# Patient Record
Sex: MALE | Race: WHITE | Employment: OTHER | URBAN - METROPOLITAN AREA
[De-identification: names, ages, dates, MRNs, and addresses within clinical notes are randomized per-mention and may not be internally consistent; named-entity substitution may affect disease eponyms.]

---

## 2017-06-18 ENCOUNTER — HOSPITAL ENCOUNTER (EMERGENCY)
Age: 82
Discharge: HOME | End: 2017-06-19
Attending: EMERGENCY MEDICINE | Admitting: EMERGENCY MEDICINE
Payer: MEDICARE

## 2017-06-18 DIAGNOSIS — T78.40XA ALLERGIC REACTION, INITIAL ENCOUNTER: Primary | ICD-10-CM

## 2017-06-18 PROCEDURE — 96374 THER/PROPH/DIAG INJ IV PUSH: CPT

## 2017-06-18 PROCEDURE — 74011250636 HC RX REV CODE- 250/636: Performed by: EMERGENCY MEDICINE

## 2017-06-18 PROCEDURE — 96375 TX/PRO/DX INJ NEW DRUG ADDON: CPT

## 2017-06-18 PROCEDURE — 96361 HYDRATE IV INFUSION ADD-ON: CPT

## 2017-06-18 PROCEDURE — 99283 EMERGENCY DEPT VISIT LOW MDM: CPT

## 2017-06-18 RX ORDER — VALSARTAN 320 MG/1
320 TABLET ORAL DAILY
COMMUNITY
End: 2017-11-19 | Stop reason: SDUPTHER

## 2017-06-18 RX ORDER — DIPHENHYDRAMINE HYDROCHLORIDE 50 MG/ML
25 INJECTION, SOLUTION INTRAMUSCULAR; INTRAVENOUS
Status: COMPLETED | OUTPATIENT
Start: 2017-06-18 | End: 2017-06-18

## 2017-06-18 RX ADMIN — DIPHENHYDRAMINE HYDROCHLORIDE 25 MG: 50 INJECTION, SOLUTION INTRAMUSCULAR; INTRAVENOUS at 22:32

## 2017-06-18 RX ADMIN — SODIUM CHLORIDE 1000 ML: 900 INJECTION, SOLUTION INTRAVENOUS at 22:32

## 2017-06-18 RX ADMIN — METHYLPREDNISOLONE SODIUM SUCCINATE 125 MG: 125 INJECTION, POWDER, FOR SOLUTION INTRAMUSCULAR; INTRAVENOUS at 22:32

## 2017-06-19 VITALS
SYSTOLIC BLOOD PRESSURE: 124 MMHG | RESPIRATION RATE: 16 BRPM | DIASTOLIC BLOOD PRESSURE: 60 MMHG | OXYGEN SATURATION: 94 % | TEMPERATURE: 98 F | BODY MASS INDEX: 26.66 KG/M2 | HEIGHT: 68 IN | WEIGHT: 175.93 LBS | HEART RATE: 69 BPM

## 2017-06-19 RX ORDER — METHYLPREDNISOLONE 4 MG/1
TABLET ORAL
Qty: 1 DOSE PACK | Refills: 0 | Status: SHIPPED | OUTPATIENT
Start: 2017-06-19 | End: 2017-12-07 | Stop reason: ALTCHOICE

## 2017-06-19 NOTE — ED PROVIDER NOTES
HPI Comments: Evangelina Hampton is a [de-identified] y.o. male with pertinent PMHx of HTN, CAD who presents ambulatory with spouse to ED c/o constant lip swelling, along with associated rash under his arms and around his waist that onset at 5:45 PM today. Spouse states the pt took Benadryl 75 mg at 5:45 PM. He denies eating any new foods or previous seafood allergy, and he denies any use of new soaps, lotions, medications, or creams. Pt states he has a history of similar rash 3-4 times with previous allergic reactions, but he does not typically have lip swelling. Pt notes previous concern for possible allergy to pork, but he reports being tested by an allergy specialist who told him that he had no discernable reaction to pork. Pt denies any shortness of breath, tongue swelling, nausea, vomiting. PCP:  Raj Neal MD  Cardiology: Dr Elisa Marina Hx:  tobacco use (former), EtOH use (-)    There are no other complaints, changes or physical findings at this time. The history is provided by the patient. No  was used. Past Medical History:   Diagnosis Date    Arthritis     CAD (coronary artery disease) 2000    MI     Hypertension     Other ill-defined conditions     elevated lipids    Psychiatric disorder     depression /anxiety       Past Surgical History:   Procedure Laterality Date    ABDOMEN SURGERY PROC UNLISTED      hernia repair    CARDIAC SURG PROCEDURE UNLIST      stents x 6    COLORECTAL SCRN; HI RISK IND  9/10/2014         HX TONSILLECTOMY      [place] COLSC FLX W/RMVL OF TUMOR POLYP LESION SNARE TQ  9/10/2014              Family History:   Problem Relation Age of Onset    Psychiatric Disorder Mother      depression    Heart Disease Father      aneurysym       Social History     Social History    Marital status:      Spouse name: N/A    Number of children: N/A    Years of education: N/A     Occupational History    Not on file.      Social History Main Topics  Smoking status: Former Smoker     Packs/day: 1.50     Years: 5.00    Smokeless tobacco: Not on file      Comment: smoke for 5 year     Alcohol use No    Drug use: No    Sexual activity: Not on file     Other Topics Concern    Not on file     Social History Narrative         ALLERGIES: Benazepril and Pork/porcine containing products    Review of Systems   Constitutional: Negative for activity change, chills and fever. HENT: Positive for facial swelling (lips). Negative for congestion and sore throat. Eyes: Negative for pain and redness. Respiratory: Negative for cough, chest tightness and shortness of breath. Cardiovascular: Negative for chest pain and palpitations. Gastrointestinal: Negative for abdominal pain, diarrhea, nausea and vomiting. Genitourinary: Negative for dysuria, frequency and urgency. Musculoskeletal: Negative for back pain and neck pain. Skin: Positive for rash. Neurological: Negative for syncope, light-headedness and headaches. Psychiatric/Behavioral: Negative for confusion. All other systems reviewed and are negative. Patient Vitals for the past 12 hrs:   Temp Pulse Resp BP SpO2   06/19/17 0000 - - - 124/60 94 %   06/18/17 2345 - - - 117/62 95 %   06/18/17 2329 - - - 125/59 95 %   06/18/17 2124 98 °F (36.7 °C) 69 16 (!) 125/94 100 %       Physical Exam   Constitutional: He is oriented to person, place, and time. He appears well-developed and well-nourished. No distress. HENT:   Head: Normocephalic. Nose: Nose normal.   Mouth/Throat: Oropharynx is clear and moist. No oropharyngeal exudate. No tongue swelling, no lip swelling appreciated    Eyes: Conjunctivae are normal. Pupils are equal, round, and reactive to light. No scleral icterus. Neck: Normal range of motion. Neck supple. No JVD present. No tracheal deviation present. No thyromegaly present. Cardiovascular: Normal rate, regular rhythm and intact distal pulses.   Exam reveals no gallop and no friction rub. No murmur heard. Pulmonary/Chest: Effort normal and breath sounds normal. No stridor. No respiratory distress. He has no wheezes. He has no rales. Abdominal: Soft. Bowel sounds are normal. He exhibits no distension. There is no tenderness. There is no rebound and no guarding. Musculoskeletal: Normal range of motion. He exhibits no edema. Lymphadenopathy:     He has no cervical adenopathy. Neurological: He is alert and oriented to person, place, and time. No cranial nerve deficit. He exhibits normal muscle tone. Coordination normal.   Skin: Skin is warm and dry. He is not diaphoretic. Erythematous, urticarial rash bilateral axilla and medial aspect of upper arms, and anterior surface of his waist line and suprapubic area extending down to his thighs. No open wounds or drainage     Psychiatric: He has a normal mood and affect. His behavior is normal.   Nursing note and vitals reviewed. MDM  Number of Diagnoses or Management Options  Allergic reaction, initial encounter:   Diagnosis management comments: DDx: Urticaria , allergic reaction       Amount and/or Complexity of Data Reviewed  Review and summarize past medical records: yes    Risk of Complications, Morbidity, and/or Mortality  General comments: Pt well appearing; urticarial rash in bilateral axillae and anterior thighs and lower abd; no tongue or lip swelling; no sob or resp sx; vss; no progression of sx during observation period; dc with medrol dose kavin; pt agreed to return to ed if any worsening of sx;  Gabriela Cruz MD      Patient Progress  Patient progress: stable    ED Course       Procedures    Progress Note  12:05 AM    Gabriela Cruz MD has re-evaluated pt, and pt states he iss feeling better, and he has had no progression of rash.     MEDICATIONS GIVEN:  Medications   methylPREDNISolone (PF) (SOLU-MEDROL) injection 125 mg (125 mg IntraVENous Given 6/18/17 2232)   diphenhydrAMINE (BENADRYL) injection 25 mg (25 mg IntraVENous Given 6/18/17 2232)   sodium chloride 0.9 % bolus infusion 1,000 mL (0 mL IntraVENous IV Completed 6/18/17 2332)       IMPRESSION:  1. Allergic reaction, initial encounter        PLAN:  1. Discharge Medication List as of 6/19/2017 12:10 AM      START taking these medications    Details   methylPREDNISolone (MEDROL, BRYNN,) 4 mg tablet Take as directed, Print, Disp-1 Dose Pack, R-0         CONTINUE these medications which have NOT CHANGED    Details   valsartan (DIOVAN) 320 mg tablet Take 320 mg by mouth daily. , Historical Med      nebivolol (BYSTOLIC) 5 mg tablet Take 5 mg by mouth daily. , Historical Med      Amlodipine-Olmesartan (TERENCE) 5-40 mg tab Take 1 tablet by mouth daily. , Historical Med      PSYLLIUM SEED, WITH SUGAR, (METAMUCIL PO) Take 2 capsules by mouth daily. , Historical Med      aspirin 81 mg tablet Take 81 mg by mouth daily. , Historical Med      fish oil-dha-epa 1,200-144-216 mg cap Take 3 Tabs by mouth daily. , Historical Med      ALPRAZolam (XANAX) 0.5 mg tablet Take 0.5 mg by mouth nightly as needed.  , Historical Med      cholecalciferol, vitamin d3, (VITAMIN D) 1,000 unit tablet Take 3,000 Units by mouth daily. , Historical Med      rosuvastatin (CRESTOR) 5 mg tablet Take 20 mg by mouth daily. , Historical Med           2. Follow-up Information     Follow up With Details Comments Contact Info    Eliazar Manzo MD Call in 1 day  [address] [place] [address]  [phone]      [facility] EMERGENCY DEPT Go in 1 day As needed, If symptoms worsen [address] [place] [address]  [phone]        Return to ED if worse       DISCHARGE NOTE  12:20 AM  The patient has been re-evaluated and is ready for discharge. Reviewed available results with patient. Counseled pt on diagnosis and care plan. Pt has expressed understanding, and all questions have been answered.  Pt agrees with plan and agrees to follow up as recommended, or return to the ED if their symptoms worsen. Discharge instructions have been provided and explained to the pt, along with reasons to return to the ED. Attestations: This note is prepared by Bharti Beaver, acting as Scribe for Deepak Fitch MD.    Deepak Fitch MD: The scribe's documentation has been prepared under my direction and personally reviewed by me in its entirety. I confirm that the note above accurately reflects all work, treatment, procedures, and medical decision making performed by me.

## 2017-06-19 NOTE — ED NOTES
Assumed care of patient. Patient is alert and oriented, does not appear to be in distress. Patient ambulatory to ED with c/o allergic reaction with rash and itching to aron under arms, abdomen, lips and pelvic area. Pt took 75 mg benadryl at 1745 this evening. Pt denies any changes in medications or laundry detergent.

## 2017-06-19 NOTE — DISCHARGE INSTRUCTIONS
Allergic Reaction: Care Instructions  Your Care Instructions  An allergic reaction is an excessive response from your immune system to a medicine, chemical, food, insect bite, or other substance. A reaction can range from mild to life-threatening. Some people have a mild rash, hives, and itching or stomach cramps. In severe reactions, swelling of your tongue and throat can close up your airway so that you cannot breathe. Follow-up care is a key part of your treatment and safety. Be sure to make and go to all appointments, and call your doctor if you are having problems. It's also a good idea to know your test results and keep a list of the medicines you take. How can you care for yourself at home? · If you know what caused your allergic reaction, be sure to avoid it. Your allergy may become more severe each time you have a reaction. · Take an over-the-counter antihistamine, such as cetirizine (Zyrtec) or loratadine (Claritin), to treat mild symptoms. Read and follow directions on the label. Some antihistamines can make you feel sleepy. Do not give antihistamines to a child unless you have checked with your doctor first. Mild symptoms include sneezing or an itchy or runny nose; an itchy mouth; a few hives or mild itching; and mild nausea or stomach discomfort. · Do not scratch hives or a rash. Put a cold, moist towel on them or take cool baths to relieve itching. Put ice packs on hives, swelling, or insect stings for 10 to 15 minutes at a time. Put a thin cloth between the ice pack and your skin. Do not take hot baths or showers. They will make the itching worse. · Your doctor may prescribe a shot of epinephrine to carry with you in case you have a severe reaction. Learn how to give yourself the shot and keep it with you at all times. Make sure it is not . · Go to the emergency room every time you have a severe reaction, even if you have used your shot of epinephrine and are feeling better. Symptoms can come back after a shot. · Wear medical alert jewelry that lists your allergies. You can buy this at most drugstores. · If your child has a severe allergy, make sure that his or her teachers, babysitters, coaches, and other caregivers know about the allergy. They should have an epinephrine shot, know how and when to give it, and have a plan to take your child to the hospital.  When should you call for help? Give an epinephrine shot if:  · You think you are having a severe allergic reaction. · You have symptoms in more than one body area, such as mild nausea and an itchy mouth. After giving an epinephrine shot call 911, even if you feel better. Call 911 if:  · You have symptoms of a severe allergic reaction. These may include:  ¨ Sudden raised, red areas (hives) all over your body. ¨ Swelling of the throat, mouth, lips, or tongue. ¨ Trouble breathing. ¨ Passing out (losing consciousness). Or you may feel very lightheaded or suddenly feel weak, confused, or restless. · You have been given an epinephrine shot, even if you feel better. Call your doctor now or seek immediate medical care if:  · You have symptoms of an allergic reaction, such as:  ¨ A rash or hives (raised, red areas on the skin). ¨ Itching. ¨ Swelling. ¨ Belly pain, nausea, or vomiting. Watch closely for changes in your health, and be sure to contact your doctor if:  · You do not get better as expected. Where can you learn more? Go to http://florin-madeline.info/. Enter X415 in the search box to learn more about \"Allergic Reaction: Care Instructions. \"  Current as of: February 12, 2016  Content Version: 11.2  © 8960-3909 [employer]. Care instructions adapted under license by [employer] (which disclaims liability or warranty for this information).  If you have questions about a medical condition or this instruction, always ask your healthcare professional. Cathy Ramirez disclaims any warranty or liability for your use of this information.

## 2017-06-19 NOTE — ED NOTES
Dr. Sven Ibarra gave and reviewed discharge instructions with the patient. The patient verbalized understanding. The patient was given opportunity for questions. Patient discharged in stable condition to the waiting room via wheelchair with ED tech and female visitor.

## 2017-06-19 NOTE — ED NOTES
Assumed care of pt from JARRELL Ortiz with verbal report consisting of Situation, Background, Assessment, and Recommendations (SBAR). Pt is A&O x 4. Pt resting comfortably on the stretcher in a position of comfort. Call bell within reach. Side rails x 2. Cardiac monitor x 2. Stretcher locked in the lowest position. Concerns and questions addressed at this time. Pt in no acute distress at this the time. Will continue to monitor.

## 2017-09-03 DIAGNOSIS — E78.2 MIXED HYPERLIPIDEMIA: Primary | ICD-10-CM

## 2017-09-05 RX ORDER — ROSUVASTATIN CALCIUM 20 MG/1
TABLET, COATED ORAL
Qty: 90 TAB | Refills: 3 | Status: SHIPPED | OUTPATIENT
Start: 2017-09-05 | End: 2018-09-17 | Stop reason: SDUPTHER

## 2017-09-05 NOTE — TELEPHONE ENCOUNTER
Requested Prescriptions     Pending Prescriptions Disp Refills    rosuvastatin (CRESTOR) 20 mg tablet [Pharmacy Med Name: ROSUVASTATIN  20MG  TAB] 90 Tab 3     Sig: Take 1 tablet by mouth  daily

## 2017-10-11 RX ORDER — ALPRAZOLAM 0.5 MG/1
0.5 TABLET ORAL
Qty: 90 TAB | Refills: 0 | Status: SHIPPED | OUTPATIENT
Start: 2017-10-11 | End: 2018-02-20 | Stop reason: SDUPTHER

## 2017-10-11 NOTE — TELEPHONE ENCOUNTER
Requested Prescriptions     Pending Prescriptions Disp Refills    ALPRAZolam (XANAX) 0.5 mg tablet 90 Tab 0     Sig: Take 1 Tab by mouth nightly as needed.  Max Daily Amount: 0.5 mg.

## 2017-10-30 ENCOUNTER — CLINICAL SUPPORT (OUTPATIENT)
Dept: INTERNAL MEDICINE CLINIC | Age: 82
End: 2017-10-30

## 2017-10-30 DIAGNOSIS — Z23 ENCOUNTER FOR IMMUNIZATION: ICD-10-CM

## 2017-10-30 NOTE — PATIENT INSTRUCTIONS
Vaccine Information Statement    Influenza (Flu) Vaccine (Inactivated or Recombinant): What you need to know    Many Vaccine Information Statements are available in Croatian and other languages. See www.immunize.org/vis  Hojas de Información Sobre Vacunas están disponibles en Español y en muchos otros idiomas. Visite www.immunize.org/vis    1. Why get vaccinated? Influenza (flu) is a contagious disease that spreads around the [place] every year, usually between October and May. Flu is caused by influenza viruses, and is spread mainly by coughing, sneezing, and close contact. Anyone can get flu. Flu strikes suddenly and can last several days. Symptoms vary by age, but can include:   fever/chills   sore throat   muscle aches   fatigue   cough   headache    runny or stuffy nose    Flu can also lead to pneumonia and blood infections, and cause diarrhea and seizures in children. If you have a medical condition, such as heart or lung disease, flu can make it worse. Flu is more dangerous for some people. Infants and young children, people 72years of age and older, pregnant women, and people with certain health conditions or a weakened immune system are at greatest risk. Each year thousands of people in the [facility] die from flu, and many more are hospitalized. Flu vaccine can:   keep you from getting flu,   make flu less severe if you do get it, and   keep you from spreading flu to your family and other people. 2. Inactivated and recombinant flu vaccines    A dose of flu vaccine is recommended every flu season. Children 6 months through 6years of age may need two doses during the same flu season. Everyone else needs only one dose each flu season.        Some inactivated flu vaccines contain a very small amount of a mercury-based preservative called thimerosal. Studies have not shown thimerosal in vaccines to be harmful, but flu vaccines that do not contain thimerosal are available. There is no live flu virus in flu shots. They cannot cause the flu. There are many flu viruses, and they are always changing. Each year a new flu vaccine is made to protect against three or four viruses that are likely to cause disease in the upcoming flu season. But even when the vaccine doesnt exactly match these viruses, it may still provide some protection    Flu vaccine cannot prevent:   flu that is caused by a virus not covered by the vaccine, or   illnesses that look like flu but are not. It takes about 2 weeks for protection to develop after vaccination, and protection lasts through the flu season. 3. Some people should not get this vaccine    Tell the person who is giving you the vaccine:     If you have any severe, life-threatening allergies. If you ever had a life-threatening allergic reaction after a dose of flu vaccine, or have a severe allergy to any part of this vaccine, you may be advised not to get vaccinated. Most, but not all, types of flu vaccine contain a small amount of egg protein.  If you ever had Guillain-Barré Syndrome (also called GBS). Some people with a history of GBS should not get this vaccine. This should be discussed with your doctor.  If you are not feeling well. It is usually okay to get flu vaccine when you have a mild illness, but you might be asked to come back when you feel better. 4. Risks of a vaccine reaction    With any medicine, including vaccines, there is a chance of reactions. These are usually mild and go away on their own, but serious reactions are also possible. Most people who get a flu shot do not have any problems with it.      Minor problems following a flu shot include:    soreness, redness, or swelling where the shot was given     hoarseness   sore, red or itchy eyes   cough   fever   aches   headache   itching   fatigue  If these problems occur, they usually begin soon after the shot and last 1 or 2 days. More serious problems following a flu shot can include the following:     There may be a small increased risk of Guillain-Barré Syndrome (GBS) after inactivated flu vaccine. This risk has been estimated at 1 or 2 additional cases per million people vaccinated. This is much lower than the risk of severe complications from flu, which can be prevented by flu vaccine.  Young children who get the flu shot along with pneumococcal vaccine (PCV13) and/or DTaP vaccine at the same time might be slightly more likely to have a seizure caused by fever. Ask your doctor for more information. Tell your doctor if a child who is getting flu vaccine has ever had a seizure. Problems that could happen after any injected vaccine:      People sometimes faint after a medical procedure, including vaccination. Sitting or lying down for about 15 minutes can help prevent fainting, and injuries caused by a fall. Tell your doctor if you feel dizzy, or have vision changes or ringing in the ears.  Some people get severe pain in the shoulder and have difficulty moving the arm where a shot was given. This happens very rarely.  Any medication can cause a severe allergic reaction. Such reactions from a vaccine are very rare, estimated at about 1 in a million doses, and would happen within a few minutes to a few hours after the vaccination. As with any medicine, there is a very remote chance of a vaccine causing a serious injury or death. The safety of vaccines is always being monitored. For more information, visit: www.cdc.gov/vaccinesafety/    5. What if there is a serious reaction? What should I look for?  Look for anything that concerns you, such as signs of a severe allergic reaction, very high fever, or unusual behavior.     Signs of a severe allergic reaction can include hives, swelling of the face and throat, difficulty breathing, a fast heartbeat, dizziness, and weakness - usually within a few minutes to a few hours after the vaccination. What should I do?  If you think it is a severe allergic reaction or other emergency that cant wait, call 9-1-1 and get the person to the nearest hospital. Otherwise, call your doctor.  Reactions should be reported to the Vaccine Adverse Event Reporting System (VAERS). Your doctor should file this report, or you can do it yourself through  the VAERS web site at www.vaers. [facility].gov, or by calling 4-756.282.3099. VAERS does not give medical advice. 6. The National Vaccine Injury Compensation Program    The [facility] Vaccine Injury Compensation Program (VICP) is a federal program that was created to compensate people who may have been injured by certain vaccines. Persons who believe they may have been injured by a vaccine can learn about the program and about filing a claim by calling 0-371.360.8113 or visiting the [employer] website at www.[facility].gov/vaccinecompensation. There is a time limit to file a claim for compensation. 7. How can I learn more?  Ask your healthcare provider. He or she can give you the vaccine package insert or suggest other sources of information.  Call your local or state health department.  Contact the Centers for Disease Control and Prevention (CDC):  - Call 9-242.116.4466 (1-800-CDC-INFO) or  - Visit CDCs website at www.cdc.gov/flu    Vaccine Information Statement   Inactivated Influenza Vaccine   8/7/2015  42 LAURA Shonda Evelyn [id number]    Department of Health and Human Services  Centers for Disease Control and Prevention    Office Use Only

## 2017-10-30 NOTE — MR AVS SNAPSHOT
Visit Information Date & Time Provider Department Dept. Phone Encounter #  
 10/30/2017 10:50 AM Nadine Patel MD [place] [address] [phone] [id number] Your Appointments 12/7/2017  8:10 AM  
FOLLOW UP 10 with MD HAYDEE Agee [facility] ([address]) Appt Note: 482 [facility] [address] [phone] [address] [address] Upcoming Health Maintenance Date Due DTaP/Tdap/Td series (1 - Tdap) 10/25/1957 ZOSTER VACCINE AGE 60> 8/25/1996 GLAUCOMA SCREENING Q2Y 10/25/2001 Pneumococcal 65+ Low/Medium Risk (1 of 2 - PCV13) 10/25/2001 MEDICARE YEARLY EXAM 10/25/2001 INFLUENZA AGE 9 TO ADULT 8/1/2017 Allergies as of 10/30/2017  Review Complete On: 6/18/2017 By: Andrews Lozano RN Severity Noted Reaction Type Reactions Benazepril  09/05/2014   Systemic Rash, Swelling Pork/porcine Containing Products  06/28/2010    Hives Current Immunizations  Never Reviewed No immunizations on file. Not reviewed this visit Vitals Smoking Status Former Smoker Preferred Pharmacy Pharmacy Name Phone [id number] - [address] [phone] Your Updated Medication List  
  
   
This list is accurate as of: 10/30/17 11:30 AM.  Always use your most recent med list.  
  
  
  
  
 ALPRAZolam 0.5 mg tablet Commonly known as:  Marcial January Take 1 Tab by mouth nightly as needed. Max Daily Amount: 0.5 mg.  
  
 aspirin 81 mg tablet Take 81 mg by mouth daily. TERENCE 5-40 mg Tab Generic drug:  amLODIPine-Olmesartan Take 1 tablet by mouth daily. BYSTOLIC 5 mg tablet Generic drug:  nebivolol Take 5 mg by mouth daily. * CRESTOR 5 mg tablet Generic drug:  rosuvastatin Take 20 mg by mouth daily. * rosuvastatin 20 mg tablet Commonly known as:  CRESTOR Take 1 tablet by mouth  daily  
  
 fish oil-dha-epa 1,200-144-216 mg Cap Take 3 Tabs by mouth daily. METAMUCIL PO Take 2 capsules by mouth daily. methylPREDNISolone 4 mg tablet Commonly known as:  MEDROL (BRYNN) Take as directed  
  
 valsartan 320 mg tablet Commonly known as:  DIOVAN Take 320 mg by mouth daily. VITAMIN D3 1,000 unit tablet Generic drug:  cholecalciferol Take 3,000 Units by mouth daily. * Notice: This list has 2 medication(s) that are the same as other medications prescribed for you. Read the directions carefully, and ask your doctor or other care provider to review them with you. Introducing [facility]! [facility] introduces [employer] patient portal. Now you can access parts of your medical record, email your doctor's office, and request medication refills online. 1. In your internet browser, go to [email or web address] 2. Click on the First Time User? Click Here link in the Sign In box. You will see the New Member Sign Up page. 3. Enter your Clacendix Access Code exactly as it appears below. You will not need to use this code after youve completed the sign-up process. If you do not sign up before the expiration date, you must request a new code. · Clacendix Access Code: Ricardo Cueva [email or web address] Expires: 1/28/2018 10:56 AM 
 
4. Enter the last four digits of your Social Security Number (xxxx) and Date of Birth (mm/dd/yyyy) as indicated and click Submit. You will be taken to the next sign-up page. 5. Create a Perdoot ID. This will be your Clacendix login ID and cannot be changed, so think of one that is secure and easy to remember. 6. Create a Clacendix password. You can change your password at any time. 7. Enter your Password Reset Question and Answer. This can be used at a later time if you forget your password. 8. Enter your e-mail address. You will receive e-mail notification when new information is available in [address] 9. Click Sign Up. You can now view and download portions of your medical record. 10. Click the Download Summary menu link to download a portable copy of your medical information. If you have questions, please visit the Frequently Asked Questions section of the Link [employer] website. Remember, Link Medicine is NOT to be used for urgent needs. For medical emergencies, dial 911. Now available from your iPhone and Android! Please provide this summary of care documentation to your next provider. Your primary care clinician is listed as Dilia. If you have any questions after today's visit, please call [phone].

## 2017-10-30 NOTE — PROGRESS NOTES
Arnaud Calvert is a 81 y.o. male who presents for routine immunizations. He denies any symptoms , reactions or allergies that would exclude them from being immunized today. Risks and adverse reactions were discussed and the VIS was given to them. All questions were addressed. He was observed for 15 min post injection. There were no reactions observed. Also advised to call if any problems occur after leaving the office.     Calixto Mcelroy RN

## 2017-11-19 DIAGNOSIS — I10 HYPERTENSION, UNSPECIFIED TYPE: Primary | ICD-10-CM

## 2017-11-20 RX ORDER — VALSARTAN 320 MG/1
TABLET ORAL
Qty: 90 TAB | Refills: 3 | Status: SHIPPED | OUTPATIENT
Start: 2017-11-20 | End: 2018-08-07 | Stop reason: ALTCHOICE

## 2017-11-20 NOTE — TELEPHONE ENCOUNTER
Requested Prescriptions     Pending Prescriptions Disp Refills    valsartan (DIOVAN) 320 mg tablet [Pharmacy Med Name: VALSARTAN  320MG  TAB] 90 Tab 3     Sig: TAKE 1 TABLET BY MOUTH  DAILY

## 2017-11-28 PROBLEM — F32.A DEPRESSION: Status: ACTIVE | Noted: 2017-11-28

## 2017-11-28 PROBLEM — Z88.8 ALLERGY TO ACE INHIBITORS: Status: ACTIVE | Noted: 2017-11-28

## 2017-11-28 PROBLEM — N52.9 ED (ERECTILE DYSFUNCTION): Status: ACTIVE | Noted: 2017-11-28

## 2017-11-28 PROBLEM — Z79.899 ON STATIN THERAPY: Status: ACTIVE | Noted: 2017-11-28

## 2017-11-28 PROBLEM — Z87.891 FORMER SMOKER: Status: ACTIVE | Noted: 2017-11-28

## 2017-11-28 PROBLEM — F41.9 ANXIETY: Status: ACTIVE | Noted: 2017-11-28

## 2017-11-28 PROBLEM — R73.02 GLUCOSE INTOLERANCE (IMPAIRED GLUCOSE TOLERANCE): Status: ACTIVE | Noted: 2017-11-28

## 2017-11-28 PROBLEM — G47.00 INSOMNIA: Status: ACTIVE | Noted: 2017-11-28

## 2017-11-28 PROBLEM — R97.20 ELEVATED PSA: Status: ACTIVE | Noted: 2017-11-28

## 2017-11-28 PROBLEM — M19.90 DJD (DEGENERATIVE JOINT DISEASE): Status: ACTIVE | Noted: 2017-11-28

## 2017-11-28 PROBLEM — I25.10 ASCVD (ARTERIOSCLEROTIC CARDIOVASCULAR DISEASE): Status: ACTIVE | Noted: 2017-11-28

## 2017-12-05 PROBLEM — M15.9 PRIMARY OSTEOARTHRITIS INVOLVING MULTIPLE JOINTS: Status: ACTIVE | Noted: 2017-11-28

## 2017-12-07 ENCOUNTER — OFFICE VISIT (OUTPATIENT)
Dept: INTERNAL MEDICINE CLINIC | Age: 82
End: 2017-12-07

## 2017-12-07 VITALS
HEIGHT: 68 IN | BODY MASS INDEX: 25.98 KG/M2 | WEIGHT: 171.4 LBS | SYSTOLIC BLOOD PRESSURE: 124 MMHG | DIASTOLIC BLOOD PRESSURE: 88 MMHG | TEMPERATURE: 97.9 F | RESPIRATION RATE: 18 BRPM | HEART RATE: 50 BPM | OXYGEN SATURATION: 99 %

## 2017-12-07 DIAGNOSIS — F41.9 ANXIETY: ICD-10-CM

## 2017-12-07 DIAGNOSIS — M15.9 PRIMARY OSTEOARTHRITIS INVOLVING MULTIPLE JOINTS: ICD-10-CM

## 2017-12-07 DIAGNOSIS — R73.02 GLUCOSE INTOLERANCE (IMPAIRED GLUCOSE TOLERANCE): ICD-10-CM

## 2017-12-07 DIAGNOSIS — Z00.00 MEDICARE ANNUAL WELLNESS VISIT, INITIAL: ICD-10-CM

## 2017-12-07 DIAGNOSIS — I10 ESSENTIAL HYPERTENSION: Primary | ICD-10-CM

## 2017-12-07 DIAGNOSIS — R26.81 GAIT INSTABILITY: ICD-10-CM

## 2017-12-07 DIAGNOSIS — L98.9 NON-HEALING SKIN LESION OF NOSE: ICD-10-CM

## 2017-12-07 DIAGNOSIS — G45.9 TRANSIENT CEREBRAL ISCHEMIA, UNSPECIFIED TYPE: ICD-10-CM

## 2017-12-07 DIAGNOSIS — E78.2 MIXED HYPERLIPIDEMIA: ICD-10-CM

## 2017-12-07 DIAGNOSIS — I25.10 ASCVD (ARTERIOSCLEROTIC CARDIOVASCULAR DISEASE): ICD-10-CM

## 2017-12-07 NOTE — PROGRESS NOTES
This is an Initial Medicare Annual Wellness Exam (AWV) (Performed 12 months after IPPE or effective date of Medicare Part B enrollment, Once in a lifetime)    I have reviewed the patient's medical history in detail and updated the computerized patient record. He returns today for initial annual Medicare wellness examination screening questionnaire. Is also here in follow-up of his medical problems include hypertension, glucose intolerance, hyperlipidemia, DJD, ASCVD, and anxiety. He is taking his medications and treatments to be doing currently quite well with them as well as trying to exercise and eat appropriately. He denies any chest pain shortness of breath palpitations PND orthopnea or any other cardiovascular complaints. He denies any headaches or neurological planes. There are no GI/ complaints. He has no arthritic complaints no other complaints on review of systems.     History     Past Medical History:   Diagnosis Date    Allergy to ACE inhibitors 11/28/2017    Anxiety 11/28/2017    Arthritis     ASCVD (arteriosclerotic cardiovascular disease) 11/28/2017    CAD (coronary artery disease) 2000    MI     Depression 11/28/2017    DJD (degenerative joint disease) 11/28/2017    ED (erectile dysfunction) 11/28/2017    Elevated PSA 11/28/2017    Former smoker 11/28/2017    Glucose intolerance (impaired glucose tolerance) 11/28/2017    Hypertension     Insomnia 11/28/2017    On statin therapy 11/28/2017    Other ill-defined conditions(799.89)     elevated lipids    Psychiatric disorder     depression /anxiety      Past Surgical History:   Procedure Laterality Date    ABDOMEN SURGERY PROC UNLISTED      hernia repair    CARDIAC SURG PROCEDURE UNLIST      stents x 6    COLORECTAL SCRN; HI RISK IND  9/10/2014         HX TONSILLECTOMY      WY COLSC FLX W/RMVL OF TUMOR POLYP LESION SNARE TQ  9/10/2014          Current Outpatient Prescriptions   Medication Sig Dispense Refill    valsartan (DIOVAN) 320 mg tablet TAKE 1 TABLET BY MOUTH  DAILY 90 Tab 3    ALPRAZolam (XANAX) 0.5 mg tablet Take 1 Tab by mouth nightly as needed. Max Daily Amount: 0.5 mg. 90 Tab 0    rosuvastatin (CRESTOR) 20 mg tablet Take 1 tablet by mouth  daily 90 Tab 3    nebivolol (BYSTOLIC) 5 mg tablet Take 5 mg by mouth daily.  Amlodipine-Olmesartan (TERENCE) 5-40 mg tab Take 1 tablet by mouth daily.  aspirin 81 mg tablet Take 81 mg by mouth daily.  fish oil-dha-epa 1,200-144-216 mg cap Take 3 Tabs by mouth daily.  cholecalciferol, vitamin d3, (VITAMIN D) 1,000 unit tablet Take 3,000 Units by mouth daily. Allergies   Allergen Reactions    Benazepril Rash and Swelling    Pork/Porcine Containing Products Hives     Family History   Problem Relation Age of Onset    Psychiatric Disorder Mother      depression    Heart Disease Father      aneurysym     Social History   Substance Use Topics    Smoking status: Former Smoker     Packs/day: 1.50     Years: 5.00    Smokeless tobacco: Never Used      Comment: smoke for 5 year     Alcohol use No     Patient Active Problem List   Diagnosis Code    TIA (transient ischemic attack) G45.9    Syncope R55    Essential hypertension I10    Mixed hyperlipidemia E78.2    ED (erectile dysfunction) N52.9    Elevated PSA R97.20    On statin therapy Z79.899    Insomnia G47.00    Glucose intolerance (impaired glucose tolerance) R73.02    Former smoker Z87.891    Primary osteoarthritis involving multiple joints M15.0    Depression F32.9    ASCVD (arteriosclerotic cardiovascular disease) I25.10    Anxiety F41.9    Allergy to ACE inhibitors Z88.8    Non-healing skin lesion of nose L98.9    Gait instability R26.81    Medicare annual wellness visit, initial Z00.00       Depression Risk Factor Screening:   No flowsheet data found. Alcohol Risk Factor Screening: You do not drink alcohol or very rarely.       Functional Ability and Level of Safety:     Hearing Loss  Hearing is good. Activities of Daily Living  The home contains: no safety equipment. Patient does total self care    Fall RiskNo flowsheet data found. Abuse Screen  Patient is not abused    Cognitive Screening   Evaluation of Cognitive Function:  Has your family/caregiver stated any concerns about your memory: no  Normal     ROS:    Constitutional: He denies fevers, weight loss, sweats. Eyes: No blurred or double vision. ENT: No difficulty with swallowing, taste, speech or smell. Neck: no stiffness or swelling  Respiratory: No cough wheezing or shortness of breath. Cardiovascular: Denies chest pain, palpitations, unexplained indigestion or syncope. Gastrointestinal:  No changes in bowel movements, no abdominal pain, no bloating. Genitourinary:  He denies frequency, nocturia or stranguria. Extremities: No joint pain, stiffness or swelling. Neurological:  No numbness, tingling, burring paresthesias or loss of motor strength. No syncope, dizziness or frequent headache. He does say he occasionally feels off balance  Lymphatic: no adenopathy noted  Hematologic: no easy bruising or bleeding gums  Skin:  No recent rashes or mole changes. Psychiatric/Behavioral:  Negative for depression. Vitals:    12/07/17 0836 12/07/17 0849 12/07/17 0916   BP: 164/82 144/78 124/88   Pulse: (!) 50     Resp: 18     Temp: 97.9 °F (36.6 °C)     TempSrc: Oral     SpO2: 99%     Weight: 171 lb 6.4 oz (77.7 kg)     Height: 5' 7.5\" (1.715 m)     PainSc:   1          PHYSICAL EXAM:    General appearance - alert, well appearing, and in no distress  Mental status - alert, oriented to person, place, and time  HEENT:  Ears - bilateral TM's and external ear canals clear  Eyes - pupillary responses were normal.  Extraocular muscle function intact. Lids and conjunctiva not injected. Fundoscopic exam revealed sharp disc margins. eye movements intact  Pharynx- clear with teeth in good repair.   No masses were noted  Neck - supple without thyromegaly or burit. No JVD noted  Lungs - clear to auscultation and percussion  Cardiac- normal rate, regular rhythm without murmurs. PMI not displaced. No gallop, rub or click  Abdomen - flat, soft, non-tender without palpable organomegaly or mass. No pulsatile mass was felt, and not bruit was heard. Bowel sounds were active  : Circumcised, Testes descended w/o masses  Rectal: normal sphincter tone, prostate normal, no masses, stool brown and hemacult negative  Extremities -  no clubbing cyanosis or edema  Lymphatics - no palpable lymphadenopathy, no hepatosplenomegaly  Hematologic: no petechiae or purpura  Peripheral vascular -Femoral, Dorsalis pedis and posterior tibial pulses felt without difficulty  Skin - no rash or unusual mole change noted. Irregular raised skin lesion left side of his nose  Neurological - Cranial nerves II-XII grossly intact. Motor strength 5/5. DTR's 2+ and symmetric. Station and gait normal  Back exam - full range of motion, no tenderness, palpable spasm or pain on motion  Musculoskeletal - no joint tenderness, deformity or swelling      Patient Care Team   Patient Care Team:  Liliana Lozoya MD as PCP - General (Internal Medicine)    Assessment/Plan   Education and counseling provided:  Are appropriate based on today's review and evaluation     ASSESSMENT:   1. Essential hypertension    2. Mixed hyperlipidemia    3. Glucose intolerance (impaired glucose tolerance)    4. ASCVD (arteriosclerotic cardiovascular disease)    5. Primary osteoarthritis involving multiple joints    6. Anxiety    7. Transient cerebral ischemia, unspecified type    8. Non-healing skin lesion of nose    9. Gait instability    10. Medicare annual wellness visit, initial      Impression  1. Hypertension that is well controlled continue current therapy reviewed with him  2.   Hyperlipidemia repeat status pending reviewed prior labs will make further recommendations adjustments as necessary based on today's lab  3. Glucose intolerance prior status pending repeat status and reviewed prior status will make adjustments if necessary  4. ASCVD clinically stable without symptoms  5. DJD currently stable  6. Anxiety current stable  7. Prior TIA continue aspirin daily  8. Skin lesion left side of his nose I will set him up for plastic surgery evaluation removal of this  9. Some gait instability I will set him up which showed normal physical therapy for balance improvement. Medicare annual wellness examination screening questionnaire completed. The results were reviewed with him his questions answered. Lifestyle recommendations and modifications discussed and made. He is up-to-date on immunizations. Labs are pending as noted will make further recommendation based on those. Follow stable continue same and follow-up schedule III months or sooner should be a problem. PLAN:  .  Orders Placed This Encounter    REFERRAL TO PLASTIC SURGERY    REFERRAL TO PHYSICAL THERAPY    AMB POC LIPID PROFILE    AMB POC HEMOGLOBIN A1C    AMB POC COMPREHENSIVE METABOLIC PANEL    AMB POC CK (CPK)         ATTENTION:   This medical record was transcribed using an electronic medical records system. Although proofread, it may and can contain electronic and spelling errors. Other human spelling and other errors may be present. Corrections may be executed at a later time. Please feel free to contact us for any clarifications as needed. Follow-up Disposition:  Return in about 6 months (around 6/7/2018).       Jonny Kessler MD         Health Maintenance Due   Topic Date Due    DTaP/Tdap/Td series (1 - Tdap) 10/25/1957    GLAUCOMA SCREENING Q2Y  10/25/2001    COLONOSCOPY  09/10/2017

## 2017-12-07 NOTE — MR AVS SNAPSHOT
Visit Information Date & Time Provider Department Dept. Phone Encounter #  
 12/7/2017  8:10 AM Jenna Miller MD [place] [address] [phone] [id number] Follow-up Instructions Return in about 6 months (around 6/7/2018). Follow-up and Disposition History Your Appointments 6/8/2018  8:10 AM  
FOLLOW UP 10 with Jenna Miller MD  
[facility] ([address]) Appt Note: [address] [facility] [address] [phone] [address] [facility] [address] Upcoming Health Maintenance Date Due DTaP/Tdap/Td series (1 - Tdap) 10/25/1957 GLAUCOMA SCREENING Q2Y 10/25/2001 COLONOSCOPY 9/10/2017 MEDICARE YEARLY EXAM 12/8/2018 Allergies as of 12/7/2017  Review Complete On: 12/7/2017 By: Jenna Miller MD  
  
 Severity Noted Reaction Type Reactions Benazepril  09/05/2014   Systemic Rash, Swelling Pork/porcine Containing Products  06/28/2010    Hives Current Immunizations  Never Reviewed Name Date Influenza High Dose Vaccine PF 10/30/2017 Influenza Vaccine 11/9/2016, 10/15/2015, 10/17/2014 Pneumococcal Conjugate (PCV-13) 12/22/2015 Pneumococcal Vaccine (Unspecified Type) 10/24/2007 Zoster Vaccine, Live 9/1/2010 Not reviewed this visit You Were Diagnosed With   
  
 Codes Comments Essential hypertension    -  Primary ICD-10-CM: I10 
ICD-9-CM: 401.9 Mixed hyperlipidemia     ICD-10-CM: E78.2 ICD-9-CM: 272.2 Glucose intolerance (impaired glucose tolerance)     ICD-10-CM: R73.02 
ICD-9-CM: 790.22   
 ASCVD (arteriosclerotic cardiovascular disease)     ICD-10-CM: I25.10 ICD-9-CM: 429.2, 440.9 Primary osteoarthritis involving multiple joints     ICD-10-CM: M15.0 ICD-9-CM: 715.09 Anxiety     ICD-10-CM: F41.9 ICD-9-CM: 300.00 Transient cerebral ischemia, unspecified type     ICD-10-CM: G45.9 ICD-9-CM: 435.9 Non-healing skin lesion of nose     ICD-10-CM: L98.9 ICD-9-CM: 709.9 Gait instability     ICD-10-CM: R26.81 
ICD-9-CM: 765. 2 Medicare annual wellness visit, initial     ICD-10-CM: Z00.00 ICD-9-CM: V70.0 Vitals BP Pulse Temp Resp Height(growth percentile) Weight(growth percentile) 124/88 (!) 50 97.9 °F (36.6 °C) (Oral) 18 5' 7.5\" (1.715 m) 171 lb 6.4 oz (77.7 kg) SpO2 BMI Smoking Status 99% 26.45 kg/m2 Former Smoker Vitals History BMI and BSA Data Body Mass Index Body Surface Area  
 26.45 kg/m 2 1.92 m 2 Preferred Pharmacy Pharmacy Name Phone 305 [facility], [address] [place] [address] [address] [facility] 134 Your Updated Medication List  
  
   
This list is accurate as of: 12/7/17  9:24 AM.  Always use your most recent med list.  
  
  
  
  
 ALPRAZolam 0.5 mg tablet Commonly known as:  Kaleta Breanna Take 1 Tab by mouth nightly as needed. Max Daily Amount: 0.5 mg.  
  
 aspirin 81 mg tablet Take 81 mg by mouth daily. TERENCE 5-40 mg Tab Generic drug:  amLODIPine-Olmesartan Take 1 tablet by mouth daily. BYSTOLIC 5 mg tablet Generic drug:  nebivolol Take 5 mg by mouth daily. fish oil-dha-epa 1,200-144-216 mg Cap Take 3 Tabs by mouth daily. rosuvastatin 20 mg tablet Commonly known as:  CRESTOR Take 1 tablet by mouth  daily  
  
 valsartan 320 mg tablet Commonly known as:  DIOVAN  
TAKE 1 TABLET BY MOUTH  DAILY  
  
 VITAMIN D3 1,000 unit tablet Generic drug:  cholecalciferol Take 3,000 Units by mouth daily. We Performed the Following AMB POC CK (CPK) [75429 CPT(R)] AMB POC COMPREHENSIVE METABOLIC PANEL [70852 CPT(R)] AMB POC HEMOGLOBIN A1C [03047 CPT(R)] AMB POC LIPID PROFILE [79883 CPT(R)] REFERRAL TO PHYSICAL THERAPY [[facility]] Comments: Referral to [facility] for physical therapy for gait instability. REFERRAL TO PLASTIC SURGERY [REF89 Custom] Comments:  
 Removal of skin lesion left side of nose Follow-up Instructions Return in about 6 months (around 6/7/2018). Referral Information Referral ID Referred By Referred To  
  
 [id number] Nakul Calderon MD   
   [address] [facility] [place], [address] Phone: [phone] Fax: [phone] Visits Status Start Date End Date 1 New Request 12/7/17 12/7/18 If your referral has a status of pending review or denied, additional information will be sent to support the outcome of this decision. Referral ID Referred By Referred To  
 [id number] Jacqueline Hodgson Not Available Visits Status Start Date End Date 1 New Request 12/7/17 12/7/18 If your referral has a status of pending review or denied, additional information will be sent to support the outcome of this decision. Introducing [facility]! [employer] introduces [employer] patient portal. Now you can access parts of your medical record, email your doctor's office, and request medication refills online. 1. In your internet browser, go to [email or web address] 2. Click on the First Time User? Click Here link in the Sign In box. You will see the New Member Sign Up page. 3. Enter your Gener[employer] Access Code exactly as it appears below. You will not need to use this code after youve completed the sign-up process. If you do not sign up before the expiration date, you must request a new code. · [employer] Access Code: Ricardo Cueva [email or web address] Expires: 1/28/2018  9:56 AM 
 
4. Enter the last four digits of your Social Security Number (xxxx) and Date of Birth (mm/dd/yyyy) as indicated and click Submit. You will be taken to the next sign-up page. 5. Create a Lifeproof ID. This will be your Lifeproof login ID and cannot be changed, so think of one that is secure and easy to remember. 6. Create a Lifeproof password. You can change your password at any time. 7. Enter your Password Reset Question and Answer. This can be used at a later time if you forget your password. 8. Enter your e-mail address. You will receive e-mail notification when new information is available in [address] 9. Click Sign Up. You can now view and download portions of your medical record. 10. Click the Download Summary menu link to download a portable copy of your medical information. If you have questions, please visit the Frequently Asked Questions section of the Lifeproof website. Remember, Lifeproof is NOT to be used for urgent needs. For medical emergencies, dial 911. Now available from your iPhone and Android! Please provide this summary of care documentation to your next provider. Your primary care clinician is listed as Dilia. If you have any questions after today's visit, please call [phone].

## 2017-12-12 LAB
ALBUMIN SERPL-MCNC: 4.5 G/DL (ref 3.9–5.4)
ALKALINE PHOS POC: 74 U/L (ref 38–126)
ALT SERPL-CCNC: 28 U/L (ref 9–52)
AST SERPL-CCNC: 29 U/L (ref 14–36)
BUN BLD-MCNC: 19 MG/DL (ref 9–20)
CALCIUM BLD-MCNC: 10.1 MG/DL (ref 8.4–10.2)
CHLORIDE BLD-SCNC: 105 MMOL/L (ref 98–107)
CHOLEST SERPL-MCNC: 173 MG/DL (ref 0–200)
CK (CPK) POC: 98 U/L (ref 30–135)
CO2 POC: 28 MMOL/L (ref 22–32)
CREAT BLD-MCNC: 1.2 MG/DL (ref 0.8–1.5)
EGFR (POC): 56.4
GLUCOSE POC: 97 MG/DL (ref 75–110)
HBA1C MFR BLD HPLC: 5.4 % (ref 4.5–5.7)
HDLC SERPL-MCNC: 57 MG/DL (ref 35–130)
LDL CHOLESTEROL POC: 97.6 MG/DL (ref 0–130)
POTASSIUM SERPL-SCNC: 5.7 MMOL/L (ref 3.6–5)
PROT SERPL-MCNC: 7.7 G/DL (ref 6.3–8.2)
SODIUM SERPL-SCNC: 146 MMOL/L (ref 137–145)
TCHOL/HDL RATIO (POC): 3 (ref 0–4)
TOTAL BILIRUBIN POC: 1 MG/DL (ref 0.2–1.3)
TRIGL SERPL-MCNC: 92 MG/DL (ref 0–200)
VLDLC SERPL CALC-MCNC: 18.4 MG/DL

## 2017-12-12 NOTE — PROGRESS NOTES
Lipid profile is good and BMP is all good except for potassium is slightly elevated which I believe that is around his baseline. Make sure he is not taking a potassium of salt substitutes.

## 2017-12-28 RX ORDER — AMLODIPINE BESYLATE 5 MG/1
TABLET ORAL
Qty: 90 TAB | Refills: 3 | Status: SHIPPED | OUTPATIENT
Start: 2017-12-28 | End: 2018-12-07 | Stop reason: SDUPTHER

## 2018-02-02 ENCOUNTER — OFFICE VISIT (OUTPATIENT)
Dept: INTERNAL MEDICINE CLINIC | Age: 83
End: 2018-02-02

## 2018-02-02 VITALS
HEART RATE: 48 BPM | TEMPERATURE: 97.5 F | DIASTOLIC BLOOD PRESSURE: 88 MMHG | OXYGEN SATURATION: 99 % | SYSTOLIC BLOOD PRESSURE: 138 MMHG | HEIGHT: 68 IN | WEIGHT: 177 LBS | RESPIRATION RATE: 16 BRPM | BODY MASS INDEX: 26.83 KG/M2

## 2018-02-02 DIAGNOSIS — E78.2 MIXED HYPERLIPIDEMIA: ICD-10-CM

## 2018-02-02 DIAGNOSIS — M79.10 MYALGIA: Primary | ICD-10-CM

## 2018-02-02 LAB — CK (CPK) POC: 133 U/L (ref 30–135)

## 2018-02-02 RX ORDER — EZETIMIBE 10 MG/1
TABLET ORAL DAILY
COMMUNITY

## 2018-02-02 RX ORDER — FOLIC ACID/MULTIVIT,IRON,MINER 0.4MG-18MG
TABLET ORAL 2 TIMES DAILY
COMMUNITY

## 2018-02-02 NOTE — PATIENT INSTRUCTIONS
Muscle Aches: Care Instructions  Your Care Instructions    Muscle aches have many possible causes. Some common ones are overuse, tension, and injuries such as a strained muscle. An infection such as the flu can cause muscle aches. Or the aches may be caused by some medicines, such as statins. Muscle aches may also be a symptom of a disease like lupus or fibromyalgia. Myalgia is the medical term for muscle aches. The doctor will do a physical exam and ask questions to try to find what is causing your pain. You may also have tests such as blood tests or imaging tests like X-rays. These can help find or rule out serious problems. The doctor has checked you carefully, but problems can develop later. If you notice any problems or new symptoms, get medical treatment right away. Follow-up care is a key part of your treatment and safety. Be sure to make and go to all appointments, and call your doctor if you are having problems. It's also a good idea to know your test results and keep a list of the medicines you take. How can you care for yourself at home? · Rest the area that hurts. You may need to stop or reduce the activity that causes your symptoms. Then you can return to it slowly. · Put ice or a cold pack on the area for 10 to 20 minutes at a time to ease pain. Put a thin cloth between the ice and your skin. · Take an over-the-counter pain medicine, such as acetaminophen (Tylenol), ibuprofen (Advil, Motrin), or naproxen (Aleve). Be safe with medicines. Read and follow all instructions on the label. When should you call for help? Call your doctor now or seek immediate medical care if:  ? · Your pain gets worse. ? · You have new symptoms, such as a fever, swelling, or a rash. ? Watch closely for changes in your health, and be sure to contact your doctor if:  ? · You do not get better as expected. Where can you learn more? Go to http://florin-madeline.info/.   Enter G355 in the search box to learn more about \"Muscle Aches: Care Instructions. \"  Current as of: October 14, 2016  Content Version: 11.4  © 2334-6029 Healthwise, [employer]. Care instructions adapted under license by [employer] (which disclaims liability or warranty for this information). If you have questions about a medical condition or this instruction, always ask your healthcare professional. [address] any warranty or liability for your use of this information.

## 2018-02-02 NOTE — MR AVS SNAPSHOT
[address] [facility] [address] [address] [phone] Patient: Lyle Moreira MRN: [id number] :[id number] Visit Information Date & Time Provider Department Dept. Phone Encounter #  
 2018  3:20 PM Mario Molina MD [facility] [id number] Follow-up Instructions Return if symptoms worsen or fail to improve. Your Appointments 2018  8:10 AM  
FOLLOW UP 10 with MD VAZQUEZ Andrews [facility] Appt Note: [address] [phone] [address] [address] Upcoming Health Maintenance Date Due DTaP/Tdap/Td series (1 - Tdap) 10/25/1957 GLAUCOMA SCREENING Q2Y 10/25/2001 COLONOSCOPY 9/10/2017 MEDICARE YEARLY EXAM 2018 Allergies as of 2018  Review Complete On: 2018 By: Mario Molina MD  
  
 Severity Noted Reaction Type Reactions Benazepril  2014   Systemic Rash, Swelling Pork/porcine Containing Products  2010    Hives Current Immunizations  Never Reviewed Name Date Influenza High Dose Vaccine PF 10/30/2017 Influenza Vaccine 2016, 10/15/2015, 10/17/2014 Pneumococcal Conjugate (PCV-13) 2015 Pneumococcal Vaccine (Unspecified Type) 10/24/2007 Zoster Vaccine, Live 2010 Not reviewed this visit You Were Diagnosed With   
  
 Codes Comments Myalgia    -  Primary ICD-10-CM: M79.1 ICD-9-CM: 729.1 Mixed hyperlipidemia     ICD-10-CM: E78.2 ICD-9-CM: 272.2 Vitals BP Pulse Temp Resp Height(growth percentile) Weight(growth percentile) 138/88 (!) 48 97.5 °F (36.4 °C) (Oral) 16 5' 7.5\" (1.715 m) 177 lb (80.3 kg) SpO2 BMI Smoking Status 99% 27.31 kg/m2 Former Smoker Vitals History BMI and BSA Data Body Mass Index Body Surface Area  
 27.31 kg/m 2 1.96 m 2 Preferred Pharmacy Pharmacy Name Phone [id number] Anitra Gage, [address] [phone] Your Updated Medication List  
  
   
This list is accurate as of: 2/2/18  5:09 PM.  Always use your most recent med list.  
  
  
  
  
 ALPRAZolam 0.5 mg tablet Commonly known as:  Alexandra Mirza Take 1 Tab by mouth nightly as needed. Max Daily Amount: 0.5 mg.  
  
 amLODIPine 5 mg tablet Commonly known as:  Mariah Glabasilioer TAKE 1 TABLET BY MOUTH  DAILY  
  
 aspirin 81 mg tablet Take 81 mg by mouth daily. TERENCE 5-40 mg Tab Generic drug:  amLODIPine-Olmesartan Take 1 tablet by mouth daily. BYSTOLIC 5 mg tablet Generic drug:  nebivolol Take 5 mg by mouth daily. krill-omega-3-dha-epa-lipids 716-64-58-51 mg Cap Take  by mouth. rosuvastatin 20 mg tablet Commonly known as:  CRESTOR Take 1 tablet by mouth  daily  
  
 valsartan 320 mg tablet Commonly known as:  DIOVAN  
TAKE 1 TABLET BY MOUTH  DAILY  
  
 VITAMIN D3 1,000 unit tablet Generic drug:  cholecalciferol Take 3,000 Units by mouth daily. ZETIA 10 mg tablet Generic drug:  ezetimibe Take  by mouth daily. We Performed the Following AMB POC CK (CPK) [78495 CPT(R)] Follow-up Instructions Return if symptoms worsen or fail to improve. Patient Instructions Muscle Aches: Care Instructions Your Care Instructions Muscle aches have many possible causes. Some common ones are overuse, tension, and injuries such as a strained muscle. An infection such as the flu can cause muscle aches. Or the aches may be caused by some medicines, such as statins. Muscle aches may also be a symptom of a disease like lupus or fibromyalgia. Myalgia is the medical term for muscle aches.  
The doctor will do a physical exam and ask questions to try to find what is causing your pain. You may also have tests such as blood tests or imaging tests like X-rays. These can help find or rule out serious problems. The doctor has checked you carefully, but problems can develop later. If you notice any problems or new symptoms, get medical treatment right away. Follow-up care is a key part of your treatment and safety. Be sure to make and go to all appointments, and call your doctor if you are having problems. It's also a good idea to know your test results and keep a list of the medicines you take. How can you care for yourself at home? · Rest the area that hurts. You may need to stop or reduce the activity that causes your symptoms. Then you can return to it slowly. · Put ice or a cold pack on the area for 10 to 20 minutes at a time to ease pain. Put a thin cloth between the ice and your skin. · Take an over-the-counter pain medicine, such as acetaminophen (Tylenol), ibuprofen (Advil, Motrin), or naproxen (Aleve). Be safe with medicines. Read and follow all instructions on the label. When should you call for help? Call your doctor now or seek immediate medical care if: 
? · Your pain gets worse. ? · You have new symptoms, such as a fever, swelling, or a rash. ? Watch closely for changes in your health, and be sure to contact your doctor if: 
? · You do not get better as expected. Where can you learn more? Go to http://florin-madeline.info/. Enter G355 in the search box to learn more about \"Muscle Aches: Care Instructions. \" Current as of: October 14, 2016 Content Version: 11.4 © 3743-5285 [employer]. Care instructions adapted under license by [employer] (which disclaims liability or warranty for this information). If you have questions about a medical condition or this instruction, always ask your healthcare professional. [address] any warranty or liability for your use of this information. Introducing [facility]! Macario Ortega introduces [employer] patient portal. Now you can access parts of your medical record, email your doctor's office, and request medication refills online. 1. In your internet browser, go to [email or web address] [email or web address]t 2. Click on the First Time User? Click Here link in the Sign In box. You will see the New Member Sign Up page. 3. Enter your [employer] Access Code exactly as it appears below. You will not need to use this code after youve completed the sign-up process. If you do not sign up before the expiration date, you must request a new code. · [employer] Access Code: [email or web address] Expires: 5/3/2018  3:19 PM 
 
4. Enter the last four digits of your Social Security Number (xxxx) and Date of Birth (mm/dd/yyyy) as indicated and click Submit. You will be taken to the next sign-up page. 5. Create a [employer] ID. This will be your [employer] login ID and cannot be changed, so think of one that is secure and easy to remember. 6. Create a [employer] password. You can change your password at any time. 7. Enter your Password Reset Question and Answer. This can be used at a later time if you forget your password. 8. Enter your e-mail address. You will receive e-mail notification when new information is available in [address] 9. Click Sign Up. You can now view and download portions of your medical record. 10. Click the Download Summary menu link to download a portable copy of your medical information. If you have questions, please visit the Frequently Asked Questions section of the [employer] website. Remember, [employer] is NOT to be used for urgent needs. For medical emergencies, dial 911. Now available from your iPhone and Android! Please provide this summary of care documentation to your next provider. Your primary care clinician is listed as Dilia. If you have any questions after today's visit, please call [phone].

## 2018-02-02 NOTE — PROGRESS NOTES
Subjective:   Leslie Marley is a 81 y.o. male      Chief Complaint   Patient presents with    Muscle Pain     both arms are sore and aching        History of present illness: He presents today complaining of some pain in his arms initially started some discomfort in his right shoulder earlier this week actually occurred Sunday and he had a physical therapist that he goes to [employer] with him look at it and told him was a muscle spasm. There was noted some pain in both arms that seems to be more in the muscles predominantly now probably some in the right forearm. He notes no pain or weakness or myalgias in the legs at all and he notes no neck pain and no other complaints. He is taking the Crestor 20 a day which just recently started on Zetia but the pain in his shoulder actually started before he started the Zetia.     Patient Active Problem List   Diagnosis Code    TIA (transient ischemic attack) G45.9    Syncope R55    Essential hypertension I10    Mixed hyperlipidemia E78.2    ED (erectile dysfunction) N52.9    Elevated PSA R97.20    On statin therapy Z79.899    Insomnia G47.00    Glucose intolerance (impaired glucose tolerance) R73.02    Former smoker Z87.891    Primary osteoarthritis involving multiple joints M15.0    Depression F32.9    ASCVD (arteriosclerotic cardiovascular disease) I25.10    Anxiety F41.9    Allergy to ACE inhibitors Z88.8    Non-healing skin lesion of nose L98.9    Gait instability R26.81    Medicare annual wellness visit, initial Z00.00    Myalgia M79.1      Past Medical History:   Diagnosis Date    Allergy to ACE inhibitors 11/28/2017    Anxiety 11/28/2017    Arthritis     ASCVD (arteriosclerotic cardiovascular disease) 11/28/2017    CAD (coronary artery disease) 2000    MI     Depression 11/28/2017    DJD (degenerative joint disease) 11/28/2017    ED (erectile dysfunction) 11/28/2017    Elevated PSA 11/28/2017    Former smoker 11/28/2017    Glucose intolerance (impaired glucose tolerance) 11/28/2017    Hypertension     Insomnia 11/28/2017    On statin therapy 11/28/2017    Other ill-defined conditions(799.89)     elevated lipids    Psychiatric disorder     depression /anxiety      Allergies   Allergen Reactions    Benazepril Rash and Swelling    Pork/Porcine Containing Products Hives      Family History   Problem Relation Age of Onset    Psychiatric Disorder Mother      depression    Heart Disease Father      aneurysym      Social History     Social History    Marital status:      Spouse name: N/A    Number of children: N/A    Years of education: N/A     Occupational History    Not on file. Social History Main Topics    Smoking status: Former Smoker     Packs/day: 1.50     Years: 5.00    Smokeless tobacco: Never Used      Comment: smoke for 5 year     Alcohol use No    Drug use: No    Sexual activity: Not on file     Other Topics Concern    Not on file     Social History Narrative     Prior to Admission medications    Medication Sig Start Date End Date Taking? Authorizing Provider   krill-omega-3-dha-epa-lipids 686-08-18-86 mg cap Take  by mouth. Yes Historical Provider   ezetimibe (ZETIA) 10 mg tablet Take  by mouth daily. Yes Historical Provider   amLODIPine (NORVASC) 5 mg tablet TAKE 1 TABLET BY MOUTH  DAILY 12/28/17  Yes Timo Warren MD   valsartan (DIOVAN) 320 mg tablet TAKE 1 TABLET BY MOUTH  DAILY 11/20/17  Yes Timo Warren MD   ALPRAZolam Radha Iba) 0.5 mg tablet Take 1 Tab by mouth nightly as needed. Max Daily Amount: 0.5 mg. 10/11/17  Yes Timo Warren MD   rosuvastatin (CRESTOR) 20 mg tablet Take 1 tablet by mouth  daily 9/5/17  Yes Timo Warren MD   nebivolol (BYSTOLIC) 5 mg tablet Take 5 mg by mouth daily. Yes Historical Provider   aspirin 81 mg tablet Take 81 mg by mouth daily.      Yes Historical Provider   cholecalciferol, vitamin d3, (VITAMIN D) 1,000 unit tablet Take 3,000 Units by mouth daily. Yes Historical Provider   Amlodipine-Olmesartan (TERENCE) 5-40 mg tab Take 1 tablet by mouth daily. Historical Provider        Review of Systems              Constitutional:  He denies fever, weight loss, sweats or fatigue. EYES: No blurred or double vision,               ENT: no nasal congestion, no headache or dizziness. No difficulty with               swallowing, taste, speech or smell. Respiratory:  No cough, wheezing or shortness of breath. No sputum production. Cardiac:  Denies chest pain, palpitations, unexplained indigestion, syncope, edema, PND or orthopnea. GI:  No changes in bowel movements, no abdominal pain, no bloating, anorexia, nausea, vomiting or heartburn. :  No frequency or dysuria. Denies incontinence or sexual dysfunction. Extremities:  No joint pain, stiffness or swelling. Muscle pain is noted  Back:.no pain or soreness  Skin:  No recent rashes or mole changes. Neurological:  No numbness, tingling, burning paresthesias or loss of motor strength. No syncope, dizziness, frequent headaches or memory loss. Hematologic:  No easy bruising  Lymphatic: No lymph node enlargement    Objective:     Vitals:    02/02/18 1639 02/02/18 1701   BP: 150/86 138/88   Pulse: (!) 48    Resp: 16    Temp: 97.5 °F (36.4 °C)    TempSrc: Oral    SpO2: 99%    Weight: 177 lb (80.3 kg)    Height: 5' 7.5\" (1.715 m)    PainSc:   3    PainLoc: Arm        Body mass index is 27.31 kg/(m^2). Physical Examination:              General Appearance:  Well-developed, well-nourished, no acute distress. HEENT:      Ears:  The TMs and ear canals were clear. Eyes:  The pupillary responses were normal.  Extraocular muscle function intact. Lids and conjunctiva not injected. Funduscopic exam revealed sharp disc margins. Nares: Clear w/o edema or erythema  Pharynx:  Clear with teeth in good repair. No masses were noted. Neck:  Supple without thyromegaly or adenopathy.   No JVD noted.  No carotid                bruits. Lungs:  Clear to auscultation and percussion. Cardiac:  Regular rate and rhythm without murmur. PMI not displaced. No gallop, rub or click. Abdominal: Soft, non-tender, no hepata-spleenomegally or masses  Extremities:  No clubbing, cyanosis or edema. Skin:  No rash or unusual mole changes noted. Lymph Nodes:  None felt in the cervical, supraclavicular, axillary or inguinal region. Neurological: . DTRs 2+ and symmetric. Station and gait normal.   Hematologic:   No purpura or petechiae        Assessment/Plan:         1. Myalgia    2. Mixed hyperlipidemia        Impressions/Plan:  Patient myalgias him a bit concerned this could be related to the statin and have asked him to stop that. I am checking a CPK today. I did offer a muscle relaxant he does not think he needs one. We will see how he does off of the statin and will see what the CPK looks like follow-up to be determined based upon those results. Orders Placed This Encounter    AMB POC CK (CPK)    krill-omega-3-dha-epa-lipids 577-80-81-87 mg cap    ezetimibe (ZETIA) 10 mg tablet       Follow-up Disposition:  Return if symptoms worsen or fail to improve. Results for orders placed or performed in visit on 02/02/18   AMB POC CK (CPK)   Result Value Ref Range    CK (CPK) (POC)  30 - 135 U/L         Kenny Lewis MD    The patient was given after the visit summary the patient verbalized an understanding of the plans and problems as explained.

## 2018-02-02 NOTE — PROGRESS NOTES
1. Have you been to the ER, urgent care clinic since your last visit? Hospitalized since your last visit? No    2. Have you seen or consulted any other health care providers outside of the [address] [place] [address] since your last visit? Include any pap smears or colon screening.  No     Chief Complaint   Patient presents with    Muscle Pain     both arms are sore and aching

## 2018-02-07 NOTE — PROGRESS NOTES
CPK did return normal.  See how he is with the medication change. Patient informed. Doing much better.

## 2018-02-20 DIAGNOSIS — G47.00 INSOMNIA, UNSPECIFIED TYPE: Primary | ICD-10-CM

## 2018-02-20 RX ORDER — ALPRAZOLAM 0.5 MG/1
TABLET ORAL
Qty: 90 TAB | Refills: 0 | Status: SHIPPED | OUTPATIENT
Start: 2018-02-20 | End: 2018-07-10 | Stop reason: SDUPTHER

## 2018-02-20 NOTE — TELEPHONE ENCOUNTER
RX refill request from the patient/pharmacy. Patient last seen 02- with labs, and next appt. scheduled for 06-.

## 2018-04-20 ENCOUNTER — OFFICE VISIT (OUTPATIENT)
Dept: INTERNAL MEDICINE CLINIC | Age: 83
End: 2018-04-20

## 2018-04-20 VITALS
BODY MASS INDEX: 27.13 KG/M2 | WEIGHT: 179 LBS | OXYGEN SATURATION: 97 % | DIASTOLIC BLOOD PRESSURE: 76 MMHG | TEMPERATURE: 97.9 F | SYSTOLIC BLOOD PRESSURE: 136 MMHG | RESPIRATION RATE: 16 BRPM | HEIGHT: 68 IN | HEART RATE: 50 BPM

## 2018-04-20 DIAGNOSIS — L03.012 PARONYCHIA OF FINGER OF LEFT HAND: Primary | ICD-10-CM

## 2018-04-20 RX ORDER — SULFAMETHOXAZOLE AND TRIMETHOPRIM 800; 160 MG/1; MG/1
1 TABLET ORAL 2 TIMES DAILY
Qty: 20 TAB | Refills: 0 | Status: SHIPPED | OUTPATIENT
Start: 2018-04-20 | End: 2018-06-08 | Stop reason: ALTCHOICE

## 2018-04-20 NOTE — PROGRESS NOTES
Subjective:   Naima Gutierrez is a 81 y.o. male      Chief Complaint   Patient presents with    Finger Swelling     Left pointer finger has redness and swelling above first joint        History of present illness: He presents today for evaluation infection in his left index finger is gotten very painful the past 24 hours. He notes no history of trauma. He has had no fevers or chills. He notes no streaking of his arms.     Patient Active Problem List   Diagnosis Code    TIA (transient ischemic attack) G45.9    Syncope R55    Essential hypertension I10    Mixed hyperlipidemia E78.2    ED (erectile dysfunction) N52.9    Elevated PSA R97.20    On statin therapy Z79.899    Insomnia G47.00    Glucose intolerance (impaired glucose tolerance) R73.02    Former smoker Z87.891    Primary osteoarthritis involving multiple joints M15.0    Depression F32.9    ASCVD (arteriosclerotic cardiovascular disease) I25.10    Anxiety F41.9    Allergy to ACE inhibitors Z88.8    Non-healing skin lesion of nose L98.9    Gait instability R26.81    Medicare annual wellness visit, initial Z00.00    Myalgia M79.1    Paronychia of finger of left hand L03.012      Past Medical History:   Diagnosis Date    Allergy to ACE inhibitors 11/28/2017    Anxiety 11/28/2017    Arthritis     ASCVD (arteriosclerotic cardiovascular disease) 11/28/2017    CAD (coronary artery disease) 2000    MI     Depression 11/28/2017    DJD (degenerative joint disease) 11/28/2017    ED (erectile dysfunction) 11/28/2017    Elevated PSA 11/28/2017    Former smoker 11/28/2017    Glucose intolerance (impaired glucose tolerance) 11/28/2017    Hypertension     Insomnia 11/28/2017    On statin therapy 11/28/2017    Other ill-defined conditions(799.89)     elevated lipids    Psychiatric disorder     depression /anxiety      Allergies   Allergen Reactions    Benazepril Rash and Swelling    Pork/Porcine Containing Products Hives Family History   Problem Relation Age of Onset    Psychiatric Disorder Mother      depression    Heart Disease Father      aneurysym      Social History     Social History    Marital status:      Spouse name: N/A    Number of children: N/A    Years of education: N/A     Occupational History    Not on file. Social History Main Topics    Smoking status: Former Smoker     Packs/day: 1.50     Years: 5.00    Smokeless tobacco: Never Used      Comment: smoke for 5 year     Alcohol use No    Drug use: No    Sexual activity: Not on file     Other Topics Concern    Not on file     Social History Narrative     Prior to Admission medications    Medication Sig Start Date End Date Taking? Authorizing Provider   trimethoprim-sulfamethoxazole (BACTRIM DS, SEPTRA DS) 160-800 mg per tablet Take 1 Tab by mouth two (2) times a day. 4/20/18  Yes Emili Rangel MD   ALPRAZolam Ranell Minium) 0.5 mg tablet TAKE 1 TABLET BY MOUTH AT BEDTIME AS NEEDED  2/20/18  Yes Emili Rangel MD   dwvwj-unlkd-6-dha-epa-lipids 306-22-21-10 mg cap Take  by mouth. Yes Historical Provider   ezetimibe (ZETIA) 10 mg tablet Take  by mouth daily. Yes Historical Provider   amLODIPine (NORVASC) 5 mg tablet TAKE 1 TABLET BY MOUTH  DAILY 12/28/17  Yes Emili Rangel MD   valsartan (DIOVAN) 320 mg tablet TAKE 1 TABLET BY MOUTH  DAILY 11/20/17  Yes Emili Rangel MD   rosuvastatin (CRESTOR) 20 mg tablet Take 1 tablet by mouth  daily 9/5/17  Yes Emili Rangel MD   nebivolol (BYSTOLIC) 5 mg tablet Take 5 mg by mouth daily. Yes Historical Provider   Amlodipine-Olmesartan (TERENCE) 5-40 mg tab Take 1 tablet by mouth daily. Yes Historical Provider   aspirin 81 mg tablet Take 81 mg by mouth daily. Yes Historical Provider   cholecalciferol, vitamin d3, (VITAMIN D) 1,000 unit tablet Take 3,000 Units by mouth daily.    Yes Historical Provider        Review of Systems              Constitutional:  He denies fever, weight loss, sweats or fatigue. EYES: No blurred or double vision,               ENT: no nasal congestion, no headache or dizziness. No difficulty with               swallowing, taste, speech or smell. Respiratory:  No cough, wheezing or shortness of breath. No sputum production. Cardiac:  Denies chest pain, palpitations, unexplained indigestion, syncope, edema, PND or orthopnea. GI:  No changes in bowel movements, no abdominal pain, no bloating, anorexia, nausea, vomiting or heartburn. :  No frequency or dysuria. Denies incontinence or sexual dysfunction. Extremities:  No joint pain, stiffness or swelling  Back:.no pain or soreness  Skin:  No recent rashes or mole changes. Redness and swelling with tenderness of his left index finger distally. Neurological:  No numbness, tingling, burning paresthesias or loss of motor strength. No syncope, dizziness, frequent headaches or memory loss. Hematologic:  No easy bruising  Lymphatic: No lymph node enlargement    Objective:     Vitals:    04/20/18 1559 04/20/18 1628   BP: 142/76 136/76   Pulse: (!) 50    Resp: 16    Temp: 97.9 °F (36.6 °C)    TempSrc: Oral    SpO2: 97%    Weight: 179 lb (81.2 kg)    Height: 5' 7.5\" (1.715 m)    PainSc:   4    PainLoc: Finger        Body mass index is 27.62 kg/(m^2). Physical Examination:              General Appearance:  Well-developed, well-nourished, no acute distress. HEENT:      Ears:  The TMs and ear canals were clear. Eyes:  The pupillary responses were normal.  Extraocular muscle function intact. Lids and conjunctiva not injected. Funduscopic exam revealed sharp disc margins. Nares: Clear w/o edema or erythema  Pharynx:  Clear with teeth in good repair. No masses were noted. Neck:  Supple without thyromegaly or adenopathy. No JVD noted. No carotid                bruits. Lungs:  Clear to auscultation and percussion. Cardiac:  Regular rate and rhythm without murmur. PMI not displaced.   No gallop, rub or click. Abdominal: Soft, non-tender, no hepata-spleenomegally or masses  Extremities:  No clubbing, cyanosis or edema. Skin:  No rash or unusual mole changes noted. Infected paronychia him left index finger with erythema involving the entire distal phalanx without streaking or lymphadenopathy  Lymph Nodes:  None felt in the cervical, supraclavicular, axillary or inguinal region. Neurological: . DTRs 2+ and symmetric. Station and gait normal.   Hematologic:   No purpura or petechiae        Assessment/Plan:         1. Paronychia of finger of left hand        Impressions/Plan:  Impression paronychia left index finger. After Betadine scrub and #11 blade was used to puncture the paronychia with only bloody return and no purulence returned at this point. It is sterilely dressed and is instructed in local care I placed him on Bactrim DS twice daily for 10 days and aspirin use warm compresses. Recheck if further problems. Orders Placed This Encounter    DRAIN SKIN ABSCESS SIMPLE    trimethoprim-sulfamethoxazole (BACTRIM DS, SEPTRA DS) 160-800 mg per tablet       Follow-up Disposition: Not on File    No results found for any visits on 04/20/18. Pillo Stern MD    The patient was given after the visit summary the patient verbalized an understanding of the plans and problems as explained.

## 2018-04-20 NOTE — MR AVS SNAPSHOT
[address] [address] [phone] Patient: Cortney Velasquez MRN: [id number] :1935 Visit Information Date & Time Provider Department Dept. Phone Encounter #  
 2018  3:00 PM [place] Patel Tiarra [address] [phone] [id number] Your Appointments 2018  8:10 AM  
FOLLOW UP 10 with MD VAZQUEZ Patel [facility] ([facility]) Appt Note: [address] [id number] [address] [address] Upcoming Health Maintenance Date Due DTaP/Tdap/Td series (1 - Tdap) 10/25/1957 GLAUCOMA SCREENING Q2Y 10/25/2001 COLONOSCOPY 9/10/2017 MEDICARE YEARLY EXAM 2018 Allergies as of 2018  Review Complete On: 2018 By: Jenn Gonsalez MD  
  
 Severity Noted Reaction Type Reactions Benazepril  2014   Systemic Rash, Swelling Pork/porcine Containing Products  2010    Hives Current Immunizations  Never Reviewed Name Date Influenza High Dose Vaccine PF 10/30/2017 Influenza Vaccine 2016, 10/15/2015, 10/17/2014 Pneumococcal Conjugate (PCV-13) 2015 Pneumococcal Vaccine (Unspecified Type) 10/24/2007 Zoster Vaccine, Live 2010 Not reviewed this visit You Were Diagnosed With   
  
 Codes Comments Paronychia of finger of left hand    -  Primary ICD-10-CM: L03.012 
ICD-9-CM: 681.02 Vitals BP Pulse Temp Resp Height(growth percentile) Weight(growth percentile) 136/76 (!) 50 97.9 °F (36.6 °C) (Oral) 16 5' 7.5\" (1.715 m) 179 lb (81.2 kg) SpO2 BMI Smoking Status 97% 27.62 kg/m2 Former Smoker Vitals History BMI and BSA Data Body Mass Index Body Surface Area  
 27.62 kg/m 2 1.97 m 2 Preferred Pharmacy Pharmacy Name Phone [facility] [address] [place] [address] [address], [facility] [address] [place] [address] [phone] Your Updated Medication List  
  
   
This list is accurate as of 4/20/18  4:32 PM.  Always use your most recent med list.  
  
  
  
  
 ALPRAZolam 0.5 mg tablet Commonly known as:  XANAX  
TAKE 1 TABLET BY MOUTH AT BEDTIME AS NEEDED  
  
 amLODIPine 5 mg tablet Commonly known as:  Orval Karrie TAKE 1 TABLET BY MOUTH  DAILY  
  
 aspirin 81 mg tablet Take 81 mg by mouth daily. TERENCE 5-40 mg Tab Generic drug:  amLODIPine-Olmesartan Take 1 tablet by mouth daily. BYSTOLIC 5 mg tablet Generic drug:  nebivolol Take 5 mg by mouth daily. krill-omega-3-dha-epa-lipids 481-03-60-39 mg Cap Take  by mouth. rosuvastatin 20 mg tablet Commonly known as:  CRESTOR Take 1 tablet by mouth  daily  
  
 trimethoprim-sulfamethoxazole 160-800 mg per tablet Commonly known as:  BACTRIM DS, SEPTRA DS Take 1 Tab by mouth two (2) times a day. valsartan 320 mg tablet Commonly known as:  DIOVAN  
TAKE 1 TABLET BY MOUTH  DAILY  
  
 VITAMIN D3 1,000 unit tablet Generic drug:  cholecalciferol Take 3,000 Units by mouth daily. ZETIA 10 mg tablet Generic drug:  ezetimibe Take  by mouth daily. Prescriptions Sent to Pharmacy Refills  
 trimethoprim-sulfamethoxazole (BACTRIM DS, SEPTRA DS) 160-800 mg per tablet 0 Sig: Take 1 Tab by mouth two (2) times a day. Class: Normal  
 Pharmacy: [facility] [id number] [place] [address] [address], [address] [place] [address] Ph #: [phone] Route: Oral  
  
We Performed the Following DRAIN SKIN ABSCESS SIMPLE [01792 CPT(R)] Patient Instructions Cellulitis: Care Instructions Your Care Instructions Cellulitis is a skin infection. It often occurs after a break in the skin from a scrape, cut, bite, or puncture, or after a rash. The doctor has checked you carefully, but problems can develop later.  If you notice any problems or new symptoms, get medical treatment right away. Follow-up care is a key part of your treatment and safety. Be sure to make and go to all appointments, and call your doctor if you are having problems. It's also a good idea to know your test results and keep a list of the medicines you take. How can you care for yourself at home? · Take your antibiotics as directed. Do not stop taking them just because you feel better. You need to take the full course of antibiotics. · Prop up the infected area on pillows to reduce pain and swelling. Try to keep the area above the level of your heart as often as you can. · If your doctor told you how to care for your wound, follow your doctor's instructions. If you did not get instructions, follow this general advice: ¨ Wash the wound with clean water 2 times a day. Don't use hydrogen peroxide or alcohol, which can slow healing. ¨ You may cover the wound with a thin layer of petroleum jelly, such as Vaseline, and a nonstick bandage. ¨ Apply more petroleum jelly and replace the bandage as needed. · Be safe with medicines. Take pain medicines exactly as directed. ¨ If the doctor gave you a prescription medicine for pain, take it as prescribed. ¨ If you are not taking a prescription pain medicine, ask your doctor if you can take an over-the-counter medicine. To prevent cellulitis in the future · Try to prevent cuts, scrapes, or other injuries to your skin. Cellulitis most often occurs where there is a break in the skin. · If you get a scrape, cut, mild burn, or bite, wash the wound with clean water as soon as you can to help avoid infection. Don't use hydrogen peroxide or alcohol, which can slow healing. · If you have swelling in your legs (edema), support stockings and good skin care may help prevent leg sores and cellulitis.  
· Take care of your feet, especially if you have diabetes or other conditions that increase the risk of infection. Wear shoes and socks. Do not go barefoot. If you have athlete's foot or other skin problems on your feet, talk to your doctor about how to treat them. When should you call for help? Call your doctor now or seek immediate medical care if: 
? · You have signs that your infection is getting worse, such as: 
¨ Increased pain, swelling, warmth, or redness. ¨ Red streaks leading from the area. ¨ Pus draining from the area. ¨ A fever. ? · You get a rash. ? Watch closely for changes in your health, and be sure to contact your doctor if: 
? · You are not getting better after 1 day (24 hours). ? · You do not get better as expected. Where can you learn more? Go to http://florin-madeline.info/. Verito Rios in the search box to learn more about \"Cellulitis: Care Instructions. \" Current as of: October 13, 2016 Content Version: 11.4 © 2802-9293 [employer]. Care instructions adapted under license by [employer] (which disclaims liability or warranty for this information). If you have questions about a medical condition or this instruction, always ask your healthcare professional. [place] [address] any warranty or liability for your use of this information. Introducing [facility]! [employer] introduces [employer] patient portal. Now you can access parts of your medical record, email your doctor's office, and request medication refills online. 1. In your internet browser, go to [email or web address] 2. Click on the First Time User? Click Here link in the Sign In box. You will see the New Member Sign Up page. 3. Enter your [employer] Access Code exactly as it appears below. You will not need to use this code after youve completed the sign-up process. If you do not sign up before the expiration date, you must request a new code.  
 
· [employer] Access Code: [email or web address] 
 Expires: 5/3/2018  4:19 PM 
 
4. Enter the last four digits of your Social Security Number (xxxx) and Date of Birth (mm/dd/yyyy) as indicated and click Submit. You will be taken to the next sign-up page. 5. Create a [employer] ID. This will be your [employer] login ID and cannot be changed, so think of one that is secure and easy to remember. 6. Create a [employer] password. You can change your password at any time. 7. Enter your Password Reset Question and Answer. This can be used at a later time if you forget your password. 8. Enter your e-mail address. You will receive e-mail notification when new information is available in [address] 9. Click Sign Up. You can now view and download portions of your medical record. 10. Click the Download Summary menu link to download a portable copy of your medical information. If you have questions, please visit the Frequently Asked Questions section of the [employer] website. Remember, [employer] is NOT to be used for urgent needs. For medical emergencies, dial 911. Now available from your iPhone and Android! Please provide this summary of care documentation to your next provider. Your primary care clinician is listed as Dilia. If you have any questions after today's visit, please call [phone].

## 2018-04-20 NOTE — PROGRESS NOTES
1. Have you been to the ER, urgent care clinic since your last visit? Hospitalized since your last visit? No    2. Have you seen or consulted any other health care providers outside of the [address] [place] [address] since your last visit? Include any pap smears or colon screening. Yes, Dr. Chelsie Davis, Cardiologist, for check up and jaw  Pain.     Chief Complaint   Patient presents with    Finger Swelling     Left pointer finger has redness and swelling above first joint

## 2018-04-20 NOTE — PATIENT INSTRUCTIONS
Cellulitis: Care Instructions  Your Care Instructions    Cellulitis is a skin infection. It often occurs after a break in the skin from a scrape, cut, bite, or puncture, or after a rash. The doctor has checked you carefully, but problems can develop later. If you notice any problems or new symptoms, get medical treatment right away. Follow-up care is a key part of your treatment and safety. Be sure to make and go to all appointments, and call your doctor if you are having problems. It's also a good idea to know your test results and keep a list of the medicines you take. How can you care for yourself at home? · Take your antibiotics as directed. Do not stop taking them just because you feel better. You need to take the full course of antibiotics. · Prop up the infected area on pillows to reduce pain and swelling. Try to keep the area above the level of your heart as often as you can. · If your doctor told you how to care for your wound, follow your doctor's instructions. If you did not get instructions, follow this general advice:  ¨ Wash the wound with clean water 2 times a day. Don't use hydrogen peroxide or alcohol, which can slow healing. ¨ You may cover the wound with a thin layer of petroleum jelly, such as Vaseline, and a nonstick bandage. ¨ Apply more petroleum jelly and replace the bandage as needed. · Be safe with medicines. Take pain medicines exactly as directed. ¨ If the doctor gave you a prescription medicine for pain, take it as prescribed. ¨ If you are not taking a prescription pain medicine, ask your doctor if you can take an over-the-counter medicine. To prevent cellulitis in the future  · Try to prevent cuts, scrapes, or other injuries to your skin. Cellulitis most often occurs where there is a break in the skin. · If you get a scrape, cut, mild burn, or bite, wash the wound with clean water as soon as you can to help avoid infection.  Don't use hydrogen peroxide or alcohol, which can slow healing. · If you have swelling in your legs (edema), support stockings and good skin care may help prevent leg sores and cellulitis. · Take care of your feet, especially if you have diabetes or other conditions that increase the risk of infection. Wear shoes and socks. Do not go barefoot. If you have athlete's foot or other skin problems on your feet, talk to your doctor about how to treat them. When should you call for help? Call your doctor now or seek immediate medical care if:  ? · You have signs that your infection is getting worse, such as:  ¨ Increased pain, swelling, warmth, or redness. ¨ Red streaks leading from the area. ¨ Pus draining from the area. ¨ A fever. ? · You get a rash. ? Watch closely for changes in your health, and be sure to contact your doctor if:  ? · You are not getting better after 1 day (24 hours). ? · You do not get better as expected. Where can you learn more? Go to http://florin-madeline.info/. Henri Moyer in the search box to learn more about \"Cellulitis: Care Instructions. \"  Current as of: October 13, 2016  Content Version: 11.4  © 2256-6831 [employer]. Care instructions adapted under license by [employer] (which disclaims liability or warranty for this information). If you have questions about a medical condition or this instruction, always ask your healthcare professional. [place] [address] any warranty or liability for your use of this information.

## 2018-06-08 ENCOUNTER — OFFICE VISIT (OUTPATIENT)
Dept: INTERNAL MEDICINE CLINIC | Age: 83
End: 2018-06-08

## 2018-06-08 VITALS
HEART RATE: 52 BPM | SYSTOLIC BLOOD PRESSURE: 138 MMHG | BODY MASS INDEX: 26.37 KG/M2 | DIASTOLIC BLOOD PRESSURE: 78 MMHG | WEIGHT: 174 LBS | TEMPERATURE: 97.5 F | OXYGEN SATURATION: 97 % | RESPIRATION RATE: 17 BRPM | HEIGHT: 68 IN

## 2018-06-08 DIAGNOSIS — E78.2 MIXED HYPERLIPIDEMIA: ICD-10-CM

## 2018-06-08 DIAGNOSIS — I25.10 ASCVD (ARTERIOSCLEROTIC CARDIOVASCULAR DISEASE): ICD-10-CM

## 2018-06-08 DIAGNOSIS — F51.01 PRIMARY INSOMNIA: ICD-10-CM

## 2018-06-08 DIAGNOSIS — I10 ESSENTIAL HYPERTENSION: Primary | ICD-10-CM

## 2018-06-08 DIAGNOSIS — F32.A MILD DEPRESSION: ICD-10-CM

## 2018-06-08 DIAGNOSIS — M15.9 PRIMARY OSTEOARTHRITIS INVOLVING MULTIPLE JOINTS: ICD-10-CM

## 2018-06-08 DIAGNOSIS — R73.02 GLUCOSE INTOLERANCE (IMPAIRED GLUCOSE TOLERANCE): ICD-10-CM

## 2018-06-08 LAB
ALBUMIN SERPL-MCNC: 4 G/DL (ref 3.9–5.4)
ALKALINE PHOS POC: 54 U/L (ref 38–126)
ALT SERPL-CCNC: 41 U/L (ref 9–52)
AST SERPL-CCNC: 32 U/L (ref 14–36)
BUN BLD-MCNC: 23 MG/DL (ref 9–20)
CALCIUM BLD-MCNC: 9.6 MG/DL (ref 8.4–10.2)
CHLORIDE BLD-SCNC: 102 MMOL/L (ref 98–107)
CHOLEST SERPL-MCNC: 149 MG/DL (ref 0–200)
CK (CPK) POC: 108 U/L (ref 30–135)
CO2 POC: 27 MMOL/L (ref 22–32)
CREAT BLD-MCNC: 1.1 MG/DL (ref 0.8–1.5)
EGFR (POC): 62.6
GLUCOSE POC: 95 MG/DL (ref 75–110)
HBA1C MFR BLD HPLC: 5.8 % (ref 4.5–5.7)
HDLC SERPL-MCNC: 63 MG/DL (ref 35–130)
LDL CHOLESTEROL POC: 72.2 MG/DL (ref 0–130)
POTASSIUM SERPL-SCNC: 4.6 MMOL/L (ref 3.6–5)
PROT SERPL-MCNC: 7.1 G/DL (ref 6.3–8.2)
SODIUM SERPL-SCNC: 140 MMOL/L (ref 137–145)
TCHOL/HDL RATIO (POC): 2.4 (ref 0–4)
TOTAL BILIRUBIN POC: 1.2 MG/DL (ref 0.2–1.3)
TRIGL SERPL-MCNC: 69 MG/DL (ref 0–200)
VLDLC SERPL CALC-MCNC: 13.8 MG/DL

## 2018-06-08 RX ORDER — TEMAZEPAM 30 MG/1
30 CAPSULE ORAL
Qty: 30 CAP | Status: SHIPPED | OUTPATIENT
Start: 2018-06-08 | End: 2018-12-07 | Stop reason: ALTCHOICE

## 2018-06-08 RX ORDER — TEMAZEPAM 30 MG/1
15 CAPSULE ORAL
Qty: 30 CAP | Refills: 5 | Status: CANCELLED | OUTPATIENT
Start: 2018-06-08

## 2018-06-08 NOTE — PROGRESS NOTES
Chief Complaint   Patient presents with    Anxiety     6 month follow up    Depression       SUBJECTIVE:    Amada Short is a 81 y.o. male who returns in follow-up of his medical problems include hypertension, glucose intolerance, hyperlipidemia, ASCVD, DJD, prior TIA, history depression, and insomnia. He is taking his medications and trying to follow his diet and exercise regular. He is primary problem seems to be days not able to sleep well at night he says he will take some Xanax and go to sleep and only last about 3 or 4 hours and then he wakes up in doses on and off the rest of the night until he gets up by 6 AM.  He currently denies any chest pain, shortness breath, palpitations or cardiorespiratory complaints. He did recently have cardiology evaluation by Dr. Angy Wang. He denies any GI or  complaints. He denies any headaches, dizziness or neurologic complaints. There are no current arthritic complaints and he has no other complaints on complete review of systems. Current Outpatient Prescriptions   Medication Sig Dispense Refill    temazepam (RESTORIL) 30 mg capsule Take 1 Cap by mouth nightly as needed for Sleep. Max Daily Amount: 30 mg. 30 Cap prn    ALPRAZolam (XANAX) 0.5 mg tablet TAKE 1 TABLET BY MOUTH AT BEDTIME AS NEEDED  90 Tab 0    krill-omega-3-dha-epa-lipids 498-71-32-81 mg cap Take  by mouth.  ezetimibe (ZETIA) 10 mg tablet Take  by mouth daily.  amLODIPine (NORVASC) 5 mg tablet TAKE 1 TABLET BY MOUTH  DAILY 90 Tab 3    valsartan (DIOVAN) 320 mg tablet TAKE 1 TABLET BY MOUTH  DAILY 90 Tab 3    rosuvastatin (CRESTOR) 20 mg tablet Take 1 tablet by mouth  daily 90 Tab 3    nebivolol (BYSTOLIC) 5 mg tablet Take 5 mg by mouth daily.  aspirin 81 mg tablet Take 81 mg by mouth daily.  cholecalciferol, vitamin d3, (VITAMIN D) 1,000 unit tablet Take 3,000 Units by mouth daily.        Past Medical History:   Diagnosis Date    Allergy to ACE inhibitors 11/28/2017    Anxiety 11/28/2017    Arthritis     ASCVD (arteriosclerotic cardiovascular disease) 11/28/2017    CAD (coronary artery disease) 2000    MI     Depression 11/28/2017    DJD (degenerative joint disease) 11/28/2017    ED (erectile dysfunction) 11/28/2017    Elevated PSA 11/28/2017    Former smoker 11/28/2017    Glucose intolerance (impaired glucose tolerance) 11/28/2017    Hypertension     Insomnia 11/28/2017    On statin therapy 11/28/2017    Other ill-defined conditions(799.89)     elevated lipids    Psychiatric disorder     depression /anxiety     Past Surgical History:   Procedure Laterality Date    ABDOMEN SURGERY PROC UNLISTED      hernia repair    CARDIAC SURG PROCEDURE UNLIST      stents x 6    COLORECTAL SCRN; HI RISK IND  9/10/2014         HX TONSILLECTOMY      CA COLSC FLX W/RMVL OF TUMOR POLYP LESION SNARE TQ  9/10/2014          Allergies   Allergen Reactions    Benazepril Rash and Swelling    Pork/Porcine Containing Products Hives       REVIEW OF SYSTEMS:  General: negative for - chills or fever, or weight loss or gain  ENT: negative for - headaches, nasal congestion or tinnitus  Eyes: no blurred or visual changes  Neck: No stiffness or swollen nodes  Respiratory: negative for - cough, hemoptysis, shortness of breath or wheezing  Cardiovascular : negative for - chest pain, edema, palpitations or shortness of breath  Gastrointestinal: negative for - abdominal pain, blood in stools, heartburn or nausea/vomiting  Genito-Urinary: no dysuria, trouble voiding, or hematuria  Musculoskeletal: negative for - gait disturbance, joint pain, joint stiffness or joint swelling  Neurological: no TIA or stroke symptoms  Hematologic: no bruises, no bleeding  Lymphatic: no swollen glands  Integument: no lumps, mole changes, nail changes or rash  Endocrine:no malaise/lethargy poly uria or polydipsia or unexpected weight changes        Social History     Social History    Marital status:      Spouse name: N/A    Number of children: N/A    Years of education: N/A     Social History Main Topics    Smoking status: Former Smoker     Packs/day: 1.50     Years: 5.00    Smokeless tobacco: Never Used      Comment: smoke for 5 year     Alcohol use No    Drug use: No    Sexual activity: Not Asked     Other Topics Concern    None     Social History Narrative     Family History   Problem Relation Age of Onset    Psychiatric Disorder Mother      depression    Heart Disease Father      aneurysym       OBJECTIVE:     Visit Vitals    /78    Pulse (!) 52    Temp 97.5 °F (36.4 °C) (Oral)    Resp 17    Ht 5' 7.5\" (1.715 m)    Wt 174 lb (78.9 kg)    SpO2 97%    BMI 26.85 kg/m2     CONSTITUTIONAL:   well nourished, appears age appropriate  EYES: sclera anicteric, PERRL, EOMI  ENMT:nares clear, moist mucous membranes, pharynx clear  NECK: supple. Thyroid normal, No JVD or bruits  RESPIRATORY: Chest: clear to ascultation and percussion, normal inspiratory effort  CARDIOVASCULAR: Heart: regular rate and rhythm no murmurs, rubs or gallops, PMI not displaced, No thrills  GASTROINTESTINAL: Abdomen: non distended, soft, non tender, bowel sounds normal  HEMATOLOGIC: no purpura, petechiae or bruising  LYMPHATIC: No lymph node enlargemant  MUSCULOSKELETAL: Extremities: no edema or active synovitis, pulse 1+   INTEGUMENT: No unusual rashes or suspicious skin lesions noted. Nails appear normal.  PERIPHERAL VASCULAR: normal pulses femoral, PT and DP  NEUROLOGIC: non-focal exam, A & O X 3  PSYCHIATRIC:, appropriate affect     ASSESSMENT:   1. Essential hypertension    2. Glucose intolerance (impaired glucose tolerance)    3. Mixed hyperlipidemia    4. ASCVD (arteriosclerotic cardiovascular disease)    5. Primary osteoarthritis involving multiple joints    6. Mild depression ([address]    7. Primary insomnia      Impression  1. Hypertension that is controlled continue current therapy reviewed with him  2. Glucose intolerance repeat status pending reviewed prior labs make adjustments as necessary  3. Hyperlipidemia prior labs reviewed repeat status pending will adjust as needed  4. ASCVD clinically stable continue aspirin daily  5. DJD that is stable  6. Depression seems to be relatively stable at the present time and is currently not taking any antidepressants just taken some Xanax at bedtime  7. Insomnia that is his main problem will try Restoril 30 mg nightly and see if that is effective  I will call the lab and make further recommendations adjustments if necessary. Follow stable continue same and follow-up schedule III months or sooner should they be a problem. PLAN:  .  Orders Placed This Encounter    AMB POC COMPREHENSIVE METABOLIC PANEL    AMB POC LIPID PROFILE    AMB POC HEMOGLOBIN A1C    AMB POC CK (CPK)    temazepam (RESTORIL) 30 mg capsule         ATTENTION:   This medical record was transcribed using an electronic medical records system. Although proofread, it may and can contain electronic and spelling errors. Other human spelling and other errors may be present. Corrections may be executed at a later time. Please feel free to contact us for any clarifications as needed. Follow-up Disposition:  Return in about 3 months (around 9/8/2018). No results found for any visits on 06/08/18. Matt Velásquez MD    The patient verbalized understanding of the problems and plans as explained.

## 2018-06-08 NOTE — MR AVS SNAPSHOT
[address] [facility] [address] [id number] [phone] Patient: Sourav Garcia MRN: [id number] :1935 Visit Information Date & Time Provider Department Dept. Phone Encounter #  
 2018  8:10 AM Martha Clifford MD [place] [phone] [id number] Follow-up Instructions Return in about 3 months (around 2018). Follow-up and Disposition History Your Appointments 2018  8:30 AM  
FOLLOW UP 10 with MD HAYDEE Martínez [facility] ([address]) Appt Note: [address] [place] [address] [place] [id number] 0[address] [facility] [id number] Upcoming Health Maintenance Date Due DTaP/Tdap/Td series (1 - Tdap) 10/25/1957 GLAUCOMA SCREENING Q2Y 10/25/2001 Influenza Age 5 to Adult 2018 MEDICARE YEARLY EXAM 2018 Allergies as of 2018  Review Complete On: 2018 By: Martha Clifford MD  
  
 Severity Noted Reaction Type Reactions Benazepril  2014   Systemic Rash, Swelling Pork/porcine Containing Products  2010    Hives Current Immunizations  Never Reviewed Name Date Influenza High Dose Vaccine PF 10/30/2017 Influenza Vaccine 2016, 10/15/2015, 10/17/2014 Pneumococcal Conjugate (PCV-13) 2015 Pneumococcal Vaccine (Unspecified Type) 10/24/2007 Zoster Vaccine, Live 2010 Not reviewed this visit You Were Diagnosed With   
  
 Codes Comments Essential hypertension    -  Primary ICD-10-CM: I10 
ICD-9-CM: 401.9 Glucose intolerance (impaired glucose tolerance)     ICD-10-CM: R73.02 
ICD-9-CM: 790.22 Mixed hyperlipidemia     ICD-10-CM: E78.2 ICD-9-CM: 272.2 ASCVD (arteriosclerotic cardiovascular disease)     ICD-10-CM: I25.10 ICD-9-CM: 429.2, 440.9 Primary osteoarthritis involving multiple joints     ICD-10-CM: M15.0 ICD-9-CM: 715.09 Mild depression (HCC)     ICD-10-CM: F32.0 ICD-9-CM: 269 Primary insomnia     ICD-10-CM: F51.01 
ICD-9-CM: 307.42 Vitals BP Pulse Temp Resp Height(growth percentile) Weight(growth percentile) 138/78 (!) 52 97.5 °F (36.4 °C) (Oral) 17 5' 7.5\" (1.715 m) 174 lb (78.9 kg) SpO2 BMI Smoking Status 97% 26.85 kg/m2 Former Smoker Vitals History BMI and BSA Data Body Mass Index Body Surface Area  
 26.85 kg/m 2 1.94 m 2 Preferred Pharmacy Pharmacy Name Phone [facility] [address] [place] [facility] - [address], [place] [address] [phone] Your Updated Medication List  
  
   
This list is accurate as of 6/8/18  9:06 AM.  Always use your most recent med list.  
  
  
  
  
 ALPRAZolam 0.5 mg tablet Commonly known as:  XANAX  
TAKE 1 TABLET BY MOUTH AT BEDTIME AS NEEDED  
  
 amLODIPine 5 mg tablet Commonly known as:  Leward Lanier TAKE 1 TABLET BY MOUTH  DAILY  
  
 aspirin 81 mg tablet Take 81 mg by mouth daily. BYSTOLIC 5 mg tablet Generic drug:  nebivolol Take 5 mg by mouth daily. krill-omega-3-dha-epa-lipids [id number] mg Cap Take  by mouth. rosuvastatin 20 mg tablet Commonly known as:  CRESTOR Take 1 tablet by mouth  daily  
  
 temazepam 30 mg capsule Commonly known as:  RESTORIL Take 1 Cap by mouth nightly as needed for Sleep. Max Daily Amount: 30 mg.  
  
 valsartan 320 mg tablet Commonly known as:  DIOVAN  
TAKE 1 TABLET BY MOUTH  DAILY  
  
 VITAMIN D3 1,000 unit tablet Generic drug:  cholecalciferol Take 3,000 Units by mouth daily. ZETIA 10 mg tablet Generic drug:  ezetimibe Take  by mouth daily. Prescriptions Printed Refills  
 temazepam (RESTORIL) 30 mg capsule prn Sig: Take 1 Cap by mouth nightly as needed for Sleep. Max Daily Amount: 30 mg.  
 Class: Print  Route: Oral  
  
 We Performed the Following AMB POC CK (CPK) [84303 CPT(R)] AMB POC COMPREHENSIVE METABOLIC PANEL [04432 CPT(R)] AMB POC HEMOGLOBIN A1C [79320 CPT(R)] AMB POC LIPID PROFILE [62349 CPT(R)] Follow-up Instructions Return in about 3 months (around 9/8/2018). Patient Instructions Depression and Chronic Disease: Care Instructions Your Care Instructions A chronic disease is one that you have for a long time. Some chronic diseases can be controlled, but they usually cannot be cured. Depression is common in people with chronic diseases, but it often goes unnoticed. Many people have concerns about seeking treatment for a mental health problem. You may think it's a sign of weakness, or you don't want people to know about it. It's important to overcome these reasons for not seeking treatment. Treating depression or anxiety is good for your health. Follow-up care is a key part of your treatment and safety. Be sure to make and go to all appointments, and call your doctor if you are having problems. It's also a good idea to know your test results and keep a list of the medicines you take. How can you care for yourself at home? Watch for symptoms of depression The symptoms of depression are often subtle at first. You may think they are caused by your disease rather than depression. Or you may think it is normal to be depressed when you have a chronic disease. If you are depressed you may: · Feel sad or hopeless. · Feel guilty or worthless. · Not enjoy the things you used to enjoy. · Feel hopeless, as though life is not worth living. · Have trouble thinking or remembering. · Have low energy, and you may not eat or sleep well. · Pull away from others. · Think often about death or killing yourself. (Keep the numbers for these national suicide hotlines: 3-143-348-TALK [1-290.460.5543] and 7-229-BRSQJPB [1-125.601.8986]. ) Get treatment By treating your depression, you can feel more hopeful and have more energy. If you feel better, you may take better care of yourself, so your health may improve. · Talk to your doctor if you have any changes in mood during treatment for your disease. · Ask your doctor for help. Counseling, antidepressant medicine, or a combination of the two can help most people with depression. Often a combination works best. Counseling can also help you cope with having a chronic disease. When should you call for help? Call 911 anytime you think you may need emergency care. For example, call if: 
? · You feel like hurting yourself or someone else. ? · Someone you know has depression and is about to attempt or is attempting suicide. ?Call your doctor now or seek immediate medical care if: 
? · You hear voices. ? · Someone you know has depression and: 
¨ Starts to give away his or her possessions. ¨ Uses illegal drugs or drinks alcohol heavily. ¨ Talks or writes about death, including writing suicide notes or talking about guns, knives, or pills. ¨ Starts to spend a lot of time alone. ¨ Acts very aggressively or suddenly appears calm. ? Watch closely for changes in your health, and be sure to contact your doctor if: 
? · You do not get better as expected. Where can you learn more? Go to http://florin-madeline.info/. Enter C788 in the search box to learn more about \"Depression and Chronic Disease: Care Instructions. \" Current as of: May 12, 2017 Content Version: 11.4 © 1642-9970 Healthwise, Incorporated. Care instructions adapted under license by [employer] (which disclaims liability or warranty for this information). If you have questions about a medical condition or this instruction, always ask your healthcare professional. [place] [address] any warranty or liability for your use of this information. Patient Instructions History Introducing [facility]! [facility] introduces [employer] patient portal. Now you can access parts of your medical record, email your doctor's office, and request medication refills online. 1. In your internet browser, go to [email or web address] 2. Click on the First Time User? Click Here link in the Sign In box. You will see the New Member Sign Up page. 3. Enter your [employer] Access Code exactly as it appears below. You will not need to use this code after youve completed the sign-up process. If you do not sign up before the expiration date, you must request a new code. · [employer] Access Code: [email or web address] Expires: 9/6/2018  8:07 AM 
 
4. Enter the last four digits of your Social Security Number (xxxx) and Date of Birth (mm/dd/yyyy) as indicated and click Submit. You will be taken to the next sign-up page. 5. Create a [employer] ID. This will be your [employer] login ID and cannot be changed, so think of one that is secure and easy to remember. 6. Create a [employer] password. You can change your password at any time. 7. Enter your Password Reset Question and Answer. This can be used at a later time if you forget your password. 8. Enter your e-mail address. You will receive e-mail notification when new information is available in [address] 9. Click Sign Up. You can now view and download portions of your medical record. 10. Click the Download Summary menu link to download a portable copy of your medical information. If you have questions, please visit the Frequently Asked Questions section of the [employer] website. Remember, [employer] is NOT to be used for urgent needs. For medical emergencies, dial 911. Now available from your iPhone and Android! Please provide this summary of care documentation to your next provider. Your primary care clinician is listed as Dilia. If you have any questions after today's visit, please call [phone].

## 2018-06-08 NOTE — PATIENT INSTRUCTIONS
Depression and Chronic Disease: Care Instructions  Your Care Instructions    A chronic disease is one that you have for a long time. Some chronic diseases can be controlled, but they usually cannot be cured. Depression is common in people with chronic diseases, but it often goes unnoticed. Many people have concerns about seeking treatment for a mental health problem. You may think it's a sign of weakness, or you don't want people to know about it. It's important to overcome these reasons for not seeking treatment. Treating depression or anxiety is good for your health. Follow-up care is a key part of your treatment and safety. Be sure to make and go to all appointments, and call your doctor if you are having problems. It's also a good idea to know your test results and keep a list of the medicines you take. How can you care for yourself at home? Watch for symptoms of depression  The symptoms of depression are often subtle at first. You may think they are caused by your disease rather than depression. Or you may think it is normal to be depressed when you have a chronic disease. If you are depressed you may:  · Feel sad or hopeless. · Feel guilty or worthless. · Not enjoy the things you used to enjoy. · Feel hopeless, as though life is not worth living. · Have trouble thinking or remembering. · Have low energy, and you may not eat or sleep well. · Pull away from others. · Think often about death or killing yourself. (Keep the numbers for these national suicide hotlines: 9-758-496-TALK [1-249.943.4201] and 2-088-SEFRCPI [1-358.976.4290]. )  Get treatment  By treating your depression, you can feel more hopeful and have more energy. If you feel better, you may take better care of yourself, so your health may improve. · Talk to your doctor if you have any changes in mood during treatment for your disease. · Ask your doctor for help.  Counseling, antidepressant medicine, or a combination of the two can help most people with depression. Often a combination works best. Counseling can also help you cope with having a chronic disease. When should you call for help? Call 911 anytime you think you may need emergency care. For example, call if:  ? · You feel like hurting yourself or someone else. ? · Someone you know has depression and is about to attempt or is attempting suicide. ?Call your doctor now or seek immediate medical care if:  ? · You hear voices. ? · Someone you know has depression and:  ¨ Starts to give away his or her possessions. ¨ Uses illegal drugs or drinks alcohol heavily. ¨ Talks or writes about death, including writing suicide notes or talking about guns, knives, or pills. ¨ Starts to spend a lot of time alone. ¨ Acts very aggressively or suddenly appears calm. ? Watch closely for changes in your health, and be sure to contact your doctor if:  ? · You do not get better as expected. Where can you learn more? Go to http://florin-madeline.info/. Enter R642 in the search box to learn more about \"Depression and Chronic Disease: Care Instructions. \"  Current as of: May 12, 2017  Content Version: 11.4  © 8796-7987 Healthwise, Incorporated. Care instructions adapted under license by [employer] (which disclaims liability or warranty for this information). If you have questions about a medical condition or this instruction, always ask your healthcare professional. [address] any warranty or liability for your use of this information.

## 2018-06-08 NOTE — PROGRESS NOTES
Chief Complaint   Patient presents with    Anxiety     6 month follow up    Depression     1. Have you been to the ER, urgent care clinic since your last visit? Hospitalized since your last visit? No    2. Have you seen or consulted any other health care providers outside of the [address] [place] [address] since your last visit? Include any pap smears or colon screening.  No     Fasting

## 2018-06-28 ENCOUNTER — TELEPHONE (OUTPATIENT)
Dept: INTERNAL MEDICINE CLINIC | Age: 83
End: 2018-06-28

## 2018-06-28 DIAGNOSIS — G47.00 INSOMNIA, UNSPECIFIED TYPE: Primary | ICD-10-CM

## 2018-06-28 NOTE — TELEPHONE ENCOUNTER
Patient called requesting a referral for sleep study. Patient c/o not being able to sleep, even with taking Temazepam and Xanax. Per verbal order from Dr. Bibiana Duffy, Sleep study referral request completed.

## 2018-07-10 DIAGNOSIS — G47.00 INSOMNIA, UNSPECIFIED TYPE: ICD-10-CM

## 2018-07-10 RX ORDER — ALPRAZOLAM 0.5 MG/1
TABLET ORAL
Qty: 90 TAB | Refills: 0 | Status: SHIPPED | OUTPATIENT
Start: 2018-07-10 | End: 2018-10-31 | Stop reason: SDUPTHER

## 2018-07-10 NOTE — TELEPHONE ENCOUNTER
RX refill request from the patient/pharmacy. Patient last seen 06- with labs, and next appt. scheduled for 10-.

## 2018-08-07 RX ORDER — LOSARTAN POTASSIUM 100 MG/1
100 TABLET ORAL DAILY
Qty: 90 TAB | Refills: 1 | Status: SHIPPED | OUTPATIENT
Start: 2018-08-07 | End: 2019-01-16 | Stop reason: SDUPTHER

## 2018-08-07 NOTE — TELEPHONE ENCOUNTER
Received request from patient to change Valsartan to another medication due to recall on Valsartan. Requested Prescriptions     Pending Prescriptions Disp Refills    losartan (COZAAR) 100 mg tablet 90 Tab 1     Sig: Take 1 Tab by mouth daily.

## 2018-09-17 DIAGNOSIS — E78.2 MIXED HYPERLIPIDEMIA: ICD-10-CM

## 2018-09-17 RX ORDER — ROSUVASTATIN CALCIUM 20 MG/1
TABLET, COATED ORAL
Qty: 90 TAB | Refills: 3 | Status: SHIPPED | OUTPATIENT
Start: 2018-09-17 | End: 2019-01-16 | Stop reason: SDUPTHER

## 2018-10-18 ENCOUNTER — CLINICAL SUPPORT (OUTPATIENT)
Dept: INTERNAL MEDICINE CLINIC | Age: 83
End: 2018-10-18

## 2018-10-18 VITALS — WEIGHT: 174 LBS | HEIGHT: 68 IN | BODY MASS INDEX: 26.37 KG/M2

## 2018-10-18 DIAGNOSIS — Z23 ENCOUNTER FOR IMMUNIZATION: Primary | ICD-10-CM

## 2018-10-18 NOTE — PROGRESS NOTES
Chief Complaint   Patient presents with    Immunization/Injection     flu       1. Have you been to the ER, urgent care clinic since your last visit? Hospitalized since your last visit? no    2. Have you seen or consulted any other health care providers outside of the [address] [place] [address] since your last visit? Include any pap smears or colon screening. No   pateint given high dose flu vacine in right deltoid per verbal order from Dr. Genesis Sullivan.

## 2018-10-18 NOTE — PATIENT INSTRUCTIONS
Vaccine Information Statement    Influenza (Flu) Vaccine (Inactivated or Recombinant): What you need to know    Many Vaccine Information Statements are available in Yi and other languages. See www.immunize.org/vis  Hojas de Información Sobre Vacunas están disponibles en Español y en muchos otros idiomas. Visite www.immunize.org/vis    1. Why get vaccinated? Influenza (flu) is a contagious disease that spreads around the [place] every year, usually between October and May. Flu is caused by influenza viruses, and is spread mainly by coughing, sneezing, and close contact. Anyone can get flu. Flu strikes suddenly and can last several days. Symptoms vary by age, but can include:   fever/chills   sore throat   muscle aches   fatigue   cough   headache    runny or stuffy nose    Flu can also lead to pneumonia and blood infections, and cause diarrhea and seizures in children. If you have a medical condition, such as heart or lung disease, flu can make it worse. Flu is more dangerous for some people. Infants and young children, people 72years of age and older, pregnant women, and people with certain health conditions or a weakened immune system are at greatest risk. Each year thousands of people in the [facility] die from flu, and many more are hospitalized. Flu vaccine can:   keep you from getting flu,   make flu less severe if you do get it, and   keep you from spreading flu to your family and other people. 2. Inactivated and recombinant flu vaccines    A dose of flu vaccine is recommended every flu season. Children 6 months through 6years of age may need two doses during the same flu season. Everyone else needs only one dose each flu season.        Some inactivated flu vaccines contain a very small amount of a mercury-based preservative called thimerosal. Studies have not shown thimerosal in vaccines to be harmful, but flu vaccines that do not contain thimerosal are available. There is no live flu virus in flu shots. They cannot cause the flu. There are many flu viruses, and they are always changing. Each year a new flu vaccine is made to protect against three or four viruses that are likely to cause disease in the upcoming flu season. But even when the vaccine doesnt exactly match these viruses, it may still provide some protection    Flu vaccine cannot prevent:   flu that is caused by a virus not covered by the vaccine, or   illnesses that look like flu but are not. It takes about 2 weeks for protection to develop after vaccination, and protection lasts through the flu season. 3. Some people should not get this vaccine    Tell the person who is giving you the vaccine:     If you have any severe, life-threatening allergies. If you ever had a life-threatening allergic reaction after a dose of flu vaccine, or have a severe allergy to any part of this vaccine, you may be advised not to get vaccinated. Most, but not all, types of flu vaccine contain a small amount of egg protein.  If you ever had Guillain-Barré Syndrome (also called GBS). Some people with a history of GBS should not get this vaccine. This should be discussed with your doctor.  If you are not feeling well. It is usually okay to get flu vaccine when you have a mild illness, but you might be asked to come back when you feel better. 4. Risks of a vaccine reaction    With any medicine, including vaccines, there is a chance of reactions. These are usually mild and go away on their own, but serious reactions are also possible. Most people who get a flu shot do not have any problems with it.      Minor problems following a flu shot include:    soreness, redness, or swelling where the shot was given     hoarseness   sore, red or itchy eyes   cough   fever   aches   headache   itching   fatigue  If these problems occur, they usually begin soon after the shot and last 1 or 2 days. More serious problems following a flu shot can include the following:     There may be a small increased risk of Guillain-Barré Syndrome (GBS) after inactivated flu vaccine. This risk has been estimated at 1 or 2 additional cases per million people vaccinated. This is much lower than the risk of severe complications from flu, which can be prevented by flu vaccine.  Young children who get the flu shot along with pneumococcal vaccine (PCV13) and/or DTaP vaccine at the same time might be slightly more likely to have a seizure caused by fever. Ask your doctor for more information. Tell your doctor if a child who is getting flu vaccine has ever had a seizure. Problems that could happen after any injected vaccine:      People sometimes faint after a medical procedure, including vaccination. Sitting or lying down for about 15 minutes can help prevent fainting, and injuries caused by a fall. Tell your doctor if you feel dizzy, or have vision changes or ringing in the ears.  Some people get severe pain in the shoulder and have difficulty moving the arm where a shot was given. This happens very rarely.  Any medication can cause a severe allergic reaction. Such reactions from a vaccine are very rare, estimated at about 1 in a million doses, and would happen within a few minutes to a few hours after the vaccination. As with any medicine, there is a very remote chance of a vaccine causing a serious injury or death. The safety of vaccines is always being monitored. For more information, visit: www.cdc.gov/vaccinesafety/    5. What if there is a serious reaction? What should I look for?  Look for anything that concerns you, such as signs of a severe allergic reaction, very high fever, or unusual behavior.     Signs of a severe allergic reaction can include hives, swelling of the face and throat, difficulty breathing, a fast heartbeat, dizziness, and weakness - usually within a few minutes to a few hours after the vaccination. What should I do?  If you think it is a severe allergic reaction or other emergency that cant wait, call 9-1-1 and get the person to the nearest hospital. Otherwise, call your doctor.  Reactions should be reported to the Vaccine Adverse Event Reporting System (VAERS). Your doctor should file this report, or you can do it yourself through  the VAERS web site at www.vaers. [facility].gov, or by calling 0-130.693.4232. VAERS does not give medical advice. 6. The National Vaccine Injury Compensation Program    The [facility] Vaccine Injury Compensation Program (VICP) is a federal program that was created to compensate people who may have been injured by certain vaccines. Persons who believe they may have been injured by a vaccine can learn about the program and about filing a claim by calling 9-477.562.1618 or visiting the [employer] website at www.[facility].gov/vaccinecompensation. There is a time limit to file a claim for compensation. 7. How can I learn more?  Ask your healthcare provider. He or she can give you the vaccine package insert or suggest other sources of information.  Call your local or state health department.  Contact the Centers for Disease Control and Prevention (CDC):  - Call 1-707.923.9098 (1-800-CDC-INFO) or  - Visit CDCs website at www.cdc.gov/flu    Vaccine Information Statement   Inactivated Influenza Vaccine   8/7/2015  [address] LA[address] [id number]    Department of Health and Human Services  Centers for Disease Control and Prevention    Office Use Only

## 2018-10-31 DIAGNOSIS — G47.00 INSOMNIA, UNSPECIFIED TYPE: ICD-10-CM

## 2018-10-31 RX ORDER — ALPRAZOLAM 0.5 MG/1
TABLET ORAL
Qty: 90 TAB | Refills: 0 | Status: SHIPPED | OUTPATIENT
Start: 2018-10-31 | End: 2019-01-15 | Stop reason: SDUPTHER

## 2018-10-31 NOTE — TELEPHONE ENCOUNTER
RX refill request from the patient/pharmacy.  Patient last seen 10- with labs, and next appt. scheduled for 12-  Requested Prescriptions     Pending Prescriptions Disp Refills    ALPRAZolam (XANAX) 0.5 mg tablet [Pharmacy Med Name: ALPRAZolam Oral Tablet 0.5 MG] 90 Tab 0     Sig: TAKE ONE TABLET BY MOUTH AT BEDTIME AS NEEDED FOR SLEEP

## 2018-12-06 NOTE — PROGRESS NOTES
This is a Subsequent Medicare Annual Wellness Visit providing Personalized Prevention Plan Services (PPPS) (Performed 12 months after initial AWV and PPPS ) I have reviewed the patient's medical history in detail and updated the computerized patient record. He returns today for his Medicare subsequent annual wellness examination screening questionnaire. He is also here in follow-up of his multiple medical problems include hypertension, glucose intolerance, hyperlipidemia, prior TIA, ASCVD, DJD, mild depression and other medical problems. He [facility] and all was stable there including a stress test that was good. He currently denies any chest pain, shortness of breath, palpitations, PND, orthopnea or cardiorespiratory complaints. He denies any GI or  complaints. He denies any headaches, dizziness or neurologic complaints. There are no current arthritic complaints and he has no other complaints on complete review of systems. History Past Medical History:  
Diagnosis Date  Allergy to ACE inhibitors 11/28/2017  Anxiety 11/28/2017  Arthritis  ASCVD (arteriosclerotic cardiovascular disease) 11/28/2017  CAD (coronary artery disease) 2000 MI  Depression 11/28/2017  DJD (degenerative joint disease) 11/28/2017  ED (erectile dysfunction) 11/28/2017  Elevated PSA 11/28/2017  Former smoker 11/28/2017  Glucose intolerance (impaired glucose tolerance) 11/28/2017  Hypertension  Insomnia 11/28/2017  On statin therapy 11/28/2017  Other ill-defined conditions(799.89)   
 elevated lipids  Psychiatric disorder   
 depression /anxiety Past Surgical History:  
Procedure Laterality Date  ABDOMEN SURGERY PROC UNLISTED    
 hernia repair  CARDIAC SURG PROCEDURE UNLIST    
 stents x 6  
 COLORECTAL SCRN; HI RISK IND  9/10/2014  HX TONSILLECTOMY  ND COLSC FLX W/RMVL OF TUMOR POLYP LESION SNARE TQ  9/10/2014 Social History Tobacco Use  Smoking status: Former Smoker Packs/day: 1.50 Years: 5.00 Pack years: 7.50  Smokeless tobacco: Never Used  Tobacco comment: smoke for 5 year Substance Use Topics  Alcohol use: No  
 Drug use: No  
 
Current Outpatient Medications Medication Sig Dispense Refill  melatonin 5 mg cap capsule Take 5 mg by mouth nightly.  ALPRAZolam (XANAX) 0.5 mg tablet TAKE ONE TABLET BY MOUTH AT BEDTIME AS NEEDED FOR SLEEP 90 Tab 0  
 rosuvastatin (CRESTOR) 20 mg tablet TAKE 1 TABLET BY MOUTH  DAILY 90 Tab 3  
 losartan (COZAAR) 100 mg tablet Take 1 Tab by mouth daily. 90 Tab 1  krill-omega-3-dha-epa-lipids 931-41-25-62 mg cap Take  by mouth.  ezetimibe (ZETIA) 10 mg tablet Take  by mouth daily.  amLODIPine (NORVASC) 5 mg tablet TAKE 1 TABLET BY MOUTH  DAILY 90 Tab 3  
 nebivolol (BYSTOLIC) 5 mg tablet Take 5 mg by mouth daily.  aspirin 81 mg tablet Take 81 mg by mouth daily.  cholecalciferol, vitamin d3, (VITAMIN D) 1,000 unit tablet Take 3,000 Units by mouth daily. Allergies Allergen Reactions  Benazepril Rash and Swelling  Pork/Porcine Containing Products Hives Family History Problem Relation Age of Onset  Psychiatric Disorder Mother   
     depression  Heart Disease Father   
     aneurysym Patient Active Problem List  
 Diagnosis  Glucose intolerance (impaired glucose tolerance)  Primary osteoarthritis involving multiple joints  ASCVD (arteriosclerotic cardiovascular disease)  Essential hypertension  Mixed hyperlipidemia  Mild depression ([address]  Paronychia of finger of left hand  Myalgia  Non-healing skin lesion of nose  Gait instability  Medicare annual wellness visit, subsequent  ED (erectile dysfunction)  Elevated PSA  Insomnia  Former smoker  Anxiety  Allergy to ACE inhibitors  TIA (transient ischemic attack)  Syncope Patient Care Team: 
Malik Tabor MD as PCP - General (Internal Medicine) Depression Risk Factor Screening: PHQ over the last two weeks 6/8/2018 Little interest or pleasure in doing things Not at all Feeling down, depressed, irritable, or hopeless Several days Total Score PHQ 2 1 Alcohol Risk Factor Screening: You do not drink alcohol or very rarely. Functional Ability and Level of Safety:  
 
Fall Risk Fall Risk Assessment, last 12 mths 12/7/2018 Able to walk? Yes Fall in past 12 months? No  
 
 
Hearing Loss  
mild Activities of Daily Living Self-care. ADL Assessment 6/8/2018 Feeding yourself No Help Needed Getting from bed to chair No Help Needed Getting dressed No Help Needed Bathing or showering No Help Needed Walk across the room (includes cane/walker) No Help Needed Using the telphone No Help Needed Taking your medications No Help Needed Preparing meals No Help Needed Managing money (expenses/bills) No Help Needed Moderately strenuous housework (laundry) No Help Needed Shopping for personal items (toiletries/medicines) No Help Needed Shopping for groceries No Help Needed Driving No Help Needed Climbing a flight of stairs No Help Needed Getting to places beyond walking distances No Help Needed Abuse Screen Patient is not abused Social History Social History Narrative  Not on file Review of Systems ROS: 
 
Constitutional: He denies fevers, weight loss, sweats. Eyes: No blurred or double vision. ENT: No difficulty with swallowing, taste, speech or smell. Neck: no stiffness or swelling Respiratory: No cough wheezing or shortness of breath. Cardiovascular: Denies chest pain, palpitations, unexplained indigestion or syncope. Gastrointestinal:  No changes in bowel movements, no abdominal pain, no bloating. Genitourinary:  He denies frequency, nocturia or stranguria. Extremities: No joint pain, stiffness or swelling. Neurological:  No numbness, tingling, burring paresthesias or loss of motor strength. No syncope, dizziness or frequent headache Lymphatic: no adenopathy noted Hematologic: no easy bruising or bleeding gums Skin:  No recent rashes or mole changes. Psychiatric/Behavioral:  Negative for depression. Physical Examination Evaluation of Cognitive Function: 
Mood/affect:  happy Appearance: age appropriate Family member/caregiver input: none Visit Vitals /68 Pulse (!) 50 Resp 17 Ht 5' 7.5\" (1.715 m) Wt 168 lb 6.4 oz (76.4 kg) SpO2 98% BMI 25.99 kg/m² Vitals:  
 12/07/18 8980 12/07/18 0955 BP: 140/88 138/68 Pulse: (!) 50 Resp: 17 SpO2: 98% Weight: 168 lb 6.4 oz (76.4 kg) Height: 5' 7.5\" (1.715 m) PainSc:   0 - No pain PHYSICAL EXAM: 
 
General appearance - alert, well appearing, and in no distress Mental status - alert, oriented to person, place, and time HEENT: 
Ears - bilateral TM's and external ear canals clear Eyes - pupillary responses were normal.  Extraocular muscle function intact. Lids and conjunctiva not injected. Fundoscopic exam revealed sharp disc margins. eye movements intact Pharynx- clear with teeth in good repair. No masses were noted Neck - supple without thyromegaly or burit. No JVD noted Lungs - clear to auscultation and percussion Cardiac- normal rate, regular rhythm without murmurs. PMI not displaced. No gallop, rub or click Abdomen - flat, soft, non-tender without palpable organomegaly or mass. No pulsatile mass was felt, and not bruit was heard. Bowel sounds were active : Circumcised, Testes descended w/o masses Rectal: normal sphincter tone, prostate normal, no masses, stool brown and hemacult negative Extremities -  no clubbing cyanosis or edema Lymphatics - no palpable lymphadenopathy, no hepatosplenomegaly Hematologic: no petechiae or purpura Peripheral vascular -Femoral, Dorsalis pedis and posterior tibial pulses felt without difficulty Skin - no rash or unusual mole change noted Neurological - Cranial nerves II-XII grossly intact. Motor strength 5/5. DTR's 2+ and symmetric. Station and gait normal 
Back exam - full range of motion, no tenderness, palpable spasm or pain on motion Musculoskeletal - no joint tenderness, deformity or swelling Results for orders placed or performed in visit on 06/08/18 AMB POC COMPREHENSIVE METABOLIC PANEL Result Value Ref Range GLUCOSE 95.0 75 - 110 mg/dL BUN 23.0 (A) 9 - 20 mg/dL Creatinine (POC) 1.1 0.8 - 1.5 mg/dL Sodium (POC) 140.0 137 - 145 MMOL/L Potassium (POC) 4.6 3.6 - 5.0 MMOL/L  
 CHLORIDE 102.0 98 - 107 MMOL/L  
 CO2 27.0 22 - 32 MMOL/L  
 CALCIUM 9.6 8.4 - 10.2 mg/dL TOTAL PROTEIN 7.1 6.3 - 8.2 g/dL ALBUMIN 4.0 3.9 - 5.4 g/dL AST (POC) 32 14 - 36 U/L  
 ALT (POC) 41 9 - 52 U/L ALKALINE PHOS 54.0 38 - 126 U/L  
 TOTAL BILIRUBIN 1.2 0.2 - 1.3 mg/dL  
 eGFR (POC) 62.6 AMB POC LIPID PROFILE Result Value Ref Range Cholesterol (POC) 149.0 0 - 200 mg/dL Triglycerides (POC) 69.0 0 - 200 mg/dL HDL Cholesterol (POC) 63.0 35 - 130 mg/dL VLDL (POC) 13.8 MG/DL  
 LDL Cholesterol (POC) 72.2 0.0 - 130.0 MG/DL TChol/HDL Ratio (POC) 2.4 0.0 - 4.0 AMB POC HEMOGLOBIN A1C Result Value Ref Range Hemoglobin A1c (POC) 5.8 (A) 4.5 - 5.7 % AMB POC CK (CPK) Result Value Ref Range CK (CPK) (POC) 108.0 30 - 135 U/L Advice/Referrals/Counseling Education and counseling provided: 
Are appropriate based on today's review and evaluation End-of-Life planning (with patient's consent) Pneumococcal Vaccine Influenza Vaccine Colorectal cancer screening tests Assessment/Plan ASSESSMENT:  
1. Essential hypertension 2. Glucose intolerance (impaired glucose tolerance) 3. Mixed hyperlipidemia 4. ASCVD (arteriosclerotic cardiovascular disease) 5. Primary osteoarthritis involving multiple joints 6. Anxiety 7. Elevated PSA 8. Mild depression ([address] 9. TIA (transient ischemic attack) 10. Medicare annual wellness visit, subsequent Impression 1. Hypertension is controlled continue current therapy reviewed with him 2.  Glucose intolerance repeat status pending and prior labs reviewed and I will make adjustments if necessary. 3.  Hyperlipidemia prior lab reviewed repeat status pending I will adjust if needed. 4.  ASCVD clinically stable and recent cardiac evaluation was good 5. DJD that is stable 6. Anxiety that is stable 7. Prior elevated PSA repeat status pending 8. History of depression that is currently stable not taking any antidepressants at the present time 
9 prior TIA continue aspirin daily Medicare subsequent annual wellness examination screening questionnaire was completed today. The results were reviewed with him and his questions were answered. Lifestyle recommendations and modifications discussed in May. I will call the lab and make further recommendations or adjustments if necessary. Follow-up as scheduled again for 6 months or sooner should there be a problem. PLAN: 
. Orders Placed This Encounter  T4, FREE  
 METABOLIC PANEL, COMPREHENSIVE  
 TSH 3RD GENERATION  
 LIPID PANEL  
 CK  
 HEMOGLOBIN A1C WITH EAG  
 PROSTATE SPECIFIC AG  
 AMB POC COMPLETE CBC,AUTOMATED ENTER  AMB POC URINALYSIS DIP STICK AUTO W/ MICRO  melatonin 5 mg cap capsule ATTENTION:  
This medical record was transcribed using an electronic medical records system. Although proofread, it may and can contain electronic and spelling errors. Other human spelling and other errors may be present. Corrections may be executed at a later time.   Please feel free to contact us for any clarifications as needed. Follow-up Disposition: 
Return in about 6 months (around 6/7/2019). Cait Rojas MD 
 
Recommended healthy diet low in carbohydrates, fats, sodium and cholesterol. Recommended regular cardiovascular exercise 3-6 times per week for 30-60 minutes daily. Current Outpatient Medications Medication Sig Dispense Refill  melatonin 5 mg cap capsule Take 5 mg by mouth nightly.  ALPRAZolam (XANAX) 0.5 mg tablet TAKE ONE TABLET BY MOUTH AT BEDTIME AS NEEDED FOR SLEEP 90 Tab 0  
 rosuvastatin (CRESTOR) 20 mg tablet TAKE 1 TABLET BY MOUTH  DAILY 90 Tab 3  
 losartan (COZAAR) 100 mg tablet Take 1 Tab by mouth daily. 90 Tab 1  krill-omega-3-dha-epa-lipids 330-85-76-67 mg cap Take  by mouth.  ezetimibe (ZETIA) 10 mg tablet Take  by mouth daily.  amLODIPine (NORVASC) 5 mg tablet TAKE 1 TABLET BY MOUTH  DAILY 90 Tab 3  
 nebivolol (BYSTOLIC) 5 mg tablet Take 5 mg by mouth daily.  aspirin 81 mg tablet Take 81 mg by mouth daily.  cholecalciferol, vitamin d3, (VITAMIN D) 1,000 unit tablet Take 3,000 Units by mouth daily. No results found for any visits on 12/07/18. Verbal and written instructions (see AVS) provided. Patient expresses understanding of diagnosis and treatment plan.  
 
Cait Rojas MD

## 2018-12-07 ENCOUNTER — OFFICE VISIT (OUTPATIENT)
Dept: INTERNAL MEDICINE CLINIC | Age: 83
End: 2018-12-07

## 2018-12-07 VITALS
SYSTOLIC BLOOD PRESSURE: 138 MMHG | WEIGHT: 168.4 LBS | HEIGHT: 68 IN | BODY MASS INDEX: 25.52 KG/M2 | OXYGEN SATURATION: 98 % | RESPIRATION RATE: 17 BRPM | HEART RATE: 50 BPM | DIASTOLIC BLOOD PRESSURE: 68 MMHG

## 2018-12-07 DIAGNOSIS — I10 ESSENTIAL HYPERTENSION: Primary | ICD-10-CM

## 2018-12-07 DIAGNOSIS — Z00.00 MEDICARE ANNUAL WELLNESS VISIT, SUBSEQUENT: ICD-10-CM

## 2018-12-07 DIAGNOSIS — E78.2 MIXED HYPERLIPIDEMIA: ICD-10-CM

## 2018-12-07 DIAGNOSIS — F32.A MILD DEPRESSION: ICD-10-CM

## 2018-12-07 DIAGNOSIS — R73.02 GLUCOSE INTOLERANCE (IMPAIRED GLUCOSE TOLERANCE): ICD-10-CM

## 2018-12-07 DIAGNOSIS — R97.20 ELEVATED PSA: ICD-10-CM

## 2018-12-07 DIAGNOSIS — F41.9 ANXIETY: ICD-10-CM

## 2018-12-07 DIAGNOSIS — G45.9 TIA (TRANSIENT ISCHEMIC ATTACK): ICD-10-CM

## 2018-12-07 DIAGNOSIS — M15.9 PRIMARY OSTEOARTHRITIS INVOLVING MULTIPLE JOINTS: ICD-10-CM

## 2018-12-07 DIAGNOSIS — I25.10 ASCVD (ARTERIOSCLEROTIC CARDIOVASCULAR DISEASE): ICD-10-CM

## 2018-12-07 LAB
BACTERIA UA POCT, BACTPOCT: NORMAL
BILIRUB UR QL STRIP: NEGATIVE
CASTS UA POCT: NORMAL
CLUE CELLS, CLUEPOCT: NEGATIVE
CRYSTALS UA POCT, CRYSPOCT: NEGATIVE
EPITHELIAL CELLS POCT: NEGATIVE
GLUCOSE UR-MCNC: NEGATIVE MG/DL
GRAN# POC: 5.2 K/UL (ref 2–7.8)
GRAN% POC: 69.8 % (ref 37–92)
HCT VFR BLD CALC: 49.3 % (ref 37–51)
HGB BLD-MCNC: 16.5 G/DL (ref 12–18)
KETONES P FAST UR STRIP-MCNC: NORMAL MG/DL
LY# POC: 1.8 K/UL (ref 0.6–4.1)
LY% POC: 24.7 % (ref 10–58.5)
MCH RBC QN: 31.1 PG (ref 26–32)
MCHC RBC-ENTMCNC: 33.5 G/DL (ref 30–36)
MCV RBC: 93 FL (ref 80–97)
MID #, POC: 0.4 K/UL (ref 0–1.8)
MID% POC: 5.5 % (ref 0.1–24)
MUCUS UA POCT, MUCPOCT: NORMAL
PH UR STRIP: 6 [PH] (ref 5–7)
PLATELET # BLD: 272 K/UL (ref 140–440)
PROT UR QL STRIP: NEGATIVE
RBC # BLD: 5.3 M/UL (ref 4.2–6.3)
RBC UA POCT, RBCPOCT: 0
SP GR UR STRIP: 1.02 (ref 1.01–1.02)
TRICH UA POCT, TRICHPOC: NEGATIVE
UA UROBILINOGEN AMB POC: NORMAL (ref 0.2–1)
URINALYSIS CLARITY POC: CLEAR
URINALYSIS COLOR POC: NORMAL
URINE BLOOD POC: NEGATIVE
URINE CULT COMMENT, POCT: NORMAL
URINE LEUKOCYTES POC: NEGATIVE
URINE NITRITES POC: NEGATIVE
WBC # BLD: 7.4 K/UL (ref 4.1–10.9)
WBC UA POCT, WBCPOCT: 0
YEAST UA POCT, YEASTPOC: NEGATIVE

## 2018-12-07 RX ORDER — MELATONIN 5 MG
5 CAPSULE ORAL
COMMUNITY
End: 2019-07-26

## 2018-12-07 NOTE — PROGRESS NOTES
Chief Complaint Patient presents with [address] [place] [address] Annual Wellness Visit 1. Have you been to the ER, urgent care clinic since your last visit? Hospitalized since your last visit? No 
 
2. Have you seen or consulted any other health care providers outside of the [address] [place] [address] since your last visit? Include any pap smears or colon screening. No 
Visit Vitals /88 (BP 1 Location: Left arm, BP Patient Position: Sitting) Pulse (!) 50 Resp 17 Ht 5' 7.5\" (1.715 m) Wt 168 lb 6.4 oz (76.4 kg) SpO2 98% BMI 25.99 kg/m²

## 2018-12-07 NOTE — PATIENT INSTRUCTIONS
Arthritis: Care Instructions Your Care Instructions Arthritis, also called osteoarthritis, is a breakdown of the cartilage that cushions your joints. When the cartilage wears down, your bones rub against each other. This causes pain and stiffness. Many people have some arthritis as they age. Arthritis most often affects the joints of the spine, hands, hips, knees, or feet. You can take simple measures to protect your joints, ease your pain, and help you stay active. Follow-up care is a key part of your treatment and safety. Be sure to make and go to all appointments, and call your doctor if you are having problems. It's also a good idea to know your test results and keep a list of the medicines you take. How can you care for yourself at home? · Stay at a healthy weight. Being overweight puts extra strain on your joints. · Talk to your doctor or physical therapist about exercises that will help ease joint pain. ? Stretch. You may enjoy gentle forms of yoga to help keep your joints and muscles flexible. ? Walk instead of jog. Other types of exercise that are less stressful on the joints include riding a bicycle, swimming, dale chi, or water exercise. ? Lift weights. Strong muscles help reduce stress on your joints. Stronger thigh muscles, for example, take some of the stress off of the knees and hips. Learn the right way to lift weights so you do not make joint pain worse. · Take your medicines exactly as prescribed. Call your doctor if you think you are having a problem with your medicine. · Take pain medicines exactly as directed. ? If the doctor gave you a prescription medicine for pain, take it as prescribed. ? If you are not taking a prescription pain medicine, ask your doctor if you can take an over-the-counter medicine. · Use a cane, crutch, walker, or another device if you need help to get around. These can help rest your joints.  You also can use other things to make life easier, such as a higher toilet seat and padded handles on kitchen utensils. · Do not sit in low chairs, which can make it hard to get up. · Put heat or cold on your sore joints as needed. Use whichever helps you most. You also can take turns with hot and cold packs. ? Apply heat 2 or 3 times a day for 20 to 30 minutesusing a heating pad, hot shower, or hot packto relieve pain and stiffness. ? Put ice or a cold pack on your sore joint for 10 to 20 minutes at a time. Put a thin cloth between the ice and your skin. When should you call for help? Call your doctor now or seek immediate medical care if: 
  · You have sudden swelling, warmth, or pain in any joint.  
  · You have joint pain and a fever or rash.  
  · You have such bad pain that you cannot use a joint.  
 Watch closely for changes in your health, and be sure to contact your doctor if: 
  · You have mild joint symptoms that continue even with more than 6 weeks of care at home.  
  · You have stomach pain or other problems with your medicine. Where can you learn more? Go to http://florin-madeline.info/. Enter P173 in the search box to learn more about \"Arthritis: Care Instructions. \" Current as of: June 11, 2018 Content Version: 11.8 © 0170-3669 [employer]. Care instructions adapted under license by [employer] (which disclaims liability or warranty for this information). If you have questions about a medical condition or this instruction, always ask your healthcare professional. [place] [address] any warranty or liability for your use of this information.

## 2018-12-08 LAB
ALBUMIN SERPL-MCNC: 4.6 G/DL (ref 3.5–4.7)
ALBUMIN/GLOB SERPL: 1.7 {RATIO} (ref 1.2–2.2)
ALP SERPL-CCNC: 69 IU/L (ref 39–117)
ALT SERPL-CCNC: 24 IU/L (ref 0–44)
AST SERPL-CCNC: 25 IU/L (ref 0–40)
BILIRUB SERPL-MCNC: 0.9 MG/DL (ref 0–1.2)
BUN SERPL-MCNC: 21 MG/DL (ref 8–27)
BUN/CREAT SERPL: 18 (ref 10–24)
CALCIUM SERPL-MCNC: 9.7 MG/DL (ref 8.6–10.2)
CHLORIDE SERPL-SCNC: 102 MMOL/L (ref 96–106)
CHOLEST SERPL-MCNC: 131 MG/DL (ref 100–199)
CK SERPL-CCNC: 111 U/L (ref 24–204)
CO2 SERPL-SCNC: 25 MMOL/L (ref 20–29)
CREAT SERPL-MCNC: 1.2 MG/DL (ref 0.76–1.27)
EST. AVERAGE GLUCOSE BLD GHB EST-MCNC: 111 MG/DL
GLOBULIN SER CALC-MCNC: 2.7 G/DL (ref 1.5–4.5)
GLUCOSE SERPL-MCNC: 98 MG/DL (ref 65–99)
HBA1C MFR BLD: 5.5 % (ref 4.8–5.6)
HDLC SERPL-MCNC: 49 MG/DL
LDLC SERPL CALC-MCNC: 68 MG/DL (ref 0–99)
POTASSIUM SERPL-SCNC: 4.9 MMOL/L (ref 3.5–5.2)
PROT SERPL-MCNC: 7.3 G/DL (ref 6–8.5)
PSA SERPL-MCNC: 3.8 NG/ML (ref 0–4)
SODIUM SERPL-SCNC: 140 MMOL/L (ref 134–144)
T4 FREE SERPL-MCNC: 1.46 NG/DL (ref 0.82–1.77)
TRIGL SERPL-MCNC: 70 MG/DL (ref 0–149)
TSH SERPL DL<=0.005 MIU/L-ACNC: 2.04 UIU/ML (ref 0.45–4.5)
VLDLC SERPL CALC-MCNC: 14 MG/DL (ref 5–40)

## 2018-12-10 RX ORDER — AMLODIPINE BESYLATE 5 MG/1
TABLET ORAL
Qty: 90 TAB | Refills: 3 | Status: SHIPPED | OUTPATIENT
Start: 2018-12-10 | End: 2019-01-16 | Stop reason: SDUPTHER

## 2019-01-15 DIAGNOSIS — G47.00 INSOMNIA, UNSPECIFIED TYPE: ICD-10-CM

## 2019-01-15 RX ORDER — ALPRAZOLAM 0.5 MG/1
TABLET ORAL
Qty: 90 TAB | Refills: 0 | Status: SHIPPED | OUTPATIENT
Start: 2019-01-15 | End: 2019-06-07 | Stop reason: ALTCHOICE

## 2019-01-15 NOTE — TELEPHONE ENCOUNTER
RX refill request from the patient/pharmacy. Patient last seen 12- with labs, and next appt. scheduled for 06-  Requested Prescriptions     Pending Prescriptions Disp Refills    ALPRAZolam (XANAX) 0.5 mg tablet 90 Tab      Sig: TAKE ONE TABLET BY MOUTH AT BEDTIME AS NEEDED FOR SLEEP   .

## 2019-01-16 DIAGNOSIS — E78.2 MIXED HYPERLIPIDEMIA: ICD-10-CM

## 2019-01-16 RX ORDER — ROSUVASTATIN CALCIUM 20 MG/1
TABLET, COATED ORAL
Qty: 90 TAB | Refills: 3 | Status: SHIPPED | OUTPATIENT
Start: 2019-01-16 | End: 2019-01-23 | Stop reason: SDUPTHER

## 2019-01-16 RX ORDER — AMLODIPINE BESYLATE 5 MG/1
TABLET ORAL
Qty: 90 TAB | Refills: 3 | Status: SHIPPED | OUTPATIENT
Start: 2019-01-16 | End: 2019-01-23 | Stop reason: SDUPTHER

## 2019-01-16 RX ORDER — LOSARTAN POTASSIUM 100 MG/1
100 TABLET ORAL DAILY
Qty: 90 TAB | Refills: 3 | Status: SHIPPED | OUTPATIENT
Start: 2019-01-16 | End: 2019-01-23 | Stop reason: SDUPTHER

## 2019-01-16 NOTE — TELEPHONE ENCOUNTER
RX refill request from the patient/pharmacy. Patient last seen 12- with labs, and next appt. scheduled for 06-  Requested Prescriptions     Pending Prescriptions Disp Refills    rosuvastatin (CRESTOR) 20 mg tablet 90 Tab 3     Sig: TAKE 1 TABLET BY MOUTH  DAILY    losartan (COZAAR) 100 mg tablet 90 Tab 3     Sig: Take 1 Tab by mouth daily.  amLODIPine (NORVASC) 5 mg tablet 90 Tab 3     Sig: TAKE 1 TABLET BY MOUTH  DAILY   .

## 2019-01-23 DIAGNOSIS — E78.2 MIXED HYPERLIPIDEMIA: ICD-10-CM

## 2019-01-23 RX ORDER — ROSUVASTATIN CALCIUM 20 MG/1
TABLET, COATED ORAL
Qty: 90 TAB | Refills: 3 | Status: SHIPPED | OUTPATIENT
Start: 2019-01-23 | End: 2020-03-13

## 2019-01-23 RX ORDER — LOSARTAN POTASSIUM 100 MG/1
100 TABLET ORAL DAILY
Qty: 90 TAB | Refills: 3 | Status: SHIPPED | OUTPATIENT
Start: 2019-01-23 | End: 2019-04-09 | Stop reason: ALTCHOICE

## 2019-01-23 RX ORDER — AMLODIPINE BESYLATE 5 MG/1
TABLET ORAL
Qty: 90 TAB | Refills: 3 | Status: SHIPPED | OUTPATIENT
Start: 2019-01-23 | End: 2020-03-13

## 2019-01-23 NOTE — TELEPHONE ENCOUNTER
RX refill request from the patient/pharmacy. Patient last seen 12- with labs, and next appt. scheduled for 06-  Requested Prescriptions     Pending Prescriptions Disp Refills    amLODIPine (NORVASC) 5 mg tablet 90 Tab 3     Sig: TAKE 1 TABLET BY MOUTH  DAILY    losartan (COZAAR) 100 mg tablet 90 Tab 3     Sig: Take 1 Tab by mouth daily.  rosuvastatin (CRESTOR) 20 mg tablet 90 Tab 3     Sig: TAKE 1 TABLET BY MOUTH  DAILY   .

## 2019-02-18 NOTE — PROGRESS NOTES
Chief Complaint   Patient presents with    Annual Wellness Visit    Hypertension     1. Have you been to the ER, urgent care clinic since your last visit? Hospitalized since your last visit? Benzapril reaction: hives    2. Have you seen or consulted any other health care providers outside of the [address] [place] [address] since your last visit? Include any pap smears or colon screening.  NO Patient is a 60y old  Female who presents with a chief complaint of Abdominal pain (2019 08:46)      SUBJECTIVE / OVERNIGHT EVENTS:  abd pain is a lot better.  only on tylenol now.  only 1x BM.  no cp, no sob, no n/v/d. no abdominal pain.  no headache, no dizziness.       Vital Signs Last 24 Hrs  T(C): 36.3 (2019 08:46), Max: 36.8 (2019 16:36)  T(F): 97.3 (2019 08:46), Max: 98.3 (2019 21:46)  HR: 73 (2019 08:46) (73 - 73)  BP: 122/73 (2019 08:46) (108/69 - 130/74)  BP(mean): --  RR: 18 (2019 08:46) (18 - 18)  SpO2: 97% (2019 08:46) (96% - 97%)  I&O's Summary    2019 07:01  -  2019 07:00  --------------------------------------------------------  IN: 1800 mL / OUT: 0 mL / NET: 1800 mL    2019 07:01  -  2019 13:11  --------------------------------------------------------  IN: 0 mL / OUT: 0 mL / NET: 0 mL        PHYSICAL EXAM:  GENERAL: NAD, Comfortable  HEAD:  Atraumatic, Normocephalic  EYES: EOMI, PERRLA, conjunctiva and sclera clear  NECK: Supple, No JVD  CHEST/LUNG: Clear to auscultation bilaterally; No wheeze  HEART: Regular rate and rhythm; No murmurs, rubs, or gallops  ABDOMEN: Soft, +tenderness diffuse, improved, Nondistended; Bowel sounds present  Neuro: AAO x 3, no focal deficit, 5/5 b/l extremities  EXTREMITIES:  2+ Peripheral Pulses, No clubbing, cyanosis, or edema  SKIN: No rashes or lesions    LABS:                        11.0   6.21  )-----------( 217      ( 2019 09:50 )             32.4     02-18    139  |  102  |  9   ----------------------------<  68<L>  3.6   |  21<L>  |  0.72    Ca    8.6      2019 07:06  Mg     2.1     -        CAPILLARY BLOOD GLUCOSE            Urinalysis Basic - ( 2019 16:09 )    Color: Yellow / Appearance: Slightly Turbid / S.028 / pH: x  Gluc: x / Ketone: Large  / Bili: Negative / Urobili: Negative   Blood: x / Protein: 100 / Nitrite: Negative   Leuk Esterase: Moderate / RBC: 5 /hpf / WBC 8 /HPF   Sq Epi: x / Non Sq Epi: 4 /hpf / Bacteria: Negative        RADIOLOGY & ADDITIONAL TESTS:    Imaging Personally Reviewed:  [x] YES  [ ] NO    Consultant(s) Notes Reviewed:  [x] YES  [ ] NO      MEDICATIONS  (STANDING):  anastrozole 1 milliGRAM(s) Oral daily  famotidine    Tablet 20 milliGRAM(s) Oral daily  heparin  Injectable 5000 Unit(s) SubCutaneous every 8 hours  nicotine -  14 mG/24Hr(s) Patch 1 patch Transdermal daily  pantoprazole  Injectable 40 milliGRAM(s) IV Push two times a day  sertraline 200 milliGRAM(s) Oral daily  sodium chloride 0.9%. 1000 milliLiter(s) (75 mL/Hr) IV Continuous <Continuous>  vancomycin    Solution 125 milliGRAM(s) Oral every 6 hours    MEDICATIONS  (PRN):  acetaminophen   Tablet .. 650 milliGRAM(s) Oral every 6 hours PRN Mild Pain (1 - 3)  aluminum hydroxide/magnesium hydroxide/simethicone Suspension 30 milliLiter(s) Oral every 4 hours PRN Dyspepsia  ketorolac   Injectable 15 milliGRAM(s) IV Push every 8 hours PRN Moderate Pain (4 - 6)  morphine  - Injectable 2 milliGRAM(s) IV Push every 4 hours PRN Severe Pain (7 - 10)  morphine  - Injectable 1 milliGRAM(s) IV Push every 2 hours PRN Breakthrough pain      Care Discussed with Consultants/Other Providers [x] YES  [ ] NO    HEALTH ISSUES - PROBLEM Dx:  GERD (gastroesophageal reflux disease): GERD (gastroesophageal reflux disease)  Tobacco dependence: Tobacco dependence  Anxiety: Anxiety  Sepsis: Sepsis  Pancolitis: Pancolitis

## 2019-03-04 PROBLEM — R41.3 MEMORY DEFICIT: Status: ACTIVE | Noted: 2019-03-04

## 2019-03-04 NOTE — PROGRESS NOTES
Chief Complaint Patient presents with  Altered mental status  Blurred Vision  Depression SUBJECTIVE: 
 
Neelima Gardner is a 81 y.o. male who returns today for evaluation of increased confusion as well as blurred vision he thinks is a component of depression as well. He notes he seems more confused when he is trying to do things and what he was before and that all the time she is less confused than he was at sometimes. He also thinks he has been depressed on and off most of his life but that is probably become a little worse and he beats himself up for doing things that he should not be been himself up for. He also has blurry vision is seen in the eye doctor Dr. Nena Fragoso and cataracts were there but not enough that he should be causing the significant blurred vision he is having. He does not really note any headaches but he does note he has to be careful, and steps that he does not lose his balance. He denies any other neurologic complaints. He denies any cardiorespiratory complaints. There are no other complaints noted. Current Outpatient Medications Medication Sig Dispense Refill  hydrALAZINE (APRESOLINE) 25 mg tablet Take 1 Tab by mouth two (2) times a day. 60 Tab prn  escitalopram oxalate (LEXAPRO) 10 mg tablet Take 1 Tab by mouth daily. 30 Tab prn  amLODIPine (NORVASC) 5 mg tablet TAKE 1 TABLET BY MOUTH  DAILY 90 Tab 3  
 losartan (COZAAR) 100 mg tablet Take 1 Tab by mouth daily. 90 Tab 3  
 rosuvastatin (CRESTOR) 20 mg tablet TAKE 1 TABLET BY MOUTH  DAILY 90 Tab 3  ALPRAZolam (XANAX) 0.5 mg tablet TAKE ONE TABLET BY MOUTH AT BEDTIME AS NEEDED FOR SLEEP 90 Tab 0  
 melatonin 5 mg cap capsule Take 5 mg by mouth nightly.  krill-omega-3-dha-epa-lipids 795-21-72-18 mg cap Take  by mouth.  ezetimibe (ZETIA) 10 mg tablet Take  by mouth daily.  nebivolol (BYSTOLIC) 5 mg tablet Take 5 mg by mouth daily.  aspirin 81 mg tablet Take 81 mg by mouth daily.  cholecalciferol, vitamin d3, (VITAMIN D) 1,000 unit tablet Take 3,000 Units by mouth daily. Past Medical History:  
Diagnosis Date  Allergy to ACE inhibitors 11/28/2017  Anxiety 11/28/2017  Arthritis  ASCVD (arteriosclerotic cardiovascular disease) 11/28/2017  CAD (coronary artery disease) 2000 MI  Depression 11/28/2017  DJD (degenerative joint disease) 11/28/2017  ED (erectile dysfunction) 11/28/2017  Elevated PSA 11/28/2017  Former smoker 11/28/2017  Glucose intolerance (impaired glucose tolerance) 11/28/2017  Hypertension  Insomnia 11/28/2017  On statin therapy 11/28/2017  Other ill-defined conditions(799.89)   
 elevated lipids  Psychiatric disorder   
 depression /anxiety Past Surgical History:  
Procedure Laterality Date  ABDOMEN SURGERY PROC UNLISTED    
 hernia repair  CARDIAC SURG PROCEDURE UNLIST    
 stents x 6  
 COLORECTAL SCRN; HI RISK IND  9/10/2014  HX TONSILLECTOMY  DC COLSC FLX W/RMVL OF TUMOR POLYP LESION SNARE TQ  9/10/2014 Allergies Allergen Reactions  Benazepril Rash and Swelling  Pork/Porcine Containing Products Hives REVIEW OF SYSTEMS: 
General: negative for - chills or fever, or weight loss or gain ENT: negative for - headaches, nasal congestion or tinnitus Eyes: no visual changes except blurred vision is noted Neck: No stiffness or swollen nodes Respiratory: negative for - cough, hemoptysis, shortness of breath or wheezing Cardiovascular : negative for - chest pain, edema, palpitations or shortness of breath Gastrointestinal: negative for - abdominal pain, blood in stools, heartburn or nausea/vomiting Genito-Urinary: no dysuria, trouble voiding, or hematuria Musculoskeletal: negative for - gait disturbance, joint pain, joint stiffness or joint swelling Neurological: no TIA or stroke symptoms but episodes of confusion Hematologic: no bruises, no bleeding Lymphatic: no swollen glands Integument: no lumps, mole changes, nail changes or rash Endocrine:no malaise/lethargy poly uria or polydipsia or unexpected weight changes Psychiatric: Some problems with depression but no suicidal thoughts or ideations Social History Socioeconomic History  Marital status:  Spouse name: Not on file  Number of children: Not on file  Years of education: Not on file  Highest education level: Not on file Tobacco Use  Smoking status: Former Smoker Packs/day: 1.50 Years: 5.00 Pack years: 7.50  Smokeless tobacco: Never Used  Tobacco comment: smoke for 5 year Substance and Sexual Activity  Alcohol use: No  
 Drug use: No  
 
Family History Problem Relation Age of Onset  Psychiatric Disorder Mother   
     depression  Heart Disease Father   
     aneurysym OBJECTIVE:  
 
Visit Vitals /80 (BP 1 Location: Left arm, BP Patient Position: Sitting) Pulse (!) 55 Temp 97.5 °F (36.4 °C) (Oral) Resp 19 Ht 5' 7.5\" (1.715 m) Wt 175 lb (79.4 kg) SpO2 98% BMI 27.00 kg/m² CONSTITUTIONAL:   well nourished, appears age appropriate EYES: sclera anicteric, PERRL, EOMI 
ENMT:nares clear, moist mucous membranes, pharynx clear NECK: supple. Thyroid normal, No JVD or bruits RESPIRATORY: Chest: clear to ascultation and percussion, normal inspiratory effort CARDIOVASCULAR: Heart: regular rate and rhythm no murmurs, rubs or gallops, PMI not displaced, No thrills GASTROINTESTINAL: Abdomen: non distended, soft, non tender, bowel sounds normal 
HEMATOLOGIC: no purpura, petechiae or bruising LYMPHATIC: No lymph node enlargemant MUSCULOSKELETAL: Extremities: no edema or active synovitis, pulse 1+ INTEGUMENT: No unusual rashes or suspicious skin lesions noted.  Nails appear normal. 
PERIPHERAL VASCULAR: normal pulses femoral, PT and DP 
 NEUROLOGIC: non-focal exam, A & O X 3. Serial sevens he did quickly through 5 repetitions, he remembered 3 of 3 objects with no problem, he was able to do animal naming with no difficulty. PSYCHIATRIC:, appropriate affect although somewhat flat and clearly admits that he does have some depression ASSESSMENT:  
1. Memory deficit 2. Mild depression ([address] 3. Essential hypertension 4. Anxiety 5. Confusion Impression 1. Question of memory deficit I do not think he has any evidence of Alzheimer's or dementia. The question is could he have some type of central process causing this something we need to do an MRI which he can light of his problems with imbalance as well as his problems with vision. MRI with and without contrast 
2. Mild depression I think we clearly need treatment for this we will start Lexapro 10 daily 3. Hypertension that is not controlled blood pressure 158/80 by the nurse and 164/88 by me but since he is convinced losartan may be playing some role in his symptoms I am going to stop that and replace it with hydralazine 25 twice daily. Continue Norvasc 5 daily and Bystolic 5 daily 4. Anxiety that is stable 5. Confusion as noted above I will check him back after the MRI we will reassess his blood pressure at that time and see how he is doing with the Lexapro. PLAN: 
. Orders Placed This Encounter  MRI BRAIN W WO CONT  hydrALAZINE (APRESOLINE) 25 mg tablet  escitalopram oxalate (LEXAPRO) 10 mg tablet ATTENTION:  
This medical record was transcribed using an electronic medical records system. Although proofread, it may and can contain electronic and spelling errors. Other human spelling and other errors may be present. Corrections may be executed at a later time. Please feel free to contact us for any clarifications as needed. Follow-up Disposition: 
Return in about 2 weeks (around 3/19/2019). No results found for any visits on 03/05/19. Yao Daniel MD 
 
The patient verbalized understanding of the problems and plans as explained.

## 2019-03-05 ENCOUNTER — OFFICE VISIT (OUTPATIENT)
Dept: INTERNAL MEDICINE CLINIC | Age: 84
End: 2019-03-05

## 2019-03-05 ENCOUNTER — HOSPITAL ENCOUNTER (OUTPATIENT)
Dept: MRI IMAGING | Age: 84
Discharge: HOME | End: 2019-03-05
Attending: INTERNAL MEDICINE
Payer: MEDICARE

## 2019-03-05 VITALS
TEMPERATURE: 97.5 F | HEIGHT: 68 IN | HEART RATE: 55 BPM | RESPIRATION RATE: 19 BRPM | OXYGEN SATURATION: 98 % | DIASTOLIC BLOOD PRESSURE: 80 MMHG | BODY MASS INDEX: 26.52 KG/M2 | SYSTOLIC BLOOD PRESSURE: 158 MMHG | WEIGHT: 175 LBS

## 2019-03-05 DIAGNOSIS — F32.A MILD DEPRESSION: ICD-10-CM

## 2019-03-05 DIAGNOSIS — R41.0 CONFUSION: ICD-10-CM

## 2019-03-05 DIAGNOSIS — F41.9 ANXIETY: ICD-10-CM

## 2019-03-05 DIAGNOSIS — R41.3 MEMORY DEFICIT: Primary | ICD-10-CM

## 2019-03-05 DIAGNOSIS — R41.3 MEMORY DEFICIT: ICD-10-CM

## 2019-03-05 DIAGNOSIS — I10 ESSENTIAL HYPERTENSION: ICD-10-CM

## 2019-03-05 PROCEDURE — 74011250636 HC RX REV CODE- 250/636: Performed by: INTERNAL MEDICINE

## 2019-03-05 PROCEDURE — A9575 INJ GADOTERATE MEGLUMI 0.1ML: HCPCS | Performed by: INTERNAL MEDICINE

## 2019-03-05 PROCEDURE — 70553 MRI BRAIN STEM W/O & W/DYE: CPT

## 2019-03-05 RX ORDER — ESCITALOPRAM OXALATE 10 MG/1
10 TABLET ORAL DAILY
Qty: 30 TAB | Status: SHIPPED | OUTPATIENT
Start: 2019-03-05 | End: 2020-03-13

## 2019-03-05 RX ORDER — GADOTERATE MEGLUMINE 376.9 MG/ML
15 INJECTION INTRAVENOUS
Status: COMPLETED | OUTPATIENT
Start: 2019-03-05 | End: 2019-03-05

## 2019-03-05 RX ORDER — HYDRALAZINE HYDROCHLORIDE 25 MG/1
25 TABLET, FILM COATED ORAL 2 TIMES DAILY
Qty: 60 TAB | Status: SHIPPED | OUTPATIENT
Start: 2019-03-05 | End: 2019-12-09 | Stop reason: SDUPTHER

## 2019-03-05 RX ADMIN — GADOTERATE MEGLUMINE 15 ML: 376.9 INJECTION INTRAVENOUS at 18:16

## 2019-03-05 NOTE — PROGRESS NOTES
Ariel Olmedo  Identified pt with two pt identifiers(name and ). Chief Complaint   Patient presents with    Altered mental status    Blurred Vision    Depression       1. Have you been to the ER, urgent care clinic since your last visit? Hospitalized since your last visit? No    2. Have you seen or consulted any other health care providers outside of the [address] [place] [address] since your last visit? Include any pap smears or colon screening. Yes, Dr. Olga Lidia Wiggins for t for eye sight check because of the blurry vision. Health Maintenance Topics with due status: Overdue       Topic Date Due    DTaP/Tdap/Td series 10/25/1957    Shingrix Vaccine Age 50> 10/25/1986    GLAUCOMA SCREENING Q2Y 10/25/2001     Health Maintenance Topics with due status: Not Due       Topic Last Completion Date    MEDICARE [facility] 2018     Health Maintenance Topics with due status: Completed       Topic Last Completion Date    Pneumococcal 65+ Low/Medium Risk 2015    Influenza Age 9 to Adult 10/18/2018           Medication reconciliation up to date and corrected with patient at this time. Today's provider has been notified of reason for visit, vitals and flowsheets obtained on patients. Reviewed record in preparation for visit, huddled with provider and have obtained necessary documentation.         Wt Readings from Last 3 Encounters:   19 175 lb (79.4 kg)   18 168 lb 6.4 oz (76.4 kg)   10/18/18 174 lb (78.9 kg)     Temp Readings from Last 3 Encounters:   19 97.5 °F (36.4 °C) (Oral)   10/18/18 (P) 97.9 °F (36.6 °C) ((P) Oral)   18 97.5 °F (36.4 °C) (Oral)     BP Readings from Last 3 Encounters:   19 158/80   18 138/68   10/18/18 (P) 148/79     Pulse Readings from Last 3 Encounters:   19 (!) 55   18 (!) 50   10/18/18 (!) (P) 54     Vitals:    19 1349   BP: 158/80   Pulse: (!) 55   Resp: 19   Temp: 97.5 °F (36.4 °C)   TempSrc: Oral   SpO2: 98% Weight: 175 lb (79.4 kg)   Height: 5' 7.5\" (1.715 m)   PainSc:   0 - No pain         Learning Assessment:  :     Learning Assessment 6/8/2018   PRIMARY LEARNER Patient   PRIMARY LANGUAGE ENGLISH   LEARNER PREFERENCE PRIMARY DEMONSTRATION   ANSWERED BY self   RELATIONSHIP SELF       Depression Screening:  :     3 most recent PHQ Screens 3/5/2019   Little interest or pleasure in doing things Several days   Feeling down, depressed, irritable, or hopeless Several days   Total Score PHQ 2 2   Trouble falling or staying asleep, or sleeping too much Several days   Feeling tired or having little energy More than half the days   Poor appetite, weight loss, or overeating Not at all   Feeling bad about yourself - or that you are a failure or have let yourself or your family down More than half the days   Trouble concentrating on things such as school, work, reading, or watching TV Not at all   Moving or speaking so slowly that other people could have noticed; or the opposite being so fidgety that others notice Not at all   Thoughts of being better off dead, or hurting yourself in some way Not at all   PHQ 9 Score 7       No flowsheet data found. Fall Risk Assessment:  :     Fall Risk Assessment, last 12 mths 12/7/2018   Able to walk? Yes   Fall in past 12 months? No       Abuse Screening:  :     Abuse Screening Questionnaire 3/5/2019 6/8/2018   Do you ever feel afraid of your partner? N N   Are you in a relationship with someone who physically or mentally threatens you? N N   Is it safe for you to go home?  Y Y       ADL Screening:  :     ADL Assessment 3/5/2019   Feeding yourself No Help Needed   Getting from bed to chair No Help Needed   Getting dressed No Help Needed   Bathing or showering No Help Needed   Walk across the room (includes cane/walker) No Help Needed   Using the telphone No Help Needed   Taking your medications No Help Needed   Preparing meals No Help Needed   Managing money (expenses/bills) No Help Needed   Moderately strenuous housework (laundry) No Help Needed   Shopping for personal items (toiletries/medicines) No Help Needed   Shopping for groceries No Help Needed   Driving No Help Needed   Climbing a flight of stairs No Help Needed   Getting to places beyond walking distances No Help Needed

## 2019-03-05 NOTE — PATIENT INSTRUCTIONS
Depression Treatment: Care Instructions  Your Care Instructions    Depression is a condition that affects the way you feel, think, and act. It causes symptoms such as low energy, loss of interest in daily activities, and sadness or grouchiness that goes on for a long time. Depression is very common and affects men and women of all ages. Depression is a medical illness caused by changes in the natural chemicals in your brain. It is not a character flaw, and it does not mean that you are a bad or weak person. It does not mean that you are going crazy. It is important to know that depression can be treated. Medicines, counseling, and self-care can all help. Many people do not get help because they are embarrassed or think that they will get over the depression on their own. But some people do not get better without treatment. Follow-up care is a key part of your treatment and safety. Be sure to make and go to all appointments, and call your doctor if you are having problems. It's also a good idea to know your test results and keep a list of the medicines you take. How can you care for yourself at home? Learn about antidepressant medicines  Antidepressant medicines can improve or end the symptoms of depression. You may need to take the medicine for at least 6 months, and often longer. Keep taking your medicine even if you feel better. If you stop taking it too soon, your symptoms may come back or get worse. You may start to feel better within 1 to 3 weeks of taking antidepressant medicine. But it can take as many as 6 to 8 weeks to see more improvement. Talk to your doctor if you have problems with your medicine or if you do not notice any improvement after 3 weeks. Antidepressants can make you feel tired, dizzy, or nervous. Some people have dry mouth, constipation, headaches, sexual problems, an upset stomach, or diarrhea.  Many of these side effects are mild and go away on their own after you take the medicine for a few weeks. Some may last longer. Talk to your doctor if side effects bother you too much. You might be able to try a different medicine. If you are pregnant or breastfeeding, talk to your doctor about what medicines you can take. Learn about counseling  In many cases, counseling can work as well as medicines to treat mild to moderate depression. Counseling is done by licensed mental health providers, such as psychologists, social workers, and some types of nurses. It can be done in one-on-one sessions or in a group setting. Many people find group sessions helpful. Cognitive-behavioral therapy is a type of counseling. In this treatment therapy, you learn how to see and change unhelpful thinking styles that may be adding to your depression. Counseling and medicines often work well when used together. To manage depression  · Be physically active. Getting 30 minutes of exercise each day is good for your body and your mind. Begin slowly if it is hard for you to get started. If you already exercise, keep it up. · Plan something pleasant for yourself every day. Include activities that you have enjoyed in the past.  · Get enough sleep. Talk to your doctor if you have problems sleeping. · Eat a balanced diet. If you do not feel hungry, eat small snacks rather than large meals. · Do not drink alcohol, use illegal drugs, or take medicines that your doctor has not prescribed for you. They may interfere with your treatment. · Spend time with family and friends. It may help to speak openly about your depression with people you trust.  · Take your medicines exactly as prescribed. Call your doctor if you think you are having a problem with your medicine. · Do not make major life decisions while you are depressed. Depression may change the way you think. You will be able to make better decisions after you feel better. · Think positively.  Challenge negative thoughts with statements such as \"I am hopeful\"; \"Things will get better\"; and \"I can ask for the help I need. \" Write down these statements and read them often, even if you don't believe them yet. · Be patient with yourself. It took time for your depression to develop, and it will take time for your symptoms to improve. Do not take on too much or be too hard on yourself. · Learn all you can about depression from written and online materials. · Check out behavioral health classes to learn more about dealing with depression. · Keep the numbers for these national suicide hotlines: 9-910-786-TALK (0-730.728.7191) and 7-281-MKXFSYJ (8-431.959.6146). If you or someone you know talks about suicide or feeling hopeless, get help right away. When should you call for help? Call 911 anytime you think you may need emergency care. For example, call if:    · You feel you cannot stop from hurting yourself or someone else.   [facility] your doctor now or seek immediate medical care if:    · You hear voices.     · You feel much more depressed.    Watch closely for changes in your health, and be sure to contact your doctor if:    · You are having problems with your depression medicine.     · You are not getting better as expected. Where can you learn more? Go to http://florin-madeline.info/. Enter P960 in the search box to learn more about \"Depression Treatment: Care Instructions. \"  Current as of: September 11, 2018  Content Version: 11.9  © 4109-4099 [employer]. Care instructions adapted under license by [employer] (which disclaims liability or warranty for this information). If you have questions about a medical condition or this instruction, always ask your healthcare professional. [address] any warranty or liability for your use of this information.

## 2019-03-06 NOTE — PROGRESS NOTES
MRI of the brain shows some age-related changes but no evidence of any acute process and therefore no difference in treatment is needed.   Take the Lexapro as started at the visit and follow-up as scheduled

## 2019-03-08 ENCOUNTER — OFFICE VISIT (OUTPATIENT)
Dept: INTERNAL MEDICINE CLINIC | Age: 84
End: 2019-03-08

## 2019-03-08 VITALS
WEIGHT: 175 LBS | TEMPERATURE: 97.6 F | HEART RATE: 53 BPM | BODY MASS INDEX: 27.47 KG/M2 | RESPIRATION RATE: 16 BRPM | SYSTOLIC BLOOD PRESSURE: 145 MMHG | DIASTOLIC BLOOD PRESSURE: 81 MMHG | OXYGEN SATURATION: 98 % | HEIGHT: 67 IN

## 2019-03-08 DIAGNOSIS — I10 ESSENTIAL HYPERTENSION: ICD-10-CM

## 2019-03-08 DIAGNOSIS — E78.2 MIXED HYPERLIPIDEMIA: ICD-10-CM

## 2019-03-08 DIAGNOSIS — R73.02 GLUCOSE INTOLERANCE (IMPAIRED GLUCOSE TOLERANCE): ICD-10-CM

## 2019-03-08 DIAGNOSIS — E53.8 VITAMIN B 12 DEFICIENCY: ICD-10-CM

## 2019-03-08 DIAGNOSIS — I25.10 ASCVD (ARTERIOSCLEROTIC CARDIOVASCULAR DISEASE): ICD-10-CM

## 2019-03-08 DIAGNOSIS — F32.A MILD DEPRESSION: Primary | ICD-10-CM

## 2019-03-08 DIAGNOSIS — R41.0 CONFUSION: ICD-10-CM

## 2019-03-08 DIAGNOSIS — R41.3 MEMORY DEFICIT: ICD-10-CM

## 2019-03-08 LAB
A-G RATIO,AGRAT: 1.5 RATIO
ALBUMIN SERPL-MCNC: 4.1 G/DL (ref 3.9–5.4)
ALP SERPL-CCNC: 68 U/L (ref 38–126)
ALT SERPL-CCNC: 39 U/L (ref 9–52)
ANION GAP SERPL CALC-SCNC: 7 MMOL/L
AST SERPL W P-5'-P-CCNC: 43 U/L (ref 14–36)
BILIRUB SERPL-MCNC: 1 MG/DL (ref 0.2–1.3)
BUN SERPL-MCNC: 23 MG/DL (ref 9–20)
BUN/CREATININE RATIO,BUCR: 21 RATIO
CALCIUM SERPL-MCNC: 9.8 MG/DL (ref 8.4–10.2)
CHLORIDE SERPL-SCNC: 103 MMOL/L (ref 98–107)
CHOL/HDL RATIO,CHHD: 2 RATIO (ref 0–4)
CHOLEST SERPL-MCNC: 112 MG/DL (ref 0–200)
CO2 SERPL-SCNC: 29 MMOL/L (ref 22–32)
CREAT SERPL-MCNC: 1.1 MG/DL (ref 0.8–1.5)
GLOBULIN,GLOB: 2.8
GLUCOSE SERPL-MCNC: 103 MG/DL (ref 75–110)
HDLC SERPL-MCNC: 48 MG/DL (ref 35–130)
LDL/HDL RATIO,LDHD: 1 RATIO
LDLC SERPL CALC-MCNC: 55 MG/DL (ref 0–130)
POTASSIUM SERPL-SCNC: 5.1 MMOL/L (ref 3.6–5)
PROT SERPL-MCNC: 6.9 G/DL (ref 6.3–8.2)
SODIUM SERPL-SCNC: 139 MMOL/L (ref 137–145)
TRIGL SERPL-MCNC: 44 MG/DL (ref 0–200)
VLDLC SERPL CALC-MCNC: 9 MG/DL

## 2019-03-08 NOTE — PATIENT INSTRUCTIONS
Depression and Chronic Disease: Care Instructions  Your Care Instructions    A chronic disease is one that you have for a long time. Some chronic diseases can be controlled, but they usually cannot be cured. Depression is common in people with chronic diseases, but it often goes unnoticed. Many people have concerns about seeking treatment for a mental health problem. You may think it's a sign of weakness, or you don't want people to know about it. It's important to overcome these reasons for not seeking treatment. Treating depression or anxiety is good for your health. Follow-up care is a key part of your treatment and safety. Be sure to make and go to all appointments, and call your doctor if you are having problems. It's also a good idea to know your test results and keep a list of the medicines you take. How can you care for yourself at home? Watch for symptoms of depression  The symptoms of depression are often subtle at first. You may think they are caused by your disease rather than depression. Or you may think it is normal to be depressed when you have a chronic disease. If you are depressed you may:  · Feel sad or hopeless. · Feel guilty or worthless. · Not enjoy the things you used to enjoy. · Feel hopeless, as though life is not worth living. · Have trouble thinking or remembering. · Have low energy, and you may not eat or sleep well. · Pull away from others. · Think often about death or killing yourself. (Keep the numbers for these national suicide hotlines: 0-643-045-TALK [1-313.216.6896] and 6-691-SODAZDG [1-159.374.9824]. )  Get treatment  By treating your depression, you can feel more hopeful and have more energy. If you feel better, you may take better care of yourself, so your health may improve. · Talk to your doctor if you have any changes in mood during treatment for your disease. · Ask your doctor for help.  Counseling, antidepressant medicine, or a combination of the two can help most people with depression. Often a combination works best. Counseling can also help you cope with having a chronic disease. When should you call for help? Call 911 anytime you think you may need emergency care. For example, call if:    · You feel like hurting yourself or someone else.     · Someone you know has depression and is about to attempt or is attempting suicide.   [facility] your doctor now or seek immediate medical care if:    · You hear voices.     · Someone you know has depression and:  ? Starts to give away his or her possessions. ? Uses illegal drugs or drinks alcohol heavily. ? Talks or writes about death, including writing suicide notes or talking about guns, knives, or pills. ? Starts to spend a lot of time alone. ? Acts very aggressively or suddenly appears calm.    Watch closely for changes in your health, and be sure to contact your doctor if:    · You do not get better as expected. Where can you learn more? Go to http://florin-madeline.info/. Enter Y697 in the search box to learn more about \"Depression and Chronic Disease: Care Instructions. \"  Current as of: September 11, 2018  Content Version: 11.9  © 1568-4566 [employer]. Care instructions adapted under license by [employer] (which disclaims liability or warranty for this information). If you have questions about a medical condition or this instruction, always ask your healthcare professional. [address] any warranty or liability for your use of this information.

## 2019-03-08 NOTE — PROGRESS NOTES
Identified pt with two pt identifiers(name and ). Reviewed record in preparation for visit and have obtained necessary documentation. Chief Complaint   Patient presents with    Heart Problem     3 month follow up    Cholesterol Problem      Visit Vitals  /81 (BP 1 Location: Left arm, BP Patient Position: Sitting)   Pulse (!) 53   Temp 97.6 °F (36.4 °C) (Oral)   Resp 16   Ht 5' 7\" (1.702 m)   Wt 175 lb (79.4 kg)   SpO2 98%   BMI 27.41 kg/m²         Health Maintenance Due   Topic    DTaP/Tdap/Td series (1 - Tdap)    Shingrix Vaccine Age 50> (1 of 2)    GLAUCOMA SCREENING Q2Y        Coordination of Care Questionnaire:  :   1) Have you been to an emergency room, urgent care, or hospitalized since your last visit? If yes, where when, and reason for visit? no       2. Have seen or consulted any other health care provider since your last visit? If yes, where when, and reason for visit? NO      3) Do you have an Advanced Directive/ Living Will in place? YES  If yes, do we have a copy on file YES  If no, would you like information NO    Patient is accompanied by self I have received verbal consent from Aziza Jackson to discuss any/all medical information while they are present in the room.

## 2019-03-09 LAB — VIT B12 SERPL-MCNC: 555 PG/ML (ref 232–1245)

## 2019-03-19 ENCOUNTER — OFFICE VISIT (OUTPATIENT)
Dept: INTERNAL MEDICINE CLINIC | Age: 84
End: 2019-03-19

## 2019-03-19 VITALS
BODY MASS INDEX: 27.15 KG/M2 | HEART RATE: 48 BPM | WEIGHT: 173 LBS | SYSTOLIC BLOOD PRESSURE: 136 MMHG | DIASTOLIC BLOOD PRESSURE: 84 MMHG | RESPIRATION RATE: 19 BRPM | TEMPERATURE: 97.3 F | OXYGEN SATURATION: 98 % | HEIGHT: 67 IN

## 2019-03-19 DIAGNOSIS — L98.9 SKIN LESIONS: Primary | ICD-10-CM

## 2019-03-19 DIAGNOSIS — I10 ESSENTIAL HYPERTENSION: ICD-10-CM

## 2019-03-19 DIAGNOSIS — F32.A MILD DEPRESSION: Primary | ICD-10-CM

## 2019-03-19 NOTE — PROGRESS NOTES
Subjective:  
Neelima Gardner is a 81 y.o. male Chief Complaint Patient presents with  Hypertension 1 week follow up  Anxiety  Depression History of present illness: He returns in follow-up for his anxiety, depression and hypertension. He feels like his mental status is improving his memory is getting better and generally feels like he is focusing better. Overall he feels like the Lexapro has made some improvement. He has no other specific complaints. Patient Active Problem List  
Diagnosis Code  TIA (transient ischemic attack) G45.9  Syncope R55  Essential hypertension I10  
 Mixed hyperlipidemia E78.2  
 ED (erectile dysfunction) N52.9  Elevated PSA R97.20  Insomnia G47.00  Glucose intolerance (impaired glucose tolerance) R73.02  
 Former smoker M54.518  Primary osteoarthritis involving multiple joints M15.0  ASCVD (arteriosclerotic cardiovascular disease) I25.10  Anxiety F41.9  Allergy to ACE inhibitors Z88.8  Non-healing skin lesion of nose L98.9  
 Gait instability R26.81  
 Medicare annual wellness visit, subsequent Z00.00  Myalgia M79.10  Paronychia of finger of left hand L03.012  
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  
Diagnosis Date  Allergy to ACE inhibitors 11/28/2017  Anxiety 11/28/2017  Arthritis  ASCVD (arteriosclerotic cardiovascular disease) 11/28/2017  CAD (coronary artery disease) 2000 MI  Depression 11/28/2017  DJD (degenerative joint disease) 11/28/2017  ED (erectile dysfunction) 11/28/2017  Elevated PSA 11/28/2017  Former smoker 11/28/2017  Glucose intolerance (impaired glucose tolerance) 11/28/2017  Hypertension  Insomnia 11/28/2017  On statin therapy 11/28/2017  Other ill-defined conditions(799.89)   
 elevated lipids  Psychiatric disorder depression [employer] Allergies Allergen Reactions  Benazepril Rash and Swelling  Pork/Porcine Containing Products Hives Family History Problem Relation Age of Onset  Psychiatric Disorder Mother   
     depression  Heart Disease Father   
     aneurysym Social History Socioeconomic History  Marital status:  Spouse name: Not on file  Number of children: Not on file  Years of education: Not on file  Highest education level: Not on file Social Needs  Financial resource strain: Not on file  Food insecurity - worry: Not on file  Food insecurity - inability: Not on file  Transportation needs - medical: Not on file  Transportation needs - non-medical: Not on file Occupational History  Not on file Tobacco Use  Smoking status: Former Smoker Packs/day: 1.50 Years: 5.00 Pack years: 7.50  Smokeless tobacco: Never Used  Tobacco comment: smoke for 5 year Substance and Sexual Activity  Alcohol use: No  
 Drug use: No  
 Sexual activity: Not on file Other Topics Concern  Not on file Social History Narrative  Not on file Prior to Admission medications Medication Sig Start Date End Date Taking? Authorizing Provider  
hydrALAZINE (APRESOLINE) 25 mg tablet Take 1 Tab by mouth two (2) times a day. 3/5/19  Yes Mark Vidal MD  
escitalopram oxalate (LEXAPRO) 10 mg tablet Take 1 Tab by mouth daily. 3/5/19  Yes Mark Vidal MD  
amLODIPine (NORVASC) 5 mg tablet TAKE 1 TABLET BY MOUTH  DAILY 1/23/19  Yes Mark Vidal MD  
losartan (COZAAR) 100 mg tablet Take 1 Tab by mouth daily.  1/23/19  Yes Mark Vidal MD  
rosuvastatin (CRESTOR) 20 mg tablet TAKE 1 TABLET BY MOUTH  DAILY 1/23/19  Yes Mark Vidal MD  
ALPRAZolam Brown Shaper) 0.5 mg tablet TAKE ONE TABLET BY MOUTH AT BEDTIME AS NEEDED FOR SLEEP 1/15/19  Yes Mark Vidal MD  
 melatonin 5 mg cap capsule Take 5 mg by mouth nightly. Yes Provider, Historical  
krill-omega-3-dha-epa-lipids 515-58-59-15 mg cap Take  by mouth. Yes Provider, Historical  
ezetimibe (ZETIA) 10 mg tablet Take  by mouth daily. Yes Provider, Historical  
nebivolol (BYSTOLIC) 5 mg tablet Take 5 mg by mouth daily. Yes Provider, Historical  
aspirin 81 mg tablet Take 81 mg by mouth daily. Yes Provider, Historical  
cholecalciferol, vitamin d3, (VITAMIN D) 1,000 unit tablet Take 3,000 Units by mouth daily. Yes Provider, Historical  
  
 
Review of Systems Constitutional:  He denies fever, weight loss, sweats or fatigue. EYES: No blurred or double vision, ENT: no nasal congestion, no headache or dizziness. No difficulty with               swallowing, taste, speech or smell. Respiratory:  No cough, wheezing or shortness of breath. No sputum production. Cardiac:  Denies chest pain, palpitations, unexplained indigestion, syncope, edema, PND or orthopnea. GI:  No changes in bowel movements, no abdominal pain, no bloating, anorexia, nausea, vomiting or heartburn. :  No frequency or dysuria. Denies incontinence or sexual dysfunction. Extremities:  No joint pain, stiffness or swelling Back:.no pain or soreness Skin:  No recent rashes or mole changes. Neurological:  No numbness, tingling, burning paresthesias or loss of motor strength. No syncope, dizziness, frequent headaches or memory loss. Hematologic:  No easy bruising Lymphatic: No lymph node enlargement Objective:  
 
Vitals:  
 03/19/19 1434 03/19/19 1524 BP: 144/80 136/84 Pulse: (!) 48 Resp: 19 Temp: 97.3 °F (36.3 °C) TempSrc: Oral   
SpO2: 98% Weight: 173 lb (78.5 kg) Height: 5' 7\" (1.702 m) PainSc:   0 - No pain Body mass index is 27.1 kg/m². Physical Examination:  
           General Appearance:  Well-developed, well-nourished, no acute distress. HEENT:   
  Ears:  The TMs and ear canals were clear. Eyes:  The pupillary responses were normal.  Extraocular muscle function intact. Lids and conjunctiva not injected. Funduscopic exam revealed sharp disc margins. Nares: Clear w/o edema or erythema Pharynx:  Clear with teeth in good repair. No masses were noted. Neck:  Supple without thyromegaly or adenopathy. No JVD noted. No carotid                bruits. Lungs:  Clear to auscultation and percussion. Cardiac:  Regular rate and rhythm without murmur. PMI not displaced. No gallop, rub or click. Abdominal: Soft, non-tender, no hepata-spleenomegally or masses Extremities:  No clubbing, cyanosis or edema. Skin:  No rash or unusual mole changes noted. Lymph Nodes:  None felt in the cervical, supraclavicular, axillary or inguinal region. Neurological: . DTRs 2+ and symmetric. Station and gait normal.  
Hematologic:   No purpura or petechiae Assessment/Plan: 1. Mild depression ([address] 2. Essential hypertension Impressions/Plan: 
Impression 1. Depression based upon this and we will treat him with the Lexapro as already prescribed and recheck at his previously scheduled appointment. 2.  Hypertension blood pressure initially up by the nurse but repeat by me was good so continue current treatment. No orders of the defined types were placed in this encounter. Follow-up Disposition: 
Return At prior appt. No results found for any visits on 03/19/19. Yao Daniel MD 
 
The patient was given after the visit summary the patient verbalized an understanding of the plans and problems as explained.

## 2019-03-19 NOTE — PROGRESS NOTES
Abelino Langston  Identified pt with two pt identifiers(name and ). Chief Complaint Patient presents with  Hypertension 1 week follow up  Anxiety  Depression 1. Have you been to the ER, urgent care clinic since your last visit? Hospitalized since your last visit? No 
 
2. Have you seen or consulted any other health care providers outside of the [address] [place] [address] since your last visit? Include any pap smears or colon screening. No 
 
Health Maintenance Topics with due status: Overdue Topic Date Due DTaP/Tdap/Td series 10/25/1957 Shingrix Vaccine Age 50> 10/25/1986 GLAUCOMA SCREENING Q2Y 10/25/2001 Health Maintenance Topics with due status: Not Due Topic Last Completion Date MEDICARE YEARLY EXAM 2018 Health Maintenance Topics with due status: Completed Topic Last Completion Date Pneumococcal 65+ Low/Medium Risk 2015 Influenza Age 5 to Adult 10/18/2018 Medication reconciliation up to date and corrected with patient at this time. Today's provider has been notified of reason for visit, vitals and flowsheets obtained on patients. Reviewed record in preparation for visit, huddled with provider and have obtained necessary documentation. Wt Readings from Last 3 Encounters:  
19 173 lb (78.5 kg) 19 175 lb (79.4 kg) 19 175 lb (79.4 kg) Temp Readings from Last 3 Encounters:  
19 97.3 °F (36.3 °C) (Oral) 19 97.6 °F (36.4 °C) (Oral) 19 97.5 °F (36.4 °C) (Oral) BP Readings from Last 3 Encounters:  
19 144/80  
19 145/81  
19 158/80 Pulse Readings from Last 3 Encounters:  
19 (!) 48  
19 (!) 53  
19 (!) 55 Vitals:  
 19 1434 BP: 144/80 Pulse: (!) 48 Resp: 19 Temp: 97.3 °F (36.3 °C) TempSrc: Oral  
SpO2: 98% Weight: 173 lb (78.5 kg) Height: 5' 7\" (1.702 m) PainSc:   0 - No pain Learning Assessment: 
:  
 
Learning Assessment 6/8/2018 PRIMARY LEARNER Patient PRIMARY LANGUAGE ENGLISH  
LEARNER PREFERENCE PRIMARY DEMONSTRATION  
ANSWERED BY self RELATIONSHIP SELF Depression Screening: 
:  
 
3 most recent PHQ Screens 3/5/2019 Little interest or pleasure in doing things Several days Feeling down, depressed, irritable, or hopeless Several days Total Score PHQ 2 2 Trouble falling or staying asleep, or sleeping too much Several days Feeling tired or having little energy More than half the days Poor appetite, weight loss, or overeating Not at all Feeling bad about yourself - or that you are a failure or have let yourself or your family down More than half the days Trouble concentrating on things such as school, work, reading, or watching TV Not at all Moving or speaking so slowly that other people could have noticed; or the opposite being so fidgety that others notice Not at all Thoughts of being better off dead, or hurting yourself in some way Not at all PHQ 9 Score 7 No flowsheet data found. Fall Risk Assessment: 
:  
 
Fall Risk Assessment, last 12 mths 3/5/2019 Able to walk? Yes Fall in past 12 months? No  
 
 
Abuse Screening: 
:  
 
Abuse Screening Questionnaire 3/5/2019 6/8/2018 Do you ever feel afraid of your partner? Nahed Kyaw Are you in a relationship with someone who physically or mentally threatens you? Nahed Kyaw Is it safe for you to go home? Y Y  
 
 
ADL Screening: 
:  
 

## 2019-03-19 NOTE — PATIENT INSTRUCTIONS
Arthritis: Care Instructions Your Care Instructions Arthritis, also called osteoarthritis, is a breakdown of the cartilage that cushions your joints. When the cartilage wears down, your bones rub against each other. This causes pain and stiffness. Many people have some arthritis as they age. Arthritis most often affects the joints of the spine, hands, hips, knees, or feet. You can take simple measures to protect your joints, ease your pain, and help you stay active. Follow-up care is a key part of your treatment and safety. Be sure to make and go to all appointments, and call your doctor if you are having problems. It's also a good idea to know your test results and keep a list of the medicines you take. How can you care for yourself at home? · Stay at a healthy weight. Being overweight puts extra strain on your joints. · Talk to your doctor or physical therapist about exercises that will help ease joint pain. ? Stretch. You may enjoy gentle forms of yoga to help keep your joints and muscles flexible. ? Walk instead of jog. Other types of exercise that are less stressful on the joints include riding a bicycle, swimming, dale chi, or water exercise. ? Lift weights. Strong muscles help reduce stress on your joints. Stronger thigh muscles, for example, take some of the stress off of the knees and hips. Learn the right way to lift weights so you do not make joint pain worse. · Take your medicines exactly as prescribed. Call your doctor if you think you are having a problem with your medicine. · Take pain medicines exactly as directed. ? If the doctor gave you a prescription medicine for pain, take it as prescribed. ? If you are not taking a prescription pain medicine, ask your doctor if you can take an over-the-counter medicine. · Use a cane, crutch, walker, or another device if you need help to get around. These can help rest your joints.  You also can use other things to make life easier, such as a higher toilet seat and padded handles on kitchen utensils. · Do not sit in low chairs, which can make it hard to get up. · Put heat or cold on your sore joints as needed. Use whichever helps you most. You also can take turns with hot and cold packs. ? Apply heat 2 or 3 times a day for 20 to 30 minutesusing a heating pad, hot shower, or hot packto relieve pain and stiffness. ? Put ice or a cold pack on your sore joint for 10 to 20 minutes at a time. Put a thin cloth between the ice and your skin. When should you call for help? Call your doctor now or seek immediate medical care if: 
  · You have sudden swelling, warmth, or pain in any joint.  
  · You have joint pain and a fever or rash.  
  · You have such bad pain that you cannot use a joint.  
 Watch closely for changes in your health, and be sure to contact your doctor if: 
  · You have mild joint symptoms that continue even with more than 6 weeks of care at home.  
  · You have stomach pain or other problems with your medicine. Where can you learn more? Go to http://florin-madeline.info/. Enter Z783 in the search box to learn more about \"Arthritis: Care Instructions. \" Current as of: Sia 10, 2018 Content Version: 11.9 © 6630-7853 [employer]. Care instructions adapted under license by [employer] (which disclaims liability or warranty for this information). If you have questions about a medical condition or this instruction, always ask your healthcare professional. [place] [address] any warranty or liability for your use of this information.

## 2019-04-08 PROBLEM — H25.9 AGE-RELATED CATARACT OF BOTH EYES: Status: ACTIVE | Noted: 2019-04-08

## 2019-04-08 PROBLEM — Z01.810 PREOP CARDIOVASCULAR EXAM: Status: ACTIVE | Noted: 2019-04-08

## 2019-04-09 ENCOUNTER — OFFICE VISIT (OUTPATIENT)
Dept: INTERNAL MEDICINE CLINIC | Age: 84
End: 2019-04-09

## 2019-04-09 VITALS
TEMPERATURE: 97.9 F | HEIGHT: 67 IN | SYSTOLIC BLOOD PRESSURE: 126 MMHG | DIASTOLIC BLOOD PRESSURE: 66 MMHG | RESPIRATION RATE: 16 BRPM | HEART RATE: 48 BPM | WEIGHT: 171.2 LBS | BODY MASS INDEX: 26.87 KG/M2 | OXYGEN SATURATION: 98 %

## 2019-04-09 DIAGNOSIS — Z01.810 PREOP CARDIOVASCULAR EXAM: Primary | ICD-10-CM

## 2019-04-09 DIAGNOSIS — R73.02 GLUCOSE INTOLERANCE (IMPAIRED GLUCOSE TOLERANCE): ICD-10-CM

## 2019-04-09 DIAGNOSIS — I25.10 ASCVD (ARTERIOSCLEROTIC CARDIOVASCULAR DISEASE): ICD-10-CM

## 2019-04-09 DIAGNOSIS — G45.9 TIA (TRANSIENT ISCHEMIC ATTACK): ICD-10-CM

## 2019-04-09 DIAGNOSIS — I10 ESSENTIAL HYPERTENSION: ICD-10-CM

## 2019-04-09 DIAGNOSIS — H25.9 AGE-RELATED CATARACT OF BOTH EYES, UNSPECIFIED AGE-RELATED CATARACT TYPE: ICD-10-CM

## 2019-04-09 NOTE — PATIENT INSTRUCTIONS
Cataract Surgery: What to Expect at [facility] Your Recovery After surgery, your eye will not hurt. But it may feel scratchy, sticky, or uncomfortable. It may also water more than usual. 
Most people see better 1 to 3 days after surgery. But it could take 3 to 10 weeks to get the full benefits of surgery and to see as clearly as possible. Your doctor may send you home with a bandage, patch, or clear shield on your eye. This will keep you from rubbing your eye. Your doctor will also give you eyedrops to help your eye heal. Use them exactly as directed. You can read or watch TV right away, but things may look blurry. Most people are able to return to work or their normal routine in 1 to 3 days. After your eye heals, you may still need to wear glasses, especially for reading. This care sheet gives you a general idea about how long it will take for you to recover. But each person recovers at a different pace. Follow the steps below to get better as quickly as possible. How can you care for yourself at home? Activity 
  · Rest when you feel tired. Getting enough sleep will help you recover.  
  · You may have trouble judging distances for a few days. Move slowly, and be careful going up and down stairs and pouring hot liquids. Ask for help if you need it.  
  · Ask your doctor when it is okay to drive.  
  · Wear your eye bandage, patch, or shield for as long as your doctor recommends. You may only need to wear it when you sleep.  
  · You can shower or wash your hair the day after surgery. Keep water, soap, shampoo, hair spray, and shaving lotion out of your eye, especially for the first week.  
  · Do not rub or put pressure on your eye for at least 1 week.  
  · Do not wear eye makeup for 1 to 2 weeks.  You may also want to avoid face cream or lotion.  
  · Do not get your hair colored or permed for 10 days after surgery.  
  · Do not bend over or do any strenuous activities, such as biking, jogging, weight lifting, or aerobic exercise, for 2 weeks or until your doctor says it is okay.  
  · Avoid swimming, hot tubs, gardening, and dusting for 1 to 2 weeks.  
  · Wear sunglasses on bright days for at least 1 year after surgery. Medicines 
  · Your doctor will tell you if and when you can restart your medicines. He or she will also give you instructions about taking any new medicines.  
  · If you take blood thinners, such as warfarin (Coumadin), clopidogrel (Plavix), or aspirin, be sure to talk to your doctor. He or she will tell you if and when to start taking those medicines again. Make sure that you understand exactly what your doctor wants you to do.  
  · Follow your doctor's instructions for when to use your eyedrops. Always wash your hands before you put your drops in. To put in eyedrops: ? Tilt your head back, and pull your lower eyelid down with one finger. ? Drop or squirt the medicine inside the lower lid. ? Close your eye for 30 to 60 seconds to let the drops or ointment move around. ? Do not touch the ointment or dropper tip to your eyelashes or any other surface.  
  · Follow your doctor's instructions for taking pain medicines. Follow-up care is a key part of your treatment and safety. Be sure to make and go to all appointments, and call your doctor if you are having problems. It's also a good idea to know your test results and keep a list of the medicines you take. When should you call for help? Call 911 anytime you think you may need emergency care. For example, call if: 
  · You passed out (lost consciousness).  
  · You have a sudden loss of vision.  
  · You have sudden chest pain, are short of breath, or cough up blood.  
 Call your doctor now or seek immediate medical care if: 
  · You have signs of an eye infection, such as: 
? Pus or thick discharge coming from the eye. 
? Redness or swelling around the eye. 
? A fever.  
  · You have new or worse eye pain.   · You have vision changes.  
  · You have symptoms of a blood clot in your leg (called a deep vein thrombosis), such as: 
? Pain in the calf, back of the knee, thigh, or groin. ? Redness and swelling in your leg or groin.  
 Watch closely for changes in your health, and be sure to contact your doctor if: 
  · You do not get better as expected. Where can you learn more? Go to http://florin-madeline.info/. Enter R255 in the search box to learn more about \"Cataract Surgery: What to Expect at Home. \" Current as of: July 17, 2018 Content Version: 11.9 © 4880-7803 [employer]. Care instructions adapted under license by [employer] (which disclaims liability or warranty for this information). If you have questions about a medical condition or this instruction, always ask your healthcare professional. [place] [address] any warranty or liability for your use of this information.

## 2019-04-09 NOTE — PROGRESS NOTES
HPI:  
81 y.o.  presents for preoperative medical consultation clearance prior to planned bilateral cataract surgery to be done by Dr. Braden Bermudez at [facility] with the first to be done on April 18 and second to be done about 2 weeks later. He is regular followed by me for hypertension, hyperlipidemia, atherosclerotic cardiovascular disease, prior TIA, glucose intolerance and other medical problems. He currently denies any chest pain, shortness of breath, palpitations, PND, orthopnea or other cardiorespiratory complaints. He denies any GI or  complaints. He denies any headaches, dizziness or neurologic complaints. There are no current arthritic complaints and he has no other complaints on complete review of systems other than decreased visual acuity which is slowly becoming worse and determined to be related to his cataracts. Patient Active Problem List  
 Diagnosis  Glucose intolerance (impaired glucose tolerance)  Primary osteoarthritis involving multiple joints  ASCVD (arteriosclerotic cardiovascular disease)  Essential hypertension  Mixed hyperlipidemia  Preop cardiovascular exam  
 Age-related cataract of both eyes  Vitamin B 12 deficiency  Confusion  Memory deficit  Mild depression ([address]  Paronychia of finger of left hand  Myalgia  Non-healing skin lesion of nose  Gait instability  Medicare annual wellness visit, subsequent  ED (erectile dysfunction)  Elevated PSA  Insomnia  Former smoker  Anxiety  Allergy to ACE inhibitors  TIA (transient ischemic attack)  Syncope Past Medical History:  
Diagnosis Date  Allergy to ACE inhibitors 11/28/2017  Anxiety 11/28/2017  Arthritis  ASCVD (arteriosclerotic cardiovascular disease) 11/28/2017  CAD (coronary artery disease) 2000 MI  Cataract  Depression 11/28/2017  DJD (degenerative joint disease) 11/28/2017  ED (erectile dysfunction) 11/28/2017  Elevated PSA 11/28/2017  Former smoker 11/28/2017  Glucose intolerance (impaired glucose tolerance) 11/28/2017  Hypertension  Insomnia 11/28/2017  On statin therapy 11/28/2017  Other ill-defined conditions(799.89)   
 elevated lipids  Psychiatric disorder   
 depression /anxiety Social History Tobacco Use  Smoking status: Former Smoker Packs/day: 1.50 Years: 5.00 Pack years: 7.50  Smokeless tobacco: Never Used  Tobacco comment: smoke for 5 year Substance Use Topics  Alcohol use: No  
 Drug use: No  
 
 
Family History Problem Relation Age of Onset  Psychiatric Disorder Mother   
     depression  Heart Disease Father   
     aneurysym Allergies Allergen Reactions  Benazepril Rash and Swelling  Pork/Porcine Containing Products Hives ROS:  
 
Constitutional: He denies fevers, weight loss, sweats. Eyes: No blurred or double vision but decreased visual acuity. ENT: No difficulty with swallowing, taste, speech or smell. Neck: no stiffness or swelling Respiratory: No cough wheezing or shortness of breath. Cardiovascular: Denies chest pain, palpitations, unexplained indigestion or syncope. Gastrointestinal:  No changes in bowel movements, no abdominal pain, no bloating. Genitourinary:  He denies frequency, nocturia or stranguria. Extremities: No joint pain, stiffness or swelling. Neurological:  No numbness, tingling, burring paresthesias or loss of motor strength. No syncope, dizziness or frequent headache Lymphatic: no adenopathy noted Hematologic: no easy bruising or bleeding gums Skin:  No recent rashes or mole changes. Psychiatric/Behavioral:  Negative for depression. The patient is not nervous/anxious. PE:  
 
Vitals:  
 04/09/19 1513 04/09/19 1521 BP: 126/66 Pulse: (!) 50 (!) 48 Resp: 16 Temp: 97.9 °F (36.6 °C) TempSrc: Oral   
SpO2: 98% Weight: 171 lb 3.2 oz (77.7 kg) Height: 5' 7\" (1.702 m) PainSc:   0 - No pain General appearance - alert, well appearing, and in no distress Mental status - alert, oriented to person, place, and time HEENT: 
Ears - bilateral TM's and external ear canals clear Eyes - pupillary responses were normal.  Extraocular muscle function intact. Lids and conjunctiva not injected. Fundoscopic exam revealed sharp disc margins. eye movements intact Pharynx- clear with teeth in good repair. No masses were noted Neck - supple without thyromegaly or burit. No JVD noted Lungs - clear to auscultation and percussion Cardiac- normal rate, regular rhythm without murmurs. PMI not displaced. No gallop, rub or click Abdomen - flat, soft, non-tender without palpable organomegaly or mass. No pulsatile mass was felt, and not bruit was heard. Bowel sounds were active Extremities -  no clubbing cyanosis or edema Lymphatics - no palpable lymphadenopathy, no hepatosplenomegaly Hematologic: no petechiae or purpura Peripheral vascular -Femoral, Dorsalis pedis and posterior tibial pulses felt without difficulty Skin - no rash or unusual mole change noted Neurological - Cranial nerves II-XII grossly intact. Motor strength 5/5. DTR's 2+ and symmetric. Station and gait normal 
Back exam - full range of motion, no tenderness, palpable spasm or pain on motion Musculoskeletal - no joint tenderness, deformity or swelling Assessment/Plan: 1. Preop cardiovascular exam   
2. Age-related cataract of both eyes, unspecified age-related cataract type 3. Essential hypertension 4. Glucose intolerance (impaired glucose tolerance) 5. ASCVD (arteriosclerotic cardiovascular disease) 6. TIA (transient ischemic attack) Impression 1. Preoperative cardiovascular examination completed today. 2.  Bilateral cataracts with surgery as scheduled 3. Hypertension that is well controlled 4.  Glucose intolerance last A1c reviewed in good 5. ASCVD clinically stable continue aspirin 6. Prior TIA on aspirin At this point he is medically stable for the planned cataract surgery. Copy to Dr. Lynn Monterroso. Health Maintenance reviewed - updated. No orders of the defined types were placed in this encounter. Medications Discontinued During This Encounter Medication Reason  losartan (COZAAR) 100 mg tablet Alternate Therapy Current Outpatient Medications Medication Sig Dispense Refill  hydrALAZINE (APRESOLINE) 25 mg tablet Take 1 Tab by mouth two (2) times a day. 60 Tab prn  escitalopram oxalate (LEXAPRO) 10 mg tablet Take 1 Tab by mouth daily. 30 Tab prn  amLODIPine (NORVASC) 5 mg tablet TAKE 1 TABLET BY MOUTH  DAILY 90 Tab 3  
 rosuvastatin (CRESTOR) 20 mg tablet TAKE 1 TABLET BY MOUTH  DAILY 90 Tab 3  ALPRAZolam (XANAX) 0.5 mg tablet TAKE ONE TABLET BY MOUTH AT BEDTIME AS NEEDED FOR SLEEP 90 Tab 0  
 melatonin 5 mg cap capsule Take 5 mg by mouth nightly.  krill-omega-3-dha-epa-lipids [id number] mg cap Take  by mouth.  ezetimibe (ZETIA) 10 mg tablet Take  by mouth daily.  nebivolol (BYSTOLIC) 5 mg tablet Take 5 mg by mouth daily.  aspirin 81 mg tablet Take 81 mg by mouth daily.  cholecalciferol, vitamin d3, (VITAMIN D) 1,000 unit tablet Take 2,000 Units by mouth daily. No results found for any visits on 04/09/19. Recommended healthy diet low in carbohydrates, fats, sodium and cholesterol. Recommended regular cardiovascular exercise 3-6 times per week for 30-60 minutes daily. Verbal and written instructions (see AVS) provided. Patient expresses understanding of diagnosis and treatment plan.  
 
 
Maricruz Freitas MD

## 2019-04-09 NOTE — PROGRESS NOTES
Chief Complaint Patient presents with  Pre-op Exam  
  eye surgery 1. Have you been to the ER, urgent care clinic since your last visit? Hospitalized since your last visit? No 
 
2. Have you seen or consulted any other health care providers outside of the [address] [place] [address] since your last visit? Include any pap smears or colon screening. Hildegard Peeling Right Cataract surgery scheduled 4/18/19, left 5/5/2019 Dr. Jose Weiner. Eye exam 3/2019, Dr. Jose Weiner Pulse low, doctor notified.

## 2019-05-03 ENCOUNTER — OFFICE VISIT (OUTPATIENT)
Dept: INTERNAL MEDICINE CLINIC | Age: 84
End: 2019-05-03

## 2019-05-03 VITALS
BODY MASS INDEX: 26.18 KG/M2 | DIASTOLIC BLOOD PRESSURE: 76 MMHG | OXYGEN SATURATION: 98 % | TEMPERATURE: 98.5 F | WEIGHT: 166.8 LBS | RESPIRATION RATE: 18 BRPM | SYSTOLIC BLOOD PRESSURE: 152 MMHG | HEART RATE: 60 BPM | HEIGHT: 67 IN

## 2019-05-03 DIAGNOSIS — R10.30 LOWER ABDOMINAL PAIN: Primary | ICD-10-CM

## 2019-05-03 DIAGNOSIS — I10 ESSENTIAL HYPERTENSION: ICD-10-CM

## 2019-05-03 LAB
BILIRUB UR QL: NEGATIVE
CLARITY: CLEAR
COLOR UR: ABNORMAL
ERYTHROCYTE [DISTWIDTH] IN BLOOD BY AUTOMATED COUNT: 14.1 %
GLUCOSE 24H UR-MRATE: NEGATIVE G/(24.H)
HCT VFR BLD AUTO: 43.3 % (ref 37–51)
HGB BLD-MCNC: 14.9 G/DL (ref 12–18)
HGB UR QL STRIP: NEGATIVE
KETONES UR QL STRIP.AUTO: NEGATIVE
LEUKOCYTE ESTERASE: NEGATIVE
LYMPHOCYTES ABSOLUTE: 1.9 K/UL (ref 0.6–4.1)
LYMPHOCYTES NFR BLD: 23 % (ref 10–58.5)
MCH RBC QN AUTO: 32.4 PG (ref 26–32)
MCHC RBC AUTO-ENTMCNC: 34.4 G/DL (ref 30–36)
MCV RBC AUTO: 94.2 FL (ref 80–97)
MONOCYTES ABS-DIF,2141: 0.6 K/UL (ref 0–1.8)
MONOCYTES NFR BLD: 7.6 % (ref 0.1–24)
NEUTROPHILS # BLD: 69.4 % (ref 37–92)
NEUTROPHILS ABS,2156: 5.6 K/UL (ref 2–7.8)
NITRITE UR QL STRIP.AUTO: NEGATIVE
PH UR STRIP: 6.5 [PH] (ref 5–7)
PLATELET # BLD AUTO: 244 K/UL (ref 140–440)
PMV BLD AUTO: 8 FL
PROT UR STRIP-MCNC: ABNORMAL MG/DL
RBC # BLD AUTO: 4.6 M/UL (ref 4.2–6.3)
RBC #/AREA URNS HPF: ABNORMAL #/HPF
SP GR UR REFRACTOMETRY: 1.02 (ref 1–1.03)
UROBILINOGEN UR QL STRIP.AUTO: ABNORMAL
WBC # BLD AUTO: 8.1 K/UL (ref 4.1–10.9)
WBC URNS QL MICRO: ABNORMAL #/HPF

## 2019-05-03 NOTE — PATIENT INSTRUCTIONS

## 2019-05-03 NOTE — PROGRESS NOTES
Antoine Quigley  Identified pt with two pt identifiers(name and ). Chief Complaint Patient presents with  Abdominal Pain 1 pain score 1. Have you been to the ER, urgent care clinic since your last visit? Hospitalized since your last visit? No 
 
2. Have you seen or consulted any other health care providers outside of the [address] [place] [address] since your last visit? Include any pap smears or colon screening. Yes,  Litter for a second opinion for his eyes in March or 2019. Health Maintenance Topics with due status: Overdue Topic Date Due DTaP/Tdap/Td series 10/25/1957 Shingrix Vaccine Age 50> 10/25/1986 GLAUCOMA SCREENING Q2Y 10/25/2001 Health Maintenance Topics with due status: Not Due Topic Last Completion Date Influenza Age 5 to Adult 10/18/2018 MEDICARE YEARLY EXAM 2018 Health Maintenance Topics with due status: Completed Topic Last Completion Date Pneumococcal 65+ years 2015 Medication reconciliation up to date and corrected with patient at this time. Today's provider has been notified of reason for visit, vitals and flowsheets obtained on patients. Reviewed record in preparation for visit, huddled with provider and have obtained necessary documentation. Wt Readings from Last 3 Encounters:  
19 166 lb 12.8 oz (75.7 kg) 19 171 lb 3.2 oz (77.7 kg) 19 173 lb (78.5 kg) Temp Readings from Last 3 Encounters:  
19 98.5 °F (36.9 °C) (Oral) 19 97.9 °F (36.6 °C) (Oral) 19 97.3 °F (36.3 °C) (Oral) BP Readings from Last 3 Encounters:  
19 160/80  
19 126/66  
19 136/84 Pulse Readings from Last 3 Encounters:  
19 60  
19 (!) 48  
19 (!) 48 Vitals:  
 19 1500 BP: 160/80 Pulse: 60 Resp: 18 Temp: 98.5 °F (36.9 °C) TempSrc: Oral  
SpO2: 98% Weight: 166 lb 12.8 oz (75.7 kg) Height: 5' 7\" (1.702 m) PainSc:   1 PainLoc: Abdomen Learning Assessment: 
:  
 
Learning Assessment 6/8/2018 PRIMARY LEARNER Patient PRIMARY LANGUAGE ENGLISH  
LEARNER PREFERENCE PRIMARY DEMONSTRATION  
ANSWERED BY self RELATIONSHIP SELF Depression Screening: 
:  
 
3 most recent PHQ Screens 3/5/2019 Little interest or pleasure in doing things Several days Feeling down, depressed, irritable, or hopeless Several days Total Score PHQ 2 2 Trouble falling or staying asleep, or sleeping too much Several days Feeling tired or having little energy More than half the days Poor appetite, weight loss, or overeating Not at all Feeling bad about yourself - or that you are a failure or have let yourself or your family down More than half the days Trouble concentrating on things such as school, work, reading, or watching TV Not at all Moving or speaking so slowly that other people could have noticed; or the opposite being so fidgety that others notice Not at all Thoughts of being better off dead, or hurting yourself in some way Not at all PHQ 9 Score 7 No flowsheet data found. Fall Risk Assessment: 
:  
 
Fall Risk Assessment, last 12 mths 4/9/2019 Able to walk? Yes Fall in past 12 months? No  
 
 
Abuse Screening: 
:  
 
Abuse Screening Questionnaire 3/5/2019 6/8/2018 Do you ever feel afraid of your partner? Edna Gottlieb Are you in a relationship with someone who physically or mentally threatens you? Edna Gottlieb Is it safe for you to go home? Y Y  
 
 
ADL Screening: 
:  
 

## 2019-05-03 NOTE — PROGRESS NOTES
Subjective:  
Yara Avila is a 81 y.o. male Chief Complaint Patient presents with  Abdominal Pain 1 pain score History of present illness: He presents complaining of some lower abdominal pain is been present over the past 3 weeks or so that is been intermittent in nature. Is not a severe pain but is more of a one on the grading scale according to him. Noticeable so thus he comes in for evaluation. He is concerned is what he could have a hernia. He notes no problems passing his urine and no change in the pain with urination or with passing his bowels. He has no change in his bowel habits. He is noted no nausea vomiting the been no fevers, chills or night sweats. He notes no other complaints. Patient Active Problem List  
Diagnosis Code  TIA (transient ischemic attack) G45.9  Syncope R55  Essential hypertension I10  
 Mixed hyperlipidemia E78.2  
 ED (erectile dysfunction) N52.9  Elevated PSA R97.20  Insomnia G47.00  Glucose intolerance (impaired glucose tolerance) R73.02  
 Former smoker Y40.131  Primary osteoarthritis involving multiple joints M15.0  ASCVD (arteriosclerotic cardiovascular disease) I25.10  Anxiety F41.9  Allergy to ACE inhibitors Z88.8  Non-healing skin lesion of nose L98.9  
 Gait instability R26.81  
 Medicare annual wellness visit, subsequent Z00.00  Myalgia M79.10  Paronychia of finger of left hand L03.012  
 Mild depression (HCC) F32.0  Memory deficit R41.3  Confusion R41.0  Vitamin B 12 deficiency E53.8  Preop cardiovascular exam Z01.810  Age-related cataract of both eyes H25.9  Lower abdominal pain R10.30 Past Medical History:  
Diagnosis Date  Allergy to ACE inhibitors 11/28/2017  Anxiety 11/28/2017  Arthritis  ASCVD (arteriosclerotic cardiovascular disease) 11/28/2017  CAD (coronary artery disease) 2000 MI  Cataract  Cataract 04/2019  Depression 11/28/2017  DJD (degenerative joint disease) 11/28/2017  ED (erectile dysfunction) 11/28/2017  Elevated PSA 11/28/2017  Former smoker 11/28/2017  Glucose intolerance (impaired glucose tolerance) 11/28/2017  Hypertension  Insomnia 11/28/2017  On statin therapy 11/28/2017  Other ill-defined conditions(799.89)   
 elevated lipids  Psychiatric disorder   
 depression /anxiety Allergies Allergen Reactions  Benazepril Rash and Swelling  Pork/Porcine Containing Products Hives Family History Problem Relation Age of Onset  Psychiatric Disorder Mother   
     depression  Heart Disease Father   
     aneurysym Social History Socioeconomic History  Marital status:  Spouse name: Not on file  Number of children: Not on file  Years of education: Not on file  Highest education level: Not on file Occupational History  Not on file Social Needs  Financial resource strain: Not on file  Food insecurity:  
  Worry: Not on file Inability: Not on file  Transportation needs:  
  Medical: Not on file Non-medical: Not on file Tobacco Use  Smoking status: Former Smoker Packs/day: 1.50 Years: 5.00 Pack years: 7.50  Smokeless tobacco: Never Used  Tobacco comment: smoke for 5 year Substance and Sexual Activity  Alcohol use: No  
 Drug use: No  
 Sexual activity: Not on file Lifestyle  Physical activity:  
  Days per week: Not on file Minutes per session: Not on file  Stress: Not on file Relationships  Social connections:  
  Talks on phone: Not on file Gets together: Not on file Attends Gnosticist service: Not on file Active member of club or organization: Not on file Attends meetings of clubs or organizations: Not on file Relationship status: Not on file  Intimate partner violence:  
  Fear of current or ex partner: Not on file Emotionally abused: Not on file Physically abused: Not on file Forced sexual activity: Not on file Other Topics Concern  Not on file Social History Narrative  Not on file Prior to Admission medications Medication Sig Start Date End Date Taking? Authorizing Provider  
hydrALAZINE (APRESOLINE) 25 mg tablet Take 1 Tab by mouth two (2) times a day. 3/5/19  Yes Macario Campos MD  
escitalopram oxalate (LEXAPRO) 10 mg tablet Take 1 Tab by mouth daily. 3/5/19  Yes Macario Campos MD  
amLODIPine (NORVASC) 5 mg tablet TAKE 1 TABLET BY MOUTH  DAILY 1/23/19  Yes Macario Campos MD  
rosuvastatin (CRESTOR) 20 mg tablet TAKE 1 TABLET BY MOUTH  DAILY 1/23/19  Yes Macario Campos MD  
ALPRAZolam Cherlynn Sharp) 0.5 mg tablet TAKE ONE TABLET BY MOUTH AT BEDTIME AS NEEDED FOR SLEEP 1/15/19  Yes Macario Campos MD  
melatonin 5 mg cap capsule Take 5 mg by mouth nightly. Yes Provider, Historical  
krill-omega-3-dha-epa-lipids [id number] mg cap Take  by mouth. Yes Provider, Historical  
ezetimibe (ZETIA) 10 mg tablet Take  by mouth daily. Yes Provider, Historical  
nebivolol (BYSTOLIC) 5 mg tablet Take 5 mg by mouth daily. Yes Provider, Historical  
aspirin 81 mg tablet Take 81 mg by mouth daily. Yes Provider, Historical  
cholecalciferol, vitamin d3, (VITAMIN D) 1,000 unit tablet Take 2,000 Units by mouth daily. Yes Provider, Historical  
  
 
Review of Systems Constitutional:  He denies fever, weight loss, sweats or fatigue. EYES: No blurred or double vision, ENT: no nasal congestion, no headache or dizziness. No difficulty with               swallowing, taste, speech or smell. Respiratory:  No cough, wheezing or shortness of breath. No sputum production. Cardiac:  Denies chest pain, palpitations, unexplained indigestion, syncope, edema, PND or orthopnea.    
GI:  No changes in bowel movements, no bloating, anorexia, nausea, vomiting or heartburn. Positive for lower abdominal/suprapubic abdominal discomfort :  No frequency or dysuria. Denies incontinence or sexual dysfunction. Extremities:  No joint pain, stiffness or swelling Back:.no pain or soreness Skin:  No recent rashes or mole changes. Neurological:  No numbness, tingling, burning paresthesias or loss of motor strength. No syncope, dizziness, frequent headaches or memory loss. Hematologic:  No easy bruising Lymphatic: No lymph node enlargement Objective:  
 
Vitals:  
 05/03/19 1500 05/03/19 1541 BP: 160/80 152/76 Pulse: 60 Resp: 18 Temp: 98.5 °F (36.9 °C) TempSrc: Oral   
SpO2: 98% Weight: 166 lb 12.8 oz (75.7 kg) Height: 5' 7\" (1.702 m) PainSc:   1 PainLoc: Abdomen Body mass index is 26.12 kg/m². Physical Examination:  
           General Appearance:  Well-developed, well-nourished, no acute distress. HEENT:   
  Ears:  The TMs and ear canals were clear. Eyes:  The pupillary responses were normal.  Extraocular muscle function intact. Lids and conjunctiva not injected. Funduscopic exam revealed sharp disc margins. Nares: Clear w/o edema or erythema Pharynx:  Clear with teeth in good repair. No masses were noted. Neck:  Supple without thyromegaly or adenopathy. No JVD noted. No carotid                bruits. Lungs:  Clear to auscultation and percussion. Cardiac:  Regular rate and rhythm without murmur. PMI not displaced. No gallop, rub or click. Abdominal: Soft, non-tender, no hepata-spleenomegally or masses : No evidence of hernias Extremities:  No clubbing, cyanosis or edema. Skin:  No rash or unusual mole changes noted. Lymph Nodes:  None felt in the cervical, supraclavicular, axillary or inguinal region. Neurological: . DTRs 2+ and symmetric. Station and gait normal.  
Hematologic:   No purpura or petechiae Assessment/Plan: 1. Lower abdominal pain 2. Essential hypertension Impressions/Plan: 
Impression 1. Lower abdominal pain I think this is a musculoskeletal pain I did a stat CBC and a urine both of which are normal.  We could empirically treat him with Cipro for low-grade diverticular disease but I doubt that is the case since this been going on for 3 weeks so at this point we can watch it and no specific treatment. 2.  Hypertension and further thinking he says he forgot his blood pressure medicine as well as I have asked him to make sure he takes Tylenol regular basis. I will recheck in his prior scheduled appointment. Orders Placed This Encounter  CBC WITH AUTOMATED DIFF ([facility])  URINALYSIS W/O MICRO ([facility])  URINALYSIS; MICROSCOPIC ONLY Follow-up and Dispositions · Return TBD. Results for orders placed or performed in visit on 05/03/19 CBC WITH AUTOMATED DIFF Result Value Ref Range WBC 8.1 4.1 - 10.9 K/uL  
 RBC 4.60 4.20 - 6.30 M/uL  
 HGB 14.9 12.0 - 18.0 g/dL HCT 43.3 37.0 - 51.0 % MCH 32.4 (H) 26.0 - 32.0 pg  
 MCHC 34.4 30.0 - 36.0 g/dL MCV 94.2 80.0 - 97.0 fL  
 RDW 14.1 % PLATELET 365.4 188.0 - 440.0 K/uL MEAN PLATELET VOLUME 8.0 fL Neutrophils abs 5.6 2.0 - 7.8 K/uL Neutrophils 69.4 37.0 - 92.0 % Monocytes abs-DIF 0.6 0.0 - 1.8 K/uL MONOCYTES 7.6 0.1 - 24.0 % Lymphocytes Absolute 1.9 0.6 - 4.1 K/uL LYMPHOCYTES 23.0 10.0 - 58.5 % URINALYSIS W/O MICRO Result Value Ref Range Color Pale Yellow Pale Yellow - Yellow CLARITY clear Clear Glucose urine, 24 hr Negative Negative Ketone Negative Negative Bilirubin Negative Negative Specific gravity 1.020 1.000 - 1.030  
 pH (UA) 6.5 5 - 7 Blood Negative Negative Protein 1+ (A) Negative Urobilinogen 2+ (A) Negative Nitrites Negative Negative Leukocyte Esterase Negative Negative URINALYSIS; MICROSCOPIC ONLY Result Value Ref Range  WBC 0-3 (A) 0 #/HPF  
 RBC 0-2 (A) 0 #/HPF Marielena Solitario MD 
 
The patient was given after the visit summary the patient verbalized an understanding of the plans and problems as explained.

## 2019-05-09 ENCOUNTER — CLINICAL SUPPORT (OUTPATIENT)
Dept: INTERNAL MEDICINE CLINIC | Age: 84
End: 2019-05-09

## 2019-05-09 VITALS
RESPIRATION RATE: 17 BRPM | HEART RATE: 62 BPM | TEMPERATURE: 97.5 F | DIASTOLIC BLOOD PRESSURE: 82 MMHG | BODY MASS INDEX: 26.12 KG/M2 | OXYGEN SATURATION: 97 % | HEIGHT: 67 IN | SYSTOLIC BLOOD PRESSURE: 140 MMHG

## 2019-05-09 DIAGNOSIS — S61.442A PUNCTURE WOUND OF LEFT HAND WITH FOREIGN BODY, INITIAL ENCOUNTER: Primary | ICD-10-CM

## 2019-05-09 DIAGNOSIS — Z23 ENCOUNTER FOR IMMUNIZATION: ICD-10-CM

## 2019-05-09 NOTE — PROGRESS NOTES
Chief Complaint   Patient presents with    Immunization/Injection     Tdap       Visit Vitals  /82 (BP 1 Location: Left arm, BP Patient Position: Sitting)   Pulse 62   Temp 97.5 °F (36.4 °C) (Oral)   Resp 17   Ht 5' 7\" (1.702 m)   SpO2 97%   BMI 26.12 kg/m²     Patient was seen in office after getting a fish hook stuck in his left palm. Site was cleansed with saline and a sterile bandage was applied. Per Clemencia Herrmann from Dr. Garima Linares and after obtaining consent from patient TDaP 0.5ml was administered in right deltoid. Pt remained in clinic for 20 minutes post injection with no complications.

## 2019-05-10 ENCOUNTER — DOCUMENTATION ONLY (OUTPATIENT)
Dept: INTERNAL MEDICINE CLINIC | Age: 84
End: 2019-05-10

## 2019-05-19 ENCOUNTER — HOSPITAL ENCOUNTER (OUTPATIENT)
Dept: ULTRASOUND IMAGING | Age: 84
Discharge: HOME | End: 2019-05-19
Attending: INTERNAL MEDICINE
Payer: MEDICARE

## 2019-05-19 DIAGNOSIS — R10.31 RIGHT LOWER QUADRANT ABDOMINAL PAIN: ICD-10-CM

## 2019-05-19 DIAGNOSIS — Z86.010 HISTORY OF COLONIC POLYPS: ICD-10-CM

## 2019-05-19 PROCEDURE — 76700 US EXAM ABDOM COMPLETE: CPT

## 2019-05-29 NOTE — PROGRESS NOTES
HPI:   81 y.o.  presents for preoperative evaluation and medical clearance before planned left cataract surgery to be done by Dr. Pasha Short at [facility] on 6/3/2019. He is regular followed by me for hypertension, ASCVD, glucose intolerance, DJD, hyperlipidemia and other medical problems including a prior TIA. He is currently noting no chest pain, shortness of breath, palpitations, PND, orthopnea or other cardiorespiratory complaints. He denies any GI or  complaints. He denies any headaches, dizziness or neurologic complaints. He has no current arthritic complaints and there are no other complaints on complete review of systems other than decreased vision from the left eye. He recently had right cataract surgery and for a while had double vision but that now has resolved.     Patient Active Problem List    Diagnosis    Glucose intolerance (impaired glucose tolerance)    Primary osteoarthritis involving multiple joints    ASCVD (arteriosclerotic cardiovascular disease)    Essential hypertension    Mixed hyperlipidemia    Lower abdominal pain    Preop cardiovascular exam    Age-related cataract of both eyes    Vitamin B 12 deficiency    Confusion    Memory deficit    Mild depression ([address]    Paronychia of finger of left hand    Myalgia    Non-healing skin lesion of nose    Gait instability    Medicare annual wellness visit, subsequent    ED (erectile dysfunction)    Elevated PSA    Insomnia    Former smoker    Anxiety    Allergy to ACE inhibitors    TIA (transient ischemic attack)    Syncope       Past Medical History:   Diagnosis Date    Allergy to ACE inhibitors 11/28/2017    Anxiety 11/28/2017    Arthritis     ASCVD (arteriosclerotic cardiovascular disease) 11/28/2017    CAD (coronary artery disease) 2000    MI     Cataract     Cataract 04/2019    Depression 11/28/2017    DJD (degenerative joint disease) 11/28/2017    ED (erectile dysfunction) 11/28/2017    Elevated PSA 11/28/2017    Former smoker 11/28/2017    Glucose intolerance (impaired glucose tolerance) 11/28/2017    Hypertension     Insomnia 11/28/2017    On statin therapy 11/28/2017    Other ill-defined conditions(799.89)     elevated lipids    Psychiatric disorder     depression /anxiety       Social History     Tobacco Use    Smoking status: Former Smoker     Packs/day: 1.50     Years: 5.00     Pack years: 7.50    Smokeless tobacco: Never Used    Tobacco comment: smoke for 5 year    Substance Use Topics    Alcohol use: No    Drug use: No       Family History   Problem Relation Age of Onset    Psychiatric Disorder Mother         depression    Heart Disease Father         aneurysym           Allergies   Allergen Reactions    Benazepril Rash and Swelling    Pork/Porcine Containing Products Hives       ROS:     Constitutional: He denies fevers, weight loss, sweats. Eyes: No blurred or double vision but decreased visual acuity left eye. ENT: No difficulty with swallowing, taste, speech or smell. Neck: no stiffness or swelling  Respiratory: No cough wheezing or shortness of breath. Cardiovascular: Denies chest pain, palpitations, unexplained indigestion or syncope. Gastrointestinal:  No changes in bowel movements, no abdominal pain, no bloating. Genitourinary:  He denies frequency, nocturia or stranguria. Extremities: No joint pain, stiffness or swelling. Neurological:  No numbness, tingling, burring paresthesias or loss of motor strength. No syncope, dizziness or frequent headache  Lymphatic: no adenopathy noted  Hematologic: no easy bruising or bleeding gums  Skin:  No recent rashes or mole changes. Psychiatric/Behavioral:  Negative for depression. The patient is not nervous/anxious.     PE:     Vitals:    05/30/19 0917 05/30/19 0959   BP: 144/70 138/70   Pulse: (!) 48    Resp: 18    Temp: 97.6 °F (36.4 °C)    TempSrc: Oral    SpO2: 98%    Weight: 165 lb 9.6 oz (75.1 kg) Height: 5' 7\" (1.702 m)    PainSc:   0 - No pain         General appearance - alert, well appearing, and in no distress  Mental status - alert, oriented to person, place, and time  HEENT:  Ears - bilateral TM's and external ear canals clear  Eyes - pupillary responses were normal.  Extraocular muscle function intact. Lids and conjunctiva not injected. Fundoscopic exam revealed sharp disc margins. eye movements intact  Pharynx- clear with teeth in good repair. No masses were noted  Neck - supple without thyromegaly or burit. No JVD noted  Lungs - clear to auscultation and percussion  Cardiac- normal rate, regular rhythm without murmurs. PMI not displaced. No gallop, rub or click  Abdomen - flat, soft, non-tender without palpable organomegaly or mass. No pulsatile mass was felt, and not bruit was heard. Bowel sounds were active  Extremities -  no clubbing cyanosis or edema  Lymphatics - no palpable lymphadenopathy, no hepatosplenomegaly  Hematologic: no petechiae or purpura  Peripheral vascular -Femoral, Dorsalis pedis and posterior tibial pulses felt without difficulty  Skin - no rash or unusual mole change noted  Neurological - Cranial nerves II-XII grossly intact. Motor strength 5/5. DTR's 2+ and symmetric. Station and gait normal  Back exam - full range of motion, no tenderness, palpable spasm or pain on motion  Musculoskeletal - no joint tenderness, deformity or swelling      Assessment/Plan:     1. Preop cardiovascular exam    2. Age-related cataract of both eyes, unspecified age-related cataract type    3. Essential hypertension    4. ASCVD (arteriosclerotic cardiovascular disease)    5. Glucose intolerance (impaired glucose tolerance)    6. Mixed hyperlipidemia    7. TIA (transient ischemic attack)      Impression  1. Preoperative cardiovascular examination completed today. 2.  Cataracts status post right eye surgery in April and now scheduled for left eye  3. Hypertension that is controlled  4. ASCVD clinically stable  5. Glucose intolerance last A1c was good  6. Hyperlipidemia good on last check  7. Prior TIA without recurrence and takes aspirin daily  He is medically cleared for the planned cataract surgery. Copy to Dr. Clover Saini. Health Maintenance reviewed - updated. No orders of the defined types were placed in this encounter. There are no discontinued medications. Current Outpatient Medications   Medication Sig Dispense Refill    hydrALAZINE (APRESOLINE) 25 mg tablet Take 1 Tab by mouth two (2) times a day. 60 Tab prn    escitalopram oxalate (LEXAPRO) 10 mg tablet Take 1 Tab by mouth daily. 30 Tab prn    amLODIPine (NORVASC) 5 mg tablet TAKE 1 TABLET BY MOUTH  DAILY 90 Tab 3    rosuvastatin (CRESTOR) 20 mg tablet TAKE 1 TABLET BY MOUTH  DAILY 90 Tab 3    ALPRAZolam (XANAX) 0.5 mg tablet TAKE ONE TABLET BY MOUTH AT BEDTIME AS NEEDED FOR SLEEP 90 Tab 0    melatonin 5 mg cap capsule Take 5 mg by mouth nightly.  krill-omega-3-dha-epa-lipids [id number] mg cap Take  by mouth.  ezetimibe (ZETIA) 10 mg tablet Take  by mouth daily.  nebivolol (BYSTOLIC) 5 mg tablet Take 5 mg by mouth daily.  aspirin 81 mg tablet Take 81 mg by mouth daily.  cholecalciferol, vitamin d3, (VITAMIN D) 1,000 unit tablet Take 2,000 Units by mouth daily. No results found for any visits on 05/30/19. Recommended healthy diet low in carbohydrates, fats, sodium and cholesterol. Recommended regular cardiovascular exercise 3-6 times per week for 30-60 minutes daily. Verbal and written instructions (see AVS) provided. Patient expresses understanding of diagnosis and treatment plan.       Jarad Bhatt MD

## 2019-05-30 ENCOUNTER — OFFICE VISIT (OUTPATIENT)
Dept: INTERNAL MEDICINE CLINIC | Age: 84
End: 2019-05-30

## 2019-05-30 VITALS
RESPIRATION RATE: 18 BRPM | TEMPERATURE: 97.6 F | OXYGEN SATURATION: 98 % | HEART RATE: 48 BPM | DIASTOLIC BLOOD PRESSURE: 70 MMHG | WEIGHT: 165.6 LBS | SYSTOLIC BLOOD PRESSURE: 138 MMHG | HEIGHT: 67 IN | BODY MASS INDEX: 25.99 KG/M2

## 2019-05-30 DIAGNOSIS — H25.9 AGE-RELATED CATARACT OF BOTH EYES, UNSPECIFIED AGE-RELATED CATARACT TYPE: ICD-10-CM

## 2019-05-30 DIAGNOSIS — I10 ESSENTIAL HYPERTENSION: ICD-10-CM

## 2019-05-30 DIAGNOSIS — E78.2 MIXED HYPERLIPIDEMIA: ICD-10-CM

## 2019-05-30 DIAGNOSIS — R73.02 GLUCOSE INTOLERANCE (IMPAIRED GLUCOSE TOLERANCE): ICD-10-CM

## 2019-05-30 DIAGNOSIS — G45.9 TIA (TRANSIENT ISCHEMIC ATTACK): ICD-10-CM

## 2019-05-30 DIAGNOSIS — I25.10 ASCVD (ARTERIOSCLEROTIC CARDIOVASCULAR DISEASE): ICD-10-CM

## 2019-05-30 DIAGNOSIS — Z01.810 PREOP CARDIOVASCULAR EXAM: Primary | ICD-10-CM

## 2019-05-30 NOTE — PROGRESS NOTES
[facility]  Identified pt with two pt identifiers(name and ). Chief Complaint   Patient presents with    Pre-op Exam     pre-op for eye surgery       1. Have you been to the ER, urgent care clinic since your last visit? Hospitalized since your last visit? No    2. Have you seen or consulted any other health care providers outside of the [address] [place] [address] since your last visit? Include any pap smears or colon screening. No      Health Maintenance Topics with due status: Overdue       Topic Date Due    Shingrix Vaccine Age 50> 10/25/1986    GLAUCOMA SCREENING Q2Y 10/25/2001     Health Maintenance Topics with due status: Not Due       Topic Last Completion Date    Influenza Age 5 to Adult 10/18/2018    MEDICARE YEARLY EXAM 2018    DTaP/Tdap/Td series 05/10/2019     Health Maintenance Topics with due status: Completed       Topic Last Completion Date    Pneumococcal 65+ years 2015           Medication reconciliation up to date and corrected with patient at this time. Today's provider has been notified of reason for visit, vitals and flowsheets obtained on patients. Reviewed record in preparation for visit, huddled with provider and have obtained necessary documentation.         Wt Readings from Last 3 Encounters:   19 165 lb 9.6 oz (75.1 kg)   19 166 lb 12.8 oz (75.7 kg)   19 171 lb 3.2 oz (77.7 kg)     Temp Readings from Last 3 Encounters:   19 97.6 °F (36.4 °C) (Oral)   19 97.5 °F (36.4 °C) (Oral)   19 98.5 °F (36.9 °C) (Oral)     BP Readings from Last 3 Encounters:   19 144/70   19 140/82   19 152/76     Pulse Readings from Last 3 Encounters:   19 (!) 48   19 62   19 60     Vitals:    19 0917   BP: 144/70   Pulse: (!) 48   Resp: 18   Temp: 97.6 °F (36.4 °C)   TempSrc: Oral   SpO2: 98%   Weight: 165 lb 9.6 oz (75.1 kg)   Height: 5' 7\" (1.702 m)   PainSc:   0 - No pain         Learning Assessment:  :     Learning Assessment 6/8/2018   PRIMARY LEARNER Patient   PRIMARY LANGUAGE ENGLISH   LEARNER PREFERENCE PRIMARY DEMONSTRATION   ANSWERED BY self   RELATIONSHIP SELF       Depression Screening:  :     3 most recent PHQ Screens 3/5/2019   Little interest or pleasure in doing things Several days   Feeling down, depressed, irritable, or hopeless Several days   Total Score PHQ 2 2   Trouble falling or staying asleep, or sleeping too much Several days   Feeling tired or having little energy More than half the days   Poor appetite, weight loss, or overeating Not at all   Feeling bad about yourself - or that you are a failure or have let yourself or your family down More than half the days   Trouble concentrating on things such as school, work, reading, or watching TV Not at all   Moving or speaking so slowly that other people could have noticed; or the opposite being so fidgety that others notice Not at all   Thoughts of being better off dead, or hurting yourself in some way Not at all   PHQ 9 Score 7       No flowsheet data found. Fall Risk Assessment:  :     Fall Risk Assessment, last 12 mths 5/3/2019   Able to walk? Yes   Fall in past 12 months? No       Abuse Screening:  :     Abuse Screening Questionnaire 3/5/2019 6/8/2018   Do you ever feel afraid of your partner? N N   Are you in a relationship with someone who physically or mentally threatens you? N N   Is it safe for you to go home?  Y Y       ADL Screening:  :     ADL Assessment 3/5/2019   Feeding yourself No Help Needed   Getting from bed to chair No Help Needed   Getting dressed No Help Needed   Bathing or showering No Help Needed   Walk across the room (includes cane/walker) No Help Needed   Using the telphone No Help Needed   Taking your medications No Help Needed   Preparing meals No Help Needed   Managing money (expenses/bills) No Help Needed   Moderately strenuous housework (laundry) No Help Needed   Shopping for personal items (toiletries/medicines) No Help Needed   Shopping for groceries No Help Needed   Driving No Help Needed   Climbing a flight of stairs No Help Needed   Getting to places beyond walking distances No Help Needed

## 2019-05-30 NOTE — PATIENT INSTRUCTIONS
Cataract Surgery: What to Expect at [facility] Your Recovery After surgery, your eye will not hurt. But it may feel scratchy, sticky, or uncomfortable. It may also water more than usual. 
Most people see better 1 to 3 days after surgery. But it could take 3 to 10 weeks to get the full benefits of surgery and to see as clearly as possible. Your doctor may send you home with a bandage, patch, or clear shield on your eye. This will keep you from rubbing your eye. Your doctor will also give you eyedrops to help your eye heal. Use them exactly as directed. You can read or watch TV right away, but things may look blurry. Most people are able to return to work or their normal routine in 1 to 3 days. After your eye heals, you may still need to wear glasses, especially for reading. This care sheet gives you a general idea about how long it will take for you to recover. But each person recovers at a different pace. Follow the steps below to get better as quickly as possible. How can you care for yourself at home? Activity 
  · Rest when you feel tired. Getting enough sleep will help you recover.  
  · You may have trouble judging distances for a few days. Move slowly, and be careful going up and down stairs and pouring hot liquids. Ask for help if you need it.  
  · Ask your doctor when it is okay to drive.  
  · Wear your eye bandage, patch, or shield for as long as your doctor recommends. You may only need to wear it when you sleep.  
  · You can shower or wash your hair the day after surgery. Keep water, soap, shampoo, hair spray, and shaving lotion out of your eye, especially for the first week.  
  · Do not rub or put pressure on your eye for at least 1 week.  
  · Do not wear eye makeup for 1 to 2 weeks.  You may also want to avoid face cream or lotion.  
  · Do not get your hair colored or permed for 10 days after surgery.  
  · Do not bend over or do any strenuous activities, such as biking, jogging, weight lifting, or aerobic exercise, for 2 weeks or until your doctor says it is okay.  
  · Avoid swimming, hot tubs, gardening, and dusting for 1 to 2 weeks.  
  · Wear sunglasses on bright days for at least 1 year after surgery. Medicines 
  · Your doctor will tell you if and when you can restart your medicines. He or she will also give you instructions about taking any new medicines.  
  · If you take blood thinners, such as warfarin (Coumadin), clopidogrel (Plavix), or aspirin, be sure to talk to your doctor. He or she will tell you if and when to start taking those medicines again. Make sure that you understand exactly what your doctor wants you to do.  
  · Follow your doctor's instructions for when to use your eyedrops. Always wash your hands before you put your drops in. To put in eyedrops: ? Tilt your head back, and pull your lower eyelid down with one finger. ? Drop or squirt the medicine inside the lower lid. ? Close your eye for 30 to 60 seconds to let the drops or ointment move around. ? Do not touch the ointment or dropper tip to your eyelashes or any other surface.  
  · Follow your doctor's instructions for taking pain medicines. Follow-up care is a key part of your treatment and safety. Be sure to make and go to all appointments, and call your doctor if you are having problems. It's also a good idea to know your test results and keep a list of the medicines you take. When should you call for help? Call 911 anytime you think you may need emergency care. For example, call if: 
  · You passed out (lost consciousness).  
  · You have a sudden loss of vision.  
  · You have sudden chest pain, are short of breath, or cough up blood.  
 Call your doctor now or seek immediate medical care if: 
  · You have signs of an eye infection, such as: 
? Pus or thick discharge coming from the eye. 
? Redness or swelling around the eye. 
? A fever.  
  · You have new or worse eye pain.   · You have vision changes.  
  · You have symptoms of a blood clot in your leg (called a deep vein thrombosis), such as: 
? Pain in the calf, back of the knee, thigh, or groin. ? Redness and swelling in your leg or groin.  
 Watch closely for changes in your health, and be sure to contact your doctor if: 
  · You do not get better as expected. Where can you learn more? Go to http://florin-madeline.info/. Enter R255 in the search box to learn more about \"Cataract Surgery: What to Expect at Home. \" Current as of: July 17, 2018 Content Version: 11.9 © 1034-0348 [employer]. Care instructions adapted under license by [employer] (which disclaims liability or warranty for this information). If you have questions about a medical condition or this instruction, always ask your healthcare professional. [place] [address] any warranty or liability for your use of this information.

## 2019-06-06 NOTE — PROGRESS NOTES
Chief Complaint   Patient presents with    Hypertension     6 month follow up    Blood sugar problem    Cholesterol Problem       SUBJECTIVE:    Negrita Burdick is a 81 y.o. male who returns in follow-up of his medical problems include hypertension, hyperlipidemia, ASCVD, prior TIA, DJD, history depression and other medical problems. He is taking his medications and trying to follow his diet and trying to remain physically active and get exercise. He currently denies any chest pain, shortness of breath, palpitations, PND, orthopnea or other cardiorespiratory complaints. He denies any GI or  complaints. He denies any headaches, dizziness or neurologic complaints. No current arthritic complaints and no other complaints on complete review of systems. Current Outpatient Medications   Medication Sig Dispense Refill    hydrALAZINE (APRESOLINE) 25 mg tablet Take 1 Tab by mouth two (2) times a day. 60 Tab prn    escitalopram oxalate (LEXAPRO) 10 mg tablet Take 1 Tab by mouth daily. 30 Tab prn    amLODIPine (NORVASC) 5 mg tablet TAKE 1 TABLET BY MOUTH  DAILY 90 Tab 3    rosuvastatin (CRESTOR) 20 mg tablet TAKE 1 TABLET BY MOUTH  DAILY 90 Tab 3    melatonin 5 mg cap capsule Take 5 mg by mouth nightly.  krill-omega-3-dha-epa-lipids [id number] mg cap Take  by mouth.  ezetimibe (ZETIA) 10 mg tablet Take  by mouth daily.  nebivolol (BYSTOLIC) 5 mg tablet Take 5 mg by mouth daily.  aspirin 81 mg tablet Take 81 mg by mouth daily.  cholecalciferol, vitamin d3, (VITAMIN D) 1,000 unit tablet Take 2,000 Units by mouth daily.        Past Medical History:   Diagnosis Date    Allergy to ACE inhibitors 11/28/2017    Anxiety 11/28/2017    Arthritis     ASCVD (arteriosclerotic cardiovascular disease) 11/28/2017    CAD (coronary artery disease) 2000    MI     Cataract     Cataract 04/2019    Depression 11/28/2017    DJD (degenerative joint disease) 11/28/2017    ED (erectile dysfunction) 11/28/2017    Elevated PSA 11/28/2017    Former smoker 11/28/2017    Glucose intolerance (impaired glucose tolerance) 11/28/2017    Hypertension     Insomnia 11/28/2017    On statin therapy 11/28/2017    Other ill-defined conditions(799.89)     elevated lipids    Psychiatric disorder     depression /anxiety     Past Surgical History:   Procedure Laterality Date    ABDOMEN SURGERY PROC UNLISTED      hernia repair    CARDIAC SURG PROCEDURE UNLIST      stents x 6    COLORECTAL SCRN; HI RISK IND  9/10/2014         HX HEENT  06/03/2019    left eye cataract    HX TONSILLECTOMY      [place] COLSC FLX W/RMVL OF TUMOR POLYP LESION SNARE TQ  9/10/2014          Allergies   Allergen Reactions    Benazepril Rash and Swelling    Pork/Porcine Containing Products Hives       REVIEW OF SYSTEMS:  General: negative for - chills or fever, or weight loss or gain  ENT: negative for - headaches, nasal congestion or tinnitus  Eyes: no blurred or visual changes  Neck: No stiffness or swollen nodes  Respiratory: negative for - cough, hemoptysis, shortness of breath or wheezing  Cardiovascular : negative for - chest pain, edema, palpitations or shortness of breath  Gastrointestinal: negative for - abdominal pain, blood in stools, heartburn or nausea/vomiting  Genito-Urinary: no dysuria, trouble voiding, or hematuria  Musculoskeletal: negative for - gait disturbance, joint pain, joint stiffness or joint swelling  Neurological: no TIA or stroke symptoms  Hematologic: no bruises, no bleeding  Lymphatic: no swollen glands  Integument: no lumps, mole changes, nail changes or rash  Endocrine:no malaise/lethargy poly uria or polydipsia or unexpected weight changes        Social History     Socioeconomic History    Marital status:      Spouse name: Not on file    Number of children: Not on file    Years of education: Not on file    Highest education level: Not on file   Tobacco Use    Smoking status: Former Smoker Packs/day: 1.50     Years: 5.00     Pack years: 7.50    Smokeless tobacco: Never Used    Tobacco comment: smoke for 5 year    Substance and Sexual Activity    Alcohol use: No    Drug use: No     Family History   Problem Relation Age of Onset    Psychiatric Disorder Mother         depression    Heart Disease Father         aneurysym       OBJECTIVE:     Visit Vitals  /88 (BP 1 Location: Right arm, BP Patient Position: Sitting)   Pulse (!) 44   Temp 97.7 °F (36.5 °C) (Oral)   Resp 18   Ht 5' 7\" (1.702 m)   Wt 166 lb (75.3 kg)   SpO2 98%   BMI 26.00 kg/m²     CONSTITUTIONAL:   well nourished, appears age appropriate  EYES: sclera anicteric, PERRL, EOMI  ENMT:nares clear, moist mucous membranes, pharynx clear  NECK: supple. Thyroid normal, No JVD or bruits  RESPIRATORY: Chest: clear to ascultation and percussion, normal inspiratory effort  CARDIOVASCULAR: Heart: regular rate and rhythm no murmurs, rubs or gallops, PMI not displaced, No thrills  GASTROINTESTINAL: Abdomen: non distended, soft, non tender, bowel sounds normal  HEMATOLOGIC: no purpura, petechiae or bruising  LYMPHATIC: No lymph node enlargemant  MUSCULOSKELETAL: Extremities: no edema or active synovitis, pulse 1+   INTEGUMENT: No unusual rashes or suspicious skin lesions noted. Nails appear normal.  PERIPHERAL VASCULAR: normal pulses femoral, PT and DP  NEUROLOGIC: non-focal exam, A & O X 3  PSYCHIATRIC:, appropriate affect     ASSESSMENT:   1. Essential hypertension    2. Glucose intolerance (impaired glucose tolerance)    3. Mixed hyperlipidemia    4. ASCVD (arteriosclerotic cardiovascular disease)    5. Primary osteoarthritis involving multiple joints    6. TIA (transient ischemic attack)    7. Mild depression (HCC)      Impression  1. Hypertension that is controlled to continue current therapy reviewed with him. 2.  Glucose intolerance last A1c of 5.5 and repeat status pending  3.   Hyperlipidemia prior lab reviewed and repeat status pending I will adjust if needed. 4   ASCVD clinically stable continue aspirin daily  5. DJD that is stable  6   Prior TIA continue aspirin daily  7   Mild depression that is currently controlled  I will call with lab results and make further recommendations or adjustments if necessary. Follow-up as scheduled for 6 months or sooner should to be a problem. PLAN:  .  Orders Placed This Encounter    HEMOGLOBIN A1C WITH EAG    METABOLIC PANEL, COMPREHENSIVE ([facility])    LIPID PANEL ([facility])    CK ([facility])         ATTENTION:   This medical record was transcribed using an electronic medical records system. Although proofread, it may and can contain electronic and spelling errors. Other human spelling and other errors may be present. Corrections may be executed at a later time. Please feel free to contact us for any clarifications as needed. Follow-up and Dispositions    · Return in about 3 months (around 9/7/2019). No results found for any visits on 06/07/19. Renetta Park MD    The patient verbalized understanding of the problems and plans as explained.

## 2019-06-07 ENCOUNTER — OFFICE VISIT (OUTPATIENT)
Dept: INTERNAL MEDICINE CLINIC | Age: 84
End: 2019-06-07

## 2019-06-07 VITALS
OXYGEN SATURATION: 98 % | HEART RATE: 44 BPM | SYSTOLIC BLOOD PRESSURE: 132 MMHG | TEMPERATURE: 97.7 F | WEIGHT: 166 LBS | HEIGHT: 67 IN | DIASTOLIC BLOOD PRESSURE: 88 MMHG | RESPIRATION RATE: 18 BRPM | BODY MASS INDEX: 26.06 KG/M2

## 2019-06-07 DIAGNOSIS — I10 ESSENTIAL HYPERTENSION: Primary | ICD-10-CM

## 2019-06-07 DIAGNOSIS — M15.9 PRIMARY OSTEOARTHRITIS INVOLVING MULTIPLE JOINTS: ICD-10-CM

## 2019-06-07 DIAGNOSIS — E78.2 MIXED HYPERLIPIDEMIA: ICD-10-CM

## 2019-06-07 DIAGNOSIS — F32.A MILD DEPRESSION: ICD-10-CM

## 2019-06-07 DIAGNOSIS — I25.10 ASCVD (ARTERIOSCLEROTIC CARDIOVASCULAR DISEASE): ICD-10-CM

## 2019-06-07 DIAGNOSIS — G45.9 TIA (TRANSIENT ISCHEMIC ATTACK): ICD-10-CM

## 2019-06-07 DIAGNOSIS — R73.02 GLUCOSE INTOLERANCE (IMPAIRED GLUCOSE TOLERANCE): ICD-10-CM

## 2019-06-07 LAB
A-G RATIO,AGRAT: 1.6 RATIO
ALBUMIN SERPL-MCNC: 4.2 G/DL (ref 3.9–5.4)
ALP SERPL-CCNC: 66 U/L (ref 38–126)
ALT SERPL-CCNC: 32 U/L (ref 9–52)
ANION GAP SERPL CALC-SCNC: 8 MMOL/L
AST SERPL W P-5'-P-CCNC: 30 U/L (ref 14–36)
BILIRUB SERPL-MCNC: 0.8 MG/DL (ref 0.2–1.3)
BUN SERPL-MCNC: 18 MG/DL (ref 9–20)
BUN/CREATININE RATIO,BUCR: 15 RATIO
CALCIUM SERPL-MCNC: 9.3 MG/DL (ref 8.4–10.2)
CHLORIDE SERPL-SCNC: 104 MMOL/L (ref 98–107)
CHOL/HDL RATIO,CHHD: 3 RATIO (ref 0–4)
CHOLEST SERPL-MCNC: 132 MG/DL (ref 0–200)
CK SERPL-CCNC: 63 U/L (ref 30–135)
CO2 SERPL-SCNC: 31 MMOL/L (ref 22–32)
CREAT SERPL-MCNC: 1.2 MG/DL (ref 0.8–1.5)
GLOBULIN,GLOB: 2.7
GLUCOSE SERPL-MCNC: 100 MG/DL (ref 75–110)
HDLC SERPL-MCNC: 52 MG/DL (ref 35–130)
LDL/HDL RATIO,LDHD: 1 RATIO
LDLC SERPL CALC-MCNC: 65 MG/DL (ref 0–130)
POTASSIUM SERPL-SCNC: 4.6 MMOL/L (ref 3.6–5)
PROT SERPL-MCNC: 6.9 G/DL (ref 6.3–8.2)
SODIUM SERPL-SCNC: 143 MMOL/L (ref 137–145)
TRIGL SERPL-MCNC: 76 MG/DL (ref 0–200)
VLDLC SERPL CALC-MCNC: 15 MG/DL

## 2019-06-07 NOTE — PATIENT INSTRUCTIONS
Arthritis: Care Instructions Your Care Instructions Arthritis, also called osteoarthritis, is a breakdown of the cartilage that cushions your joints. When the cartilage wears down, your bones rub against each other. This causes pain and stiffness. Many people have some arthritis as they age. Arthritis most often affects the joints of the spine, hands, hips, knees, or feet. You can take simple measures to protect your joints, ease your pain, and help you stay active. Follow-up care is a key part of your treatment and safety. Be sure to make and go to all appointments, and call your doctor if you are having problems. It's also a good idea to know your test results and keep a list of the medicines you take. How can you care for yourself at home? · Stay at a healthy weight. Being overweight puts extra strain on your joints. · Talk to your doctor or physical therapist about exercises that will help ease joint pain. ? Stretch. You may enjoy gentle forms of yoga to help keep your joints and muscles flexible. ? Walk instead of jog. Other types of exercise that are less stressful on the joints include riding a bicycle, swimming, dale chi, or water exercise. ? Lift weights. Strong muscles help reduce stress on your joints. Stronger thigh muscles, for example, take some of the stress off of the knees and hips. Learn the right way to lift weights so you do not make joint pain worse. · Take your medicines exactly as prescribed. Call your doctor if you think you are having a problem with your medicine. · Take pain medicines exactly as directed. ? If the doctor gave you a prescription medicine for pain, take it as prescribed. ? If you are not taking a prescription pain medicine, ask your doctor if you can take an over-the-counter medicine. · Use a cane, crutch, walker, or another device if you need help to get around. These can help rest your joints.  You also can use other things to make life easier, such as a higher toilet seat and padded handles on kitchen utensils. · Do not sit in low chairs, which can make it hard to get up. · Put heat or cold on your sore joints as needed. Use whichever helps you most. You also can take turns with hot and cold packs. ? Apply heat 2 or 3 times a day for 20 to 30 minutesusing a heating pad, hot shower, or hot packto relieve pain and stiffness. ? Put ice or a cold pack on your sore joint for 10 to 20 minutes at a time. Put a thin cloth between the ice and your skin. When should you call for help? Call your doctor now or seek immediate medical care if: 
  · You have sudden swelling, warmth, or pain in any joint.  
  · You have joint pain and a fever or rash.  
  · You have such bad pain that you cannot use a joint.  
 Watch closely for changes in your health, and be sure to contact your doctor if: 
  · You have mild joint symptoms that continue even with more than 6 weeks of care at home.  
  · You have stomach pain or other problems with your medicine. Where can you learn more? Go to http://florin-madeline.info/. Enter B183 in the search box to learn more about \"Arthritis: Care Instructions. \" Current as of: Sia 10, 2018 Content Version: 11.9 © 3558-2434 [email or web address]. Care instructions adapted under license by [employer] (which disclaims liability or warranty for this information). If you have questions about a medical condition or this instruction, always ask your healthcare professional. [place] [address] any warranty or liability for your use of this information.

## 2019-06-07 NOTE — PROGRESS NOTES
Chief Complaint   Patient presents with    Hypertension     6 month follow up    Blood sugar problem    Cholesterol Problem     Visit Vitals  /88 (BP 1 Location: Right arm, BP Patient Position: Sitting)   Pulse (!) 44   Temp 97.7 °F (36.5 °C) (Oral)   Resp 18   Ht 5' 7\" (1.702 m)   Wt 166 lb (75.3 kg)   SpO2 98%   BMI 26.00 kg/m²   1. Have you been to the ER, urgent care clinic since your last visit? Hospitalized since your last visit? No    2. Have you seen or consulted any other health care providers outside of the [address] [address] since your last visit? Include any pap smears or colon screening.  Dr. Belem Abraham cataract removal 6/3/19  Dr. Kaitlyn Djeesus did intestinal ultrasound

## 2019-06-08 LAB
EST. AVERAGE GLUCOSE BLD GHB EST-MCNC: 108 MG/DL
HBA1C MFR BLD: 5.4 % (ref 4.8–5.6)

## 2019-07-29 DIAGNOSIS — F41.9 ANXIETY: Primary | ICD-10-CM

## 2019-07-29 RX ORDER — ALPRAZOLAM 0.5 MG/1
0.5 TABLET ORAL
Qty: 90 TAB | Refills: 1 | Status: SHIPPED | OUTPATIENT
Start: 2019-07-29 | End: 2021-01-08 | Stop reason: ALTCHOICE

## 2019-07-29 NOTE — TELEPHONE ENCOUNTER
RX refill request from the patient/pharmacy. Patient last seen 06- with labs, and next appt. scheduled for 12-  Requested Prescriptions     Pending Prescriptions Disp Refills    ALPRAZolam (XANAX) 0.5 mg tablet 90 Tab 1     Sig: Take 1 Tab by mouth nightly as needed for Anxiety. Max Daily Amount: 0.5 mg.   .

## 2019-07-30 ENCOUNTER — ANESTHESIA (OUTPATIENT)
Dept: ENDOSCOPY | Age: 84
End: 2019-07-30
Payer: MEDICARE

## 2019-07-30 ENCOUNTER — HOSPITAL ENCOUNTER (OUTPATIENT)
Age: 84
Setting detail: OUTPATIENT SURGERY
Discharge: HOME | End: 2019-07-30
Attending: INTERNAL MEDICINE | Admitting: INTERNAL MEDICINE
Payer: MEDICARE

## 2019-07-30 ENCOUNTER — ANESTHESIA EVENT (OUTPATIENT)
Dept: ENDOSCOPY | Age: 84
End: 2019-07-30
Payer: MEDICARE

## 2019-07-30 VITALS
TEMPERATURE: 97.6 F | HEIGHT: 67 IN | OXYGEN SATURATION: 100 % | HEART RATE: 48 BPM | SYSTOLIC BLOOD PRESSURE: 142 MMHG | WEIGHT: 168 LBS | DIASTOLIC BLOOD PRESSURE: 63 MMHG | RESPIRATION RATE: 10 BRPM | BODY MASS INDEX: 26.37 KG/M2

## 2019-07-30 PROCEDURE — 76060000031 HC ANESTHESIA FIRST 0.5 HR: Performed by: INTERNAL MEDICINE

## 2019-07-30 PROCEDURE — 88305 TISSUE EXAM BY PATHOLOGIST: CPT

## 2019-07-30 PROCEDURE — 74011250636 HC RX REV CODE- 250/636: Performed by: INTERNAL MEDICINE

## 2019-07-30 PROCEDURE — 74011250637 HC RX REV CODE- 250/637: Performed by: INTERNAL MEDICINE

## 2019-07-30 PROCEDURE — 77030013992 HC SNR POLYP ENDOSC BSC -B: Performed by: INTERNAL MEDICINE

## 2019-07-30 PROCEDURE — 74011250636 HC RX REV CODE- 250/636

## 2019-07-30 PROCEDURE — 76040000019: Performed by: INTERNAL MEDICINE

## 2019-07-30 RX ORDER — PROPOFOL 10 MG/ML
INJECTION, EMULSION INTRAVENOUS AS NEEDED
Status: DISCONTINUED | OUTPATIENT
Start: 2019-07-30 | End: 2019-07-30 | Stop reason: HOSPADM

## 2019-07-30 RX ORDER — MIDAZOLAM HYDROCHLORIDE 1 MG/ML
.25-5 INJECTION, SOLUTION INTRAMUSCULAR; INTRAVENOUS
Status: DISCONTINUED | OUTPATIENT
Start: 2019-07-30 | End: 2019-07-30 | Stop reason: HOSPADM

## 2019-07-30 RX ORDER — LIDOCAINE HYDROCHLORIDE 20 MG/ML
INJECTION, SOLUTION EPIDURAL; INFILTRATION; INTRACAUDAL; PERINEURAL AS NEEDED
Status: DISCONTINUED | OUTPATIENT
Start: 2019-07-30 | End: 2019-07-30 | Stop reason: HOSPADM

## 2019-07-30 RX ORDER — EPINEPHRINE 0.1 MG/ML
1 INJECTION INTRACARDIAC; INTRAVENOUS
Status: DISCONTINUED | OUTPATIENT
Start: 2019-07-30 | End: 2019-07-30 | Stop reason: HOSPADM

## 2019-07-30 RX ORDER — SODIUM CHLORIDE 9 MG/ML
100 INJECTION, SOLUTION INTRAVENOUS CONTINUOUS
Status: DISCONTINUED | OUTPATIENT
Start: 2019-07-30 | End: 2019-07-30 | Stop reason: HOSPADM

## 2019-07-30 RX ORDER — ATROPINE SULFATE 0.1 MG/ML
0.5 INJECTION INTRAVENOUS
Status: DISCONTINUED | OUTPATIENT
Start: 2019-07-30 | End: 2019-07-30 | Stop reason: HOSPADM

## 2019-07-30 RX ORDER — DEXTROMETHORPHAN/PSEUDOEPHED 2.5-7.5/.8
1.2 DROPS ORAL
Status: DISCONTINUED | OUTPATIENT
Start: 2019-07-30 | End: 2019-07-30 | Stop reason: HOSPADM

## 2019-07-30 RX ORDER — FLUMAZENIL 0.1 MG/ML
0.2 INJECTION INTRAVENOUS
Status: DISCONTINUED | OUTPATIENT
Start: 2019-07-30 | End: 2019-07-30 | Stop reason: HOSPADM

## 2019-07-30 RX ORDER — SODIUM CHLORIDE 0.9 % (FLUSH) 0.9 %
5-40 SYRINGE (ML) INJECTION AS NEEDED
Status: DISCONTINUED | OUTPATIENT
Start: 2019-07-30 | End: 2019-07-30 | Stop reason: HOSPADM

## 2019-07-30 RX ORDER — NALOXONE HYDROCHLORIDE 0.4 MG/ML
0.4 INJECTION, SOLUTION INTRAMUSCULAR; INTRAVENOUS; SUBCUTANEOUS
Status: DISCONTINUED | OUTPATIENT
Start: 2019-07-30 | End: 2019-07-30 | Stop reason: HOSPADM

## 2019-07-30 RX ORDER — SODIUM CHLORIDE 0.9 % (FLUSH) 0.9 %
5-40 SYRINGE (ML) INJECTION EVERY 8 HOURS
Status: DISCONTINUED | OUTPATIENT
Start: 2019-07-30 | End: 2019-07-30 | Stop reason: HOSPADM

## 2019-07-30 RX ADMIN — LIDOCAINE HYDROCHLORIDE 40 MG: 20 INJECTION, SOLUTION EPIDURAL; INFILTRATION; INTRACAUDAL; PERINEURAL at 12:35

## 2019-07-30 RX ADMIN — SIMETHICONE 80 MG: 20 SUSPENSION/ DROPS ORAL at 12:41

## 2019-07-30 RX ADMIN — PROPOFOL 140 MG: 10 INJECTION, EMULSION INTRAVENOUS at 12:51

## 2019-07-30 RX ADMIN — SODIUM CHLORIDE 100 ML/HR: 900 INJECTION, SOLUTION INTRAVENOUS at 12:25

## 2019-07-30 NOTE — DISCHARGE INSTRUCTIONS
Edith Blount MD  [facility], [address]  [place], [address] [facility]  [phone]  [email or web address]    Yoni Stovall  [id number]  1935    COLON DISCHARGE INSTRUCTIONS  Discomfort:  Redness at IV site- apply warm compress to area; if redness or soreness persist- contact your physician  There may be a slight amount of blood passed from the rectum  Gaseous discomfort- walking, belching will help relieve any discomfort  You may not operate a vehicle for 12 hours  You may not engage in an occupation involving machinery or appliances for rest of today  You may not drink alcoholic beverages for at least 12 hours  Avoid making any critical decisions for at least 24 hour  DIET:   High fiber diet. - however -  remember your colon is empty and a heavy meal will produce gas. Avoid these foods:  vegetables, fried / greasy foods, carbonated drinks for today      ACTIVITY:  You may resume your normal daily activities it is recommended that you spend the remainder of the day resting -  avoid any strenuous activity. CALL M.D. ANY SIGN OF:   Increasing pain, nausea, vomiting  Abdominal distension (swelling)  New increased bleeding (oral or rectal)  Fever (chills)  Pain in chest area  Bloody discharge from nose or mouth  Shortness of breath     COLONOSCOPY FINDINGS:  Your colonoscopy showed: one small polyp which was removed; mild sigmoid diverticulosis and internal hemorrhoids. Follow-up Instructions:   Call Dr. Edith Blount if any questions or problems. Telephone # [phone]  Dr. Leonor Hebert office will notify you of the biopsy results within 7 to 10 days. Should have a repeat colonoscopy in 5 years.

## 2019-07-30 NOTE — ANESTHESIA POSTPROCEDURE EVALUATION
Procedure(s):  COLONOSCOPY  ENDOSCOPIC POLYPECTOMY. total IV anesthesia    Anesthesia Post Evaluation        Patient location during evaluation: PACU  Note status: Adequate. Level of consciousness: responsive to verbal stimuli and sleepy but conscious  Pain management: satisfactory to patient  Airway patency: patent  Anesthetic complications: no  Cardiovascular status: acceptable  Respiratory status: acceptable  Hydration status: acceptable  Comments: +Post-Anesthesia Evaluation and Assessment    Patient: Samuel Reeves MRN: [id number]  SSN: [id number]   YOB: 1935  Age: 81 y.o. Sex: male      Cardiovascular Function/Vital Signs    /63   Pulse (!) 48   Temp 36.4 °C (97.6 °F)   Resp 10   Ht 5' 7\" (1.702 m)   Wt 76.2 kg (168 lb)   SpO2 100%   BMI 26.31 kg/m²     Patient is status post Procedure(s):  COLONOSCOPY  ENDOSCOPIC POLYPECTOMY. Nausea/Vomiting: Controlled. Postoperative hydration reviewed and adequate. Pain:  Pain Scale 1: Numeric (0 - 10) (07/30/19 1325)  Pain Intensity 1: 0 (07/30/19 1325)   Managed. Neurological Status: At baseline. Mental Status and Level of Consciousness: Arousable. Pulmonary Status:   O2 Device: Room air (07/30/19 1325)   Adequate oxygenation and airway patent. Complications related to anesthesia: None    Post-anesthesia assessment completed. No concerns. Signed By: Yamilex Crystal DO    7/30/2019  Post anesthesia nausea and vomiting:  controlled      Vitals Value Taken Time   /67 7/30/2019  1:27 PM   Temp     Pulse 0 7/30/2019  1:29 PM   Resp 0 7/30/2019  1:29 PM   SpO2 100 % 7/30/2019  1:28 PM   Vitals shown include unvalidated device data.

## 2019-07-30 NOTE — ANESTHESIA PREPROCEDURE EVALUATION
Anesthetic History   No history of anesthetic complications            Review of Systems / Medical History  Patient summary reviewed, nursing notes reviewed and pertinent labs reviewed    Pulmonary  Within defined limits                 Neuro/Psych       CVA  TIA and psychiatric history     Cardiovascular    Hypertension: well controlled          CAD and cardiac stents         GI/Hepatic/Renal  Within defined limits              Endo/Other        Arthritis     Other Findings   Comments: RLQ pain  Hx polyps           Physical Exam    Airway  Mallampati: III  TM Distance: > 6 cm  Neck ROM: normal range of motion   Mouth opening: Normal     Cardiovascular  Regular rate and rhythm,  S1 and S2 normal,  no murmur, click, rub, or gallop             Dental  No notable dental hx       Pulmonary  Breath sounds clear to auscultation               Abdominal  GI exam deferred       Other Findings            Anesthetic Plan    ASA: 3  Anesthesia type: MAC          Induction: Intravenous  Anesthetic plan and risks discussed with: Patient

## 2019-07-30 NOTE — H&P
Virginia Jimenez MD  [facility], [address]  [phone]  [email or web address]      Gastroenterology Outpatient History and Physical    Patient: Leslie Dixon    Physician: Sanju Ryder MD    Vital Signs: Blood pressure 109/85, pulse (!) 46, resp. rate 15, height 5' 7\" (1.702 m), weight 76.2 kg (168 lb), SpO2 100 %. Allergies: Allergies   Allergen Reactions    Benazepril Rash and Swelling    Pork/Porcine Containing Products Hives       Chief Complaint: abd pain    History of Present Illness: here for colonoscopy for RLQ abdominal pain and hx of colonic polyps. Adenomas removed 5 yrs ago.     History:  Past Medical History:   Diagnosis Date    Allergy to ACE inhibitors 11/28/2017    Anxiety 11/28/2017    Arthritis     ASCVD (arteriosclerotic cardiovascular disease) 11/28/2017    CAD (coronary artery disease) 2000    MI     Cataract     Cataract 04/2019    Depression 11/28/2017    DJD (degenerative joint disease) 11/28/2017    pt denies    ED (erectile dysfunction) 11/28/2017    Elevated PSA 11/28/2017    Former smoker 11/28/2017    Glucose intolerance (impaired glucose tolerance) 11/28/2017    Hypertension     Insomnia 11/28/2017    On statin therapy 11/28/2017    Other ill-defined conditions(799.89)     elevated lipids    Psychiatric disorder     depression /anxiety      Past Surgical History:   Procedure Laterality Date    ABDOMEN SURGERY PROC UNLISTED      hernia repair    CARDIAC SURG PROCEDURE UNLIST      stents x 6    COLORECTAL SCRN; HI RISK IND  9/10/2014         HX HEENT  06/03/2019    left eye cataract    HX TONSILLECTOMY      OK COLSC FLX W/RMVL OF TUMOR POLYP LESION SNARE TQ  9/10/2014           Social History     Socioeconomic History    Marital status:      Spouse name: Not on file    Number of children: Not on file    Years of education: Not on file  Highest education level: Not on file   Tobacco Use    Smoking status: Former Smoker     Packs/day: 1.50     Years: 5.00     Pack years: 7.50     Last attempt to quit: 1980     Years since quittin.6    Smokeless tobacco: Never Used    Tobacco comment: smoke for 5 year    Substance and Sexual Activity    Alcohol use: No    Drug use: No      Family History   Problem Relation Age of Onset    Psychiatric Disorder Mother         depression    Heart Disease Father         aneurysym      Patient Active Problem List   Diagnosis Code    TIA (transient ischemic attack) G45.9    Syncope R55    Essential hypertension I10    Mixed hyperlipidemia E78.2    ED (erectile dysfunction) N52.9    Elevated PSA R97.20    Insomnia G47.00    Glucose intolerance (impaired glucose tolerance) R73.02    Former smoker Z87.891    Primary osteoarthritis involving multiple joints M15.0    ASCVD (arteriosclerotic cardiovascular disease) I25.10    Anxiety F41.9    Allergy to ACE inhibitors Z88.8    Non-healing skin lesion of nose L98.9    Gait instability R26.81    Medicare annual wellness visit, subsequent Z00.00    Myalgia M79.10    Paronychia of finger of left hand L03.012    Mild depression ([address] F32.0    Memory deficit R41.3    Confusion R41.0    Vitamin B 12 deficiency E53.8    Preop cardiovascular exam Z01.810    Age-related cataract of both eyes H25.9    Lower abdominal pain R10.30         Medications:   Prior to Admission medications    Medication Sig Start Date End Date Taking? Authorizing Provider   hydrALAZINE (APRESOLINE) 25 mg tablet Take 1 Tab by mouth two (2) times a day. 3/5/19  Yes Macario Zee MD   escitalopram oxalate (LEXAPRO) 10 mg tablet Take 1 Tab by mouth daily.  3/5/19  Yes Macario Zee MD   amLODIPine (NORVASC) 5 mg tablet TAKE 1 TABLET BY MOUTH  DAILY 19  Yes Macario Zee MD   rosuvastatin (CRESTOR) 20 mg tablet TAKE 1 TABLET BY MOUTH  DAILY 19  Yes Dottie Meckel, MD   yfdfe-vzycg-7-dha-epa-lipids 193-83-63-63 mg cap Take  by mouth. Yes Provider, Historical   ezetimibe (ZETIA) 10 mg tablet Take  by mouth daily. Yes Provider, Historical   nebivolol (BYSTOLIC) 5 mg tablet Take 5 mg by mouth daily. Yes Provider, Historical   aspirin 81 mg tablet Take 81 mg by mouth daily. Yes Provider, Historical   cholecalciferol, vitamin d3, (VITAMIN D) 1,000 unit tablet Take 2,000 Units by mouth daily. Yes Provider, Historical   ALPRAZolam (XANAX) 0.5 mg tablet Take 1 Tab by mouth nightly as needed for Anxiety. Max Daily Amount: 0.5 mg. 7/29/19   Dottie Meckel, MD       Physical Exam:     General: well developed, well nourished   HEENT: unremarkable   Heart: regular rhythm no mumur    Lungs: clear   Abdominal:  benign   Neurological: unremarkable   Extremities: no edema     Findings/Diagnosis: RLQ pain.  Hx of colonic polyps    Plan of Care/Planned Procedure: Colonoscopy with  monitored anesthesia care sedation       Signed:  Colby Shaikh MD 7/30/2019

## 2019-07-30 NOTE — ROUTINE PROCESS
Leslie Dixon  1935  [id number]    Situation:  Verbal report received from: Jacob Lira RN  Procedure: Procedure(s):  COLONOSCOPY  ENDOSCOPIC POLYPECTOMY    Background:    Preoperative diagnosis: RLQ PAIN, HISTORY OF POLYPS  Postoperative diagnosis: polyp    :  Dr. Edilberto Lara  Assistant(s): Endoscopy Technician-1: Swapna Boateng  Endoscopy RN-1: Ariane Prather RN    Specimens:   ID Type Source Tests Collected by Time Destination   1 : transverse colon polyp Preservative Colon, Transverse  Tina Jewell MD 7/30/2019 1249 Pathology     H. Pylori  no    Assessment:  Intra-procedure medications     Anesthesia gave intra-procedure sedation and medications, see anesthesia flow sheet yes    Intravenous fluids: NS@ KVO     Vital signs stable     Abdominal assessment: round and soft     Recommendation:  Discharge patient per MD order.   Family or Friend wife, June  Permission to share finding with family or friend yes

## 2019-07-30 NOTE — PROCEDURES
[facility]                  Colonoscopy Operative Report    7/30/2019      Kei Quigley  [id number]  1935    Procedure Type:   Colonoscopy --screening     Indications:    Personal history of colon polyps (screening only)     Pre-operative Diagnosis: see indication above    Post-operative Diagnosis:  See findings below    :  Eva London MD    Referring Provider: Jack Rm MD      Sedation:  MAC anesthesia Propofol    Pre-Procedural Exam:      Airway: clear,  No airway problems anticipated  Heart: RRR, without gallops or rubs  Lungs: clear bilaterally without wheezes, crackles, or rhonchi  Abdomen: soft, nontender, nondistended, bowel sounds present  Mental Status: awake, alert and oriented to person, place and time     Procedure Details:  After informed consent was obtained with all risks and benefits of procedure explained and preoperative exam completed, the patient was taken to the endoscopy suite and placed in the left lateral decubitus position. Upon sequential sedation as per above, a digital rectal exam was performed . The Olympus videocolonoscope  was inserted in the rectum and carefully advanced to the cecum, which was identified by the ileocecal valve and appendiceal orifice. The cecum was identified by the ileocecal valve and appendiceal orifice. The quality of preparation was good. The colonoscope was slowly withdrawn with careful evaluation between folds. Retroflexion in the rectum was completed demonstrating internal hemorrhoids. Findings:   Rectum: Grade 1 internal hemorrhoid(s); Sigmoid:     - Diverticulosis  Descending Colon: normal  Transverse Colon: 6 mm polyp, cold snared  Ascending Colon: normal  Cecum: normal  Terminal Ileum: not intubated      Specimen Removed:  transverse colonic polyp    Complications: None. EBL:  None.     Impression:    diverticulosis,  Mild in degree, involving the sigmoid  hemorrhoids internal, Small in size  small transverse colonic polyp    Recommendations: --Await pathology. , -Repeat colonoscopy in 5 years. High fiber diet. Resume normal medication(s). Discharge Disposition:  Home in the company of a  when able to ambulate. Denver Breen., MD    7/30/2019     EDUARDO Lester MD  [facility], [address] [facility] [address] [place] [address]  [phone]  [email or web address]

## 2019-09-20 PROBLEM — Z01.810 PREOP CARDIOVASCULAR EXAM: Status: RESOLVED | Noted: 2019-04-08 | Resolved: 2019-09-20

## 2019-09-23 PROBLEM — Z00.00 MEDICARE ANNUAL WELLNESS VISIT, SUBSEQUENT: Status: RESOLVED | Noted: 2017-12-07 | Resolved: 2019-09-23

## 2019-12-06 PROBLEM — Z13.39 ALCOHOL SCREENING: Status: ACTIVE | Noted: 2019-12-06

## 2019-12-06 NOTE — PROGRESS NOTES
This is a Subsequent Medicare Annual Wellness Visit providing Personalized Prevention Plan Services (PPPS) (Performed 12 months after initial AWV and PPPS )    I have reviewed the patient's medical history in detail and updated the computerized patient record. He returns today for his Medicare subsequent annual wellness examination and screening questionnaire. He is also here in follow-up of his multi-medical problems include hypertension, glucose intolerance, hyperlipidemia, ASCVD, prior TIA, history of confusion resolved, history depression, prior elevated PSA, DJD and other multiple medical problems. He is taking his medications and trying to follow his diet and remain physically active. He currently denies any chest pain, shortness of breath, palpitations, PND, orthopnea or other cardiorespiratory complaints. He notes no GI or  complaints. He notes no headaches, dizziness or neurologic complaints. He has no change of his chronic arthritic complaints and there are no other complaints on complete review of systems.     History     Past Medical History:   Diagnosis Date    Allergy to ACE inhibitors 11/28/2017    Anxiety 11/28/2017    Arthritis     ASCVD (arteriosclerotic cardiovascular disease) 11/28/2017    CAD (coronary artery disease) 2000    MI     Cataract     Cataract 04/2019    Depression 11/28/2017    DJD (degenerative joint disease) 11/28/2017    pt denies    ED (erectile dysfunction) 11/28/2017    Elevated PSA 11/28/2017    Former smoker 11/28/2017    Glucose intolerance (impaired glucose tolerance) 11/28/2017    Hypertension     Insomnia 11/28/2017    On statin therapy 11/28/2017    Other ill-defined conditions(799.89)     elevated lipids    Psychiatric disorder     depression /anxiety      Past Surgical History:   Procedure Laterality Date    ABDOMEN SURGERY PROC UNLISTED      hernia repair    CARDIAC SURG PROCEDURE UNLIST      stents x 6    COLONOSCOPY N/A 7/30/2019 COLONOSCOPY performed by Stevie Luna [facility] at [facility]  2019         COLORECTAL SCRN; HI RISK IND  9/10/2014         COLORECTAL SCRN; HI RISK IND  2019         HX HEENT  2019    left eye cataract    HX TONSILLECTOMY      OK COLSC FLX W/RMVL OF TUMOR POLYP LESION SNARE TQ  9/10/2014          Social History     Tobacco Use    Smoking status: Former Smoker     Packs/day: 1.50     Years: 5.00     Pack years: 7.50     Last attempt to quit: 1980     Years since quittin.9    Smokeless tobacco: Never Used    Tobacco comment: smoke for 5 year    Substance Use Topics    Alcohol use: No    Drug use: No     Current Outpatient Medications   Medication Sig Dispense Refill    vit C/E/Zn/coppr/lutein/zeaxan (PRESERVISION AREDS-2 PO) Take 2 Caps by mouth.  hydrALAZINE (APRESOLINE) 50 mg tablet Take 1 Tab by mouth two (2) times a day. 180 Tab 3    ALPRAZolam (XANAX) 0.5 mg tablet Take 1 Tab by mouth nightly as needed for Anxiety. Max Daily Amount: 0.5 mg. 90 Tab 1    escitalopram oxalate (LEXAPRO) 10 mg tablet Take 1 Tab by mouth daily. 30 Tab prn    amLODIPine (NORVASC) 5 mg tablet TAKE 1 TABLET BY MOUTH  DAILY 90 Tab 3    rosuvastatin (CRESTOR) 20 mg tablet TAKE 1 TABLET BY MOUTH  DAILY 90 Tab 3    krill-omega-3-dha-epa-lipids 380-76-18-03 mg cap Take  by mouth.  ezetimibe (ZETIA) 10 mg tablet Take  by mouth daily.  nebivolol (BYSTOLIC) 5 mg tablet Take 5 mg by mouth daily.  aspirin 81 mg tablet Take 81 mg by mouth daily.  cholecalciferol, vitamin d3, (VITAMIN D) 1,000 unit tablet Take 2,000 Units by mouth daily.        Allergies   Allergen Reactions    Benazepril Rash and Swelling    Pork/Porcine Containing Products Hives     Family History   Problem Relation Age of Onset    Psychiatric Disorder Mother         depression    Heart Disease Father         aneurysym       Patient Active Problem List    Diagnosis    Glucose intolerance (impaired glucose tolerance)    Primary osteoarthritis involving multiple joints    ASCVD (arteriosclerotic cardiovascular disease)    Essential hypertension    Mixed hyperlipidemia    Alcohol screening    Lower abdominal pain    Age-related cataract of both eyes    Vitamin B 12 deficiency    Confusion    Memory deficit    Mild depression (HCC)    Paronychia of finger of left hand    Myalgia    Non-healing skin lesion of nose    Gait instability    Medicare annual wellness visit, subsequent    ED (erectile dysfunction)    Elevated PSA    Insomnia    Former smoker    Anxiety    Allergy to ACE inhibitors    TIA (transient ischemic attack)    Syncope       Patient Care Team:  Fabricio Jackson MD as PCP - General (Internal Medicine)  Fabricio Jackson MD as PCP - [facility] Empaneled Provider    Depression Risk Factor Screening:     3 most recent PHQ Screens 12/9/2019   Little interest or pleasure in doing things Not at all   Feeling down, depressed, irritable, or hopeless Not at all   Total Score PHQ 2 0   Trouble falling or staying asleep, or sleeping too much -   Feeling tired or having little energy -   Poor appetite, weight loss, or overeating -   Feeling bad about yourself - or that you are a failure or have let yourself or your family down -   Trouble concentrating on things such as school, work, reading, or watching TV -   Moving or speaking so slowly that other people could have noticed; or the opposite being so fidgety that others notice -   Thoughts of being better off dead, or hurting yourself in some way -   PHQ 9 Score -     Alcohol Risk Factor Screening: You do not drink alcohol or very rarely. Functional Ability and Level of Safety:     Fall Risk     Fall Risk Assessment, last 12 mths 12/9/2019   Able to walk? Yes   Fall in past 12 months? No       Hearing Loss   mild    Activities of Daily Living   Self-care.    ADL Assessment 12/9/2019   Feeding yourself No Help Needed   Getting from bed to chair No Help Needed   Getting dressed No Help Needed   Bathing or showering No Help Needed   Walk across the room (includes cane/walker) No Help Needed   Using the telphone No Help Needed   Taking your medications No Help Needed   Preparing meals No Help Needed   Managing money (expenses/bills) No Help Needed   Moderately strenuous housework (laundry) No Help Needed   Shopping for personal items (toiletries/medicines) No Help Needed   Shopping for groceries No Help Needed   Driving No Help Needed   Climbing a flight of stairs No Help Needed   Getting to places beyond walking distances No Help Needed       Abuse Screen   Patient is not abused    Social History     Patient does not qualify to have social determinant information on file (likely too young). Social History Narrative    Not on file       Review of Systems      ROS:    Constitutional: He denies fevers, weight loss, sweats. Eyes: No blurred or double vision. ENT: No difficulty with swallowing, taste, speech or smell. Neck: no stiffness or swelling  Respiratory: No cough wheezing or shortness of breath. Cardiovascular: Denies chest pain, palpitations, unexplained indigestion or syncope. Gastrointestinal:  No changes in bowel movements, no abdominal pain, no bloating. Genitourinary:  He denies frequency, nocturia or stranguria. Extremities: No change of his chronic joint pain, stiffness or swelling. Neurological:  No numbness, tingling, burring paresthesias or loss of motor strength. No syncope, dizziness or frequent headache  Lymphatic: no adenopathy noted  Hematologic: no easy bruising or bleeding gums  Skin:  No recent rashes or mole changes. Psychiatric/Behavioral:  Negative for depression.       Physical Examination     Evaluation of Cognitive Function:  Mood/affect:  happy  Appearance: age appropriate  Family member/caregiver input: none    Visit Vitals  /82 (BP 1 Location: Right arm, BP Patient Position: Sitting)   Pulse (!) 50   Temp 98 °F (36.7 °C)   Resp 16   Ht 5' 7\" (1.702 m)   Wt 174 lb 3.2 oz (79 kg)   SpO2 98%   BMI 27.28 kg/m²     Vitals:    12/09/19 0833   BP: 152/82   Pulse: (!) 50   Resp: 16   Temp: 98 °F (36.7 °C)   SpO2: 98%   Weight: 174 lb 3.2 oz (79 kg)   Height: 5' 7\" (1.702 m)   PainSc:   0 - No pain        PHYSICAL EXAM:    General appearance - alert, well appearing, and in no distress  Mental status - alert, oriented to person, place, and time  HEENT:  Ears - bilateral TM's and external ear canals clear  Eyes - pupillary responses were normal.  Extraocular muscle function intact. Lids and conjunctiva not injected. Fundoscopic exam revealed sharp disc margins. eye movements intact  Pharynx- clear with teeth in good repair. No masses were noted  Neck - supple without thyromegaly or burit. No JVD noted  Lungs - clear to auscultation and percussion  Cardiac- normal rate, regular rhythm without murmurs. PMI not displaced. No gallop, rub or click  Abdomen - flat, soft, non-tender without palpable organomegaly or mass. No pulsatile mass was felt, and not bruit was heard. Bowel sounds were active  : Circumcised, Testes descended w/o masses  Rectal: normal sphincter tone, prostate normal, no masses, stool brown and hemacult negative  Extremities -  no clubbing cyanosis or edema  Lymphatics - no palpable lymphadenopathy, no hepatosplenomegaly  Hematologic: no petechiae or purpura  Peripheral vascular -Femoral, Dorsalis pedis and posterior tibial pulses felt without difficulty  Skin - no rash or unusual mole change noted  Neurological - Cranial nerves II-XII grossly intact. Motor strength 5/5. DTR's 2+ and symmetric.   Station and gait normal  Back exam - full range of motion, no tenderness, palpable spasm or pain on motion  Musculoskeletal - no joint tenderness, deformity or swelling        Results for orders placed or performed in visit on 06/07/19   HEMOGLOBIN A1C WITH EAG   Result Value Ref Range    Hemoglobin A1c 5.4 4.8 - 5.6 %    Estimated average glucose 834 mg/dL   METABOLIC PANEL, COMPREHENSIVE   Result Value Ref Range    Glucose 100 75 - 110 mg/dL    BUN 18.0 9.0 - 20.0 mg/dL    Creatinine 1.2 0.8 - 1.5 mg/dL    Sodium 143 137 - 145 mmol/L    Potassium 4.6 3.6 - 5.0 mmol/L    Chloride 104 98 - 107 mmol/L    CO2 31.0 22.0 - 32.0 mmol/L    Calcium 9.3 8.4 - 10.2 mg/dl    Protein, total 6.9 6.3 - 8.2 g/dL    Albumin 4.2 3.9 - 5.4 g/dL    AST (SGOT) 30.0 14.0 - 36.0 U/L    ALT (SGPT) 32 9 - 52 U/L    Alk. phosphatase 66 38 - 126 U/L    Bilirubin, total 0.8 0.2 - 1.3 mg/dL    BUN/Creatinine ratio 15 Ratio    GFR est AA >60 mL/min/1.73m2    GFR est non-AA 58 <60 mL/min/1.73m2    Globulin 2.70     A-G Ratio 1.6 Ratio    Anion gap 8 mmol/L   LIPID PANEL   Result Value Ref Range    Cholesterol, total 132 0 - 200 mg/dL    Triglyceride 76 0 - 200 mg/dL    HDL Cholesterol 52 35 - 130 mg/dL    VLDL 15 mg/dL    LDL, calculated 65 0 - 130 mg/dL    CHOL/HDL Ratio 3 0 - 4 Ratio    LDL/HDL Ratio 1 Ratio   CK   Result Value Ref Range    CK 63.00 30.00 - 135.00 U/L       Advice/Referrals/Counseling   Education and counseling provided:  Are appropriate based on today's review and evaluation  End-of-Life planning (with patient's consent)  Pneumococcal Vaccine  Influenza Vaccine  Colorectal cancer screening tests      Assessment/Plan     ASSESSMENT:   1. Essential hypertension    2. Glucose intolerance (impaired glucose tolerance)    3. Mixed hyperlipidemia    4. ASCVD (arteriosclerotic cardiovascular disease)    5. Primary osteoarthritis involving multiple joints    6. Elevated PSA    7. Anxiety    8. Mild depression ([address]    9. Alcohol screening    10. Medicare annual wellness visit, subsequent      Impression  1.   Hypertension that is not controlled by the nurses check nor by my check or by his checks at home so I am going to increase his Adderall again to 50 mg twice daily and he is going to continue to monitor at home and if remains up then he will notify me and we will see him sooner  2. Glucose intolerance repeat status pending a prior lab reviewed no make adjustments if necessary. 3.  Hyperlipidemia prior lab reviewed and repeat status pending and will adjust if needed. 4   ASCVD clinically stable continue aspirin daily and EKG obtained today reveals no acute process with sinus bradycardia at rate of 48.  5. DJD that is stable  6. Prior elevated PSA repeat status pending  7   Anxiety that is currently stable  8. Depression currently controlled on Lexapro  9. Annual alcohol screening is done he currently does not drink alcohol at all saying he has not drank for 40 years. I did caution regarding more than 2 drinks per day in males with increased cardiovascular risk and increased risk of liver disease and other GI problems. Medicare subsequent annual wellness examination screening questionnaire is completed today. The results were reviewed with him next questions were answered. Lifestyle recommendations modifications discussed and made. Follow-up in 6 months or sooner should there be a problem or should his blood pressure not improve to 052 or less systolic with the change in blood pressure medicine. PLAN:  .  Orders Placed This Encounter    CBC WITH AUTOMATED DIFF    METABOLIC PANEL, COMPREHENSIVE ([facility])    LIPID PANEL ([facility])    CK ([facility])    HEMOGLOBIN A1C W/O EAG ([facility])    T4, FREE ([facility])    TSH 3RD GENERATION ([facility])    PROSTATE SPECIFIC AG ([facility])    URINALYSIS W/O MICRO ([facility])    AMB POC EKG ROUTINE W/ 12 LEADS, INTER & REP    vit C/E/Zn/coppr/lutein/zeaxan (PRESERVISION AREDS-2 PO)    hydrALAZINE (APRESOLINE) 50 mg tablet         ATTENTION:   This medical record was transcribed using an electronic medical records system.   Although proofread, it may and can contain electronic and spelling errors. Other human spelling and other errors may be present. Corrections may be executed at a later time. Please feel free to contact us for any clarifications as needed. Follow-up and Dispositions    · Return in about 6 months (around 6/9/2020). Cassandra Davis MD    Recommended healthy diet low in carbohydrates, fats, sodium and cholesterol. Recommended regular cardiovascular exercise 3-6 times per week for 30-60 minutes daily. Current Outpatient Medications   Medication Sig Dispense Refill    vit C/E/Zn/coppr/lutein/zeaxan (PRESERVISION AREDS-2 PO) Take 2 Caps by mouth.  hydrALAZINE (APRESOLINE) 50 mg tablet Take 1 Tab by mouth two (2) times a day. 180 Tab 3    ALPRAZolam (XANAX) 0.5 mg tablet Take 1 Tab by mouth nightly as needed for Anxiety. Max Daily Amount: 0.5 mg. 90 Tab 1    escitalopram oxalate (LEXAPRO) 10 mg tablet Take 1 Tab by mouth daily. 30 Tab prn    amLODIPine (NORVASC) 5 mg tablet TAKE 1 TABLET BY MOUTH  DAILY 90 Tab 3    rosuvastatin (CRESTOR) 20 mg tablet TAKE 1 TABLET BY MOUTH  DAILY 90 Tab 3    krill-omega-3-dha-epa-lipids 583-75-28-14 mg cap Take  by mouth.  ezetimibe (ZETIA) 10 mg tablet Take  by mouth daily.  nebivolol (BYSTOLIC) 5 mg tablet Take 5 mg by mouth daily.  aspirin 81 mg tablet Take 81 mg by mouth daily.  cholecalciferol, vitamin d3, (VITAMIN D) 1,000 unit tablet Take 2,000 Units by mouth daily. No results found for any visits on 12/09/19. Verbal and written instructions (see AVS) provided. Patient expresses understanding of diagnosis and treatment plan.     Cassandra Davis MD

## 2019-12-09 ENCOUNTER — OFFICE VISIT (OUTPATIENT)
Dept: INTERNAL MEDICINE CLINIC | Age: 84
End: 2019-12-09

## 2019-12-09 VITALS
RESPIRATION RATE: 16 BRPM | WEIGHT: 174.2 LBS | DIASTOLIC BLOOD PRESSURE: 82 MMHG | OXYGEN SATURATION: 98 % | TEMPERATURE: 98 F | SYSTOLIC BLOOD PRESSURE: 152 MMHG | HEART RATE: 50 BPM | BODY MASS INDEX: 27.34 KG/M2 | HEIGHT: 67 IN

## 2019-12-09 DIAGNOSIS — Z13.39 ALCOHOL SCREENING: ICD-10-CM

## 2019-12-09 DIAGNOSIS — F32.A MILD DEPRESSION: ICD-10-CM

## 2019-12-09 DIAGNOSIS — F41.9 ANXIETY: ICD-10-CM

## 2019-12-09 DIAGNOSIS — I10 ESSENTIAL HYPERTENSION: Primary | ICD-10-CM

## 2019-12-09 DIAGNOSIS — M15.9 PRIMARY OSTEOARTHRITIS INVOLVING MULTIPLE JOINTS: ICD-10-CM

## 2019-12-09 DIAGNOSIS — R73.02 GLUCOSE INTOLERANCE (IMPAIRED GLUCOSE TOLERANCE): ICD-10-CM

## 2019-12-09 DIAGNOSIS — I25.10 ASCVD (ARTERIOSCLEROTIC CARDIOVASCULAR DISEASE): ICD-10-CM

## 2019-12-09 DIAGNOSIS — Z00.00 MEDICARE ANNUAL WELLNESS VISIT, SUBSEQUENT: ICD-10-CM

## 2019-12-09 DIAGNOSIS — R97.20 ELEVATED PSA: ICD-10-CM

## 2019-12-09 DIAGNOSIS — E78.2 MIXED HYPERLIPIDEMIA: ICD-10-CM

## 2019-12-09 LAB
A-G RATIO,AGRAT: 1.3 RATIO
ALBUMIN SERPL-MCNC: 4.1 G/DL (ref 3.9–5.4)
ALP SERPL-CCNC: 67 U/L (ref 38–126)
ALT SERPL-CCNC: 28 U/L (ref 0–50)
ANION GAP SERPL CALC-SCNC: 11 MMOL/L
AST SERPL W P-5'-P-CCNC: 34 U/L (ref 14–36)
BILIRUB SERPL-MCNC: 1 MG/DL (ref 0.2–1.3)
BILIRUB UR QL: NEGATIVE
BUN SERPL-MCNC: 19 MG/DL (ref 9–20)
BUN/CREATININE RATIO,BUCR: 19 RATIO
CALCIUM SERPL-MCNC: 9.7 MG/DL (ref 8.4–10.2)
CHLORIDE SERPL-SCNC: 101 MMOL/L (ref 98–107)
CHOL/HDL RATIO,CHHD: 3 RATIO (ref 0–4)
CHOLEST SERPL-MCNC: 130 MG/DL (ref 0–200)
CK SERPL-CCNC: 115 U/L (ref 30–135)
CLARITY: CLEAR
CO2 SERPL-SCNC: 30 MMOL/L (ref 22–32)
COLOR UR: NORMAL
CREAT SERPL-MCNC: 1 MG/DL (ref 0.8–1.5)
GLOBULIN,GLOB: 3.1
GLUCOSE 24H UR-MRATE: NEGATIVE G/(24.H)
GLUCOSE SERPL-MCNC: 98 MG/DL (ref 75–110)
HBA1C MFR BLD HPLC: 5.8 % (ref 4–5.7)
HDLC SERPL-MCNC: 50 MG/DL (ref 35–130)
HGB UR QL STRIP: NEGATIVE
KETONES UR QL STRIP.AUTO: NEGATIVE
LDL/HDL RATIO,LDHD: 1 RATIO
LDLC SERPL CALC-MCNC: 67 MG/DL (ref 0–130)
LEUKOCYTE ESTERASE: NEGATIVE
NITRITE UR QL STRIP.AUTO: NEGATIVE
PH UR STRIP: 7 [PH] (ref 5–7)
POTASSIUM SERPL-SCNC: 4.9 MMOL/L (ref 3.6–5)
PROT SERPL-MCNC: 7.2 G/DL (ref 6.3–8.2)
PROT UR STRIP-MCNC: NEGATIVE MG/DL
PSA, TEST22: 3.1 NG/ML (ref 0–4)
SODIUM SERPL-SCNC: 142 MMOL/L (ref 137–145)
SP GR UR REFRACTOMETRY: 1.01 (ref 1–1.03)
T4 FREE SERPL-MCNC: 0.97 NG/DL (ref 0.58–2.3)
TRIGL SERPL-MCNC: 63 MG/DL (ref 0–200)
TSH SERPL DL<=0.05 MIU/L-ACNC: 0.94 UIU/ML (ref 0.34–5.6)
UROBILINOGEN UR QL STRIP.AUTO: NEGATIVE
VLDLC SERPL CALC-MCNC: 13 MG/DL

## 2019-12-09 RX ORDER — HYDRALAZINE HYDROCHLORIDE 50 MG/1
50 TABLET, FILM COATED ORAL 2 TIMES DAILY
Qty: 180 TAB | Refills: 3 | Status: SHIPPED | OUTPATIENT
Start: 2019-12-09 | End: 2021-01-11

## 2019-12-09 NOTE — PATIENT INSTRUCTIONS
Arthritis: Care Instructions Your Care Instructions Arthritis, also called osteoarthritis, is a breakdown of the cartilage that cushions your joints. When the cartilage wears down, your bones rub against each other. This causes pain and stiffness. Many people have some arthritis as they age. Arthritis most often affects the joints of the spine, hands, hips, knees, or feet. You can take simple measures to protect your joints, ease your pain, and help you stay active. Follow-up care is a key part of your treatment and safety. Be sure to make and go to all appointments, and call your doctor if you are having problems. It's also a good idea to know your test results and keep a list of the medicines you take. How can you care for yourself at home? · Stay at a healthy weight. Being overweight puts extra strain on your joints. · Talk to your doctor or physical therapist about exercises that will help ease joint pain. ? Stretch. You may enjoy gentle forms of yoga to help keep your joints and muscles flexible. ? Walk instead of jog. Other types of exercise that are less stressful on the joints include riding a bicycle, swimming, dale chi, or water exercise. ? Lift weights. Strong muscles help reduce stress on your joints. Stronger thigh muscles, for example, take some of the stress off of the knees and hips. Learn the right way to lift weights so you do not make joint pain worse. · Take your medicines exactly as prescribed. Call your doctor if you think you are having a problem with your medicine. · Take pain medicines exactly as directed. ? If the doctor gave you a prescription medicine for pain, take it as prescribed. ? If you are not taking a prescription pain medicine, ask your doctor if you can take an over-the-counter medicine. · Use a cane, crutch, walker, or another device if you need help to get around. These can help rest your joints.  You also can use other things to make life easier, such as a higher toilet seat and padded handles on kitchen utensils. · Do not sit in low chairs, which can make it hard to get up. · Put heat or cold on your sore joints as needed. Use whichever helps you most. You also can take turns with hot and cold packs. ? Apply heat 2 or 3 times a day for 20 to 30 minutesusing a heating pad, hot shower, or hot packto relieve pain and stiffness. ? Put ice or a cold pack on your sore joint for 10 to 20 minutes at a time. Put a thin cloth between the ice and your skin. When should you call for help? Call your doctor now or seek immediate medical care if: 
  · You have sudden swelling, warmth, or pain in any joint.  
  · You have joint pain and a fever or rash.  
  · You have such bad pain that you cannot use a joint.  
 Watch closely for changes in your health, and be sure to contact your doctor if: 
  · You have mild joint symptoms that continue even with more than 6 weeks of care at home.  
  · You have stomach pain or other problems with your medicine. Where can you learn more? Go to http://florin-madeline.info/. Enter K063 in the search box to learn more about \"Arthritis: Care Instructions. \" Current as of: April 1, 2019 Content Version: 12.2 © 3399-7481 [employer]. Care instructions adapted under license by [employer] (which disclaims liability or warranty for this information). If you have questions about a medical condition or this instruction, always ask your healthcare professional. [place] [address] any warranty or liability for your use of this information.

## 2019-12-09 NOTE — PROGRESS NOTES
Jerilyn Ybarra  Identified pt with two pt identifiers(name and ). Chief Complaint   Patient presents with   Richa Clancy Annual Wellness Visit       1. Have you been to the ER, urgent care clinic since your last visit? Hospitalized since your last visit? no    2. Have you seen or consulted any other health care providers outside of the [address] [place] [address] since your last visit? Include any pap smears or colon screening. no      Health Maintenance Topics with due status: Overdue       Topic Date Due    Shingrix Vaccine Age 50> 10/25/1986    GLAUCOMA SCREENING Q2Y 10/25/2001    Influenza Age 5 to Adult 2019     Health Maintenance Topics with due status: Due On       Topic Date Due    MEDICARE YEARLY EXAM 2019     Health Maintenance Topics with due status: Not Due       Topic Last Completion Date    DTaP/Tdap/Td series 05/10/2019     Health Maintenance Topics with due status: Completed       Topic Last Completion Date    Pneumococcal 65+ years 2015           Medication reconciliation up to date and corrected with patient at this time. Today's provider has been notified of reason for visit, vitals and flowsheets obtained on patients. Reviewed record in preparation for visit, huddled with provider and have obtained necessary documentation.         Wt Readings from Last 3 Encounters:   19 174 lb 3.2 oz (79 kg)   19 168 lb (76.2 kg)   19 166 lb (75.3 kg)     Temp Readings from Last 3 Encounters:   19 98 °F (36.7 °C)   19 97.6 °F (36.4 °C)   19 97.7 °F (36.5 °C) (Oral)     BP Readings from Last 3 Encounters:   19 152/82   19 142/63   19 132/88     Pulse Readings from Last 3 Encounters:   19 (!) 50   19 (!) 48   19 (!) 44     Vitals:    19 0833   BP: 152/82   Pulse: (!) 50   Resp: 16   Temp: 98 °F (36.7 °C)   SpO2: 98%   Weight: 174 lb 3.2 oz (79 kg)   Height: 5' 7\" (1.702 m)   PainSc:   0 - No pain         Learning Assessment:  :     Learning Assessment 6/8/2018   PRIMARY LEARNER Patient   PRIMARY LANGUAGE ENGLISH   LEARNER PREFERENCE PRIMARY DEMONSTRATION   ANSWERED BY self   RELATIONSHIP SELF       Depression Screening:  :     3 most recent PHQ Screens 12/9/2019   Little interest or pleasure in doing things Not at all   Feeling down, depressed, irritable, or hopeless Not at all   Total Score PHQ 2 0   Trouble falling or staying asleep, or sleeping too much -   Feeling tired or having little energy -   Poor appetite, weight loss, or overeating -   Feeling bad about yourself - or that you are a failure or have let yourself or your family down -   Trouble concentrating on things such as school, work, reading, or watching TV -   Moving or speaking so slowly that other people could have noticed; or the opposite being so fidgety that others notice -   Thoughts of being better off dead, or hurting yourself in some way -   PHQ 9 Score -       No flowsheet data found. Fall Risk Assessment:  :     Fall Risk Assessment, last 12 mths 6/7/2019   Able to walk? Yes   Fall in past 12 months? No       Abuse Screening:  :     Abuse Screening Questionnaire 12/9/2019 3/5/2019 6/8/2018   Do you ever feel afraid of your partner? N N N   Are you in a relationship with someone who physically or mentally threatens you? N N N   Is it safe for you to go home?  Y Y Y       ADL Screening:  :     ADL Assessment 12/9/2019   Feeding yourself No Help Needed   Getting from bed to chair No Help Needed   Getting dressed No Help Needed   Bathing or showering No Help Needed   Walk across the room (includes cane/walker) No Help Needed   Using the telphone No Help Needed   Taking your medications No Help Needed   Preparing meals No Help Needed   Managing money (expenses/bills) No Help Needed   Moderately strenuous housework (laundry) No Help Needed   Shopping for personal items (toiletries/medicines) No Help Needed   Shopping for groceries No Help Needed Driving No Help Needed   Climbing a flight of stairs No Help Needed   Getting to places beyond walking distances No Help Needed

## 2019-12-10 LAB
BASOPHILS # BLD AUTO: 0.1 X10E3/UL (ref 0–0.2)
BASOPHILS NFR BLD AUTO: 1 %
EOSINOPHIL # BLD AUTO: 0.2 X10E3/UL (ref 0–0.4)
EOSINOPHIL NFR BLD AUTO: 3 %
ERYTHROCYTE [DISTWIDTH] IN BLOOD BY AUTOMATED COUNT: 12.5 % (ref 12.3–15.4)
HCT VFR BLD AUTO: 47.4 % (ref 37.5–51)
HGB BLD-MCNC: 15.8 G/DL (ref 13–17.7)
IMM GRANULOCYTES # BLD AUTO: 0 X10E3/UL (ref 0–0.1)
IMM GRANULOCYTES NFR BLD AUTO: 0 %
LYMPHOCYTES # BLD AUTO: 1.5 X10E3/UL (ref 0.7–3.1)
LYMPHOCYTES NFR BLD AUTO: 21 %
MCH RBC QN AUTO: 30.2 PG (ref 26.6–33)
MCHC RBC AUTO-ENTMCNC: 33.3 G/DL (ref 31.5–35.7)
MCV RBC AUTO: 91 FL (ref 79–97)
MONOCYTES # BLD AUTO: 0.5 X10E3/UL (ref 0.1–0.9)
MONOCYTES NFR BLD AUTO: 7 %
NEUTROPHILS # BLD AUTO: 4.8 X10E3/UL (ref 1.4–7)
NEUTROPHILS NFR BLD AUTO: 68 %
PLATELET # BLD AUTO: 216 X10E3/UL (ref 150–450)
RBC # BLD AUTO: 5.24 X10E6/UL (ref 4.14–5.8)
WBC # BLD AUTO: 7.1 X10E3/UL (ref 3.4–10.8)

## 2020-01-05 PROBLEM — Z00.00 MEDICARE ANNUAL WELLNESS VISIT, SUBSEQUENT: Status: RESOLVED | Noted: 2017-12-07 | Resolved: 2020-01-05

## 2020-03-13 DIAGNOSIS — E78.2 MIXED HYPERLIPIDEMIA: ICD-10-CM

## 2020-03-13 RX ORDER — ESCITALOPRAM OXALATE 10 MG/1
TABLET ORAL
Qty: 90 TAB | Refills: 3 | Status: SHIPPED | OUTPATIENT
Start: 2020-03-13 | End: 2021-02-26

## 2020-03-13 RX ORDER — AMLODIPINE BESYLATE 5 MG/1
TABLET ORAL
Qty: 90 TAB | Refills: 3 | Status: SHIPPED | OUTPATIENT
Start: 2020-03-13 | End: 2021-02-08

## 2020-03-13 RX ORDER — ROSUVASTATIN CALCIUM 20 MG/1
TABLET, COATED ORAL
Qty: 90 TAB | Refills: 3 | Status: SHIPPED | OUTPATIENT
Start: 2020-03-13 | End: 2021-03-16

## 2020-03-17 ENCOUNTER — TELEPHONE (OUTPATIENT)
Dept: FAMILY MEDICINE CLINIC | Age: 85
End: 2020-03-17

## 2020-05-07 ENCOUNTER — DOCUMENTATION ONLY (OUTPATIENT)
Dept: FAMILY MEDICINE CLINIC | Age: 85
End: 2020-05-07

## 2020-06-08 NOTE — PROGRESS NOTES
Chief Complaint   Patient presents with    Hypertension     6 month follow up    Blood sugar problem    Cholesterol Problem       SUBJECTIVE:    Katelin Ortega is a 81 y.o. male who returns in follow-up of his medical problems include hypertension, glucose intolerance, hyperlipidemia, DJD, anxiety, prior TIA, ASCVD, history of depression and other medical problems. He recently had cataract surgery and has had multiple visits to the eye doctor since the cataract surgery as it did not go well and he is having blurred vision discontinued despite several different types of glasses. He currently denies any chest pain, shortness of breath, palpitations, PND, orthopnea or other cardiorespiratory complaints. There are no GI or  complaints. He notes no headaches, dizziness or neurologic complaints. He has no current active arthritic complaints and there are no other complaints on complete review of systems. Current Outpatient Medications   Medication Sig Dispense Refill    amLODIPine (NORVASC) 5 mg tablet TAKE 1 TABLET EVERY DAY 90 Tab 3    rosuvastatin (CRESTOR) 20 mg tablet TAKE 1 TABLET EVERY DAY 90 Tab 3    escitalopram oxalate (LEXAPRO) 10 mg tablet TAKE 1 TABLET EVERY DAY 90 Tab 3    vit C/E/Zn/coppr/lutein/zeaxan (PRESERVISION AREDS-2 PO) Take 2 Caps by mouth.  hydrALAZINE (APRESOLINE) 50 mg tablet Take 1 Tab by mouth two (2) times a day. 180 Tab 3    ALPRAZolam (XANAX) 0.5 mg tablet Take 1 Tab by mouth nightly as needed for Anxiety. Max Daily Amount: 0.5 mg. 90 Tab 1    krill-omega-3-dha-epa-lipids 190-74-49-43 mg cap Take  by mouth.  ezetimibe (ZETIA) 10 mg tablet Take  by mouth daily.  nebivolol (BYSTOLIC) 5 mg tablet Take 5 mg by mouth daily.  aspirin 81 mg tablet Take 81 mg by mouth daily.  cholecalciferol, vitamin d3, (VITAMIN D) 1,000 unit tablet Take 2,000 Units by mouth daily.        Past Medical History:   Diagnosis Date    Allergy to ACE inhibitors 11/28/2017    Anxiety 11/28/2017    Arthritis     ASCVD (arteriosclerotic cardiovascular disease) 11/28/2017    CAD (coronary artery disease) 2000    MI     Cataract     Cataract 04/2019    Depression 11/28/2017    DJD (degenerative joint disease) 11/28/2017    pt denies    ED (erectile dysfunction) 11/28/2017    Elevated PSA 11/28/2017    Former smoker 11/28/2017    Glucose intolerance (impaired glucose tolerance) 11/28/2017    Hypertension     Insomnia 11/28/2017    On statin therapy 11/28/2017    Other ill-defined conditions(799.89)     elevated lipids    Psychiatric disorder     depression /anxiety     Past Surgical History:   Procedure Laterality Date    ABDOMEN SURGERY PROC UNLISTED      hernia repair    CARDIAC SURG PROCEDURE UNLIST      stents x 6    COLONOSCOPY N/A 7/30/2019    COLONOSCOPY performed by Jac Guzman MD at hospitals ENDOSCOPY    COLONOSCOPY,[facility]  7/30/2019         COLORECTAL SCRN; HI RISK IND  9/10/2014         COLORECTAL SCRN; HI RISK IND  7/30/2019         HX HEENT  06/03/2019    left eye cataract    HX TONSILLECTOMY      NC COLSC FLX W/RMVL OF TUMOR POLYP LESION SNARE TQ  9/10/2014          Allergies   Allergen Reactions    Benazepril Rash and Swelling    Pork/Porcine Containing Products Hives       REVIEW OF SYSTEMS:  General: negative for - chills or fever, or weight loss or gain  ENT: negative for - headaches, nasal congestion or tinnitus  Eyes: no blurred or visual changes  Neck: No stiffness or swollen nodes  Respiratory: negative for - cough, hemoptysis, shortness of breath or wheezing  Cardiovascular : negative for - chest pain, edema, palpitations or shortness of breath  Gastrointestinal: negative for - abdominal pain, blood in stools, heartburn or nausea/vomiting  Genito-Urinary: no dysuria, trouble voiding, or hematuria  Musculoskeletal: negative for - gait disturbance, joint pain, joint stiffness or joint swelling  Neurological: no TIA or stroke symptoms  Hematologic: no bruises, no bleeding  Lymphatic: no swollen glands  Integument: no lumps, mole changes, nail changes or rash  Endocrine:no malaise/lethargy poly uria or polydipsia or unexpected weight changes        Social History     Socioeconomic History    Marital status:      Spouse name: Not on file    Number of children: Not on file    Years of education: Not on file    Highest education level: Not on file   Tobacco Use    Smoking status: Former Smoker     Packs/day: 1.50     Years: 5.00     Pack years: 7.50     Last attempt to quit:      Years since quittin.4    Smokeless tobacco: Never Used    Tobacco comment: smoke for 5 year    Substance and Sexual Activity    Alcohol use: No    Drug use: No     Family History   Problem Relation Age of Onset    Psychiatric Disorder Mother         depression    Heart Disease Father         aneurysym       OBJECTIVE:     Visit Vitals  /76   Pulse (!) 55   Temp 98.2 °F (36.8 °C) (Oral)   Resp 18   Ht 5' 7\" (1.702 m)   Wt 175 lb 11.2 oz (79.7 kg)   SpO2 97%   BMI 27.52 kg/m²     CONSTITUTIONAL:   well nourished, appears age appropriate  EYES: sclera anicteric, PERRL, EOMI  ENMT:nares clear, moist mucous membranes, pharynx clear  NECK: supple. Thyroid normal, No JVD or bruits  RESPIRATORY: Chest: clear to ascultation and percussion, normal inspiratory effort  CARDIOVASCULAR: Heart: regular rate and rhythm no murmurs, rubs or gallops, PMI not displaced, No thrills, no peripheral edema  GASTROINTESTINAL: Abdomen: non distended, soft, non tender, bowel sounds normal  HEMATOLOGIC: no purpura, petechiae or bruising  LYMPHATIC: No lymph node enlargemant  MUSCULOSKELETAL: Extremities: no active synovitis, pulse 1+   INTEGUMENT: No unusual rashes or suspicious skin lesions noted.  Nails appear normal.  PERIPHERAL VASCULAR: normal pulses femoral, PT and DP  NEUROLOGIC: non-focal exam, A & O X 3  PSYCHIATRIC:, appropriate affect ASSESSMENT:   1. Essential hypertension    2. Glucose intolerance (impaired glucose tolerance)    3. Mixed hyperlipidemia    4. Primary osteoarthritis involving multiple joints    5. ASCVD (arteriosclerotic cardiovascular disease)    6. Mild depression (HCC)      Impression  1. Hypertension that is controlled on recheck by me so continue current medications. It has been normal by his checks at home including 126/62 this morning. 2.  Glucose intolerance repeat status pending a prior lab review not make no adjustments unless necessary. 3.  Hyperlipidemia prior lab reviewed and repeat status pending and I will adjust if necessary. 4   DJD that is stable  5   ASCVD clinically stable continue aspirin daily  6. Depression we discussed this and he seems to be doing well  I will recheck him again myself in 6 months or sooner should it be a problem. PLAN:  .  Orders Placed This Encounter    METABOLIC PANEL, COMPREHENSIVE ([facility])    LIPID PANEL ([facility])    CK ([facility])    HEMOGLOBIN A1C W/O EAG ([facility])         ATTENTION:   This medical record was transcribed using an electronic medical records system. Although proofread, it may and can contain electronic and spelling errors. Other human spelling and other errors may be present. Corrections may be executed at a later time. Please feel free to contact us for any clarifications as needed. Follow-up and Dispositions    · Return in about 6 months (around 12/9/2020). No results found for any visits on 06/09/20. Milo Rubinstein, MD    The patient verbalized understanding of the problems and plans as explained.

## 2020-06-09 ENCOUNTER — OFFICE VISIT (OUTPATIENT)
Dept: INTERNAL MEDICINE CLINIC | Age: 85
End: 2020-06-09

## 2020-06-09 VITALS
DIASTOLIC BLOOD PRESSURE: 76 MMHG | RESPIRATION RATE: 18 BRPM | OXYGEN SATURATION: 97 % | TEMPERATURE: 98.2 F | HEIGHT: 67 IN | BODY MASS INDEX: 27.58 KG/M2 | WEIGHT: 175.7 LBS | HEART RATE: 55 BPM | SYSTOLIC BLOOD PRESSURE: 136 MMHG

## 2020-06-09 DIAGNOSIS — F32.A MILD DEPRESSION: ICD-10-CM

## 2020-06-09 DIAGNOSIS — M15.9 PRIMARY OSTEOARTHRITIS INVOLVING MULTIPLE JOINTS: ICD-10-CM

## 2020-06-09 DIAGNOSIS — I25.10 ASCVD (ARTERIOSCLEROTIC CARDIOVASCULAR DISEASE): ICD-10-CM

## 2020-06-09 DIAGNOSIS — E78.2 MIXED HYPERLIPIDEMIA: ICD-10-CM

## 2020-06-09 DIAGNOSIS — R73.02 GLUCOSE INTOLERANCE (IMPAIRED GLUCOSE TOLERANCE): ICD-10-CM

## 2020-06-09 DIAGNOSIS — I10 ESSENTIAL HYPERTENSION: Primary | ICD-10-CM

## 2020-06-09 LAB
A-G RATIO,AGRAT: 1.3 RATIO
ALBUMIN SERPL-MCNC: 4.2 G/DL (ref 3.9–5.4)
ALP SERPL-CCNC: 71 U/L (ref 38–126)
ALT SERPL-CCNC: 25 U/L (ref 0–50)
ANION GAP SERPL CALC-SCNC: 8 MMOL/L
AST SERPL W P-5'-P-CCNC: 33 U/L (ref 14–36)
BILIRUB SERPL-MCNC: 1 MG/DL (ref 0.2–1.3)
BUN SERPL-MCNC: 18 MG/DL (ref 9–20)
BUN/CREATININE RATIO,BUCR: 16 RATIO
CALCIUM SERPL-MCNC: 9.6 MG/DL (ref 8.4–10.2)
CHLORIDE SERPL-SCNC: 104 MMOL/L (ref 98–107)
CHOL/HDL RATIO,CHHD: 2 RATIO (ref 0–4)
CHOLEST SERPL-MCNC: 124 MG/DL (ref 0–200)
CK SERPL-CCNC: 171 U/L (ref 30–135)
CO2 SERPL-SCNC: 30 MMOL/L (ref 22–32)
CREAT SERPL-MCNC: 1.1 MG/DL (ref 0.8–1.5)
GLOBULIN,GLOB: 3.2
GLUCOSE SERPL-MCNC: 102 MG/DL (ref 75–110)
HBA1C MFR BLD HPLC: 5.4 % (ref 4–5.7)
HDLC SERPL-MCNC: 51 MG/DL (ref 35–130)
LDL/HDL RATIO,LDHD: 1 RATIO
LDLC SERPL CALC-MCNC: 63 MG/DL (ref 0–130)
POTASSIUM SERPL-SCNC: 5.1 MMOL/L (ref 3.6–5)
PROT SERPL-MCNC: 7.4 G/DL (ref 6.3–8.2)
SODIUM SERPL-SCNC: 142 MMOL/L (ref 137–145)
TRIGL SERPL-MCNC: 52 MG/DL (ref 0–200)
VLDLC SERPL CALC-MCNC: 10 MG/DL

## 2020-06-09 NOTE — PROGRESS NOTES
Chief Complaint   Patient presents with    Hypertension     6 month follow up    Blood sugar problem    Cholesterol Problem     Visit Vitals  /90 (BP 1 Location: Left arm, BP Patient Position: Sitting)   Pulse (!) 55   Temp 98.2 °F (36.8 °C) (Oral)   Resp 18   Ht 5' 7\" (1.702 m)   Wt 175 lb 11.2 oz (79.7 kg)   SpO2 97%   BMI 27.52 kg/m²     1. Have you been to the ER, urgent care clinic since your last visit? Hospitalized since your last visit? [facility] 5/2020 for glass in foot    2. Have you seen or consulted any other health care providers outside of the [address] [place] [address] since your last visit? Include any pap smears or colon screening.  Dr. Bren Marcos 6/2020

## 2020-06-09 NOTE — PATIENT INSTRUCTIONS

## 2020-07-02 RX ORDER — HYDRALAZINE HYDROCHLORIDE 25 MG/1
TABLET, FILM COATED ORAL
Qty: 180 TAB | Refills: 1 | Status: SHIPPED | OUTPATIENT
Start: 2020-07-02 | End: 2021-01-08 | Stop reason: ALTCHOICE

## 2020-07-02 NOTE — TELEPHONE ENCOUNTER
RX refill request from the patient/pharmacy. Patient last seen 06- with labs, and next appt. scheduled for 12-  Requested Prescriptions     Pending Prescriptions Disp Refills    hydrALAZINE (APRESOLINE) 25 mg tablet [Pharmacy Med Name: HYDRALAZINE HYDROCHLORIDE 25 MG Tablet] 180 Tab 1     Sig: TAKE 1 TABLET TWICE DAILY   .

## 2020-09-09 NOTE — PERIOP NOTES
Anesthesia reports 140mg Propofol, 40mg Lidocaine and 400mL NS given during procedure. Received report from anesthesia staff on vital signs and status of patient. yes

## 2020-11-29 ENCOUNTER — APPOINTMENT (OUTPATIENT)
Dept: GENERAL RADIOLOGY | Age: 85
End: 2020-11-29
Attending: EMERGENCY MEDICINE
Payer: MEDICARE

## 2020-11-29 ENCOUNTER — HOSPITAL ENCOUNTER (EMERGENCY)
Age: 85
Discharge: HOME | End: 2020-11-29
Attending: EMERGENCY MEDICINE
Payer: MEDICARE

## 2020-11-29 VITALS
HEART RATE: 61 BPM | RESPIRATION RATE: 18 BRPM | WEIGHT: 174 LBS | SYSTOLIC BLOOD PRESSURE: 122 MMHG | TEMPERATURE: 98.5 F | DIASTOLIC BLOOD PRESSURE: 52 MMHG | HEIGHT: 68 IN | OXYGEN SATURATION: 100 % | BODY MASS INDEX: 26.37 KG/M2

## 2020-11-29 DIAGNOSIS — S51.812A LACERATION OF LEFT FOREARM, INITIAL ENCOUNTER: ICD-10-CM

## 2020-11-29 DIAGNOSIS — S70.11XA QUADRICEPS CONTUSION, RIGHT, INITIAL ENCOUNTER: Primary | ICD-10-CM

## 2020-11-29 LAB
ABO + RH BLD: NORMAL
ALBUMIN SERPL-MCNC: 3.4 G/DL (ref 3.5–5)
ALBUMIN/GLOB SERPL: 1 {RATIO} (ref 1.1–2.2)
ALP SERPL-CCNC: 62 U/L (ref 45–117)
ALT SERPL-CCNC: 27 U/L (ref 12–78)
ANION GAP SERPL CALC-SCNC: 6 MMOL/L (ref 5–15)
AST SERPL-CCNC: 28 U/L (ref 15–37)
BASOPHILS # BLD: 0.1 K/UL (ref 0–0.1)
BASOPHILS NFR BLD: 1 % (ref 0–1)
BILIRUB SERPL-MCNC: 0.5 MG/DL (ref 0.2–1)
BLOOD GROUP ANTIBODIES SERPL: NORMAL
BUN SERPL-MCNC: 29 MG/DL (ref 6–20)
BUN/CREAT SERPL: 27 (ref 12–20)
CALCIUM SERPL-MCNC: 9.1 MG/DL (ref 8.5–10.1)
CHLORIDE SERPL-SCNC: 108 MMOL/L (ref 97–108)
CO2 SERPL-SCNC: 25 MMOL/L (ref 21–32)
COMMENT, HOLDF: NORMAL
CREAT SERPL-MCNC: 1.08 MG/DL (ref 0.7–1.3)
DIFFERENTIAL METHOD BLD: NORMAL
EOSINOPHIL # BLD: 0.2 K/UL (ref 0–0.4)
EOSINOPHIL NFR BLD: 2 % (ref 0–7)
ERYTHROCYTE [DISTWIDTH] IN BLOOD BY AUTOMATED COUNT: 14.1 % (ref 11.5–14.5)
GLOBULIN SER CALC-MCNC: 3.4 G/DL (ref 2–4)
GLUCOSE SERPL-MCNC: 90 MG/DL (ref 65–100)
HCT VFR BLD AUTO: 44.3 % (ref 36.6–50.3)
HGB BLD-MCNC: 14.7 G/DL (ref 12.1–17)
IMM GRANULOCYTES # BLD AUTO: 0 K/UL (ref 0–0.04)
IMM GRANULOCYTES NFR BLD AUTO: 0 % (ref 0–0.5)
LYMPHOCYTES # BLD: 1.6 K/UL (ref 0.8–3.5)
LYMPHOCYTES NFR BLD: 16 % (ref 12–49)
MCH RBC QN AUTO: 30.6 PG (ref 26–34)
MCHC RBC AUTO-ENTMCNC: 33.2 G/DL (ref 30–36.5)
MCV RBC AUTO: 92.1 FL (ref 80–99)
MONOCYTES # BLD: 0.7 K/UL (ref 0–1)
MONOCYTES NFR BLD: 7 % (ref 5–13)
NEUTS SEG # BLD: 7.7 K/UL (ref 1.8–8)
NEUTS SEG NFR BLD: 74 % (ref 32–75)
NRBC # BLD: 0 K/UL (ref 0–0.01)
NRBC BLD-RTO: 0 PER 100 WBC
PLATELET # BLD AUTO: 228 K/UL (ref 150–400)
PMV BLD AUTO: 10.5 FL (ref 8.9–12.9)
POTASSIUM SERPL-SCNC: 4.4 MMOL/L (ref 3.5–5.1)
PROT SERPL-MCNC: 6.8 G/DL (ref 6.4–8.2)
RBC # BLD AUTO: 4.81 M/UL (ref 4.1–5.7)
SAMPLES BEING HELD,HOLD: NORMAL
SODIUM SERPL-SCNC: 139 MMOL/L (ref 136–145)
SPECIMEN EXP DATE BLD: NORMAL
WBC # BLD AUTO: 10.4 K/UL (ref 4.1–11.1)

## 2020-11-29 PROCEDURE — 80053 COMPREHEN METABOLIC PANEL: CPT

## 2020-11-29 PROCEDURE — 73090 X-RAY EXAM OF FOREARM: CPT

## 2020-11-29 PROCEDURE — 71045 X-RAY EXAM CHEST 1 VIEW: CPT

## 2020-11-29 PROCEDURE — 96361 HYDRATE IV INFUSION ADD-ON: CPT

## 2020-11-29 PROCEDURE — 85025 COMPLETE CBC W/AUTO DIFF WBC: CPT

## 2020-11-29 PROCEDURE — 73552 X-RAY EXAM OF FEMUR 2/>: CPT

## 2020-11-29 PROCEDURE — 75810000293 HC SIMP/SUPERF WND  RPR

## 2020-11-29 PROCEDURE — 99285 EMERGENCY DEPT VISIT HI MDM: CPT

## 2020-11-29 PROCEDURE — 74011250636 HC RX REV CODE- 250/636: Performed by: EMERGENCY MEDICINE

## 2020-11-29 PROCEDURE — 96375 TX/PRO/DX INJ NEW DRUG ADDON: CPT

## 2020-11-29 PROCEDURE — 96374 THER/PROPH/DIAG INJ IV PUSH: CPT

## 2020-11-29 PROCEDURE — 86900 BLOOD TYPING SEROLOGIC ABO: CPT

## 2020-11-29 PROCEDURE — 74011250637 HC RX REV CODE- 250/637: Performed by: EMERGENCY MEDICINE

## 2020-11-29 PROCEDURE — 36415 COLL VENOUS BLD VENIPUNCTURE: CPT

## 2020-11-29 RX ORDER — ONDANSETRON 2 MG/ML
4 INJECTION INTRAMUSCULAR; INTRAVENOUS
Status: COMPLETED | OUTPATIENT
Start: 2020-11-29 | End: 2020-11-29

## 2020-11-29 RX ORDER — HYDROCODONE BITARTRATE AND ACETAMINOPHEN 5; 300 MG/1; MG/1
1 TABLET ORAL
Qty: 12 TAB | Refills: 0 | Status: SHIPPED | OUTPATIENT
Start: 2020-11-29 | End: 2020-12-02

## 2020-11-29 RX ORDER — ACETAMINOPHEN 500 MG
1000 TABLET ORAL
Status: COMPLETED | OUTPATIENT
Start: 2020-11-29 | End: 2020-11-29

## 2020-11-29 RX ORDER — LIDOCAINE HYDROCHLORIDE AND EPINEPHRINE 20; 10 MG/ML; UG/ML
20 INJECTION, SOLUTION INFILTRATION; PERINEURAL
Status: DISCONTINUED | OUTPATIENT
Start: 2020-11-29 | End: 2020-11-30 | Stop reason: HOSPADM

## 2020-11-29 RX ORDER — IBUPROFEN 400 MG/1
400 TABLET ORAL
Status: COMPLETED | OUTPATIENT
Start: 2020-11-29 | End: 2020-11-29

## 2020-11-29 RX ORDER — FENTANYL CITRATE 50 UG/ML
50 INJECTION, SOLUTION INTRAMUSCULAR; INTRAVENOUS
Status: COMPLETED | OUTPATIENT
Start: 2020-11-29 | End: 2020-11-29

## 2020-11-29 RX ADMIN — IBUPROFEN 400 MG: 400 TABLET, FILM COATED ORAL at 20:06

## 2020-11-29 RX ADMIN — FENTANYL CITRATE 50 MCG: 50 INJECTION, SOLUTION INTRAMUSCULAR; INTRAVENOUS at 21:30

## 2020-11-29 RX ADMIN — ONDANSETRON 4 MG: 2 INJECTION INTRAMUSCULAR; INTRAVENOUS at 22:19

## 2020-11-29 RX ADMIN — SODIUM CHLORIDE 500 ML: 900 INJECTION, SOLUTION INTRAVENOUS at 22:20

## 2020-11-29 RX ADMIN — ACETAMINOPHEN 1000 MG: 500 TABLET ORAL at 20:05
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
## 2020-11-30 NOTE — DISCHARGE INSTRUCTIONS
Patient Education        Cuts: Care Instructions  Your Care Instructions  A cut can happen anywhere on your body. Stitches, staples, skin adhesives, or pieces of tape called Steri-Strips are sometimes used to keep the edges of a cut together and help it heal. Steri-Strips can be used by themselves or with stitches or staples. Sometimes cuts are left open. If the cut went deep and through the skin, the doctor may have closed the cut in two layers. A deeper layer of stitches brings the deep part of the cut together. These stitches will dissolve and don't need to be removed. The upper layer closure, which could be stitches, staples, Steri-Strips, or adhesive, is what you see on the cut. A cut is often covered by a bandage. The doctor has checked you carefully, but problems can develop later. If you notice any problems or new symptoms, get medical treatment right away. Follow-up care is a key part of your treatment and safety. Be sure to make and go to all appointments, and call your doctor if you are having problems. It's also a good idea to know your test results and keep a list of the medicines you take. How can you care for yourself at home? If a cut is open or closed  · Prop up the sore area on a pillow anytime you sit or lie down during the next 3 days. Try to keep it above the level of your heart. This will help reduce swelling. · Keep the cut dry for the first 24 to 48 hours. After this, you can shower if your doctor okays it. Pat the cut dry. · Don't soak the cut, such as in a bathtub. Your doctor will tell you when it's safe to get the cut wet. · After the first 24 to 48 hours, clean the cut with soap and water 2 times a day unless your doctor gives you different instructions. ? Don't use hydrogen peroxide or alcohol, which can slow healing. ? You may cover the cut with a thin layer of petroleum jelly and a nonstick bandage.   ? If the doctor put a bandage over the cut, put on a new bandage after cleaning the cut or if the bandage gets wet or dirty. · Avoid any activity that could cause your cut to reopen. · Be safe with medicines. Read and follow all instructions on the label. ? If the doctor gave you a prescription medicine for pain, take it as prescribed. ? If you are not taking a prescription pain medicine, ask your doctor if you can take an over-the-counter medicine. If the cut is closed with stitches, staples, or Steri-Strips  · Follow the above instructions for open or closed cuts. · Do not remove the stitches or staples on your own. Your doctor will tell you when to come back to have the stitches or staples removed. · Leave Steri-Strips on until they fall off. If the cut is closed with a skin adhesive  · Follow the above instructions for open or closed cuts. · Leave the skin adhesive on your skin until it falls off on its own. This may take 5 to 10 days. · Do not scratch, rub, or pick at the adhesive. · Do not put the sticky part of a bandage directly on the adhesive. · Do not put any kind of ointment, cream, or lotion over the area. This can make the adhesive fall off too soon. Do not use hydrogen peroxide or alcohol, which can slow healing. When should you call for help? Call your doctor now or seek immediate medical care if:    · You have new pain, or your pain gets worse.     · The skin near the cut is cold or pale or changes color.     · You have tingling, weakness, or numbness near the cut.     · The cut starts to bleed, and blood soaks through the bandage. Oozing small amounts of blood is normal.     · You have trouble moving the area near the cut.     · You have symptoms of infection, such as:  ? Increased pain, swelling, warmth, or redness around the cut.  ? Red streaks leading from the cut.  ? Pus draining from the cut.  ? A fever. Watch closely for changes in your health, and be sure to contact your doctor if:    · The cut reopens.     · You do not get better as expected. Where can you learn more? Go to http://www.gray.com/  Enter M735 in the search box to learn more about \"Cuts: Care Instructions. \"  Current as of: June 26, 2019               Content Version: 12.6  © 8703-5900 [employer]. Care instructions adapted under license by [employer] (which disclaims liability or warranty for this information). If you have questions about a medical condition or this instruction, always ask your healthcare professional. [address] any warranty or liability for your use of this information. Patient Education        Quadriceps Bruise: Care Instructions  Your Care Instructions     The quadriceps is the big muscle that runs down the front of your thigh. Quadriceps bruises are often caused by a fall or a direct blow to the thigh. They are common injuries in people who play contact sports. A quadriceps bruise may cause pain and swelling in your thigh. It may hurt to use your leg or bend your knee. You may have a red or black-and-blue area on your thigh. To diagnose a quadriceps bruise, the doctor examines your thigh. You may also have tests to make sure you do not have a more serious injury, such as a broken bone or nerve damage. A quadriceps bruise will usually get better in a few weeks with rest and home care. Your doctor may recommend physical therapy and exercises to stretch your leg muscles. The doctor has checked you carefully, but problems can develop later. If you notice any problems or new symptoms, get medical treatment right away. Follow-up care is a key part of your treatment and safety. Be sure to make and go to all appointments, and call your doctor if you are having problems. It's also a good idea to know your test results and keep a list of the medicines you take. How can you care for yourself at home?   · Immediately after the injury, put ice or a cold pack on the injured area of your thigh for 20 minutes. Put a thin cloth between the ice and your skin. While you apply ice, lie on your back. Bend your knee enough that you can feel a gentle stretch in the front of your thigh. Try to bend your knee to about 90 degrees. Use pillows to prop your bent leg up so you can relax in that position. Repeat the icing with your knee bent every 2 hours for the first 48 to 72 hours. · Rest your injured leg. Don't put weight on it for a day or two. If your doctor advises you to, use crutches to rest the leg. · Wrapping your thigh with an elastic bandage (such as an Ace wrap) will help decrease swelling. Don't wrap it too tightly, though. A tight wrap can cause more swelling below the affected area. · Prop up your leg on a pillow anytime you sit or lie down during the next 3 days. Try to keep it above the level of your heart. This will help reduce swelling. · Ask your doctor if you can take an over-the-counter pain medicine, such as acetaminophen (Tylenol), ibuprofen (Advil, Motrin), or naproxen (Aleve). Be safe with medicines. Read and follow all instructions on the label. · Don't do anything that makes the pain worse. Return to your usual level of activity slowly. When should you call for help? Call your doctor now or seek immediate medical care if:    · You have severe or increasing pain.     · You have tingling, weakness, or numbness in your injured leg.     · You cannot move your injured leg. Watch closely for changes in your health, and be sure to contact your doctor if:    · You do not get better as expected. Where can you learn more? Go to http://www.gray.com/  Enter Q244 in the search box to learn more about \"Quadriceps Bruise: Care Instructions. \"  Current as of: June 26, 2019               Content Version: 12.6  © 9647-7081 [employer].    Care instructions adapted under license by [employer] (which disclaims liability or warranty for this information). If you have questions about a medical condition or this instruction, always ask your healthcare professional. [place] [address] any warranty or liability for your use of this information.

## 2020-12-07 ENCOUNTER — OFFICE VISIT (OUTPATIENT)
Dept: ORTHOPEDIC SURGERY | Age: 85
End: 2020-12-07
Payer: MEDICARE

## 2020-12-07 VITALS
HEIGHT: 68 IN | OXYGEN SATURATION: 100 % | SYSTOLIC BLOOD PRESSURE: 128 MMHG | TEMPERATURE: 96.8 F | DIASTOLIC BLOOD PRESSURE: 75 MMHG | WEIGHT: 182 LBS | HEART RATE: 56 BPM | BODY MASS INDEX: 27.58 KG/M2

## 2020-12-07 DIAGNOSIS — S76.191A OTHER SPECIFIED INJURY OF RIGHT QUADRICEPS MUSCLE, FASCIA AND TENDON, INITIAL ENCOUNTER: Primary | ICD-10-CM

## 2020-12-07 PROCEDURE — 99204 OFFICE O/P NEW MOD 45 MIN: CPT | Performed by: ORTHOPAEDIC SURGERY

## 2020-12-07 NOTE — PROGRESS NOTES
Identified pt with two pt identifiers (name and ). Reviewed chart in preparation for visit and have obtained necessary documentation. Chayo Bagley is a 81 y.o. male  Chief Complaint   Patient presents with    ED Follow-up     RT femur     Visit Vitals  /75 (BP 1 Location: Right arm, BP Patient Position: Sitting)   Pulse (!) 56   Temp 96.8 °F (36 °C) (Tympanic)   Ht 5' 8\" (1.727 m)   Wt 182 lb (82.6 kg)   SpO2 100%   BMI 27.67 kg/m²     1. Have you been to the ER, urgent care clinic since your last visit? Hospitalized since your last visit? Yes Where: ED [facility]    2. Have you seen or consulted any other health care providers outside of the [address] [place] [address] since your last visit? Include any pap smears or colon screening.  No
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
## 2021-01-07 PROBLEM — R41.0 CONFUSION: Status: RESOLVED | Noted: 2019-03-05 | Resolved: 2021-01-07
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
## 2021-01-08 ENCOUNTER — OFFICE VISIT (OUTPATIENT)
Dept: INTERNAL MEDICINE CLINIC | Age: 86
End: 2021-01-08
Payer: MEDICARE

## 2021-01-08 VITALS
SYSTOLIC BLOOD PRESSURE: 136 MMHG | RESPIRATION RATE: 17 BRPM | HEART RATE: 54 BPM | BODY MASS INDEX: 27.95 KG/M2 | HEIGHT: 67 IN | TEMPERATURE: 97.8 F | DIASTOLIC BLOOD PRESSURE: 88 MMHG | WEIGHT: 178.1 LBS | OXYGEN SATURATION: 98 %

## 2021-01-08 DIAGNOSIS — F32.A MILD DEPRESSION: ICD-10-CM

## 2021-01-08 DIAGNOSIS — R41.3 MEMORY DEFICIT: ICD-10-CM

## 2021-01-08 DIAGNOSIS — E78.2 MIXED HYPERLIPIDEMIA: ICD-10-CM

## 2021-01-08 DIAGNOSIS — M15.9 PRIMARY OSTEOARTHRITIS INVOLVING MULTIPLE JOINTS: ICD-10-CM

## 2021-01-08 DIAGNOSIS — Z13.39 ALCOHOL SCREENING: ICD-10-CM

## 2021-01-08 DIAGNOSIS — I25.10 ASCVD (ARTERIOSCLEROTIC CARDIOVASCULAR DISEASE): ICD-10-CM

## 2021-01-08 DIAGNOSIS — R73.02 GLUCOSE INTOLERANCE (IMPAIRED GLUCOSE TOLERANCE): ICD-10-CM

## 2021-01-08 DIAGNOSIS — I10 ESSENTIAL HYPERTENSION: Primary | ICD-10-CM

## 2021-01-08 DIAGNOSIS — R97.20 ELEVATED PSA: ICD-10-CM

## 2021-01-08 DIAGNOSIS — F41.9 ANXIETY: ICD-10-CM

## 2021-01-08 DIAGNOSIS — G45.9 TIA (TRANSIENT ISCHEMIC ATTACK): ICD-10-CM

## 2021-01-08 DIAGNOSIS — Z00.00 MEDICARE ANNUAL WELLNESS VISIT, SUBSEQUENT: ICD-10-CM

## 2021-01-08 LAB
A-G RATIO,AGRAT: 1.4 RATIO
ALBUMIN SERPL-MCNC: 4.3 G/DL (ref 3.9–5.4)
ALP SERPL-CCNC: 66 U/L (ref 38–126)
ALT SERPL-CCNC: 18 U/L (ref 0–50)
ANION GAP SERPL CALC-SCNC: 9 MMOL/L
AST SERPL W P-5'-P-CCNC: 30 U/L (ref 14–36)
BACTERIA,BACTU: ABNORMAL
BILIRUB SERPL-MCNC: 0.8 MG/DL (ref 0.2–1.3)
BILIRUB UR QL: NEGATIVE
BUN SERPL-MCNC: 22 MG/DL (ref 9–20)
BUN/CREATININE RATIO,BUCR: 22 RATIO
CALCIUM SERPL-MCNC: 9.5 MG/DL (ref 8.4–10.2)
CHLORIDE SERPL-SCNC: 104 MMOL/L (ref 98–107)
CHOL/HDL RATIO,CHHD: 3 RATIO (ref 0–4)
CHOLEST SERPL-MCNC: 146 MG/DL (ref 0–200)
CK SERPL-CCNC: 75 U/L (ref 30–135)
CLARITY: CLEAR
CO2 SERPL-SCNC: 30 MMOL/L (ref 22–32)
COLOR UR: ABNORMAL
CREAT SERPL-MCNC: 1 MG/DL (ref 0.8–1.5)
ERYTHROCYTE [DISTWIDTH] IN BLOOD BY AUTOMATED COUNT: 13.6 %
GLOBULIN,GLOB: 3.1
GLUCOSE 24H UR-MRATE: NEGATIVE G/(24.H)
GLUCOSE SERPL-MCNC: 99 MG/DL (ref 75–110)
HBA1C MFR BLD HPLC: 5 % (ref 4–5.7)
HCT VFR BLD AUTO: 47.1 % (ref 37–51)
HDLC SERPL-MCNC: 48 MG/DL (ref 35–130)
HGB BLD-MCNC: 15.2 G/DL (ref 12–18)
HGB UR QL STRIP: NEGATIVE
KETONES UR QL STRIP.AUTO: NEGATIVE
LDL/HDL RATIO,LDHD: 2 RATIO
LDLC SERPL CALC-MCNC: 80 MG/DL (ref 0–130)
LEUKOCYTE ESTERASE: NEGATIVE
LYMPHOCYTES ABSOLUTE: 1.7 K/UL (ref 0.6–4.1)
LYMPHOCYTES NFR BLD: 21.1 % (ref 10–58.5)
MCH RBC QN AUTO: 30.9 PG (ref 26–32)
MCHC RBC AUTO-ENTMCNC: 32.3 G/DL (ref 30–36)
MCV RBC AUTO: 95.7 FL (ref 80–97)
MONOCYTES ABS-DIF,2141: 0.6 K/UL (ref 0–1.8)
MONOCYTES NFR BLD: 6.9 % (ref 0.1–24)
NEUTROPHILS # BLD: 72 % (ref 37–92)
NEUTROPHILS ABS,2156: 5.8 K/UL (ref 2–7.8)
NITRITE UR QL STRIP.AUTO: NEGATIVE
PH UR STRIP: 6 [PH] (ref 5–7)
PLATELET # BLD AUTO: 257 K/UL (ref 140–440)
POTASSIUM SERPL-SCNC: 4.5 MMOL/L (ref 3.6–5)
PROT SERPL-MCNC: 7.4 G/DL (ref 6.3–8.2)
PROT UR STRIP-MCNC: ABNORMAL MG/DL
PSA, TEST22: 3.3 NG/ML (ref 0–4)
RBC # BLD AUTO: 4.92 M/UL (ref 4.2–6.3)
RBC #/AREA URNS HPF: ABNORMAL #/HPF
SODIUM SERPL-SCNC: 143 MMOL/L (ref 137–145)
SP GR UR REFRACTOMETRY: 1.02 (ref 1–1.03)
T4 FREE SERPL-MCNC: 0.98 NG/DL (ref 0.58–2.3)
TRIGL SERPL-MCNC: 89 MG/DL (ref 0–200)
TSH SERPL DL<=0.05 MIU/L-ACNC: 1.39 UIU/ML (ref 0.34–5.6)
UROBILINOGEN UR QL STRIP.AUTO: NEGATIVE
VLDLC SERPL CALC-MCNC: 18 MG/DL
WBC # BLD AUTO: 8.1 K/UL (ref 4.1–10.9)
WBC URNS QL MICRO: ABNORMAL #/HPF

## 2021-01-08 PROCEDURE — 84443 ASSAY THYROID STIM HORMONE: CPT | Performed by: INTERNAL MEDICINE

## 2021-01-08 PROCEDURE — G0103 PSA SCREENING: HCPCS | Performed by: INTERNAL MEDICINE

## 2021-01-08 PROCEDURE — 1101F PT FALLS ASSESS-DOCD LE1/YR: CPT | Performed by: INTERNAL MEDICINE

## 2021-01-08 PROCEDURE — 93000 ELECTROCARDIOGRAM COMPLETE: CPT | Performed by: INTERNAL MEDICINE

## 2021-01-08 PROCEDURE — 81003 URINALYSIS AUTO W/O SCOPE: CPT | Performed by: INTERNAL MEDICINE

## 2021-01-08 PROCEDURE — 85025 COMPLETE CBC W/AUTO DIFF WBC: CPT | Performed by: INTERNAL MEDICINE

## 2021-01-08 PROCEDURE — 83036 HEMOGLOBIN GLYCOSYLATED A1C: CPT | Performed by: INTERNAL MEDICINE

## 2021-01-08 PROCEDURE — G8754 DIAS BP LESS 90: HCPCS | Performed by: INTERNAL MEDICINE

## 2021-01-08 PROCEDURE — G8536 NO DOC ELDER MAL SCRN: HCPCS | Performed by: INTERNAL MEDICINE

## 2021-01-08 PROCEDURE — 84439 ASSAY OF FREE THYROXINE: CPT | Performed by: INTERNAL MEDICINE

## 2021-01-08 PROCEDURE — G9717 DOC PT DX DEP/BP F/U NT REQ: HCPCS | Performed by: INTERNAL MEDICINE

## 2021-01-08 PROCEDURE — G8427 DOCREV CUR MEDS BY ELIG CLIN: HCPCS | Performed by: INTERNAL MEDICINE

## 2021-01-08 PROCEDURE — 82550 ASSAY OF CK (CPK): CPT | Performed by: INTERNAL MEDICINE

## 2021-01-08 PROCEDURE — 80061 LIPID PANEL: CPT | Performed by: INTERNAL MEDICINE

## 2021-01-08 PROCEDURE — G8752 SYS BP LESS 140: HCPCS | Performed by: INTERNAL MEDICINE

## 2021-01-08 PROCEDURE — G8419 CALC BMI OUT NRM PARAM NOF/U: HCPCS | Performed by: INTERNAL MEDICINE

## 2021-01-08 PROCEDURE — 80053 COMPREHEN METABOLIC PANEL: CPT | Performed by: INTERNAL MEDICINE

## 2021-01-08 PROCEDURE — 99215 OFFICE O/P EST HI 40 MIN: CPT | Performed by: INTERNAL MEDICINE

## 2021-01-08 PROCEDURE — G0439 PPPS, SUBSEQ VISIT: HCPCS | Performed by: INTERNAL MEDICINE
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
## 2021-01-11 RX ORDER — HYDRALAZINE HYDROCHLORIDE 50 MG/1
TABLET, FILM COATED ORAL
Qty: 180 TAB | Refills: 3 | Status: SHIPPED | OUTPATIENT
Start: 2021-01-11 | End: 2021-03-19 | Stop reason: SDUPTHER

## 2021-01-11 NOTE — TELEPHONE ENCOUNTER
RX refill request from the patient/pharmacy.  Patient last seen 01- with labs, and next appt. scheduled for 07-  Requested Prescriptions     Pending Prescriptions Disp Refills    hydrALAZINE (APRESOLINE) 50 mg tablet [Pharmacy Med Name: HYDRALAZINE  50MG TABLETS(ORANGE)] 180 Tab 3     Sig: TAKE 1 TABLET BY MOUTH TWICE DAILY

## 2021-02-01 ENCOUNTER — DOCUMENTATION ONLY (OUTPATIENT)
Dept: CARDIOLOGY CLINIC | Age: 86
End: 2021-02-01

## 2021-02-06 PROBLEM — Z00.00 MEDICARE ANNUAL WELLNESS VISIT, SUBSEQUENT: Status: RESOLVED | Noted: 2017-12-07 | Resolved: 2021-02-06

## 2021-02-08 RX ORDER — AMLODIPINE BESYLATE 5 MG/1
TABLET ORAL
Qty: 90 TAB | Refills: 3 | Status: SHIPPED | OUTPATIENT
Start: 2021-02-08 | End: 2022-02-17

## 2021-02-08 NOTE — TELEPHONE ENCOUNTER
RX refill request from the patient/pharmacy. Patient last seen 01- with labs, and next appt. scheduled for 07-  Requested Prescriptions     Pending Prescriptions Disp Refills    amLODIPine (NORVASC) 5 mg tablet [Pharmacy Med Name: AMLODIPINE BESYLATE 5 MG Tablet] 90 Tab 3     Sig: TAKE 1 TABLET EVERY DAY   .

## 2021-02-26 RX ORDER — ESCITALOPRAM OXALATE 10 MG/1
TABLET ORAL
Qty: 90 TAB | Refills: 0 | Status: SHIPPED | OUTPATIENT
Start: 2021-02-26 | End: 2021-06-03 | Stop reason: SDUPTHER

## 2021-02-26 NOTE — TELEPHONE ENCOUNTER
RX refill request from the patient/pharmacy. Patient last seen 01- with labs, and next appt. scheduled for 07-  Last refill 03-  . Requested Prescriptions     Pending Prescriptions Disp Refills    escitalopram oxalate (LEXAPRO) 10 mg tablet [Pharmacy Med Name: ESCITALOPRAM OXALATE 10 MG Tablet] 90 Tab 0     Sig: TAKE 1 TABLET EVERY DAY   .

## 2021-03-13 DIAGNOSIS — E78.2 MIXED HYPERLIPIDEMIA: ICD-10-CM

## 2021-03-16 RX ORDER — ROSUVASTATIN CALCIUM 20 MG/1
TABLET, COATED ORAL
Qty: 90 TAB | Refills: 3 | Status: SHIPPED | OUTPATIENT
Start: 2021-03-16 | End: 2022-03-07

## 2021-03-16 NOTE — TELEPHONE ENCOUNTER
RX refill request from the patient/pharmacy. Patient last seen 01- with labs, and next appt. scheduled for 07-  Requested Prescriptions     Pending Prescriptions Disp Refills    rosuvastatin (CRESTOR) 20 mg tablet [Pharmacy Med Name: ROSUVASTATIN CALCIUM 20 MG Tablet] 90 Tab 3     Sig: TAKE 1 TABLET EVERY DAY   .

## 2021-03-19 RX ORDER — HYDRALAZINE HYDROCHLORIDE 50 MG/1
TABLET, FILM COATED ORAL
Qty: 180 TAB | Refills: 3 | Status: SHIPPED | OUTPATIENT
Start: 2021-03-19 | End: 2021-03-23 | Stop reason: SDUPTHER

## 2021-03-19 NOTE — TELEPHONE ENCOUNTER
PCP: Ad Melchor MD    Last appt: 1/8/2021  Future Appointments   Date Time Provider Grant Myrick   7/8/2021  8:10 AM Ad Melchor MD [facility]       Last refilled:1/11/21    Requested Prescriptions     Pending Prescriptions Disp Refills    hydrALAZINE (APRESOLINE) 50 mg tablet 180 Tab 3     Sig: TAKE 1 TABLET BY MOUTH TWICE DAILY

## 2021-03-23 RX ORDER — HYDRALAZINE HYDROCHLORIDE 50 MG/1
TABLET, FILM COATED ORAL
Qty: 180 TAB | Refills: 3 | Status: SHIPPED | OUTPATIENT
Start: 2021-03-23 | End: 2022-10-13

## 2021-03-23 NOTE — TELEPHONE ENCOUNTER
RX refill request from the patient/pharmacy. Patient last seen 01- with labs, and next appt. scheduled for 07-  Requested Prescriptions     Pending Prescriptions Disp Refills    hydrALAZINE (APRESOLINE) 50 mg tablet 180 Tab 3     Sig: TAKE 1 TABLET BY MOUTH TWICE DAILY   .

## 2021-05-12 ENCOUNTER — OFFICE VISIT (OUTPATIENT)
Dept: INTERNAL MEDICINE CLINIC | Age: 86
End: 2021-05-12
Payer: MEDICARE

## 2021-05-12 VITALS
HEART RATE: 52 BPM | RESPIRATION RATE: 17 BRPM | TEMPERATURE: 98.2 F | SYSTOLIC BLOOD PRESSURE: 154 MMHG | WEIGHT: 181.6 LBS | DIASTOLIC BLOOD PRESSURE: 86 MMHG | OXYGEN SATURATION: 97 % | BODY MASS INDEX: 28.5 KG/M2 | HEIGHT: 67 IN

## 2021-05-12 DIAGNOSIS — R07.9 CHEST PAIN, UNSPECIFIED TYPE: Primary | ICD-10-CM

## 2021-05-12 DIAGNOSIS — I10 ESSENTIAL HYPERTENSION: ICD-10-CM

## 2021-05-12 DIAGNOSIS — M79.605 PAIN OF LEFT LOWER EXTREMITY: ICD-10-CM
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
## 2021-05-12 PROCEDURE — G8536 NO DOC ELDER MAL SCRN: HCPCS | Performed by: INTERNAL MEDICINE

## 2021-05-12 PROCEDURE — G8753 SYS BP > OR = 140: HCPCS | Performed by: INTERNAL MEDICINE

## 2021-05-12 PROCEDURE — G8754 DIAS BP LESS 90: HCPCS | Performed by: INTERNAL MEDICINE

## 2021-05-12 PROCEDURE — 1101F PT FALLS ASSESS-DOCD LE1/YR: CPT | Performed by: INTERNAL MEDICINE

## 2021-05-12 PROCEDURE — 99214 OFFICE O/P EST MOD 30 MIN: CPT | Performed by: INTERNAL MEDICINE

## 2021-05-12 PROCEDURE — G8419 CALC BMI OUT NRM PARAM NOF/U: HCPCS | Performed by: INTERNAL MEDICINE

## 2021-05-12 PROCEDURE — G9717 DOC PT DX DEP/BP F/U NT REQ: HCPCS | Performed by: INTERNAL MEDICINE

## 2021-05-12 PROCEDURE — G8427 DOCREV CUR MEDS BY ELIG CLIN: HCPCS | Performed by: INTERNAL MEDICINE

## 2021-05-12 NOTE — PATIENT INSTRUCTIONS
Learning About Coronary Artery Disease (CAD) What is coronary artery disease? Coronary artery disease is a condition that occurs when plaque builds up in the arteries that bring oxygen-rich blood to your heart. Plaque is a fatty substance made of cholesterol, calcium, and other substances in the blood. This process is called hardening of the arteries, or atherosclerosis. What happens when you have coronary artery disease? · Plaque may narrow the coronary arteries. Narrowed arteries cause poor blood flow. This can lead to angina symptoms such as chest pain or discomfort. If blood flow is completely blocked, you could have a heart attack. · You can slow and reduce the risk of future problems by making changes in your lifestyle. These include quitting smoking and eating heart-healthy foods. · Treatment, along with changes in your lifestyle, can help you live a longer and healthier life. How can you prevent coronary artery disease? · Do not smoke. It may be the best thing you can do to prevent coronary artery disease. If you need help quitting, talk to your doctor about stop-smoking programs and medicines. These can increase your chances of quitting for good. · Be active. Try to do moderate activity at least 2½ hours a week. Or try to do vigorous activity at least 1¼ hours a week. You may want to walk or try other activities, such as running, swimming, cycling, or playing tennis or team sports. · Eat heart-healthy foods. Eat more fruits and vegetables and less food that contains saturated and trans fats. Limit alcohol, sodium, and sweets. · Stay at a healthy weight. Lose weight if you need to. · Manage other health problems such as diabetes, high blood pressure, and high cholesterol. How is coronary artery disease treated? · Your doctor will suggest that you make lifestyle changes. For example, your doctor may ask you to eat healthy foods, quit smoking, lose extra weight, and be more active.  
· You will take medicines that help prevent a heart attack. · Your doctor may suggest a procedure to open narrowed or blocked arteries. This is called angioplasty. Or your doctor may suggest using healthy blood vessels to create detours around narrowed or blocked arteries. This is called bypass surgery. Follow-up care is a key part of your treatment and safety. Be sure to make and go to all appointments, and call your doctor if you are having problems. It's also a good idea to know your test results and keep a list of the medicines you take. Where can you learn more? Go to http://www.gray.com/ Enter (23) [phone] in the search box to learn more about \"Learning About Coronary Artery Disease (CAD). \" Current as of: August 31, 2020               Content Version: 12.8 © 2006-2021 [employer]. Care instructions adapted under license by [employer] (which disclaims liability or warranty for this information). If you have questions about a medical condition or this instruction, always ask your healthcare professional. [address] any warranty or liability for your use of this information.
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
## 2021-05-20 ENCOUNTER — TELEPHONE (OUTPATIENT)
Dept: INTERNAL MEDICINE CLINIC | Age: 86
End: 2021-05-20

## 2021-05-20 NOTE — TELEPHONE ENCOUNTER
Patient called to report that he went to Physical Therapy that was ordered by Dr. Malik Beckford and was told that it was not necessary

## 2021-05-21 ENCOUNTER — OFFICE VISIT (OUTPATIENT)
Dept: INTERNAL MEDICINE CLINIC | Age: 86
End: 2021-05-21
Payer: MEDICARE

## 2021-05-21 VITALS
TEMPERATURE: 98.8 F | RESPIRATION RATE: 17 BRPM | DIASTOLIC BLOOD PRESSURE: 74 MMHG | BODY MASS INDEX: 27.89 KG/M2 | WEIGHT: 177.7 LBS | HEART RATE: 54 BPM | SYSTOLIC BLOOD PRESSURE: 162 MMHG | OXYGEN SATURATION: 97 % | HEIGHT: 67 IN

## 2021-05-21 DIAGNOSIS — F41.9 ANXIETY: ICD-10-CM

## 2021-05-21 DIAGNOSIS — I10 ESSENTIAL HYPERTENSION: ICD-10-CM

## 2021-05-21 DIAGNOSIS — K42.9 UMBILICAL HERNIA WITHOUT OBSTRUCTION AND WITHOUT GANGRENE: Primary | ICD-10-CM

## 2021-05-21 PROCEDURE — G9717 DOC PT DX DEP/BP F/U NT REQ: HCPCS | Performed by: INTERNAL MEDICINE

## 2021-05-21 PROCEDURE — 1101F PT FALLS ASSESS-DOCD LE1/YR: CPT | Performed by: INTERNAL MEDICINE

## 2021-05-21 PROCEDURE — G8536 NO DOC ELDER MAL SCRN: HCPCS | Performed by: INTERNAL MEDICINE

## 2021-05-21 PROCEDURE — G8754 DIAS BP LESS 90: HCPCS | Performed by: INTERNAL MEDICINE

## 2021-05-21 PROCEDURE — 99213 OFFICE O/P EST LOW 20 MIN: CPT | Performed by: INTERNAL MEDICINE

## 2021-05-21 PROCEDURE — G8753 SYS BP > OR = 140: HCPCS | Performed by: INTERNAL MEDICINE

## 2021-05-21 PROCEDURE — G8419 CALC BMI OUT NRM PARAM NOF/U: HCPCS | Performed by: INTERNAL MEDICINE

## 2021-05-21 PROCEDURE — G8427 DOCREV CUR MEDS BY ELIG CLIN: HCPCS | Performed by: INTERNAL MEDICINE

## 2021-05-21 NOTE — PROGRESS NOTES
Chief Complaint   Patient presents with    Umbilical Hernia     Visit Vitals  BP (!) 162/80 (BP 1 Location: Left upper arm, BP Patient Position: Sitting, BP Cuff Size: Adult)   Pulse (!) 54   Temp 98.8 °F (37.1 °C) (Temporal)   Resp 17   Ht 5' 7\" (1.702 m)   Wt 177 lb 11.2 oz (80.6 kg)   SpO2 97%   BMI 27.83 kg/m²     1. Have you been to the ER, urgent care clinic since your last visit? Hospitalized since your last visit? No    2. Have you seen or consulted any other health care providers outside of the [address] [place] [address] since your last visit? Include any pap smears or colon screening.  No

## 2021-05-21 NOTE — PATIENT INSTRUCTIONS
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
## 2021-05-25 ENCOUNTER — OFFICE VISIT (OUTPATIENT)
Dept: SURGERY | Age: 86
End: 2021-05-25
Payer: MEDICARE

## 2021-05-25 VITALS
WEIGHT: 177 LBS | BODY MASS INDEX: 27.78 KG/M2 | HEIGHT: 67 IN | SYSTOLIC BLOOD PRESSURE: 135 MMHG | DIASTOLIC BLOOD PRESSURE: 72 MMHG | OXYGEN SATURATION: 98 % | TEMPERATURE: 97.3 F | HEART RATE: 46 BPM

## 2021-05-25 DIAGNOSIS — Z01.810 PRE-OPERATIVE CARDIOVASCULAR EXAMINATION: ICD-10-CM

## 2021-05-25 DIAGNOSIS — K43.9 VENTRAL HERNIA WITHOUT OBSTRUCTION OR GANGRENE: Primary | ICD-10-CM

## 2021-05-25 DIAGNOSIS — K40.90 RIGHT INGUINAL HERNIA: ICD-10-CM

## 2021-05-25 PROCEDURE — G8752 SYS BP LESS 140: HCPCS | Performed by: SURGERY

## 2021-05-25 PROCEDURE — G9717 DOC PT DX DEP/BP F/U NT REQ: HCPCS | Performed by: SURGERY

## 2021-05-25 PROCEDURE — G8754 DIAS BP LESS 90: HCPCS | Performed by: SURGERY

## 2021-05-25 PROCEDURE — G8536 NO DOC ELDER MAL SCRN: HCPCS | Performed by: SURGERY

## 2021-05-25 PROCEDURE — 99205 OFFICE O/P NEW HI 60 MIN: CPT | Performed by: SURGERY

## 2021-05-25 PROCEDURE — G8427 DOCREV CUR MEDS BY ELIG CLIN: HCPCS | Performed by: SURGERY

## 2021-05-25 PROCEDURE — 1101F PT FALLS ASSESS-DOCD LE1/YR: CPT | Performed by: SURGERY

## 2021-05-25 PROCEDURE — G8419 CALC BMI OUT NRM PARAM NOF/U: HCPCS | Performed by: SURGERY

## 2021-05-25 NOTE — PROGRESS NOTES
HISTORY OF PRESENT ILLNESS  Hair Kim is a 81 y.o. male who comes in for consultation by Rozella Boast, MD for a hernia  HPI  He developed severe abdominal pain last week. He noted a bulge in the mid abdomen and was able to push it back in and the pain went away. He feels ok to day. He denies associated nausea, vomiting, diarrhea, constipation, melena, or hematochezia, dysuria or hematuria but has some urinary hesitancy and frequency.     Past Medical History:   Diagnosis Date    Allergy to ACE inhibitors 11/28/2017    Anxiety 11/28/2017    Arthritis     ASCVD (arteriosclerotic cardiovascular disease) 11/28/2017    CAD (coronary artery disease) 2000    MI     Cataract     Cataract 04/2019    Depression 11/28/2017    DJD (degenerative joint disease) 11/28/2017    pt denies    ED (erectile dysfunction) 11/28/2017    Elevated PSA 11/28/2017    Former smoker 11/28/2017    Glucose intolerance (impaired glucose tolerance) 11/28/2017    Hypercholesterolemia     Hypertension     Insomnia 11/28/2017    On statin therapy 11/28/2017    Other ill-defined conditions(799.89)     elevated lipids    Psychiatric disorder     depression /anxiety     Past Surgical History:   Procedure Laterality Date    COLONOSCOPY N/A 7/30/2019    COLONOSCOPY performed by Arian Loo., MD at [place] [address]  7/30/2019         COLORECTAL SCRN; HI RISK IND  9/10/2014         COLORECTAL SCRN; HI RISK IND  7/30/2019         HX HEENT  06/03/2019    left eye cataract    HX TONSILLECTOMY      ID ABDOMEN SURGERY PROC UNLISTED      hernia repair    ID CARDIAC SURG PROCEDURE UNLIST      stents x 6    ID COLSC FLX W/RMVL OF TUMOR POLYP LESION SNARE TQ  9/10/2014          Family History   Problem Relation Age of Onset    Psychiatric Disorder Mother         depression    Heart Disease Father         aneurysym     Social History     Tobacco Use    Smoking status: Former Smoker     Packs/day: 1.50     Years: 5.00     Pack years: 7.50     Quit date:      Years since quittin.4    Smokeless tobacco: Never Used    Tobacco comment: smoke for 5 year    Vaping Use    Vaping Use: Never used   Substance Use Topics    Alcohol use: No    Drug use: No     Current Outpatient Medications   Medication Sig    hydrALAZINE (APRESOLINE) 50 mg tablet TAKE 1 TABLET BY MOUTH TWICE DAILY    rosuvastatin (CRESTOR) 20 mg tablet TAKE 1 TABLET EVERY DAY    escitalopram oxalate (LEXAPRO) 10 mg tablet TAKE 1 TABLET EVERY DAY    amLODIPine (NORVASC) 5 mg tablet TAKE 1 TABLET EVERY DAY    vit C/E/Zn/coppr/lutein/zeaxan (PRESERVISION AREDS-2 PO) Take 2 Caps by mouth.  krill-omega-3-dha-epa-lipids 020-53-06-47 mg cap Take  by mouth.  ezetimibe (ZETIA) 10 mg tablet Take  by mouth daily.  nebivolol (BYSTOLIC) 5 mg tablet Take 5 mg by mouth daily.  aspirin 81 mg tablet Take 81 mg by mouth daily.  cholecalciferol, vitamin d3, (VITAMIN D) 1,000 unit tablet Take 2,000 Units by mouth daily. No current facility-administered medications for this visit. Allergies   Allergen Reactions    Benazepril Rash and Swelling    Pork/Porcine Containing Products Hives       Review of Systems   Constitutional: Negative for chills, diaphoresis, fever, malaise/fatigue and weight loss. HENT: Negative for congestion, ear pain and sore throat. Eyes: Negative for blurred vision and pain. Respiratory: Negative for cough, hemoptysis, sputum production, shortness of breath, wheezing and stridor. Cardiovascular: Negative for chest pain, palpitations, orthopnea, claudication, leg swelling and PND. Gastrointestinal: Positive for abdominal pain. Negative for blood in stool, constipation, diarrhea, heartburn, melena, nausea and vomiting. Genitourinary: Positive for frequency and urgency. Negative for dysuria, flank pain and hematuria.    Musculoskeletal: Negative for back pain, joint pain, myalgias and neck pain. Skin: Negative for itching and rash. Neurological: Negative for dizziness, tremors, focal weakness, seizures, weakness and headaches. Endo/Heme/Allergies: Negative for polydipsia. Psychiatric/Behavioral: Negative for depression and memory loss. The patient is not nervous/anxious. Visit Vitals  /72 (BP 1 Location: Left arm, BP Patient Position: Sitting)   Pulse (!) 46   Temp 97.3 °F (36.3 °C) (Temporal)   Ht 5' 7\" (1.702 m)   Wt 80.3 kg (177 lb)   SpO2 98%   BMI 27.72 kg/m²       Physical Exam  Constitutional:       General: He is not in acute distress. Appearance: Normal appearance. He is well-developed. He is not diaphoretic. HENT:      Head: Normocephalic and atraumatic. Mouth/Throat:      Pharynx: No oropharyngeal exudate. Eyes:      General: No scleral icterus. Conjunctiva/sclera: Conjunctivae normal.      Pupils: Pupils are equal, round, and reactive to light. Neck:      Thyroid: No thyromegaly. Trachea: No tracheal deviation. Cardiovascular:      Rate and Rhythm: Normal rate and regular rhythm. Heart sounds: Normal heart sounds. No murmur heard. No friction rub. No gallop. Pulmonary:      Effort: Pulmonary effort is normal. No respiratory distress. Breath sounds: Normal breath sounds. No stridor. No wheezing or rales. Abdominal:      General: Bowel sounds are normal. There is no distension. Palpations: Abdomen is soft. There is no mass. Tenderness: There is no abdominal tenderness. There is no guarding or rebound. Hernia: A hernia is present. Hernia is present in the ventral area and right inguinal area. There is no hernia in the left inguinal area. Genitourinary:     Penis: Circumcised. Testes: Normal. Cremasteric reflex is present. Musculoskeletal:         General: No tenderness. Normal range of motion. Cervical back: Normal range of motion and neck supple.    Lymphadenopathy: Cervical: No cervical adenopathy. Skin:     General: Skin is warm and dry. Findings: No erythema or rash. Neurological:      Mental Status: He is alert and oriented to person, place, and time. Cranial Nerves: No cranial nerve deficit. Coordination: Coordination normal.   Psychiatric:         Behavior: Behavior normal.         Thought Content: Thought content normal.         Judgment: Judgment normal.         ASSESSMENT and PLAN  1. Ventral and right inguinal herniae. I explained about the anatomy and pathophysiology of hernias and the risk of incarceration and strangulation of the bowel. I explained about hernia repairs (open with and without mesh, and robotic assisted and laparoscopic with mesh). I explained the risks and benefits of repair including bleeding, infection, chronic pain, orchalgia, loss of testes, bowel or bladder injury, hernia recurrence, seroma, mesh infection requiring removal.  I explained it would be a six to eight week recuperation with no driving for 5 - 7 days, no lifting for six weeks. 2.  CAD  Hx PCI x 6. No CP  He has not seen Dr Rut Thorpe in 3 years per patient report  Needs cardiac assessment prior to surgery    3. Essential hypertension. Stable on rx  4. Depression controlled on rx  5.   Social  Planning hunting trip to [facility] in mid July and another to [place] [place] [facility]\"" in the fall    He wishes to proceed with a ventral hernia repair with mesh and right inguinal hernia repair with mesh under general anesthesia as an outpatient   Will arrange at his convenience      Joe Patterson MD FACS
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
## 2021-06-03 RX ORDER — ESCITALOPRAM OXALATE 10 MG/1
TABLET ORAL
Qty: 90 TABLET | Refills: 0 | Status: SHIPPED | OUTPATIENT
Start: 2021-06-03 | End: 2021-06-23 | Stop reason: ALTCHOICE

## 2021-06-03 NOTE — TELEPHONE ENCOUNTER
PCP: Adama Darby MD    Last appt: 5/21/2021  Future Appointments   Date Time Provider Grant Myrick   7/8/2021  8:10 AM Adama Darby MD [facility]   8/18/2021  1:40 PM Jaxon Munguia MD [facility] AMB       Last refilled:2/26/21    Requested Prescriptions     Pending Prescriptions Disp Refills    escitalopram oxalate (LEXAPRO) 10 mg tablet 90 Tablet 0     Sig: TAKE 1 TABLET EVERY DAY

## 2021-06-07 ENCOUNTER — DOCUMENTATION ONLY (OUTPATIENT)
Dept: CARDIOLOGY CLINIC | Age: 86
End: 2021-06-07

## 2021-06-07 NOTE — PROGRESS NOTES
Per fax from [facility] they were unable to reach patient to schedule an appointment per [facility].

## 2021-06-23 ENCOUNTER — OFFICE VISIT (OUTPATIENT)
Dept: INTERNAL MEDICINE CLINIC | Age: 86
End: 2021-06-23
Payer: MEDICARE

## 2021-06-23 VITALS
HEIGHT: 67 IN | OXYGEN SATURATION: 97 % | BODY MASS INDEX: 27.84 KG/M2 | DIASTOLIC BLOOD PRESSURE: 78 MMHG | WEIGHT: 177.4 LBS | HEART RATE: 55 BPM | RESPIRATION RATE: 16 BRPM | TEMPERATURE: 97.5 F | SYSTOLIC BLOOD PRESSURE: 138 MMHG

## 2021-06-23 DIAGNOSIS — F32.A MILD DEPRESSION: ICD-10-CM

## 2021-06-23 DIAGNOSIS — G89.29 CHRONIC LEFT SHOULDER PAIN: ICD-10-CM

## 2021-06-23 DIAGNOSIS — M25.512 CHRONIC LEFT SHOULDER PAIN: ICD-10-CM

## 2021-06-23 DIAGNOSIS — R41.3 MEMORY DEFICIT: Primary | ICD-10-CM

## 2021-06-23 PROCEDURE — G8427 DOCREV CUR MEDS BY ELIG CLIN: HCPCS | Performed by: INTERNAL MEDICINE

## 2021-06-23 PROCEDURE — G8536 NO DOC ELDER MAL SCRN: HCPCS | Performed by: INTERNAL MEDICINE

## 2021-06-23 PROCEDURE — 1101F PT FALLS ASSESS-DOCD LE1/YR: CPT | Performed by: INTERNAL MEDICINE

## 2021-06-23 PROCEDURE — G8754 DIAS BP LESS 90: HCPCS | Performed by: INTERNAL MEDICINE

## 2021-06-23 PROCEDURE — 99214 OFFICE O/P EST MOD 30 MIN: CPT | Performed by: INTERNAL MEDICINE

## 2021-06-23 PROCEDURE — G8419 CALC BMI OUT NRM PARAM NOF/U: HCPCS | Performed by: INTERNAL MEDICINE

## 2021-06-23 PROCEDURE — G9717 DOC PT DX DEP/BP F/U NT REQ: HCPCS | Performed by: INTERNAL MEDICINE

## 2021-06-23 PROCEDURE — G8752 SYS BP LESS 140: HCPCS | Performed by: INTERNAL MEDICINE
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
## 2021-06-23 NOTE — PATIENT INSTRUCTIONS

## 2021-06-24 LAB
ANION GAP SERPL CALC-SCNC: 10 MMOL/L (ref 5–15)
BUN SERPL-MCNC: 22 MG/DL (ref 6–20)
BUN/CREAT SERPL: 20 (ref 12–20)
CALCIUM SERPL-MCNC: 9.4 MG/DL (ref 8.5–10.1)
CHLORIDE SERPL-SCNC: 101 MMOL/L (ref 97–108)
CO2 SERPL-SCNC: 29 MMOL/L (ref 21–32)
COMMENT, HOLDF: NORMAL
CREAT SERPL-MCNC: 1.11 MG/DL (ref 0.7–1.3)
GLUCOSE SERPL-MCNC: 82 MG/DL (ref 65–100)
POTASSIUM SERPL-SCNC: 4.1 MMOL/L (ref 3.5–5.1)
SAMPLES BEING HELD,HOLD: NORMAL
SODIUM SERPL-SCNC: 140 MMOL/L (ref 136–145)
T4 SERPL-MCNC: 7.8 UG/DL (ref 4.5–12.1)
TSH SERPL DL<=0.05 MIU/L-ACNC: 1.82 UIU/ML (ref 0.36–3.74)
VIT B12 SERPL-MCNC: 284 PG/ML (ref 193–986)

## 2021-06-30 PROBLEM — I71.40 ABDOMINAL AORTIC ANEURYSM (AAA) WITHOUT RUPTURE: Status: ACTIVE | Noted: 2021-06-30

## 2021-07-07 NOTE — PROGRESS NOTES
Chief Complaint   Patient presents with    Hypertension     6 month follow up    Cholesterol Problem       SUBJECTIVE:    Melchor Casey is a 81 y.o. male returns in follow-up for his hypertension, hyperlipidemia, glucose intolerance, ASCVD, prior TIA, small aortic aneurysm recently found on CT scan and other medical problems. He is due to have his hernia repair by Dr. Shona Duane in August but he has appointment prior to that with Dr. Riley his cardiologist and will be cleared from that standpoint. He did see Dr. Elizabeth Bowers recently for his shoulder and was recommended physical therapy and no surgery recommended. He generally seems to be doing well at the present time. He denies any chest pain, shortness of breath, palpitations, PND, orthopnea or other cardiorespiratory complaints. He notes no GI or  complaints. He notes no headaches, dizziness or neurologic complaints. Other than left shoulder he has no current arthritic complaints and and no other complaints on complete review of systems. Current Outpatient Medications   Medication Sig Dispense Refill    escitalopram oxalate (LEXAPRO) 10 mg tablet Take 10 mg by mouth daily.  hydrALAZINE (APRESOLINE) 50 mg tablet TAKE 1 TABLET BY MOUTH TWICE DAILY 180 Tab 3    rosuvastatin (CRESTOR) 20 mg tablet TAKE 1 TABLET EVERY DAY 90 Tab 3    amLODIPine (NORVASC) 5 mg tablet TAKE 1 TABLET EVERY DAY 90 Tab 3    vit C/E/Zn/coppr/lutein/zeaxan (PRESERVISION AREDS-2 PO) Take 2 Caps by mouth.  krill-omega-3-dha-epa-lipids 601-22-37-78 mg cap Take  by mouth.  ezetimibe (ZETIA) 10 mg tablet Take  by mouth daily.  nebivolol (BYSTOLIC) 5 mg tablet Take 5 mg by mouth daily.  aspirin 81 mg tablet Take 81 mg by mouth daily.  cholecalciferol, vitamin d3, (VITAMIN D) 1,000 unit tablet Take 2,000 Units by mouth daily.        Past Medical History:   Diagnosis Date    Allergy to ACE inhibitors 11/28/2017    Anxiety 11/28/2017    Arthritis     ASCVD (arteriosclerotic cardiovascular disease) 11/28/2017    CAD (coronary artery disease) 2000    MI     Cataract     Cataract 04/2019    Depression 11/28/2017    DJD (degenerative joint disease) 11/28/2017    pt denies    ED (erectile dysfunction) 11/28/2017    Elevated PSA 11/28/2017    Former smoker 11/28/2017    Glucose intolerance (impaired glucose tolerance) 11/28/2017    Hypercholesterolemia     Hypertension     Insomnia 11/28/2017    On statin therapy 11/28/2017    Other ill-defined conditions(799.89)     elevated lipids    Psychiatric disorder     depression /anxiety     Past Surgical History:   Procedure Laterality Date    COLONOSCOPY N/A 7/30/2019    COLONOSCOPY performed by Angeli Owusu MD at [address] [place] [address]  7/30/2019         COLORECTAL SCRN; HI RISK IND  9/10/2014         COLORECTAL SCRN; HI RISK IND  7/30/2019         HX HEENT  06/03/2019    left eye cataract    HX TONSILLECTOMY      OK ABDOMEN SURGERY PROC UNLISTED      hernia repair    OK CARDIAC SURG PROCEDURE UNLIST      stents x 6    OK COLSC FLX W/RMVL OF TUMOR POLYP LESION SNARE TQ  9/10/2014          Allergies   Allergen Reactions    Benazepril Rash and Swelling    Pork/Porcine Containing Products Hives       REVIEW OF SYSTEMS:  General: negative for - chills or fever, or weight loss or gain  ENT: negative for - headaches, nasal congestion or tinnitus  Eyes: no blurred or visual changes  Neck: No stiffness or swollen nodes  Respiratory: negative for - cough, hemoptysis, shortness of breath or wheezing  Cardiovascular : negative for - chest pain, edema, palpitations or shortness of breath  Gastrointestinal: negative for - abdominal pain, blood in stools, heartburn or nausea/vomiting  Genito-Urinary: no dysuria, trouble voiding, or hematuria  Musculoskeletal: negative for - gait disturbance, joint pain, joint stiffness or joint swelling  Neurological: no TIA or stroke symptoms  Hematologic: no bruises, no bleeding  Lymphatic: no swollen glands  Integument: no lumps, mole changes, nail changes or rash  Endocrine:no malaise/lethargy poly uria or polydipsia or unexpected weight changes        Social History     Socioeconomic History    Marital status:      Spouse name: Not on file    Number of children: Not on file    Years of education: Not on file    Highest education level: Not on file   Tobacco Use    Smoking status: Former Smoker     Packs/day: 1.50     Years: 5.00     Pack years: 7.50     Quit date:      Years since quittin.5    Smokeless tobacco: Never Used    Tobacco comment: smoke for 5 year    Vaping Use    Vaping Use: Never used   Substance and Sexual Activity    Alcohol use: No    Drug use: No     Social Determinants of Health     Financial Resource Strain:     Difficulty of Paying Living Expenses:    Food Insecurity:     Worried About Running Out of Food in the Last Year:     Ran Out of Food in the Last Year:    Transportation Needs:     Lack of Transportation (Medical):      Lack of Transportation (Non-Medical):    Physical Activity:     Days of Exercise per Week:     Minutes of Exercise per Session:    Stress:     Feeling of Stress :    Social Connections:     Frequency of Communication with Friends and Family:     Frequency of Social Gatherings with Friends and Family:     Attends [employer] Services:     Active Member of Clubs or Organizations:     Attends Club or Organization Meetings:     Marital Status:      Family History   Problem Relation Age of Onset    Psychiatric Disorder Mother         depression    Heart Disease Father         aneurysym       OBJECTIVE:     Visit Vitals  /72 (BP 1 Location: Left upper arm, BP Patient Position: Sitting, BP Cuff Size: Adult)   Pulse (!) 58   Temp 97.7 °F (36.5 °C) (Temporal)   Resp 17   Ht 5' 7\" (1.702 m)   Wt 175 lb 14.4 oz (79.8 kg)   SpO2 97%   BMI 27.55 kg/m² CONSTITUTIONAL:   well nourished, appears age appropriate  EYES: sclera anicteric, PERRL, EOMI  ENMT:nares clear, moist mucous membranes, pharynx clear  NECK: supple. Thyroid normal, No JVD or bruits  RESPIRATORY: Chest: clear to ascultation and percussion, normal inspiratory effort  CARDIOVASCULAR: Heart: regular rate and rhythm no murmurs, rubs or gallops, PMI not displaced, No thrills, no peripheral edema  GASTROINTESTINAL: Abdomen: non distended, soft, non tender, bowel sounds normal  HEMATOLOGIC: no purpura, petechiae or bruising  LYMPHATIC: No lymph node enlargemant  MUSCULOSKELETAL: Extremities: no active synovitis, pulse 1+   INTEGUMENT: No unusual rashes or suspicious skin lesions noted. Nails appear normal.  PERIPHERAL VASCULAR: normal pulses femoral, PT and DP  NEUROLOGIC: non-focal exam, A & O X 3  PSYCHIATRIC:, appropriate affect     ASSESSMENT:   1. Essential hypertension    2. Glucose intolerance (impaired glucose tolerance)    3. Mixed hyperlipidemia    4. ASCVD (arteriosclerotic cardiovascular disease)    5. Primary osteoarthritis involving multiple joints    6. Abdominal aortic aneurysm (AAA) without rupture ([address]    7. Mild depression (HCC)      Impression  1. Hypertension well controlled so continue current therapy reviewed with him. 2.  Glucose intolerance repeat status pending and prior lab review not make adjustments if necessary. 3.  Hyperlipidemia prior lab reviewed repeat status pending I will adjust if needed. 4.  ASCVD clinically stable continue aspirin daily  5. DJD that is stable  6. Abdominal aortic aneurysm just recently found on CT scan and only 3.1 cm we will follow that yearly  7. Depression that is stable  I will call the lab and make further recommendations or adjustments if necessary. Follow-up scheduled again for 6 months or sooner if there is a problem.     PLAN:  .  Orders Placed This Encounter    METABOLIC PANEL, COMPREHENSIVE    LIPID PANEL    CK    HEMOGLOBIN A1C WITH EAG    escitalopram oxalate (LEXAPRO) 10 mg tablet         ATTENTION:   This medical record was transcribed using an electronic medical records system. Although proofread, it may and can contain electronic and spelling errors. Other human spelling and other errors may be present. Corrections may be executed at a later time. Please feel free to contact us for any clarifications as needed. Follow-up and Dispositions    · Return in about 6 months (around 1/8/2022). No results found for any visits on 07/08/21. Hilary Pantoja MD    The patient verbalized understanding of the problems and plans as explained.

## 2021-07-08 ENCOUNTER — OFFICE VISIT (OUTPATIENT)
Dept: INTERNAL MEDICINE CLINIC | Age: 86
End: 2021-07-08
Payer: MEDICARE

## 2021-07-08 VITALS
RESPIRATION RATE: 17 BRPM | TEMPERATURE: 97.7 F | OXYGEN SATURATION: 97 % | WEIGHT: 175.9 LBS | HEART RATE: 58 BPM | DIASTOLIC BLOOD PRESSURE: 72 MMHG | SYSTOLIC BLOOD PRESSURE: 136 MMHG | BODY MASS INDEX: 27.61 KG/M2 | HEIGHT: 67 IN

## 2021-07-08 DIAGNOSIS — I10 ESSENTIAL HYPERTENSION: Primary | ICD-10-CM

## 2021-07-08 DIAGNOSIS — I71.40 ABDOMINAL AORTIC ANEURYSM (AAA) WITHOUT RUPTURE: ICD-10-CM

## 2021-07-08 DIAGNOSIS — F32.A MILD DEPRESSION: ICD-10-CM

## 2021-07-08 DIAGNOSIS — I25.10 ASCVD (ARTERIOSCLEROTIC CARDIOVASCULAR DISEASE): ICD-10-CM

## 2021-07-08 DIAGNOSIS — E78.2 MIXED HYPERLIPIDEMIA: ICD-10-CM

## 2021-07-08 DIAGNOSIS — R73.02 GLUCOSE INTOLERANCE (IMPAIRED GLUCOSE TOLERANCE): ICD-10-CM

## 2021-07-08 DIAGNOSIS — M15.9 PRIMARY OSTEOARTHRITIS INVOLVING MULTIPLE JOINTS: ICD-10-CM

## 2021-07-08 LAB
ALBUMIN SERPL-MCNC: 3.9 G/DL (ref 3.5–5)
ALBUMIN/GLOB SERPL: 1.2 {RATIO} (ref 1.1–2.2)
ALP SERPL-CCNC: 68 U/L (ref 45–117)
ALT SERPL-CCNC: 30 U/L (ref 12–78)
ANION GAP SERPL CALC-SCNC: 4 MMOL/L (ref 5–15)
AST SERPL-CCNC: 23 U/L (ref 15–37)
BILIRUB SERPL-MCNC: 0.7 MG/DL (ref 0.2–1)
BUN SERPL-MCNC: 18 MG/DL (ref 6–20)
BUN/CREAT SERPL: 18 (ref 12–20)
CALCIUM SERPL-MCNC: 9.2 MG/DL (ref 8.5–10.1)
CHLORIDE SERPL-SCNC: 106 MMOL/L (ref 97–108)
CHOLEST SERPL-MCNC: 133 MG/DL
CK SERPL-CCNC: 85 U/L (ref 39–308)
CO2 SERPL-SCNC: 31 MMOL/L (ref 21–32)
CREAT SERPL-MCNC: 1.02 MG/DL (ref 0.7–1.3)
EST. AVERAGE GLUCOSE BLD GHB EST-MCNC: 105 MG/DL
GLOBULIN SER CALC-MCNC: 3.3 G/DL (ref 2–4)
GLUCOSE SERPL-MCNC: 96 MG/DL (ref 65–100)
HBA1C MFR BLD: 5.3 % (ref 4–5.6)
HDLC SERPL-MCNC: 55 MG/DL
HDLC SERPL: 2.4 {RATIO} (ref 0–5)
LDLC SERPL CALC-MCNC: 62.6 MG/DL (ref 0–100)
POTASSIUM SERPL-SCNC: 4.9 MMOL/L (ref 3.5–5.1)
PROT SERPL-MCNC: 7.2 G/DL (ref 6.4–8.2)
SODIUM SERPL-SCNC: 141 MMOL/L (ref 136–145)
TRIGL SERPL-MCNC: 77 MG/DL (ref ?–150)
VLDLC SERPL CALC-MCNC: 15.4 MG/DL

## 2021-07-08 PROCEDURE — 1101F PT FALLS ASSESS-DOCD LE1/YR: CPT | Performed by: INTERNAL MEDICINE

## 2021-07-08 PROCEDURE — 99214 OFFICE O/P EST MOD 30 MIN: CPT | Performed by: INTERNAL MEDICINE

## 2021-07-08 PROCEDURE — G8419 CALC BMI OUT NRM PARAM NOF/U: HCPCS | Performed by: INTERNAL MEDICINE

## 2021-07-08 PROCEDURE — G8754 DIAS BP LESS 90: HCPCS | Performed by: INTERNAL MEDICINE

## 2021-07-08 PROCEDURE — G9717 DOC PT DX DEP/BP F/U NT REQ: HCPCS | Performed by: INTERNAL MEDICINE

## 2021-07-08 PROCEDURE — G8536 NO DOC ELDER MAL SCRN: HCPCS | Performed by: INTERNAL MEDICINE

## 2021-07-08 PROCEDURE — G8752 SYS BP LESS 140: HCPCS | Performed by: INTERNAL MEDICINE

## 2021-07-08 PROCEDURE — G8427 DOCREV CUR MEDS BY ELIG CLIN: HCPCS | Performed by: INTERNAL MEDICINE

## 2021-07-08 RX ORDER — ESCITALOPRAM OXALATE 10 MG/1
10 TABLET ORAL DAILY
COMMUNITY
End: 2021-09-02

## 2021-07-08 NOTE — PROGRESS NOTES
Chief Complaint   Patient presents with    Hypertension     6 month follow up    Cholesterol Problem     Visit Vitals  /72 (BP 1 Location: Left upper arm, BP Patient Position: Sitting, BP Cuff Size: Adult)   Pulse (!) 58   Temp 97.7 °F (36.5 °C) (Temporal)   Resp 17   Ht 5' 7\" (1.702 m)   Wt 175 lb 14.4 oz (79.8 kg)   SpO2 97%   BMI 27.55 kg/m²     1. Have you been to the ER, urgent care clinic since your last visit? Hospitalized since your last visit? No    2. Have you seen or consulted any other health care providers outside of the [address] [place] [address] since your last visit? Include any pap smears or colon screening.  Dr. Pham Mc

## 2021-07-08 NOTE — PATIENT INSTRUCTIONS
Angina: Care Instructions  Your Care Instructions     You have a problem called angina. Angina happens when there is not enough blood flow to your heart muscle. Angina is a sign of coronary artery disease (CAD). CAD occurs when blood vessels that supply the heart become narrowed. Having CAD increases your risk of a heart attack. Chest pain or pressure is the most common symptom of angina. But some people have other symptoms, like:  · Pain, pressure, or a strange feeling in the back, neck, jaw, or upper belly, or in one or both shoulders or arms. · Shortness of breath. · Nausea or vomiting. · Lightheadedness or sudden weakness. · Fast or irregular heartbeat. Women are somewhat more likely than men to have angina symptoms like shortness of breath, nausea, and back or jaw pain. Angina can be dangerous. That's why it is important to pay attention to your symptoms. Know what is typical for you, learn how to control your symptoms, and understand when you need to get treatment. A change in your usual pattern of symptoms is an emergency. It may mean that you are having a heart attack. The doctor has checked you carefully, but problems can develop later. If you notice any problems or new symptoms, get medical treatment right away. Follow-up care is a key part of your treatment and safety. Be sure to make and go to all appointments, and call your doctor if you are having problems. It's also a good idea to know your test results and keep a list of the medicines you take. How can you care for yourself at home? Medicines    · If your doctor has given you nitroglycerin for angina symptoms, keep it with you at all times. If you have symptoms, sit down and rest, and take the first dose of nitroglycerin as directed. If your symptoms get worse or are not getting better within 5 minutes, call 911 right away. Stay on the phone.  The emergency  will give you further instructions.     · If your doctor advises it, take 1 low-dose aspirin a day to prevent heart attack.     · Be safe with medicines. Take your medicines exactly as prescribed. Call your doctor if you think you are having a problem with your medicine. You will get more details on the specific medicines your doctor prescribes. Lifestyle changes    · Do not smoke. If you need help quitting, talk to your doctor about stop-smoking programs and medicines. These can increase your chances of quitting for good.     · Eat a heart-healthy diet that is low in saturated fat and salt, and is high in fiber. Talk to your doctor or a dietitian about healthy eating.     · Stay at a healthy weight. Or lose weight if you need to. Activity    · Talk to your doctor about a level of activity that is safe for you.     · If an activity causes angina symptoms, stop and rest.   When should you call for help? Call 911 anytime you think you may need emergency care. For example, call if:    · You passed out (lost consciousness).     · You have symptoms of a heart attack. These may include:  ? Chest pain or pressure, or a strange feeling in the chest.  ? Sweating. ? Shortness of breath. ? Nausea or vomiting. ? Pain, pressure, or a strange feeling in the back, neck, jaw, or upper belly or in one or both shoulders or arms. ? Lightheadedness or sudden weakness. ? A fast or irregular heartbeat. After you call 911, the  may tell you to chew 1 adult-strength or 2 to 4 low-dose aspirin. Wait for an ambulance. Do not try to drive yourself.     · You have angina symptoms that do not go away with rest or are not getting better within 5 minutes after you take a dose of nitroglycerin. Call your doctor now if:    · Your angina symptoms seem worse but still follow your typical pattern. You can predict when symptoms will happen, but they may come on sooner, feel worse, or last longer.     · You feel dizzy or lightheaded, or you feel like you may faint.    Watch closely for changes in your health, and be sure to contact your doctor if you have any problems. Where can you learn more? Go to http://www.gray.com/  Enter H129 in the search box to learn more about \"Angina: Care Instructions. \"  Current as of: August 31, 2020               Content Version: 12.8  © 7180-7473 [employer]. Care instructions adapted under license by [employer] (which disclaims liability or warranty for this information). If you have questions about a medical condition or this instruction, always ask your healthcare professional. [address] any warranty or liability for your use of this information.

## 2021-07-29 NOTE — PERIOP NOTES
[facility]  Preoperative Instructions        Surgery Date 8/4/21          Time of Arrival 0545    1. On the day of your surgery, please report to the Surgical Services Registration Desk and sign in at your designated time. The Surgery Center is located to the right of the Emergency Room. 2. You must have someone with you to drive you home. You should not drive a car for 24 hours following surgery. Please make arrangements for a friend or family member to stay with you for the first 24 hours after your surgery. 3. Do not have anything to eat or drink (including water, gum, mints, coffee, juice) after midnight ?8/3/21? Raman Brittle ? This may not apply to medications prescribed by your physician. ?(Please note below the special instructions with medications to take the morning of your procedure.)    4. We recommend you do not drink any alcoholic beverages for 24 hours before and after your surgery. 5. Contact your surgeons office for instructions on the following medications: non-steroidal anti-inflammatory drugs (i.e. Advil, Aleve), vitamins, and supplements. (Some surgeons will want you to stop these medications prior to surgery and others may allow you to take them)  **If you are currently taking Plavix, Coumadin, Aspirin and/or other blood-thinning agents, contact your surgeon for instructions. ** Your surgeon will partner with the physician prescribing these medications to determine if it is safe to stop or if you need to continue taking. Please do not stop taking these medications without instructions from your surgeon    6. Wear comfortable clothes. Wear glasses instead of contacts. Do not bring any money or jewelry. Please bring picture ID, insurance card, and any prearranged co-payment or hospital payment. Do not wear make-up, particularly mascara the morning of your surgery. Do not wear nail polish, particularly if you are having foot /hand surgery.   Wear your hair loose or down, no ponytails, buns, alejandra pins or clips. All body piercings must be removed. Please shower with antibacterial soap for three consecutive days before and on the morning of surgery, but do not apply any lotions, powders or deodorants after the shower on the day of surgery. Please use a fresh towels after each shower. Please sleep in clean clothes and change bed linens the night before surgery. Please do not shave for 48 hours prior to surgery. Shaving of the face is acceptable. 7. You should understand that if you do not follow these instructions your surgery may be cancelled. If your physical condition changes (I.e. fever, cold or flu) please contact your surgeon as soon as possible. 8. It is important that you be on time. If a situation occurs where you may be late, please call [phone] (OR Holding Area). 9. If you have any questions and or problems, please call [phone] (Pre-admission Testing). 10. Your surgery time may be subject to change. You will receive a phone call the evening prior if your time changes. 11.  If having outpatient surgery, you must have someone to drive you here, stay with you during the duration of your stay, and to drive you home at time of discharge. Special Instructions: aspirin per MD    TAKE ALL MEDICATIONS DAY OF SURGERY EXCEPT:  Aspirin, vitamins/supplements    I understand a pre-operative phone call will be made to verify my surgery time. In the event that I am not available, I give permission for a message to be left on my answering service and/or with another person?   Yes [phone]         ___________________      __________   _________    (Signature of Patient)             (Witness)                (Date and Time)

## 2021-08-04 ENCOUNTER — HOSPITAL ENCOUNTER (EMERGENCY)
Age: 86
Discharge: HOME | End: 2021-08-04
Attending: EMERGENCY MEDICINE
Payer: MEDICARE

## 2021-08-04 ENCOUNTER — HOSPITAL ENCOUNTER (OUTPATIENT)
Age: 86
Setting detail: OUTPATIENT SURGERY
Discharge: HOME | End: 2021-08-04
Attending: SURGERY | Admitting: SURGERY
Payer: MEDICARE

## 2021-08-04 ENCOUNTER — ANESTHESIA (OUTPATIENT)
Dept: SURGERY | Age: 86
End: 2021-08-04
Payer: MEDICARE

## 2021-08-04 ENCOUNTER — ANESTHESIA EVENT (OUTPATIENT)
Dept: SURGERY | Age: 86
End: 2021-08-04
Payer: MEDICARE

## 2021-08-04 VITALS
SYSTOLIC BLOOD PRESSURE: 177 MMHG | RESPIRATION RATE: 18 BRPM | OXYGEN SATURATION: 97 % | WEIGHT: 182.98 LBS | HEIGHT: 67 IN | TEMPERATURE: 98.1 F | HEART RATE: 56 BPM | DIASTOLIC BLOOD PRESSURE: 78 MMHG | BODY MASS INDEX: 28.72 KG/M2

## 2021-08-04 VITALS
HEIGHT: 67 IN | HEART RATE: 55 BPM | WEIGHT: 176.37 LBS | RESPIRATION RATE: 18 BRPM | TEMPERATURE: 98 F | BODY MASS INDEX: 27.68 KG/M2 | OXYGEN SATURATION: 95 % | DIASTOLIC BLOOD PRESSURE: 57 MMHG | SYSTOLIC BLOOD PRESSURE: 124 MMHG

## 2021-08-04 DIAGNOSIS — K40.90 RIGHT INGUINAL HERNIA: Primary | ICD-10-CM

## 2021-08-04 DIAGNOSIS — K43.9 VENTRAL HERNIA WITHOUT OBSTRUCTION OR GANGRENE: ICD-10-CM

## 2021-08-04 DIAGNOSIS — R33.8 ACUTE URINARY RETENTION: Primary | ICD-10-CM

## 2021-08-04 LAB
APPEARANCE UR: CLEAR
BACTERIA URNS QL MICRO: NEGATIVE /HPF
BILIRUB UR QL: NEGATIVE
COLOR UR: ABNORMAL
EPITH CASTS URNS QL MICRO: ABNORMAL /LPF
GLUCOSE UR STRIP.AUTO-MCNC: NEGATIVE MG/DL
HGB UR QL STRIP: NEGATIVE
KETONES UR QL STRIP.AUTO: NEGATIVE MG/DL
LEUKOCYTE ESTERASE UR QL STRIP.AUTO: ABNORMAL
NITRITE UR QL STRIP.AUTO: NEGATIVE
PH UR STRIP: 6 [PH] (ref 5–8)
PROT UR STRIP-MCNC: ABNORMAL MG/DL
RBC #/AREA URNS HPF: ABNORMAL /HPF (ref 0–5)
SP GR UR REFRACTOMETRY: 1.02 (ref 1–1.03)
UA: UC IF INDICATED,UAUC: ABNORMAL
UROBILINOGEN UR QL STRIP.AUTO: 0.2 EU/DL (ref 0.2–1)
WBC URNS QL MICRO: ABNORMAL /HPF (ref 0–4)

## 2021-08-04 PROCEDURE — 51702 INSERT TEMP BLADDER CATH: CPT

## 2021-08-04 PROCEDURE — 74011250636 HC RX REV CODE- 250/636: Performed by: ANESTHESIOLOGY

## 2021-08-04 PROCEDURE — 77030010507 HC ADH SKN DERMBND J&J -B: Performed by: SURGERY

## 2021-08-04 PROCEDURE — 88302 TISSUE EXAM BY PATHOLOGIST: CPT

## 2021-08-04 PROCEDURE — 74011000250 HC RX REV CODE- 250: Performed by: SURGERY

## 2021-08-04 PROCEDURE — 74011250636 HC RX REV CODE- 250/636: Performed by: SURGERY

## 2021-08-04 PROCEDURE — 77030002986 HC SUT PROL J&J -A: Performed by: SURGERY

## 2021-08-04 PROCEDURE — C1781 MESH (IMPLANTABLE): HCPCS | Performed by: SURGERY

## 2021-08-04 PROCEDURE — 2709999900 HC NON-CHARGEABLE SUPPLY: Performed by: SURGERY

## 2021-08-04 PROCEDURE — 74011250636 HC RX REV CODE- 250/636: Performed by: NURSE ANESTHETIST, CERTIFIED REGISTERED

## 2021-08-04 PROCEDURE — 74011000250 HC RX REV CODE- 250: Performed by: NURSE ANESTHETIST, CERTIFIED REGISTERED

## 2021-08-04 PROCEDURE — 77030005513 HC CATH URETH FOL11 MDII -B

## 2021-08-04 PROCEDURE — 76210000021 HC REC RM PH II 0.5 TO 1 HR: Performed by: SURGERY

## 2021-08-04 PROCEDURE — 81001 URINALYSIS AUTO W/SCOPE: CPT

## 2021-08-04 PROCEDURE — 74011000250 HC RX REV CODE- 250: Performed by: EMERGENCY MEDICINE

## 2021-08-04 PROCEDURE — 77030031139 HC SUT VCRL2 J&J -A: Performed by: SURGERY

## 2021-08-04 PROCEDURE — 76210000006 HC OR PH I REC 0.5 TO 1 HR: Performed by: SURGERY

## 2021-08-04 PROCEDURE — 76060000034 HC ANESTHESIA 1.5 TO 2 HR: Performed by: SURGERY

## 2021-08-04 PROCEDURE — 77030040361 HC SLV COMPR DVT MDII -B: Performed by: SURGERY

## 2021-08-04 PROCEDURE — 99282 EMERGENCY DEPT VISIT SF MDM: CPT

## 2021-08-04 PROCEDURE — 76010000153 HC OR TIME 1.5 TO 2 HR: Performed by: SURGERY

## 2021-08-04 PROCEDURE — 49568 PR IMPLANTATION OF MESH OR OTHER PROSTHESIS FOR OPEN INCISIONAL OR VENTRAL HERNIA REPAIR OR MESH FOR CLOSURE OF DEBRIDEMENT FOR NECROTIZING SOFT TISSUE INFECTION: CPT | Performed by: SURGERY

## 2021-08-04 PROCEDURE — 77030013079 HC BLNKT BAIR HGGR 3M -A: Performed by: NURSE ANESTHETIST, CERTIFIED REGISTERED

## 2021-08-04 PROCEDURE — 77030002996 HC SUT SLK J&J -A: Performed by: SURGERY

## 2021-08-04 PROCEDURE — 77030008684 HC TU ET CUF COVD -B: Performed by: NURSE ANESTHETIST, CERTIFIED REGISTERED

## 2021-08-04 PROCEDURE — 49505 PRP I/HERN INIT REDUC >5 YR: CPT | Performed by: SURGERY

## 2021-08-04 PROCEDURE — 77030026438 HC STYL ET INTUB CARD -A: Performed by: NURSE ANESTHETIST, CERTIFIED REGISTERED

## 2021-08-04 PROCEDURE — 49560 PR REPAIR INCISIONAL HERNIA,REDUCIBLE: CPT | Performed by: SURGERY

## 2021-08-04 PROCEDURE — 74011250637 HC RX REV CODE- 250/637: Performed by: SURGERY

## 2021-08-04 DEVICE — BARD SOFT MESH, 3" X 6" (7.5 CM X 15 CM)
Type: IMPLANTABLE DEVICE | Site: INGUINAL | Status: FUNCTIONAL
Brand: BARD

## 2021-08-04 DEVICE — POLYPROPYLENE NONABSORBABLE SYNTHETIC SURGICAL MESH
Type: IMPLANTABLE DEVICE | Site: UMBILICAL | Status: FUNCTIONAL
Brand: PROCEED

## 2021-08-04 RX ORDER — SODIUM CHLORIDE 0.9 % (FLUSH) 0.9 %
5-40 SYRINGE (ML) INJECTION AS NEEDED
Status: DISCONTINUED | OUTPATIENT
Start: 2021-08-04 | End: 2021-08-04 | Stop reason: HOSPADM

## 2021-08-04 RX ORDER — FENTANYL CITRATE 50 UG/ML
INJECTION, SOLUTION INTRAMUSCULAR; INTRAVENOUS AS NEEDED
Status: DISCONTINUED | OUTPATIENT
Start: 2021-08-04 | End: 2021-08-04 | Stop reason: HOSPADM

## 2021-08-04 RX ORDER — DIPHENHYDRAMINE HYDROCHLORIDE 50 MG/ML
12.5 INJECTION, SOLUTION INTRAMUSCULAR; INTRAVENOUS AS NEEDED
Status: DISCONTINUED | OUTPATIENT
Start: 2021-08-04 | End: 2021-08-04 | Stop reason: HOSPADM

## 2021-08-04 RX ORDER — OXYCODONE AND ACETAMINOPHEN 5; 325 MG/1; MG/1
TABLET ORAL
Status: DISCONTINUED
Start: 2021-08-04 | End: 2021-08-04 | Stop reason: HOSPADM

## 2021-08-04 RX ORDER — TAMSULOSIN HYDROCHLORIDE 0.4 MG/1
0.4 CAPSULE ORAL DAILY
Qty: 7 CAPSULE | Refills: 0 | Status: SHIPPED | OUTPATIENT
Start: 2021-08-04 | End: 2021-08-11

## 2021-08-04 RX ORDER — ROCURONIUM BROMIDE 10 MG/ML
INJECTION, SOLUTION INTRAVENOUS AS NEEDED
Status: DISCONTINUED | OUTPATIENT
Start: 2021-08-04 | End: 2021-08-04 | Stop reason: HOSPADM

## 2021-08-04 RX ORDER — NEOSTIGMINE METHYLSULFATE 1 MG/ML
INJECTION, SOLUTION INTRAVENOUS AS NEEDED
Status: DISCONTINUED | OUTPATIENT
Start: 2021-08-04 | End: 2021-08-04 | Stop reason: HOSPADM

## 2021-08-04 RX ORDER — DEXAMETHASONE SODIUM PHOSPHATE 4 MG/ML
INJECTION, SOLUTION INTRA-ARTICULAR; INTRALESIONAL; INTRAMUSCULAR; INTRAVENOUS; SOFT TISSUE AS NEEDED
Status: DISCONTINUED | OUTPATIENT
Start: 2021-08-04 | End: 2021-08-04 | Stop reason: HOSPADM

## 2021-08-04 RX ORDER — HYDROMORPHONE HYDROCHLORIDE 1 MG/ML
0.2 INJECTION, SOLUTION INTRAMUSCULAR; INTRAVENOUS; SUBCUTANEOUS
Status: DISCONTINUED | OUTPATIENT
Start: 2021-08-04 | End: 2021-08-04 | Stop reason: HOSPADM

## 2021-08-04 RX ORDER — PROPOFOL 10 MG/ML
INJECTION, EMULSION INTRAVENOUS AS NEEDED
Status: DISCONTINUED | OUTPATIENT
Start: 2021-08-04 | End: 2021-08-04 | Stop reason: HOSPADM

## 2021-08-04 RX ORDER — POLYETHYLENE GLYCOL 3350 17 G/17G
17 POWDER, FOR SOLUTION ORAL 2 TIMES DAILY
Qty: 60 PACKET | Refills: 0 | Status: SHIPPED | OUTPATIENT
Start: 2021-08-04 | End: 2021-09-01 | Stop reason: SDUPTHER

## 2021-08-04 RX ORDER — SODIUM CHLORIDE, SODIUM LACTATE, POTASSIUM CHLORIDE, CALCIUM CHLORIDE 600; 310; 30; 20 MG/100ML; MG/100ML; MG/100ML; MG/100ML
25 INJECTION, SOLUTION INTRAVENOUS CONTINUOUS
Status: DISCONTINUED | OUTPATIENT
Start: 2021-08-04 | End: 2021-08-04 | Stop reason: HOSPADM

## 2021-08-04 RX ORDER — OXYCODONE AND ACETAMINOPHEN 5; 325 MG/1; MG/1
1 TABLET ORAL
Qty: 15 TABLET | Refills: 0 | Status: SHIPPED | OUTPATIENT
Start: 2021-08-04 | End: 2021-08-08

## 2021-08-04 RX ORDER — LIDOCAINE HYDROCHLORIDE 10 MG/ML
0.1 INJECTION, SOLUTION EPIDURAL; INFILTRATION; INTRACAUDAL; PERINEURAL AS NEEDED
Status: DISCONTINUED | OUTPATIENT
Start: 2021-08-04 | End: 2021-08-04 | Stop reason: HOSPADM

## 2021-08-04 RX ORDER — BUPIVACAINE HYDROCHLORIDE AND EPINEPHRINE 2.5; 5 MG/ML; UG/ML
INJECTION, SOLUTION EPIDURAL; INFILTRATION; INTRACAUDAL; PERINEURAL AS NEEDED
Status: DISCONTINUED | OUTPATIENT
Start: 2021-08-04 | End: 2021-08-04 | Stop reason: HOSPADM

## 2021-08-04 RX ORDER — POLYETHYLENE GLYCOL 3350 17 G/17G
17 POWDER, FOR SOLUTION ORAL 2 TIMES DAILY
Qty: 60 PACKET | Refills: 0 | Status: SHIPPED | OUTPATIENT
Start: 2021-08-04 | End: 2021-09-03

## 2021-08-04 RX ORDER — OXYCODONE AND ACETAMINOPHEN 5; 325 MG/1; MG/1
1 TABLET ORAL ONCE
Status: COMPLETED | OUTPATIENT
Start: 2021-08-04 | End: 2021-08-04

## 2021-08-04 RX ORDER — GLYCOPYRROLATE 0.2 MG/ML
INJECTION INTRAMUSCULAR; INTRAVENOUS AS NEEDED
Status: DISCONTINUED | OUTPATIENT
Start: 2021-08-04 | End: 2021-08-04 | Stop reason: HOSPADM

## 2021-08-04 RX ORDER — SODIUM CHLORIDE 0.9 % (FLUSH) 0.9 %
5-40 SYRINGE (ML) INJECTION EVERY 8 HOURS
Status: DISCONTINUED | OUTPATIENT
Start: 2021-08-04 | End: 2021-08-04 | Stop reason: HOSPADM

## 2021-08-04 RX ORDER — EPHEDRINE SULFATE/0.9% NACL/PF 50 MG/5 ML
SYRINGE (ML) INTRAVENOUS AS NEEDED
Status: DISCONTINUED | OUTPATIENT
Start: 2021-08-04 | End: 2021-08-04 | Stop reason: HOSPADM

## 2021-08-04 RX ORDER — FENTANYL CITRATE 50 UG/ML
25 INJECTION, SOLUTION INTRAMUSCULAR; INTRAVENOUS
Status: DISCONTINUED | OUTPATIENT
Start: 2021-08-04 | End: 2021-08-04 | Stop reason: HOSPADM

## 2021-08-04 RX ORDER — LIDOCAINE HYDROCHLORIDE 20 MG/ML
JELLY TOPICAL
Status: COMPLETED | OUTPATIENT
Start: 2021-08-04 | End: 2021-08-04

## 2021-08-04 RX ORDER — LIDOCAINE HYDROCHLORIDE 20 MG/ML
INJECTION, SOLUTION EPIDURAL; INFILTRATION; INTRACAUDAL; PERINEURAL AS NEEDED
Status: DISCONTINUED | OUTPATIENT
Start: 2021-08-04 | End: 2021-08-04 | Stop reason: HOSPADM

## 2021-08-04 RX ADMIN — Medication 10 MG: at 07:36

## 2021-08-04 RX ADMIN — ROCURONIUM BROMIDE 30 MG: 10 INJECTION INTRAVENOUS at 07:25

## 2021-08-04 RX ADMIN — FENTANYL CITRATE 25 MCG: 50 INJECTION, SOLUTION INTRAMUSCULAR; INTRAVENOUS at 09:19

## 2021-08-04 RX ADMIN — GLYCOPYRROLATE 0.2 MG: 0.2 INJECTION, SOLUTION INTRAMUSCULAR; INTRAVENOUS at 07:31

## 2021-08-04 RX ADMIN — PROPOFOL 10 MG: 10 INJECTION, EMULSION INTRAVENOUS at 08:26

## 2021-08-04 RX ADMIN — ROCURONIUM BROMIDE 5 MG: 10 INJECTION INTRAVENOUS at 08:35

## 2021-08-04 RX ADMIN — PROPOFOL 200 MG: 10 INJECTION, EMULSION INTRAVENOUS at 07:25

## 2021-08-04 RX ADMIN — NEOSTIGMINE METHYLSULFATE 3 MG: 1 INJECTION, SOLUTION INTRAVENOUS at 08:45

## 2021-08-04 RX ADMIN — LIDOCAINE HYDROCHLORIDE: 20 JELLY TOPICAL at 18:52

## 2021-08-04 RX ADMIN — ROCURONIUM BROMIDE 5 MG: 10 INJECTION INTRAVENOUS at 08:25

## 2021-08-04 RX ADMIN — GLYCOPYRROLATE 0.2 MG: 0.2 INJECTION, SOLUTION INTRAMUSCULAR; INTRAVENOUS at 07:36

## 2021-08-04 RX ADMIN — DEXAMETHASONE SODIUM PHOSPHATE 8 MG: 4 INJECTION, SOLUTION INTRAMUSCULAR; INTRAVENOUS at 08:33

## 2021-08-04 RX ADMIN — Medication 5 MG: at 07:56

## 2021-08-04 RX ADMIN — FENTANYL CITRATE 25 MCG: 50 INJECTION, SOLUTION INTRAMUSCULAR; INTRAVENOUS at 09:25

## 2021-08-04 RX ADMIN — WATER 2 G: 1 INJECTION INTRAMUSCULAR; INTRAVENOUS; SUBCUTANEOUS at 07:32

## 2021-08-04 RX ADMIN — Medication 5 MG: at 07:55

## 2021-08-04 RX ADMIN — SODIUM CHLORIDE, POTASSIUM CHLORIDE, SODIUM LACTATE AND CALCIUM CHLORIDE 25 ML/HR: 600; 310; 30; 20 INJECTION, SOLUTION INTRAVENOUS at 07:05

## 2021-08-04 RX ADMIN — FENTANYL CITRATE 50 MCG: 50 INJECTION, SOLUTION INTRAMUSCULAR; INTRAVENOUS at 07:49

## 2021-08-04 RX ADMIN — ROCURONIUM BROMIDE 20 MG: 10 INJECTION INTRAVENOUS at 07:47

## 2021-08-04 RX ADMIN — Medication 3 AMPULE: at 07:06

## 2021-08-04 RX ADMIN — FENTANYL CITRATE 50 MCG: 50 INJECTION, SOLUTION INTRAMUSCULAR; INTRAVENOUS at 07:25

## 2021-08-04 RX ADMIN — LIDOCAINE HYDROCHLORIDE 60 MG: 20 INJECTION, SOLUTION EPIDURAL; INFILTRATION; INTRACAUDAL; PERINEURAL at 07:25

## 2021-08-04 RX ADMIN — OXYCODONE HYDROCHLORIDE AND ACETAMINOPHEN 1 TABLET: 5; 325 TABLET ORAL at 10:09

## 2021-08-04 RX ADMIN — SODIUM CHLORIDE 25 MCG/MIN: 900 INJECTION, SOLUTION INTRAVENOUS at 08:01

## 2021-08-04 RX ADMIN — GLYCOPYRROLATE 0.6 MG: 0.2 INJECTION, SOLUTION INTRAMUSCULAR; INTRAVENOUS at 08:45

## 2021-08-04 NOTE — H&P
HISTORY OF PRESENT ILLNESS  Phylicia Barraza is a 81 y.o. male who comes in for consultation by Jyotsna Hicks MD for a hernia  HPI  He developed severe abdominal pain last week. He noted a bulge in the mid abdomen and was able to push it back in and the pain went away. He feels ok to day.   He denies associated nausea, vomiting, diarrhea, constipation, melena, or hematochezia, dysuria or hematuria but has some urinary hesitancy and frequency.          Past Medical History:   Diagnosis Date    Allergy to ACE inhibitors 11/28/2017    Anxiety 11/28/2017    Arthritis      ASCVD (arteriosclerotic cardiovascular disease) 11/28/2017    CAD (coronary artery disease) 2000     MI     Cataract      Cataract 04/2019    Depression 11/28/2017    DJD (degenerative joint disease) 11/28/2017     pt denies    ED (erectile dysfunction) 11/28/2017    Elevated PSA 11/28/2017    Former smoker 11/28/2017    Glucose intolerance (impaired glucose tolerance) 11/28/2017    Hypercholesterolemia      Hypertension      Insomnia 11/28/2017    On statin therapy 11/28/2017    Other ill-defined conditions(799.89)       elevated lipids    Psychiatric disorder       depression /anxiety            Past Surgical History:   Procedure Laterality Date    COLONOSCOPY N/A 7/30/2019     COLONOSCOPY performed by Rosalinda Bowen MD at [facility] ENDOSCOPY    COLONOSCOPY,REMV DEMETRIS,SNARE   7/30/2019          COLORECTAL SCRN; HI RISK IND   9/10/2014          COLORECTAL SCRN; HI RISK IND   7/30/2019          HX HEENT   06/03/2019     left eye cataract    HX TONSILLECTOMY        WA ABDOMEN SURGERY PROC UNLISTED         hernia repair    WA CARDIAC SURG PROCEDURE UNLIST         stents x 6    WA COLSC FLX W/RMVL OF TUMOR POLYP LESION SNARE TQ   9/10/2014                  Family History   Problem Relation Age of Onset    Psychiatric Disorder Mother           depression    Heart Disease Father           aneurysym    Social History            Tobacco Use    Smoking status: Former Smoker       Packs/day: 1.50       Years: 5.00       Pack years: 7.50       Quit date: 36       Years since quittin.4    Smokeless tobacco: Never Used    Tobacco comment: smoke for 5 year    Vaping Use    Vaping Use: Never used   Substance Use Topics    Alcohol use: No    Drug use: No           Current Outpatient Medications   Medication Sig    hydrALAZINE (APRESOLINE) 50 mg tablet TAKE 1 TABLET BY MOUTH TWICE DAILY    rosuvastatin (CRESTOR) 20 mg tablet TAKE 1 TABLET EVERY DAY    escitalopram oxalate (LEXAPRO) 10 mg tablet TAKE 1 TABLET EVERY DAY    amLODIPine (NORVASC) 5 mg tablet TAKE 1 TABLET EVERY DAY    vit C/E/Zn/coppr/lutein/zeaxan (PRESERVISION AREDS-2 PO) Take 2 Caps by mouth.  krill-omega-3-dha-epa-lipids 502-54-73-93 mg cap Take  by mouth.  ezetimibe (ZETIA) 10 mg tablet Take  by mouth daily.  nebivolol (BYSTOLIC) 5 mg tablet Take 5 mg by mouth daily.  aspirin 81 mg tablet Take 81 mg by mouth daily.  cholecalciferol, vitamin d3, (VITAMIN D) 1,000 unit tablet Take 2,000 Units by mouth daily.      No current facility-administered medications for this visit.           Allergies   Allergen Reactions    Benazepril Rash and Swelling    Pork/Porcine Containing Products Hives         Review of Systems   Constitutional: Negative for chills, diaphoresis, fever, malaise/fatigue and weight loss. HENT: Negative for congestion, ear pain and sore throat. Eyes: Negative for blurred vision and pain. Respiratory: Negative for cough, hemoptysis, sputum production, shortness of breath, wheezing and stridor. Cardiovascular: Negative for chest pain, palpitations, orthopnea, claudication, leg swelling and PND. Gastrointestinal: Positive for abdominal pain. Negative for blood in stool, constipation, diarrhea, heartburn, melena, nausea and vomiting. Genitourinary: Positive for frequency and urgency.  Negative for dysuria, flank pain and hematuria. Musculoskeletal: Negative for back pain, joint pain, myalgias and neck pain. Skin: Negative for itching and rash. Neurological: Negative for dizziness, tremors, focal weakness, seizures, weakness and headaches. Endo/Heme/Allergies: Negative for polydipsia. Psychiatric/Behavioral: Negative for depression and memory loss. The patient is not nervous/anxious.       Visit Vitals  /72 (BP 1 Location: Left arm, BP Patient Position: Sitting)   Pulse (!) 46   Temp 97.3 °F (36.3 °C) (Temporal)   Ht 5' 7\" (1.702 m)   Wt 80.3 kg (177 lb)   SpO2 98%   BMI 27.72 kg/m²         Physical Exam  Constitutional:       General: He is not in acute distress. Appearance: Normal appearance. He is well-developed. He is not diaphoretic. HENT:      Head: Normocephalic and atraumatic. Mouth/Throat:      Pharynx: No oropharyngeal exudate. Eyes:      General: No scleral icterus. Conjunctiva/sclera: Conjunctivae normal.      Pupils: Pupils are equal, round, and reactive to light. Neck:      Thyroid: No thyromegaly. Trachea: No tracheal deviation. Cardiovascular:      Rate and Rhythm: Normal rate and regular rhythm. Heart sounds: Normal heart sounds. No murmur heard. No friction rub. No gallop. Pulmonary:      Effort: Pulmonary effort is normal. No respiratory distress. Breath sounds: Normal breath sounds. No stridor. No wheezing or rales. Abdominal:      General: Bowel sounds are normal. There is no distension. Palpations: Abdomen is soft. There is no mass. Tenderness: There is no abdominal tenderness. There is no guarding or rebound. Hernia: A hernia is present. Hernia is present in the ventral area and right inguinal area. There is no hernia in the left inguinal area. Genitourinary:     Penis: Circumcised. Testes: Normal. Cremasteric reflex is present. Musculoskeletal:         General: No tenderness.  Normal range of motion. Cervical back: Normal range of motion and neck supple. Lymphadenopathy:      Cervical: No cervical adenopathy. Skin:     General: Skin is warm and dry. Findings: No erythema or rash. Neurological:      Mental Status: He is alert and oriented to person, place, and time. Cranial Nerves: No cranial nerve deficit. Coordination: Coordination normal.   Psychiatric:         Behavior: Behavior normal.         Thought Content: Thought content normal.         Judgment: Judgment normal.            ASSESSMENT and PLAN  1. Ventral and right inguinal herniae. I explained about the anatomy and pathophysiology of hernias and the risk of incarceration and strangulation of the bowel. I explained about hernia repairs (open with and without mesh, and robotic assisted and laparoscopic with mesh). I explained the risks and benefits of repair including bleeding, infection, chronic pain, orchalgia, loss of testes, bowel or bladder injury, hernia recurrence, seroma, mesh infection requiring removal.  I explained it would be a six to eight week recuperation with no driving for 5 - 7 days, no lifting for six weeks.       2.  CAD  Hx PCI x 6. No CP  He has not seen Dr Jermaine Catalan in 3 years per patient report  Needs cardiac assessment prior to surgery     3.  Essential hypertension. Stable on rx  4. Depression controlled on rx  5.   Social  Planning hunting trip to [facility] in mid July and another to [facility] [place] [facility] in the fall     He wishes to proceed with a ventral hernia repair with mesh and right inguinal hernia repair with mesh under general anesthesia as an outpatient           Leno Garcia MD FACS

## 2021-08-04 NOTE — DISCHARGE INSTRUCTIONS
Please use the medicine flowmax. Please follow-up with your primary care doctor or urologist to have the catheter removed, can be removed in the ER as well if unable to make appointment. Please return for pain or fever at any time.

## 2021-08-04 NOTE — PERIOP NOTES
Handoff Report from Operating Room to PACU  0900  Report received from [facility] and LUIS ARMANDO Riley CRNA regarding Rachel Silvas. Surgeon(s):  Keith Lynne MD  And Procedure(s) (LRB):  VENTRAL HERNIA REPAIR WITH MESH AND RIGHT INGUINAL HERNIA REPAIR WITH MESH (Right)  confirmed   with allergies and dressings discussed. Anesthesia type, drugs, patient history, complications, estimated blood loss, vital signs, intake and output, and last pain medication, lines and temperature were reviewed. 9:45 AM  Report off to [facility] RN for tx to Phase II for discharge.

## 2021-08-04 NOTE — ANESTHESIA POSTPROCEDURE EVALUATION
Procedure(s):  VENTRAL HERNIA REPAIR WITH MESH AND RIGHT INGUINAL HERNIA REPAIR WITH MESH. general    Anesthesia Post Evaluation        Patient location during evaluation: PACU  Note status: Adequate. Level of consciousness: responsive to verbal stimuli and sleepy but conscious  Pain management: satisfactory to patient  Airway patency: patent  Anesthetic complications: no  Cardiovascular status: acceptable  Respiratory status: acceptable  Hydration status: acceptable  Comments: +Post-Anesthesia Evaluation and Assessment    Patient: Alma Hamilton MRN: [id number]  SSN: [id number]   YOB: 1935  Age: 81 y.o. Sex: male      Cardiovascular Function/Vital Signs    /70   Pulse (!) 58   Temp 36.6 °C (97.9 °F)   Resp 14   Ht 5' 7\" (1.702 m)   Wt 80 kg (176 lb 5.9 oz)   SpO2 98%   BMI 27.62 kg/m²     Patient is status post Procedure(s):  VENTRAL HERNIA REPAIR WITH MESH AND RIGHT INGUINAL HERNIA REPAIR WITH MESH. Nausea/Vomiting: Controlled. Postoperative hydration reviewed and adequate. Pain:  Pain Scale 1: Visual (08/04/21 0930)  Pain Intensity 1: 0 (08/04/21 0930)   Managed. Neurological Status:   Neuro (WDL): Exceptions to WDL (gait instability POA s/p hx stroke) (08/04/21 0900)   At baseline. Mental Status and Level of Consciousness: Arousable. Pulmonary Status:   O2 Device: None (Room air) (08/04/21 0930)   Adequate oxygenation and airway patent. Complications related to anesthesia: None    Post-anesthesia assessment completed. No concerns. Signed By: Shauna Ambrose MD    8/4/2021  Post anesthesia nausea and vomiting:  controlled      INITIAL Post-op Vital signs:   Vitals Value Taken Time   /70 08/04/21 0930   Temp 36.6 °C (97.9 °F) 08/04/21 0930   Pulse 57 08/04/21 0944   Resp 13 08/04/21 0944   SpO2 94 % 08/04/21 0944   Vitals shown include unvalidated device data.

## 2021-08-04 NOTE — ED NOTES
Leg bag placed, patient discharged, discharge instructions provided to patient and reviewed, all questions answered.  Patient ambulatory on discharge

## 2021-08-04 NOTE — ED PROVIDER NOTES
EMERGENCY DEPARTMENT HISTORY AND PHYSICAL EXAM      Date: 8/4/2021  Patient Name: Rhonda Abdul    History of Presenting Illness     Chief Complaint   Patient presents with    Post OP Complication     pt ambulatory to triage. pt reports he had a hernia repair this morning. pt was discharged from the hospital around 1030 and has not been able to pee since. pt reports he does not feel like he needs to urinate but has tried and is unable to.  Urinary Retention       History Provided By: Patient and Patient's Wife    HPI: Rhonda Abdul, 81 y.o. male presents to the ED with cc of difficulty urinating    60-year-old male with history of prostatic enlargement presents emergency department with difficulty urinating. Patient is postop day 0 from a bilateral inguinal hernia surgery repair. Patient of Dr. Jessica Cr. Patient had uncomplicated surgery and was discharged with precautions that if he did not urinate by 3 to seek care. Patient reports that 3 he was unable to urinate, despite attempts, he is unable to pass with a small amount of urine. Denies pain. Denies fever. Denies difficulty with bowel movements. Denies chest pain or shortness of breath. In triage, patient is able to pass a small amount of urine, but post void residual was notable for 450 cc. There are no other complaints, changes, or physical findings at this time.     PCP: Cee Dangelo MD    Current Facility-Administered Medications on File Prior to Encounter   Medication Dose Route Frequency Provider Last Rate Last Admin    [COMPLETED] alcohol 62% (NOZIN) nasal  3 Ampule  3 Ampule Topical ONCE Kel Arriaga MD   3 Ampule at 08/04/21 0706    [COMPLETED] ceFAZolin (ANCEF) 2 g in sterile water (preservative free) 20 mL IV syringe  2 g IntraVENous ONCE Kel Arriaga MD   2 g at 08/04/21 0732    [COMPLETED] oxyCODONE-acetaminophen (PERCOCET) 5-325 mg per tablet 1 Tablet  1 Tablet Oral ONCE Guadalupe Gonsalves MD   1 Tablet at 08/04/21 1009    [DISCONTINUED] lactated Ringers infusion  25 mL/hr IntraVENous CONTINUOUS Elvin Hernandez MD 25 mL/hr at 08/04/21 0721 NoRateChange at 08/04/21 0721    [DISCONTINUED] sodium chloride (NS) flush 5-40 mL  5-40 mL IntraVENous Q8H Patricia Nguyen MD        [DISCONTINUED] sodium chloride (NS) flush 5-40 mL  5-40 mL IntraVENous PRN Elvin Hernandez MD        [DISCONTINUED] lidocaine (PF) (XYLOCAINE) 10 mg/mL (1 %) injection 0.1 mL  0.1 mL SubCUTAneous PRN Elvin Hernandez MD        [DISCONTINUED] lactated Ringers infusion  25 mL/hr IntraVENous CONTINUOUS Elvin Hernandez MD        [DISCONTINUED] sodium chloride (NS) flush 5-40 mL  5-40 mL IntraVENous Q8H Patricia Nguyen MD        [DISCONTINUED] sodium chloride (NS) flush 5-40 mL  5-40 mL IntraVENous PRN Elvin Hernandez MD        [DISCONTINUED] fentaNYL citrate (PF) injection 25 mcg  25 mcg IntraVENous Multiple Elvinscarlet Hernandez MD   25 mcg at 08/04/21 2318    [DISCONTINUED] HYDROmorphone (DILAUDID) injection 0.2 mg  0.2 mg IntraVENous Multiple Elvin Hernandez MD        [DISCONTINUED] diphenhydrAMINE (BENADRYL) injection 12.5 mg  12.5 mg IntraVENous PRN Elvin Hernandez MD        [DISCONTINUED] meperidine (DEMEROL) injection 12.5 mg  12.5 mg IntraVENous Q5MIN PRN Elvin Hernandez MD        [DISCONTINUED] glycopyrrolate (ROBINUL) injection   IntraVENous PRN Lovena Marco Antonioe, CRNA   0.6 mg at 08/04/21 0845    [DISCONTINUED] lidocaine (PF) (XYLOCAINE) 20 mg/mL (2 %) injection   IntraVENous PRN Lovena Fudge, CRNA   60 mg at 08/04/21 0725    [DISCONTINUED] rocuronium injection   IntraVENous PRN Caplette, Temple, CRNA   5 mg at 08/04/21 0045    [DISCONTINUED] propofoL (DIPRIVAN) 10 mg/mL injection   IntraVENous PRN Lovena Fudge, CRNA   10 mg at 08/04/21 3931    [DISCONTINUED] fentaNYL citrate (PF) injection   IntraVENous PRN Andres Bautista CRNA   50 mcg at 08/04/21 65    [DISCONTINUED] ePHEDrine in NS (PF) (MISTOLE) 10 mg/mL in NS syringe   IntraVENous PRN Monica Riley, CRNA   5 mg at 08/04/21 0756    [DISCONTINUED] PHENYLephrine (TAMMIE-SYNEPHRINE) 10 mg in 0.9% sodium chloride 250 mL infusion   IntraVENous CONTINUOUS Marielena Flood CRNA   Stopped at 08/04/21 0432    [DISCONTINUED] bupivacaine 0.25% -EPINEPHrine 1:200,000 (SENSORCAINE) 0.25 %-1:200,000 injection    PRN Ronn Sepulveda MD   30 mL at 08/04/21 0846    [DISCONTINUED] dexamethasone (DECADRON) 4 mg/mL injection   IntraVENous PRN Marielena Flood, CRNA   8 mg at 08/04/21 0764    [DISCONTINUED] neostigmine (PROSTIGMINE) 1 mg/mL syringe   IntraVENous PRN Marielena Flood, CRNA   3 mg at 08/04/21 0845    [DISCONTINUED] oxyCODONE-acetaminophen (PERCOCET) 5-325 mg per tablet              Current Outpatient Medications on File Prior to Encounter   Medication Sig Dispense Refill    oxyCODONE-acetaminophen (PERCOCET) 5-325 mg per tablet Take 1 Tablet by mouth every six (6) hours as needed for Pain for up to 4 days. Max Daily Amount: 4 Tablets. 15 Tablet 0    polyethylene glycol (MIRALAX) 17 gram packet Take 1 Packet by mouth two (2) times a day for 30 days. Stop if having diarrhea 60 Packet 0    oxyCODONE-acetaminophen (PERCOCET) 5-325 mg per tablet Take 1 Tablet by mouth every six (6) hours as needed for Pain for up to 4 days. Max Daily Amount: 4 Tablets. 15 Tablet 0    polyethylene glycol (MIRALAX) 17 gram packet Take 1 Packet by mouth two (2) times a day for 30 days. Stop if having diarrhea 60 Packet 0    escitalopram oxalate (LEXAPRO) 10 mg tablet Take 10 mg by mouth daily.       hydrALAZINE (APRESOLINE) 50 mg tablet TAKE 1 TABLET BY MOUTH TWICE DAILY 180 Tab 3    rosuvastatin (CRESTOR) 20 mg tablet TAKE 1 TABLET EVERY DAY 90 Tab 3    amLODIPine (NORVASC) 5 mg tablet TAKE 1 TABLET EVERY DAY 90 Tab 3    vit C/E/Zn/coppr/lutein/zeaxan (PRESERVISION AREDS-2 PO) Take 2 Capsules by mouth two (2) times a day.      krill-omega-3-dha-epa-lipids 250-49-67-73 mg cap Take  by mouth two (2) times a day.  ezetimibe (ZETIA) 10 mg tablet Take  by mouth daily.  nebivolol (BYSTOLIC) 5 mg tablet Take 5 mg by mouth daily.  aspirin 81 mg tablet Take 81 mg by mouth daily.  cholecalciferol, vitamin d3, (VITAMIN D) 1,000 unit tablet Take 2,000 Units by mouth daily.          Past History     Past Medical History:  Past Medical History:   Diagnosis Date    Allergy to ACE inhibitors 11/28/2017    Anxiety 11/28/2017    ASCVD (arteriosclerotic cardiovascular disease) 11/28/2017    CAD (coronary artery disease) 2000    MI     Cataract     Cataract 04/2019    Depression 11/28/2017    ED (erectile dysfunction) 11/28/2017    Elevated PSA 11/28/2017    Former smoker 11/28/2017    Glucose intolerance (impaired glucose tolerance) 11/28/2017    Hypercholesterolemia     Hypertension     Insomnia 11/28/2017    On statin therapy 11/28/2017    Other ill-defined conditions(799.89)     elevated lipids    Psychiatric disorder     depression /anxiety    Stroke [facility])     TIA       Past Surgical History:  Past Surgical History:   Procedure Laterality Date    COLONOSCOPY N/A 7/30/2019    COLONOSCOPY performed by Juan Pablo [facility] at [facility]  7/30/2019         COLORECTAL SCRN; HI RISK IND  9/10/2014         COLORECTAL SCRN; HI RISK IND  7/30/2019         HX HEENT Bilateral 06/03/2019    eye cataract    HX TONSILLECTOMY      ME ABDOMEN SURGERY PROC UNLISTED      hernia repair    ME CARDIAC SURG PROCEDURE UNLIST      stents x 6    ME COLSC FLX W/RMVL OF TUMOR POLYP LESION SNARE TQ  9/10/2014            Family History:  Family History   Problem Relation Age of Onset    Psychiatric Disorder Mother         depression    Heart Disease Father         aneurysym       Social History:  Social History     Tobacco Use    Smoking status: Former Smoker Packs/day: 1.50     Years: 5.00     Pack years: 7.50     Quit date: 36     Years since quittin.6    Smokeless tobacco: Never Used    Tobacco comment: smoke for 5 year    Vaping Use    Vaping Use: Never used   Substance Use Topics    Alcohol use: No    Drug use: No       Allergies: Allergies   Allergen Reactions    Benazepril Rash and Swelling    Pork/Porcine Containing Products Hives         Review of Systems   Review of Systems   Constitutional: Negative for fever. HENT: Negative for voice change. Eyes: Negative for pain and redness. Respiratory: Negative for cough and chest tightness. Cardiovascular: Negative for chest pain and leg swelling. Gastrointestinal: Negative for abdominal pain, diarrhea, nausea and vomiting. Genitourinary: Positive for difficulty urinating. Negative for hematuria. Musculoskeletal: Negative for gait problem. Skin: Negative for color change, pallor and rash. Neurological: Negative for facial asymmetry, weakness and headaches. Hematological: Does not bruise/bleed easily. Psychiatric/Behavioral: Negative for behavioral problems. All other systems reviewed and are negative. Physical Exam   Physical Exam  Vitals and nursing note reviewed. Constitutional:       Comments: 43-year-old male, resting in bed, no distress   HENT:      Head: Normocephalic. Right Ear: External ear normal.      Left Ear: External ear normal.      Nose: Nose normal.   Eyes:      Conjunctiva/sclera: Conjunctivae normal.   Cardiovascular:      Rate and Rhythm: Normal rate and regular rhythm. Heart sounds: No murmur heard. No friction rub. No gallop. Pulmonary:      Effort: Pulmonary effort is normal.      Breath sounds: Normal breath sounds. No wheezing, rhonchi or rales. Abdominal:      Palpations: Abdomen is soft. Tenderness: There is no abdominal tenderness. Comments:  There are healing laparoscopic scars of the patient's abdomen and inguinal area without active bleeding or discharge. There is mild ecchymosis noted   Musculoskeletal:         General: Normal range of motion. Skin:     General: Skin is warm. Capillary Refill: Capillary refill takes less than 2 seconds. Comments: Andino bag at bedside with 700 cc of yellow urine. Neurological:      Mental Status: He is alert. Mental status is at baseline. Psychiatric:         Mood and Affect: Mood normal.         Behavior: Behavior normal.         Diagnostic Study Results     Labs -     Recent Results (from the past 12 hour(s))   URINALYSIS W/ REFLEX CULTURE    Collection Time: 08/04/21  6:36 PM    Specimen: Urine   Result Value Ref Range    Color YELLOW/STRAW      Appearance CLEAR CLEAR      Specific gravity 1.022 1.003 - 1.030      pH (UA) 6.0 5.0 - 8.0      Protein TRACE (A) NEG mg/dL    Glucose Negative NEG mg/dL    Ketone Negative NEG mg/dL    Bilirubin Negative NEG      Blood Negative NEG      Urobilinogen 0.2 0.2 - 1.0 EU/dL    Nitrites Negative NEG      Leukocyte Esterase TRACE (A) NEG      WBC 0-4 0 - 4 /hpf    RBC 0-5 0 - 5 /hpf    Epithelial cells FEW FEW /lpf    Bacteria Negative NEG /hpf    UA:UC IF INDICATED CULTURE NOT INDICATED BY UA RESULT CNI         Radiologic Studies -   No orders to display     CT Results  (Last 48 hours)    None        CXR Results  (Last 48 hours)    None          Medical Decision Making   I am the first provider for this patient. I reviewed the vital signs, available nursing notes, past medical history, past surgical history, family history and social history. Vital Signs-Reviewed the patient's vital signs. Patient Vitals for the past 12 hrs:   Temp Pulse Resp BP SpO2   08/04/21 1702 98.1 °F (36.7 °C) (!) 56 18 (!) 177/78 97 %     Records Reviewed: Nursing Notes and Old Medical Records    Provider Notes (Medical Decision Making):     81-year-old male presents emergency department with acute urinary obstruction after anesthesia.   Suspect etiology is recent anesthesia and history of BPH. No other complaints. Urinalysis without UTI. Given return precautions including fever. Will discharge with leg bag and urology follow-up. Flomax trial.    ED Course:   Initial assessment performed. The patients presenting problems have been discussed, and they are in agreement with the care plan formulated and outlined with them. I have encouraged them to ask questions as they arise throughout their visit. Paulie Peacock MD      Disposition:    Discharged    DISCHARGE PLAN:  1. Discharge Medication List as of 8/4/2021  7:21 PM      START taking these medications    Details   tamsulosin (Flomax) 0.4 mg capsule Take 1 Capsule by mouth daily for 7 days. , Normal, Disp-7 Capsule, R-0         CONTINUE these medications which have NOT CHANGED    Details   !! oxyCODONE-acetaminophen (PERCOCET) 5-325 mg per tablet Take 1 Tablet by mouth every six (6) hours as needed for Pain for up to 4 days. Max Daily Amount: 4 Tablets., Normal, Disp-15 Tablet, R-0      !! polyethylene glycol (MIRALAX) 17 gram packet Take 1 Packet by mouth two (2) times a day for 30 days. Stop if having diarrhea, Normal, Disp-60 Packet, R-0      !! oxyCODONE-acetaminophen (PERCOCET) 5-325 mg per tablet Take 1 Tablet by mouth every six (6) hours as needed for Pain for up to 4 days. Max Daily Amount: 4 Tablets., Normal, Disp-15 Tablet, R-0      !! polyethylene glycol (MIRALAX) 17 gram packet Take 1 Packet by mouth two (2) times a day for 30 days. Stop if having diarrhea, Normal, Disp-60 Packet, R-0      escitalopram oxalate (LEXAPRO) 10 mg tablet Take 10 mg by mouth daily. , Historical Med      hydrALAZINE (APRESOLINE) 50 mg tablet TAKE 1 TABLET BY MOUTH TWICE DAILY, Normal, Disp-180 Tab, R-3      rosuvastatin (CRESTOR) 20 mg tablet TAKE 1 TABLET EVERY DAY, Normal, Disp-90 Tab, R-3      amLODIPine (NORVASC) 5 mg tablet TAKE 1 TABLET EVERY DAY, Normal, Disp-90 Tab, R-3      vit C/E/Zn/coppr/lutein/zeaxan (PRESERVISION AREDS-2 PO) Take 2 Capsules by mouth two (2) times a day., Historical Med      krill-omega-3-dha-epa-lipids 347-76-52-38 mg cap Take  by mouth two (2) times a day., Historical Med      ezetimibe (ZETIA) 10 mg tablet Take  by mouth daily. , Historical Med      nebivolol (BYSTOLIC) 5 mg tablet Take 5 mg by mouth daily. , Historical Med      aspirin 81 mg tablet Take 81 mg by mouth daily. , Historical Med      cholecalciferol, vitamin d3, (VITAMIN D) 1,000 unit tablet Take 2,000 Units by mouth daily. , Historical Med       !! - Potential duplicate medications found. Please discuss with provider. 2.   Follow-up Information     Follow up With Specialties Details Why Contact Info    Lonnie Wilson MD Internal Medicine In 3 days  [address] [address] [phone]      [facility]  In 1 week  [address] [place] [address]        3. Return to ED if worse     Diagnosis     Clinical Impression:   1. Acute urinary retention        Attestations:    Mark Masters MD    Please note that this dictation was completed with [employer], the [employer] voice recognition software. Quite often unanticipated grammatical, syntax, homophones, and other interpretive errors are inadvertently transcribed by the computer software. Please disregard these errors. Please excuse any errors that have escaped final proofreading. Thank you.

## 2021-08-04 NOTE — BRIEF OP NOTE
Brief Postoperative Note    Patient: Isabel Porter  YOB: 1935  MRN: [id number]    Date of Procedure: 8/4/2021     Pre-Op Diagnosis: VENTRAL HERNIA AND RIGHT INGUINAL HERNIA    Post-Op Diagnosis: Same as preoperative diagnosis. Procedure(s):  VENTRAL HERNIA REPAIR WITH MESH AND RIGHT INGUINAL HERNIA REPAIR WITH MESH    Surgeon(s):  Abbie Loredo MD    Surgical Assistant: Surg Asst-1: Micviriel Melissa    Anesthesia: General     Estimated Blood Loss (mL): less than 50     Complications: None    Specimens:   ID Type Source Tests Collected by Time Destination   1 : Hernia sac and lypoma of the cord Preservative Inguinal  Abbie Loredo MD 8/4/2021 2386 Pathology   2 : Ventral hernia sac Preservative Abdomen  Abbie Loredo MD 8/4/2021 9819 Pathology        Implants:   Implant Name Type Inv. Item Serial No.  Lot No. LRB No. Used Action   MESH ODILIA [id number] INGUINAL POLYPR SYN FLAT L PORE MFIL - SNA  MESH ODILIA [id number] INGUINAL POLYPR SYN FLAT L PORE MFIL NA BARD DAVOL_WD [id number] Right 1 Implanted   MESH SURG W4.3XL4. 3CM CIR VENTRAL PTCH FOR TISS  - SNA  MESH SURG H5.4LN5. 3CM CIR VENTRAL PTCH FOR TISS  NA [employer]_WD [id number] N/A 1 Implanted       Drains: * No LDAs found *    Findings: direct, indirect RIH and ventral herniae    Electronically Signed by Karlee Elizondo MD on 8/4/2021 at 9:00 AM

## 2021-08-04 NOTE — DISCHARGE INSTRUCTIONS
Discharge Instructions:  Hernia Repair    Dr. Nancy Andrew    Call for appointment for follow up in 2-3 weeks [phone]    Activity:    Walk regularly. No lifting more than 10 pounds for 6 weeks. Light aerobic activity is okay when you feel up to it. You may resume driving in five days unless still requiring narcotics for pain. Work:    N/A    Diet:    You may resume normal diet after 24 hours. Anesthesia and narcotics may cause nausea and vomiting. If persistent please call the office. Wound Care: You have a special dressing called Dermabond. It is okay to shower and let the water run over the incisions but do not scrub the area or soak in a tub. If you have a small amount of drainage you may place a dry bandage over the wound and change it daily. If you experience a lot of drainage, develop redness around the wound, or a fever over 101 F occurs please call the office. May use ice over incision for comfort. Medications:    Resume home medications as indicated on the Medical Reconciliation form. Aspirin and Coumadin can be restarted immediately if you were taking them preoperatively. If taking Plavix do not restart it until post operative day 2. Pain medications:  A narcotic prescription will also be given for breakthrough pain. Over the counter stool softeners and laxatives may be used if needed. Do not hesitate to call with questions or concerns. Patient Education      Oxycodone/Acetaminophen (Percocet, Roxicet) - (By mouth)   Why this medicine is used:   Treats pain. This medicine contains a narcotic pain reliever.   Contact a nurse or doctor right away if you have:  · Extreme weakness, shallow breathing, slow heartbeat  · Sweating or cold, clammy skin  · Skin blisters, rash, or peeling     Common side effects:  · Constipation  · Nausea, vomiting  · Tiredness  © 2017 [employer] Information is for End User's use only and may not be sold, redistributed or otherwise used for commercial purposes. How to Care for Your Wound After Its Treated With  DERMABOND* Topical Skin Adhesive  DERMABOND* Topical Skin Adhesive (2-octyl cyanoacrylate) is a sterile, liquid skin adhesive  that holds wound edges together. The film will usually remain in place for 5 to 10 days, then  naturally fall off your skin. The following will answer some of your questions and provide instructions for proper care for your  wound while it is healing:    CHECK WOUND APPEARANCE   Some swelling, redness, and pain are common with all wounds and normally will go away as the  wound heals. If swelling, redness, or pain increases or if the wound feels warm to the touch,  contact a doctor. Also contact a doctor if the wound edges reopen or separate. REPLACE BANDAGES   If your wound is bandaged, keep the bandage dry.  Replace the dressing daily until the adhesive film has fallen off or if the  bandage should become wet, unless otherwise instructed by your  physician.  When changing the dressing, do not place tape directly over the  DERMABOND adhesive film, because removing the tape later may also  remove the film. AVOID TOPICAL MEDICATIONS   Do not apply liquid or ointment medications or any other product to your wound while the  DERMABOND adhesive film is in place. These may loosen the film before your wound is healed. KEEP WOUND DRY AND PROTECTED   You may occasionally and briefly wet your wound in the shower or bath. Do not soak or scrub  your wound, do not swim, and avoid periods of heavy perspiration until the DERMABOND  adhesive has naturally fallen off. After showering or bathing, gently blot your wound dry with a  soft towel. If a protective dressing is being used, apply a fresh, dry bandage, being sure to keep  the tape off the DERMABOND adhesive film.  Apply a clean, dry bandage over the wound if necessary to protect it.    Protect your wound from injury until the skin has had sufficient time to heal.   Do not scratch, rub, or pick at the DERMABOND adhesive film. This may loosen the film before  your wound is healed.  Protect the wound from prolonged exposure to sunlight or tanning lamps while the film is in  place. If you have any questions or concerns about this product, please consult your doctor. *Trademark ©[employer] 2002  Patient Education        Abdominal Hernia Repair: What to Expect at Home  Your Recovery  After surgery to repair your hernia, you are likely to have pain for a few days. You may also feel tired and have less energy than normal. This is common. You should feel better after a few days and will probably feel much better in 7 days. For several weeks you may feel discomfort or pulling in the hernia repair when you move. You may have some bruising around the area of the repair. This is normal.  This care sheet gives you a general idea about how long it will take for you to recover. But each person recovers at a different pace. Follow the steps below to get better as quickly as possible. How can you care for yourself at home? Activity    · Rest when you feel tired. Getting enough sleep will help you recover.     · Try to walk each day. Start by walking a little more than you did the day before. Bit by bit, increase the amount you walk. Walking boosts blood flow and helps prevent pneumonia and constipation.     · If your doctor gives you an abdominal binder to wear, use it as directed. This is an elastic bandage that wraps around your belly and upper hips. It helps support your belly muscles after surgery.     · Avoid strenuous activities, such as biking, jogging, weight lifting, or aerobic exercise, until your doctor says it is okay.     · Avoid lifting anything that would make you strain.  This may include heavy grocery bags and milk containers, a heavy briefcase or backpack, cat litter or dog food bags, a vacuum , or a child.     · Ask your doctor when you can drive again.     · Most people are able to return to work within 1 to 2 weeks after surgery. But if your job requires that you do heavy lifting or strenuous activity, you may need to take 4 to 6 weeks off from work.     · You may shower 24 to 48 hours after surgery, if your doctor okays it. Pat the cut (incision) dry. Do not take a bath for the first 2 weeks, or until your doctor tells you it is okay.     · Ask your doctor when it is okay for you to have sex. Diet    · You can eat your normal diet. If your stomach is upset, try bland, low-fat foods like plain rice, broiled chicken, toast, and yogurt.     · Drink plenty of fluids (unless your doctor tells you not to).     · You may notice that your bowel movements are not regular right after your surgery. This is common. Avoid constipation and straining with bowel movements. You may want to take a fiber supplement every day. If you have not had a bowel movement after a couple of days, ask your doctor about taking a mild laxative. Medicines    · Your doctor will tell you if and when you can restart your medicines. You will also be given instructions about taking any new medicines.     · If you take aspirin or some other blood thinner, ask your doctor if and when to start taking it again. Make sure that you understand exactly what your doctor wants you to do.     · Be safe with medicines. Take pain medicines exactly as directed. ? If the doctor gave you a prescription medicine for pain, take it as prescribed. ? If you are not taking a prescription pain medicine, ask your doctor if you can take an over-the-counter medicine.     · If your doctor prescribed antibiotics, take them as directed. Do not stop taking them just because you feel better. You need to take the full course of antibiotics.     · If you think your pain medicine is making you sick to your stomach:  ? Take your medicine after meals (unless your doctor has told you not to). ?  Ask your doctor for a different pain medicine. Incision care    · If you have strips of tape on the cut (incision) the doctor made, leave the tape on for a week or until it falls off. Or follow your doctor's instructions for removing the tape.     · If you have staples closing the cut, you will need to visit your doctor in 1 to 2 weeks to have them removed.     · Wash the area daily with warm, soapy water, and pat it dry. Don't use hydrogen peroxide or alcohol, which can slow healing. You may cover the area with a gauze bandage if it weeps or rubs against clothing. Change the bandage every day. Other instructions    · Hold a pillow over your incision when you cough or take deep breaths. This will support your belly and decrease your pain.     · Do breathing exercises at home as instructed by your doctor. This will help prevent pneumonia.     · If you had laparoscopic surgery, you may also have pain in your shoulder. The pain usually lasts about a day or two. Follow-up care is a key part of your treatment and safety. Be sure to make and go to all appointments, and call your doctor if you are having problems. It's also a good idea to know your test results and keep a list of the medicines you take. When should you call for help? Call 911 anytime you think you may need emergency care. For example, call if:    · You passed out (lost consciousness).     · You are short of breath. Call your doctor now or seek immediate medical care if:    · You are sick to your stomach and cannot drink fluids.     · You have signs of a blood clot in your leg (called a deep vein thrombosis), such as:  ? Pain in your calf, back of the knee, thigh, or groin. ? Redness and swelling in your leg or groin.     · You have signs of infection, such as:  ? Increased pain, swelling, warmth, or redness. ? Red streaks leading from the incision. ? Pus draining from the incision. ?  A fever.     · You cannot pass stools or gas.     · You have pain that does not get better after you take pain medicine.     · You have loose stitches, or your incision comes open.     · Bright red blood has soaked through the bandage over your incision. Watch closely for changes in your health, and be sure to contact your doctor if you have any problems. Where can you learn more? Go to http://www.gray.com/  Enter B577 in the search box to learn more about \"Abdominal Hernia Repair: What to Expect at Home. \"  Current as of: April 15, 2020               Content Version: 12.8  © 5526-0698 [employer]. Care instructions adapted under license by [employer] (which disclaims liability or warranty for this information). If you have questions about a medical condition or this instruction, always ask your healthcare professional. [address] any warranty or liability for your use of this information.

## 2021-08-04 NOTE — ANESTHESIA PREPROCEDURE EVALUATION
Anesthetic History   No history of anesthetic complications            Review of Systems / Medical History  Patient summary reviewed, nursing notes reviewed and pertinent labs reviewed    Pulmonary                Comments: Former smoker - Quit 1980 - 7.5 pack years   Neuro/Psych       CVA  TIA and psychiatric history    Comments: Gait instability    Depression  Anxiety Cardiovascular    Hypertension: well controlled          CAD, cardiac stents and hyperlipidemia    Exercise tolerance: >4 METS  Comments: S/P Coronary Stents x 6    Abdominal aortic aneurysm (AAA)      ECG (1/8/21):   Sinus  Bradycardia   WITHIN NORMAL LIMITS   GI/Hepatic/Renal               Comments: Ventral Hernia  Right Inguinal Hernia Endo/Other        Arthritis     Other Findings   Comments: ED (erectile dysfunction)    Hearing loss - wears hearing aides         Physical Exam    Airway  Mallampati: III  TM Distance: > 6 cm  Neck ROM: normal range of motion   Mouth opening: Normal     Cardiovascular  Regular rate and rhythm,  S1 and S2 normal,  no murmur, click, rub, or gallop             Dental    Dentition: Caps/crowns     Pulmonary  Breath sounds clear to auscultation               Abdominal  GI exam deferred       Other Findings            Anesthetic Plan    ASA: 3  Anesthesia type: general    Monitoring Plan: BIS      Induction: Intravenous  Anesthetic plan and risks discussed with: Patient

## 2021-08-04 NOTE — OP NOTES
[facility]  OPERATIVE REPORT    Name:  Negrita Galdamez  MR#:  [id number]  :  1935  ACCOUNT #:  [de-identified]  DATE OF SERVICE:  2021    PREOPERATIVE DIAGNOSES:  Symptomatic right inguinal hernia and ventral hernia. POSTOPERATIVE DIAGNOSES:  Symptomatic right inguinal hernia and ventral hernia. PROCEDURES PERFORMED:  Right inguinal hernia repair with mesh and ventral hernia repair with mesh. SURGEON:  Juan R Man MD    ASSISTANT:  Ashok Crockett    ANESTHESIA:  General.    COMPLICATIONS:  None. SPECIMENS REMOVED:  1. Right inguinal hernia sac and lipoma of the cord. 2.  Ventral hernia sac. IMPLANTS:  In the inguinal region is Bard Davol hernia mesh 7.5 x 15 cm soft mesh, lot #[id number]. In the ventral region is a [employer] PVPS mesh 4.3 cm circular ventral patch, lot #[id number]. ESTIMATED BLOOD LOSS:  10 mL. FINDINGS:  Direct and indirect inguinal hernia and ventral hernia. BRIEF HISTORY:  The patient is an 81-year-old gentleman with symptomatic hernias. Options were discussed, elected to undergo repair. He understands the risks and benefits and wished to proceed. These are noted in my office notes. PROCEDURE:  The patient was taken to the operating room, placed on the operating table in the supine position and underwent general anesthesia. The abdomen was prepped and draped in the usual sterile fashion. After appropriate time-out and antibiotics were given, 0.25% Marcaine with epinephrine was infiltrated into the skin and subcutaneous tissues obliquely in the right groin and then an oblique incision was made. Subcutaneous tissues gone through with electrocautery and crossing vein was doubly ligated and divided. Dissection was carried down through the Aurea's fascia down to the aponeurosis of the external oblique. The aponeurosis was then opened up along the length of the fibers, opening up the external ring.   The tissue was dissected out, then the cord was dissected free. A Penrose drain was placed around it and elevated up. There was a lipoma of the cord as well as a small indirect sac, but the majority was direct defect. It was actually very difficult to reduce, but ultimately we were able to reduce the direct defect. Next, we placed the Bard soft mesh on the field, put a slit down in the short end and interrupted 0 Prolene was used to sew the mesh down in inferomedial fashion over the pubic tubercle, then down to Gavin's ligament and up to the shelving edge of the inguinal ligament more superolaterally. Medially, it was sewn to the conjoint tendon, taking special care not to entrap the nerves with any of the sutures. The tails of the mesh were then wrapped around the cord structures and sewn back to themselves. This gave a snug, but not tight internal ring. Then, 0.25% Marcaine was directly infiltrated around the nerves and cord structures. The tails of the mesh were then placed underneath the aponeurosis of the external oblique. A running layer of 3-0 Vicryl was used to approximate the aponeurosis of the external oblique creating the external ring. Another running layer of 3-0 Vicryl was used to approximate the Aurea's fascia. A running layer of 4-0 Vicryl was used to close the skin. Next we repaired the ventral hernia. I anesthetized the skin and subcutaneous tissues above the umbilicus, made a transverse incision and the subcutaneous tissues dissected out, taking out the hernia sac surrounding tissues and formally excised it. This left a fascial defect approximately 1.5-2 cm in size. I took a piece of [employer] PVPS mesh and ultimately placed in an underlay position with interrupted 0 Prolene sutures. I then closed the defect with a running 0-prolene suture.   Once that was completed, interrupted 3-0 Vicryl was used to approximate the deep tissues and deep dermis and a running 4-0 Vicryl was used to close the skin and a Dermabond dressing was placed over both wounds. Upon completion of the operation, the needle, sponge, and instrument counts were correct x2. The patient had tolerated the procedure well and was extubated and brought to recovery room.         Quentin Varner MD      [id number]  D:  08/04/2021 9:06  T:  08/04/2021 15:31  JOB #:  [id number]  CC:  Linnea Waldron MD

## 2021-08-25 ENCOUNTER — TELEPHONE (OUTPATIENT)
Dept: SURGERY | Age: 86
End: 2021-08-25

## 2021-09-01 ENCOUNTER — OFFICE VISIT (OUTPATIENT)
Dept: SURGERY | Age: 86
End: 2021-09-01
Payer: MEDICARE

## 2021-09-01 VITALS
BODY MASS INDEX: 27.47 KG/M2 | WEIGHT: 175 LBS | OXYGEN SATURATION: 98 % | HEART RATE: 55 BPM | SYSTOLIC BLOOD PRESSURE: 144 MMHG | DIASTOLIC BLOOD PRESSURE: 73 MMHG | TEMPERATURE: 97.3 F | HEIGHT: 67 IN | RESPIRATION RATE: 16 BRPM

## 2021-09-01 DIAGNOSIS — Z09 POSTOPERATIVE EXAMINATION: Primary | ICD-10-CM

## 2021-09-01 PROCEDURE — 99024 POSTOP FOLLOW-UP VISIT: CPT | Performed by: SURGERY

## 2021-09-01 RX ORDER — TAMSULOSIN HYDROCHLORIDE 0.4 MG/1
0.4 CAPSULE ORAL DAILY
COMMUNITY
End: 2022-08-05 | Stop reason: SDUPTHER

## 2021-09-01 NOTE — LETTER
9/1/2021    Patient: Shavon Noyola   YOB: 1935   Date of Visit: 9/1/2021     Josh Artis, [address] [place] [address] [phone]  Via In Basket    Dear Josh Artis MD,      Thank you for referring Mr. Shavon Noyola to [address] for evaluation. My notes for this consultation are attached. If you have questions, please do not hesitate to call me. I look forward to following your patient along with you.       Sincerely,    Cherly Oppenheim, MD

## 2021-09-01 NOTE — PROGRESS NOTES
Identified pt with two pt identifiers(name and ). Reviewed record in preparation for visit and have obtained necessary documentation. All patient medications has been reviewed. Chief Complaint   Patient presents with    Surgical Follow-up     VENTRAL HERNIA REPAIR WITH MESH AND RIGHT INGUINAL HERNIA REPAIR WITH MESH        Health Maintenance Due   Topic    Flu Vaccine (1)       Vitals:    21 1356   BP: (!) 144/73   Pulse: (!) 55   Resp: 16   Temp: 97.3 °F (36.3 °C)   TempSrc: Temporal   SpO2: 98%   Weight: 79.4 kg (175 lb)   Height: 5' 7\" (1.702 m)   PainSc:   0 - No pain       4. Have you been to the ER, urgent care clinic since your last visit? Hospitalized since your last visit? No    5. Have you seen or consulted any other health care providers outside of the [address] [place] [address] since your last visit? Include any pap smears or colon screening. No      Patient is accompanied by self I have received verbal consent from Sara Landrum to discuss any/all medical information while they are present in the room.

## 2021-09-02 RX ORDER — ESCITALOPRAM OXALATE 10 MG/1
TABLET ORAL
Qty: 90 TABLET | Refills: 3 | Status: SHIPPED | OUTPATIENT
Start: 2021-09-02 | End: 2022-08-10

## 2022-01-01 ENCOUNTER — HOSPITAL ENCOUNTER (EMERGENCY)
Age: 87
Discharge: HOME | End: 2022-01-01
Attending: EMERGENCY MEDICINE
Payer: MEDICARE

## 2022-01-01 ENCOUNTER — APPOINTMENT (OUTPATIENT)
Dept: GENERAL RADIOLOGY | Age: 87
End: 2022-01-01
Attending: EMERGENCY MEDICINE
Payer: MEDICARE

## 2022-01-01 VITALS
OXYGEN SATURATION: 99 % | DIASTOLIC BLOOD PRESSURE: 68 MMHG | WEIGHT: 191.36 LBS | RESPIRATION RATE: 11 BRPM | HEART RATE: 51 BPM | HEIGHT: 68 IN | BODY MASS INDEX: 29 KG/M2 | SYSTOLIC BLOOD PRESSURE: 149 MMHG

## 2022-01-01 DIAGNOSIS — M54.9 BACK PAIN, UNSPECIFIED BACK LOCATION, UNSPECIFIED BACK PAIN LATERALITY, UNSPECIFIED CHRONICITY: Primary | ICD-10-CM

## 2022-01-01 LAB
ALBUMIN SERPL-MCNC: 3.7 G/DL (ref 3.5–5)
ALBUMIN/GLOB SERPL: 1 {RATIO} (ref 1.1–2.2)
ALP SERPL-CCNC: 62 U/L (ref 45–117)
ALT SERPL-CCNC: 23 U/L (ref 12–78)
ANION GAP SERPL CALC-SCNC: 5 MMOL/L (ref 5–15)
AST SERPL-CCNC: 28 U/L (ref 15–37)
ATRIAL RATE: 56 BPM
BASOPHILS # BLD: 0.1 K/UL (ref 0–0.1)
BASOPHILS NFR BLD: 1 % (ref 0–1)
BILIRUB SERPL-MCNC: 0.6 MG/DL (ref 0.2–1)
BUN SERPL-MCNC: 20 MG/DL (ref 6–20)
BUN/CREAT SERPL: 18 (ref 12–20)
CALCIUM SERPL-MCNC: 9 MG/DL (ref 8.5–10.1)
CALCULATED P AXIS, ECG09: 41 DEGREES
CALCULATED R AXIS, ECG10: -35 DEGREES
CALCULATED T AXIS, ECG11: 31 DEGREES
CHLORIDE SERPL-SCNC: 106 MMOL/L (ref 97–108)
CO2 SERPL-SCNC: 28 MMOL/L (ref 21–32)
CREAT SERPL-MCNC: 1.12 MG/DL (ref 0.7–1.3)
DIAGNOSIS, 93000: NORMAL
DIFFERENTIAL METHOD BLD: NORMAL
EOSINOPHIL # BLD: 0.3 K/UL (ref 0–0.4)
EOSINOPHIL NFR BLD: 4 % (ref 0–7)
ERYTHROCYTE [DISTWIDTH] IN BLOOD BY AUTOMATED COUNT: 14.1 % (ref 11.5–14.5)
GLOBULIN SER CALC-MCNC: 3.6 G/DL (ref 2–4)
GLUCOSE SERPL-MCNC: 108 MG/DL (ref 65–100)
HCT VFR BLD AUTO: 47.2 % (ref 36.6–50.3)
HGB BLD-MCNC: 15.7 G/DL (ref 12.1–17)
IMM GRANULOCYTES # BLD AUTO: 0 K/UL (ref 0–0.04)
IMM GRANULOCYTES NFR BLD AUTO: 0 % (ref 0–0.5)
LYMPHOCYTES # BLD: 1.7 K/UL (ref 0.8–3.5)
LYMPHOCYTES NFR BLD: 22 % (ref 12–49)
MCH RBC QN AUTO: 31.3 PG (ref 26–34)
MCHC RBC AUTO-ENTMCNC: 33.3 G/DL (ref 30–36.5)
MCV RBC AUTO: 94.2 FL (ref 80–99)
MONOCYTES # BLD: 0.6 K/UL (ref 0–1)
MONOCYTES NFR BLD: 8 % (ref 5–13)
NEUTS SEG # BLD: 4.8 K/UL (ref 1.8–8)
NEUTS SEG NFR BLD: 65 % (ref 32–75)
NRBC # BLD: 0 K/UL (ref 0–0.01)
NRBC BLD-RTO: 0 PER 100 WBC
P-R INTERVAL, ECG05: 218 MS
PLATELET # BLD AUTO: 209 K/UL (ref 150–400)
PMV BLD AUTO: 10.1 FL (ref 8.9–12.9)
POTASSIUM SERPL-SCNC: 4.2 MMOL/L (ref 3.5–5.1)
PROT SERPL-MCNC: 7.3 G/DL (ref 6.4–8.2)
Q-T INTERVAL, ECG07: 434 MS
QRS DURATION, ECG06: 104 MS
QTC CALCULATION (BEZET), ECG08: 418 MS
RBC # BLD AUTO: 5.01 M/UL (ref 4.1–5.7)
SODIUM SERPL-SCNC: 139 MMOL/L (ref 136–145)
TROPONIN-HIGH SENSITIVITY: 12 NG/L (ref 0–76)
TROPONIN-HIGH SENSITIVITY: 13 NG/L (ref 0–76)
VENTRICULAR RATE, ECG03: 56 BPM
WBC # BLD AUTO: 7.5 K/UL (ref 4.1–11.1)

## 2022-01-01 PROCEDURE — 93005 ELECTROCARDIOGRAM TRACING: CPT

## 2022-01-01 PROCEDURE — 71045 X-RAY EXAM CHEST 1 VIEW: CPT

## 2022-01-01 PROCEDURE — 99285 EMERGENCY DEPT VISIT HI MDM: CPT

## 2022-01-01 PROCEDURE — 80053 COMPREHEN METABOLIC PANEL: CPT

## 2022-01-01 PROCEDURE — 84484 ASSAY OF TROPONIN QUANT: CPT

## 2022-01-01 PROCEDURE — 36415 COLL VENOUS BLD VENIPUNCTURE: CPT

## 2022-01-01 PROCEDURE — 85025 COMPLETE CBC W/AUTO DIFF WBC: CPT

## 2022-01-01 RX ORDER — METAXALONE 800 MG/1
800 TABLET ORAL 4 TIMES DAILY
Qty: 20 TABLET | Refills: 0 | Status: SHIPPED | OUTPATIENT
Start: 2022-01-01 | End: 2022-01-06

## 2022-01-01 NOTE — ED NOTES
Sunita Martinez RN reviewed discharge instructions with the patient. The patient verbalized understanding. All questions and concerns were addressed. The patient is discharged via wheelchair in the care of family members with instructions and prescriptions in hand. Pt is alert and oriented x 4. Respirations are clear and unlabored.

## 2022-01-01 NOTE — ED PROVIDER NOTES
EMERGENCY DEPARTMENT HISTORY AND PHYSICAL EXAM      Date: 1/1/2022  Patient Name: Mukund Partida    History of Presenting Illness     Chief Complaint   Patient presents with    Back Pain    Arm Pain         HPI: Mukund Partida, 81 y.o. male presenting to ED with chief complaint of back pain. Onset was several days ago and he thought it was a muscle spasm. He noticed it radiating down his left arm and wanted to be evaluated for myocardial infarction. He states he had a heart attack in the past, has several stents, but usually gets jaw pain with this. He denies any jaw pain today. He has no other symptoms. He denies trauma to his back. There are no other complaints, changes, or physical findings at this time. PCP: Courtney Fernandes MD    No current facility-administered medications on file prior to encounter. Current Outpatient Medications on File Prior to Encounter   Medication Sig Dispense Refill    escitalopram oxalate (LEXAPRO) 10 mg tablet TAKE 1 TABLET EVERY DAY 90 Tablet 3    tamsulosin (Flomax) 0.4 mg capsule Take 0.4 mg by mouth daily.  hydrALAZINE (APRESOLINE) 50 mg tablet TAKE 1 TABLET BY MOUTH TWICE DAILY 180 Tab 3    rosuvastatin (CRESTOR) 20 mg tablet TAKE 1 TABLET EVERY DAY 90 Tab 3    amLODIPine (NORVASC) 5 mg tablet TAKE 1 TABLET EVERY DAY 90 Tab 3    vit C/E/Zn/coppr/lutein/zeaxan (PRESERVISION AREDS-2 PO) Take 2 Capsules by mouth two (2) times a day.  krill-omega-3-dha-epa-lipids [id number] mg cap Take  by mouth two (2) times a day.  ezetimibe (ZETIA) 10 mg tablet Take  by mouth daily.  nebivolol (BYSTOLIC) 5 mg tablet Take 5 mg by mouth daily.  aspirin 81 mg tablet Take 81 mg by mouth daily.  cholecalciferol, vitamin d3, (VITAMIN D) 1,000 unit tablet Take 2,000 Units by mouth daily.          Past History     Past Medical History:  Past Medical History:   Diagnosis Date    Allergy to ACE inhibitors 11/28/2017    Anxiety 2017    ASCVD (arteriosclerotic cardiovascular disease) 2017    CAD (coronary artery disease) 2000    MI     Cataract     Cataract 2019    Depression 2017    ED (erectile dysfunction) 2017    Elevated PSA 2017    Former smoker 2017    Glucose intolerance (impaired glucose tolerance) 2017    Hypercholesterolemia     Hypertension     Insomnia 2017    On statin therapy 2017    Other ill-defined conditions(799.89)     elevated lipids    Psychiatric disorder     depression /anxiety    Stroke [facility])     TIA       Past Surgical History:  Past Surgical History:   Procedure Laterality Date    COLONOSCOPY N/A 2019    COLONOSCOPY performed by Billy Moon MD at [place] [address]  2019         COLORECTAL SCRN; HI RISK IND  9/10/2014         COLORECTAL SCRN; HI RISK IND  2019         HX HEENT Bilateral 2019    eye cataract    HX HERNIA REPAIR  2021    VENTRAL HERNIA REPAIR WITH MESH AND RIGHT INGUINAL HERNIA REPAIR WITH MESH     HX TONSILLECTOMY      DE ABDOMEN SURGERY PROC UNLISTED      hernia repair    DE CARDIAC SURG PROCEDURE UNLIST      stents x 6    DE COLSC FLX W/RMVL OF TUMOR POLYP LESION SNARE TQ  9/10/2014            Family History:  Family History   Problem Relation Age of Onset    Psychiatric Disorder Mother         depression    Heart Disease Father         aneurysym       Social History:  Social History     Tobacco Use    Smoking status: Former Smoker     Packs/day: 1.50     Years: 5.00     Pack years: 7.50     Quit date: 1980     Years since quittin.0    Smokeless tobacco: Never Used    Tobacco comment: smoke for 5 year    Vaping Use    Vaping Use: Never used   Substance Use Topics    Alcohol use: No    Drug use: No       Allergies:   Allergies   Allergen Reactions    Benazepril Rash and Swelling    Pork/Porcine Containing Products Hives Review of Systems   Review of Systems   Constitutional: Negative for chills and fever. HENT: Negative for sore throat. Eyes: Negative for redness. Respiratory: Negative for shortness of breath. Cardiovascular: Negative for chest pain. Gastrointestinal: Negative for abdominal pain. Genitourinary: Negative for dysuria. Musculoskeletal: Positive for back pain. Neurological: Negative for syncope. Psychiatric/Behavioral: The patient is not nervous/anxious. All other systems reviewed and are negative. Physical Exam   Physical Exam  Vitals and nursing note reviewed. Constitutional:       Appearance: Normal appearance. HENT:      Head: Normocephalic and atraumatic. Mouth/Throat:      Mouth: Mucous membranes are moist.   Cardiovascular:      Rate and Rhythm: Normal rate and regular rhythm. Pulmonary:      Effort: Pulmonary effort is normal.      Breath sounds: Normal breath sounds. Abdominal:      Palpations: Abdomen is soft. Tenderness: There is no abdominal tenderness. Musculoskeletal:         General: No deformity. Cervical back: Neck supple. Comments: TTP overlying R trapezius muscle at insertion to T spine. No bony spinal TTP or step offs. Skin:     General: Skin is warm and dry. Neurological:      General: No focal deficit present. Mental Status: He is alert.    Psychiatric:         Mood and Affect: Mood normal.         Behavior: Behavior normal.         Diagnostic Study Results     Labs -     Recent Results (from the past 24 hour(s))   EKG, 12 LEAD, INITIAL    Collection Time: 01/01/22  6:05 AM   Result Value Ref Range    Ventricular Rate 56 BPM    Atrial Rate 56 BPM    P-R Interval 218 ms    QRS Duration 104 ms    Q-T Interval 434 ms    QTC Calculation (Bezet) 418 ms    Calculated P Axis 41 degrees    Calculated R Axis -35 degrees    Calculated T Axis 31 degrees    Diagnosis       Sinus bradycardia with 1st degree AV block with premature atrial complexes  Left axis deviation  Minimal voltage criteria for LVH, may be normal variant  Nonspecific ST abnormality  When compared with ECG of 23-NOV-2013 17:22,  premature atrial complexes are now present  CO interval has increased     CBC WITH AUTOMATED DIFF    Collection Time: 01/01/22  6:10 AM   Result Value Ref Range    WBC 7.5 4.1 - 11.1 K/uL    RBC 5.01 4.10 - 5.70 M/uL    HGB 15.7 12.1 - 17.0 g/dL    HCT 47.2 36.6 - 50.3 %    MCV 94.2 80.0 - 99.0 FL    MCH 31.3 26.0 - 34.0 PG    MCHC 33.3 30.0 - 36.5 g/dL    RDW 14.1 11.5 - 14.5 %    PLATELET 012 654 - 880 K/uL    MPV 10.1 8.9 - 12.9 FL    NRBC 0.0 0  WBC    ABSOLUTE NRBC 0.00 0.00 - 0.01 K/uL    NEUTROPHILS 65 32 - 75 %    LYMPHOCYTES 22 12 - 49 %    MONOCYTES 8 5 - 13 %    EOSINOPHILS 4 0 - 7 %    BASOPHILS 1 0 - 1 %    IMMATURE GRANULOCYTES 0 0.0 - 0.5 %    ABS. NEUTROPHILS 4.8 1.8 - 8.0 K/UL    ABS. LYMPHOCYTES 1.7 0.8 - 3.5 K/UL    ABS. MONOCYTES 0.6 0.0 - 1.0 K/UL    ABS. EOSINOPHILS 0.3 0.0 - 0.4 K/UL    ABS. BASOPHILS 0.1 0.0 - 0.1 K/UL    ABS. IMM. GRANS. 0.0 0.00 - 0.04 K/UL    DF AUTOMATED     METABOLIC PANEL, COMPREHENSIVE    Collection Time: 01/01/22  6:10 AM   Result Value Ref Range    Sodium 139 136 - 145 mmol/L    Potassium 4.2 3.5 - 5.1 mmol/L    Chloride 106 97 - 108 mmol/L    CO2 28 21 - 32 mmol/L    Anion gap 5 5 - 15 mmol/L    Glucose 108 (H) 65 - 100 mg/dL    BUN 20 6 - 20 MG/DL    Creatinine 1.12 0.70 - 1.30 MG/DL    BUN/Creatinine ratio 18 12 - 20      GFR est AA >60 >60 ml/min/1.73m2    GFR est non-AA >60 >60 ml/min/1.73m2    Calcium 9.0 8.5 - 10.1 MG/DL    Bilirubin, total 0.6 0.2 - 1.0 MG/DL    ALT (SGPT) 23 12 - 78 U/L    AST (SGOT) 28 15 - 37 U/L    Alk.  phosphatase 62 45 - 117 U/L    Protein, total 7.3 6.4 - 8.2 g/dL    Albumin 3.7 3.5 - 5.0 g/dL    Globulin 3.6 2.0 - 4.0 g/dL    A-G Ratio 1.0 (L) 1.1 - 2.2     TROPONIN-HIGH SENSITIVITY    Collection Time: 01/01/22  6:10 AM   Result Value Ref Range    Troponin-High Sensitivity 13 0 - 76 ng/L   TROPONIN-HIGH SENSITIVITY    Collection Time: 01/01/22  9:20 AM   Result Value Ref Range    Troponin-High Sensitivity 12 0 - 76 ng/L       Radiologic Studies -   XR CHEST PORT   Final Result    No acute cardiopulmonary disease radiographically is confirmed. .  . Faint   patchy density projecting over the right fifth rib anteriorly is likely a   confluence of bony and vascular shadows. Correlate with repeat radiographs when   possible. CT Results  (Last 48 hours)    None        CXR Results  (Last 48 hours)               01/01/22 0725  XR CHEST PORT Final result    Impression:   No acute cardiopulmonary disease radiographically is confirmed. .  . Faint   patchy density projecting over the right fifth rib anteriorly is likely a   confluence of bony and vascular shadows. Correlate with repeat radiographs when   possible. Narrative:  INDICATION:  CP. EXAM: 2 VIEW CHEST RADIOGRAPH. COMPARISON: None. FINDINGS: Frontal and lateral views of the chest show a faint patchy small   density projecting over the right fifth rib anteriorly, likely representing a   confluence of vascular and bony shadows. . . The heart, mediastinum and pulmonary   vasculature are stable. The bony thorax is unremarkable for age. ..                   Medical Decision Making   I am the first provider for this patient. I reviewed the vital signs, available nursing notes, past medical history, past surgical history, family history and social history. Vital Signs-Reviewed the patient's vital signs.   Patient Vitals for the past 24 hrs:   Pulse Resp BP SpO2   01/01/22 0951 (!) 51 11 (!) 149/68 99 %   01/01/22 0936 (!) 49 11 (!) 147/64 98 %   01/01/22 0921 (!) 48 11 (!) 147/68 98 %   01/01/22 0906 (!) 50 15 (!) 148/71 98 %   01/01/22 0851 (!) 51 13 (!) 163/75 99 %   01/01/22 0841 (!) 50 13 (!) 162/70 99 %   01/01/22 0559 60 16 -- 100 %         Provider Notes (Medical Decision Making): 29-year-old presenting to ED with chief complaint of upper back pain. There is musculoskeletal pain on exam.  EKG has no ischemia, troponins negative x2, does not sound like ACS. Chest x-ray is without pneumothorax or pneumonia, normal mediastinum. Ultimately, I think patient has musculoskeletal pain. We will plan for follow-up as an outpatient. Return precautions given. EKG sinus, rate 56, nonspecific ST T changes, normal QT, artifact present    ED Course:     Initial assessment performed. The patients presenting problems have been discussed, and they are in agreement with the care plan formulated and outlined with them. I have encouraged them to ask questions as they arise throughout their visit. Disposition:  dc    PLAN:  1. Current Discharge Medication List        2.    Follow-up Information    None       Return to ED if worse     Diagnosis     Clinical Impression: back pain

## 2022-01-04 ENCOUNTER — TELEPHONE (OUTPATIENT)
Dept: INTERNAL MEDICINE CLINIC | Age: 87
End: 2022-01-04

## 2022-01-04 ENCOUNTER — OFFICE VISIT (OUTPATIENT)
Dept: INTERNAL MEDICINE CLINIC | Age: 87
End: 2022-01-04
Payer: MEDICARE

## 2022-01-04 VITALS
RESPIRATION RATE: 16 BRPM | OXYGEN SATURATION: 97 % | HEART RATE: 52 BPM | BODY MASS INDEX: 30.2 KG/M2 | DIASTOLIC BLOOD PRESSURE: 92 MMHG | WEIGHT: 192.4 LBS | TEMPERATURE: 97 F | HEIGHT: 67 IN | SYSTOLIC BLOOD PRESSURE: 164 MMHG

## 2022-01-04 DIAGNOSIS — I10 ESSENTIAL HYPERTENSION: ICD-10-CM

## 2022-01-04 DIAGNOSIS — I25.10 ASCVD (ARTERIOSCLEROTIC CARDIOVASCULAR DISEASE): ICD-10-CM

## 2022-01-04 DIAGNOSIS — M54.2 NECK PAIN: Primary | ICD-10-CM

## 2022-01-04 PROCEDURE — G8427 DOCREV CUR MEDS BY ELIG CLIN: HCPCS | Performed by: INTERNAL MEDICINE

## 2022-01-04 PROCEDURE — 99214 OFFICE O/P EST MOD 30 MIN: CPT | Performed by: INTERNAL MEDICINE

## 2022-01-04 PROCEDURE — G9717 DOC PT DX DEP/BP F/U NT REQ: HCPCS | Performed by: INTERNAL MEDICINE

## 2022-01-04 PROCEDURE — G8536 NO DOC ELDER MAL SCRN: HCPCS | Performed by: INTERNAL MEDICINE

## 2022-01-04 PROCEDURE — G8753 SYS BP > OR = 140: HCPCS | Performed by: INTERNAL MEDICINE

## 2022-01-04 PROCEDURE — G8754 DIAS BP LESS 90: HCPCS | Performed by: INTERNAL MEDICINE

## 2022-01-04 PROCEDURE — G8419 CALC BMI OUT NRM PARAM NOF/U: HCPCS | Performed by: INTERNAL MEDICINE

## 2022-01-04 PROCEDURE — 1101F PT FALLS ASSESS-DOCD LE1/YR: CPT | Performed by: INTERNAL MEDICINE

## 2022-01-04 RX ORDER — AMOXICILLIN 875 MG/1
TABLET, FILM COATED ORAL
COMMUNITY
Start: 2021-12-27 | End: 2022-01-10 | Stop reason: ALTCHOICE

## 2022-01-04 RX ORDER — PREDNISONE 5 MG/1
TABLET ORAL
Qty: 48 TABLET | Refills: 0 | Status: SHIPPED | OUTPATIENT
Start: 2022-01-04 | End: 2022-07-12 | Stop reason: ALTCHOICE

## 2022-01-04 NOTE — PATIENT INSTRUCTIONS
Neck Pain: Care Instructions  Your Care Instructions     You can have neck pain anywhere from the bottom of your head to the top of your shoulders. It can spread to the upper back or arms. Injuries, painting a ceiling, sleeping with your neck twisted, staying in one position for too long, and many other activities can cause neck pain. Most neck pain gets better with home care. Your doctor may recommend medicine to relieve pain or relax your muscles. He or she may suggest exercise and physical therapy to increase flexibility and relieve stress. You may need to wear a special (cervical) collar to support your neck for a day or two. Follow-up care is a key part of your treatment and safety. Be sure to make and go to all appointments, and call your doctor if you are having problems. It's also a good idea to know your test results and keep a list of the medicines you take. How can you care for yourself at home? · Try using a heating pad on a low or medium setting for 15 to 20 minutes every 2 or 3 hours. Try a warm shower in place of one session with the heating pad. · You can also try an ice pack for 10 to 15 minutes every 2 to 3 hours. Put a thin cloth between the ice and your skin. · Take pain medicines exactly as directed. ? If the doctor gave you a prescription medicine for pain, take it as prescribed. ? If you are not taking a prescription pain medicine, ask your doctor if you can take an over-the-counter medicine. · If your doctor recommends a cervical collar, wear it exactly as directed. When should you call for help? Call your doctor now or seek immediate medical care if:    · You have new or worsening numbness in your arms, buttocks or legs.     · You have new or worsening weakness in your arms or legs. (This could make it hard to stand up.)     · You lose control of your bladder or bowels.    Watch closely for changes in your health, and be sure to contact your doctor if:    · Your neck pain is getting worse.     · You are not getting better after 1 week.     · You do not get better as expected. Where can you learn more? Go to http://www.[employer].com/  Enter V723 in the search box to learn more about \"Neck Pain: Care Instructions. \"  Current as of: July 1, 2021               Content Version: 13.0  © 5720-3311 [employer]. Care instructions adapted under license by [employer] (which disclaims liability or warranty for this information). If you have questions about a medical condition or this instruction, always ask your healthcare professional. [place] [address] any warranty or liability for your use of this information.
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
## 2022-01-04 NOTE — TELEPHONE ENCOUNTER
Patient's wife called in. .    [facility] was in the ER on 1/1/2022 for back shoulder blade pain.     Would like a call back to get more info on Physical Therapy     [phone]

## 2022-01-04 NOTE — TELEPHONE ENCOUNTER
Called and patient states he wants an appointment to see Dr. Jessica Anderson for further evaluation.

## 2022-01-04 NOTE — PROGRESS NOTES
HIPAA verified by two patient identifiers. Aletha Addison is a 81 y.o. male    Chief Complaint   Patient presents with    Back Pain     since last thursday  went to ER  on new years day. There were no vitals taken for this visit. Pain Scale: /10  Pain Location:       Health Maintenance Due   Topic Date Due    COVID-19 Vaccine (3 - Booster for Moderna series) 09/12/2021    Medicare Yearly Exam  01/09/2022         Coordination of Care Questionnaire:  :   1) Have you been to an emergency room, urgent care, or hospitalized since your last visit? If yes, where when, and reason for visit? yes   [facility]   1/1/2022  Back pain      2. Have seen or consulted any other health care provider since your last visit? If yes, where when, and reason for visit? NO      Patient is accompanied by wife I have received verbal consent from Aletha Addison to discuss any/all medical information while they are present in the room.
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
## 2022-01-10 ENCOUNTER — OFFICE VISIT (OUTPATIENT)
Dept: INTERNAL MEDICINE CLINIC | Age: 87
End: 2022-01-10
Payer: MEDICARE

## 2022-01-10 VITALS
SYSTOLIC BLOOD PRESSURE: 136 MMHG | DIASTOLIC BLOOD PRESSURE: 82 MMHG | WEIGHT: 182.5 LBS | RESPIRATION RATE: 16 BRPM | HEART RATE: 44 BPM | TEMPERATURE: 97.8 F | HEIGHT: 67 IN | OXYGEN SATURATION: 98 % | BODY MASS INDEX: 28.64 KG/M2

## 2022-01-10 DIAGNOSIS — F51.01 PRIMARY INSOMNIA: ICD-10-CM

## 2022-01-10 DIAGNOSIS — F41.9 ANXIETY: ICD-10-CM

## 2022-01-10 DIAGNOSIS — R97.20 ELEVATED PSA: ICD-10-CM

## 2022-01-10 DIAGNOSIS — M15.9 PRIMARY OSTEOARTHRITIS INVOLVING MULTIPLE JOINTS: ICD-10-CM

## 2022-01-10 DIAGNOSIS — R41.3 MEMORY DEFICIT: ICD-10-CM

## 2022-01-10 DIAGNOSIS — R29.898 RIGHT HAND WEAKNESS: ICD-10-CM

## 2022-01-10 DIAGNOSIS — R73.02 GLUCOSE INTOLERANCE (IMPAIRED GLUCOSE TOLERANCE): ICD-10-CM

## 2022-01-10 DIAGNOSIS — M47.22 OSTEOARTHRITIS OF SPINE WITH RADICULOPATHY, CERVICAL REGION: ICD-10-CM

## 2022-01-10 DIAGNOSIS — F32.A MILD DEPRESSION: ICD-10-CM

## 2022-01-10 DIAGNOSIS — I10 ESSENTIAL HYPERTENSION: Primary | ICD-10-CM

## 2022-01-10 DIAGNOSIS — G45.9 TIA (TRANSIENT ISCHEMIC ATTACK): ICD-10-CM

## 2022-01-10 DIAGNOSIS — I71.40 ABDOMINAL AORTIC ANEURYSM (AAA) WITHOUT RUPTURE: ICD-10-CM

## 2022-01-10 DIAGNOSIS — Z13.39 ALCOHOL SCREENING: ICD-10-CM

## 2022-01-10 DIAGNOSIS — E55.9 VITAMIN D DEFICIENCY: ICD-10-CM

## 2022-01-10 DIAGNOSIS — Z00.00 MEDICARE ANNUAL WELLNESS VISIT, SUBSEQUENT: ICD-10-CM

## 2022-01-10 DIAGNOSIS — E78.2 MIXED HYPERLIPIDEMIA: ICD-10-CM

## 2022-01-10 DIAGNOSIS — I25.10 ASCVD (ARTERIOSCLEROTIC CARDIOVASCULAR DISEASE): ICD-10-CM

## 2022-01-10 LAB
25(OH)D3 SERPL-MCNC: 40.1 NG/ML (ref 30–100)
ALBUMIN SERPL-MCNC: 3.9 G/DL (ref 3.5–5)
ALBUMIN/GLOB SERPL: 1.3 {RATIO} (ref 1.1–2.2)
ALP SERPL-CCNC: 74 U/L (ref 45–117)
ALT SERPL-CCNC: 26 U/L (ref 12–78)
ANION GAP SERPL CALC-SCNC: 6 MMOL/L (ref 5–15)
APPEARANCE UR: CLEAR
AST SERPL-CCNC: 14 U/L (ref 15–37)
BACTERIA URNS QL MICRO: NEGATIVE /HPF
BASOPHILS # BLD: 0 K/UL (ref 0–0.1)
BASOPHILS NFR BLD: 0 % (ref 0–1)
BILIRUB SERPL-MCNC: 0.7 MG/DL (ref 0.2–1)
BILIRUB UR QL: NEGATIVE
BUN SERPL-MCNC: 29 MG/DL (ref 6–20)
BUN/CREAT SERPL: 27 (ref 12–20)
CALCIUM SERPL-MCNC: 9 MG/DL (ref 8.5–10.1)
CHLORIDE SERPL-SCNC: 103 MMOL/L (ref 97–108)
CHOLEST SERPL-MCNC: 131 MG/DL
CK SERPL-CCNC: 64 U/L (ref 39–308)
CO2 SERPL-SCNC: 29 MMOL/L (ref 21–32)
COLOR UR: ABNORMAL
CREAT SERPL-MCNC: 1.06 MG/DL (ref 0.7–1.3)
DIFFERENTIAL METHOD BLD: ABNORMAL
EOSINOPHIL # BLD: 0 K/UL (ref 0–0.4)
EOSINOPHIL NFR BLD: 0 % (ref 0–7)
EPITH CASTS URNS QL MICRO: ABNORMAL /LPF
ERYTHROCYTE [DISTWIDTH] IN BLOOD BY AUTOMATED COUNT: 14.1 % (ref 11.5–14.5)
EST. AVERAGE GLUCOSE BLD GHB EST-MCNC: 108 MG/DL
GLOBULIN SER CALC-MCNC: 3.1 G/DL (ref 2–4)
GLUCOSE SERPL-MCNC: 98 MG/DL (ref 65–100)
GLUCOSE UR STRIP.AUTO-MCNC: NEGATIVE MG/DL
HBA1C MFR BLD: 5.4 % (ref 4–5.6)
HCT VFR BLD AUTO: 52.5 % (ref 36.6–50.3)
HDLC SERPL-MCNC: 54 MG/DL
HDLC SERPL: 2.4 {RATIO} (ref 0–5)
HGB BLD-MCNC: 17 G/DL (ref 12.1–17)
HGB UR QL STRIP: NEGATIVE
HYALINE CASTS URNS QL MICRO: ABNORMAL /LPF (ref 0–5)
IMM GRANULOCYTES # BLD AUTO: 0.1 K/UL (ref 0–0.04)
IMM GRANULOCYTES NFR BLD AUTO: 1 % (ref 0–0.5)
KETONES UR QL STRIP.AUTO: NEGATIVE MG/DL
LDLC SERPL CALC-MCNC: 63.6 MG/DL (ref 0–100)
LEUKOCYTE ESTERASE UR QL STRIP.AUTO: ABNORMAL
LYMPHOCYTES # BLD: 1.6 K/UL (ref 0.8–3.5)
LYMPHOCYTES NFR BLD: 15 % (ref 12–49)
MCH RBC QN AUTO: 30.7 PG (ref 26–34)
MCHC RBC AUTO-ENTMCNC: 32.4 G/DL (ref 30–36.5)
MCV RBC AUTO: 94.8 FL (ref 80–99)
MONOCYTES # BLD: 0.8 K/UL (ref 0–1)
MONOCYTES NFR BLD: 8 % (ref 5–13)
NEUTS SEG # BLD: 7.9 K/UL (ref 1.8–8)
NEUTS SEG NFR BLD: 76 % (ref 32–75)
NITRITE UR QL STRIP.AUTO: NEGATIVE
NRBC # BLD: 0 K/UL (ref 0–0.01)
NRBC BLD-RTO: 0 PER 100 WBC
PH UR STRIP: 7 [PH] (ref 5–8)
PLATELET # BLD AUTO: 261 K/UL (ref 150–400)
PMV BLD AUTO: 10.3 FL (ref 8.9–12.9)
POTASSIUM SERPL-SCNC: 4.5 MMOL/L (ref 3.5–5.1)
PROT SERPL-MCNC: 7 G/DL (ref 6.4–8.2)
PROT UR STRIP-MCNC: ABNORMAL MG/DL
PSA SERPL-MCNC: 4.1 NG/ML (ref 0.01–4)
RBC # BLD AUTO: 5.54 M/UL (ref 4.1–5.7)
RBC #/AREA URNS HPF: ABNORMAL /HPF (ref 0–5)
SODIUM SERPL-SCNC: 138 MMOL/L (ref 136–145)
SP GR UR REFRACTOMETRY: 1.02 (ref 1–1.03)
T4 SERPL-MCNC: 8.5 UG/DL (ref 4.5–12.1)
TRIGL SERPL-MCNC: 67 MG/DL (ref ?–150)
TSH SERPL DL<=0.05 MIU/L-ACNC: 1.76 UIU/ML (ref 0.36–3.74)
UA: UC IF INDICATED,UAUC: ABNORMAL
UROBILINOGEN UR QL STRIP.AUTO: 1 EU/DL (ref 0.2–1)
VLDLC SERPL CALC-MCNC: 13.4 MG/DL
WBC # BLD AUTO: 10.4 K/UL (ref 4.1–11.1)
WBC URNS QL MICRO: ABNORMAL /HPF (ref 0–4)

## 2022-01-10 PROCEDURE — G8754 DIAS BP LESS 90: HCPCS | Performed by: INTERNAL MEDICINE

## 2022-01-10 PROCEDURE — G8752 SYS BP LESS 140: HCPCS | Performed by: INTERNAL MEDICINE

## 2022-01-10 PROCEDURE — 93000 ELECTROCARDIOGRAM COMPLETE: CPT | Performed by: INTERNAL MEDICINE

## 2022-01-10 PROCEDURE — G8427 DOCREV CUR MEDS BY ELIG CLIN: HCPCS | Performed by: INTERNAL MEDICINE

## 2022-01-10 PROCEDURE — G0439 PPPS, SUBSEQ VISIT: HCPCS | Performed by: INTERNAL MEDICINE

## 2022-01-10 PROCEDURE — 1101F PT FALLS ASSESS-DOCD LE1/YR: CPT | Performed by: INTERNAL MEDICINE

## 2022-01-10 PROCEDURE — G8417 CALC BMI ABV UP PARAM F/U: HCPCS | Performed by: INTERNAL MEDICINE

## 2022-01-10 PROCEDURE — G9717 DOC PT DX DEP/BP F/U NT REQ: HCPCS | Performed by: INTERNAL MEDICINE

## 2022-01-10 PROCEDURE — 99215 OFFICE O/P EST HI 40 MIN: CPT | Performed by: INTERNAL MEDICINE

## 2022-01-10 PROCEDURE — G8536 NO DOC ELDER MAL SCRN: HCPCS | Performed by: INTERNAL MEDICINE

## 2022-01-10 RX ORDER — METAXALONE 800 MG/1
800 TABLET ORAL 3 TIMES DAILY
Qty: 30 TABLET | Refills: 0 | Status: SHIPPED | OUTPATIENT
Start: 2022-01-10 | End: 2022-07-12 | Stop reason: ALTCHOICE

## 2022-01-10 RX ORDER — METAXALONE 800 MG/1
800 TABLET ORAL 3 TIMES DAILY
Qty: 30 TABLET | Refills: 0 | Status: SHIPPED | OUTPATIENT
Start: 2022-01-10 | End: 2022-01-10 | Stop reason: SDUPTHER

## 2022-01-10 NOTE — TELEPHONE ENCOUNTER
PCP: Tessa Denson MD    Last appt: 1/10/2022  Future Appointments   Date Time Provider Grant Myrick   7/12/2022  8:00 AM Tessa Denson MD [facility] AMB       Requested Prescriptions     Pending Prescriptions Disp Refills    metaxalone (SKELAXIN) 800 mg tablet 30 Tablet 0     Sig: Take 1 Tablet by mouth three (3) times daily.        Prior labs and Blood pressures:  BP Readings from Last 3 Encounters:   01/10/22 136/82   01/04/22 (!) 164/92   01/01/22 (!) 149/68     Lab Results   Component Value Date/Time    Sodium 139 01/01/2022 06:10 AM    Potassium 4.2 01/01/2022 06:10 AM    Chloride 106 01/01/2022 06:10 AM    CO2 28 01/01/2022 06:10 AM    Anion gap 5 01/01/2022 06:10 AM    Glucose 108 (H) 01/01/2022 06:10 AM    BUN 20 01/01/2022 06:10 AM    Creatinine 1.12 01/01/2022 06:10 AM    BUN/Creatinine ratio 18 01/01/2022 06:10 AM    GFR est AA >60 01/01/2022 06:10 AM    GFR est non-AA >60 01/01/2022 06:10 AM    Calcium 9.0 01/01/2022 06:10 AM     Lab Results   Component Value Date/Time    Hemoglobin A1c 5.3 07/08/2021 09:04 AM    Hemoglobin A1c (POC) 5.8 (A) 06/08/2018 08:57 AM     Lab Results   Component Value Date/Time    Cholesterol, total 133 07/08/2021 09:04 AM    Cholesterol (POC) 149.0 06/08/2018 08:57 AM    HDL Cholesterol 55 07/08/2021 09:04 AM    HDL Cholesterol (POC) 63.0 06/08/2018 08:57 AM    LDL Cholesterol (POC) 72.2 06/08/2018 08:57 AM    LDL, calculated 62.6 07/08/2021 09:04 AM    VLDL, calculated 15.4 07/08/2021 09:04 AM    Triglyceride 77 07/08/2021 09:04 AM    Triglycerides (POC) 69.0 06/08/2018 08:57 AM    CHOL/HDL Ratio 2.4 07/08/2021 09:04 AM     No results found for: Erin Gall, [id number]    Lab Results   Component Value Date/Time    TSH 1.82 06/23/2021 04:20 PM    TSH, 3rd generation 1.39 01/08/2021 10:52 AM

## 2022-01-10 NOTE — PROGRESS NOTES
HIPAA verified by two patient identifiers. Krissy Hawkins is a 81 y.o. male    Chief Complaint   Patient presents with   [place] Annual Wellness Visit       Visit Vitals  BP (!) 147/72 (BP 1 Location: Left upper arm, BP Patient Position: Sitting, BP Cuff Size: Large adult)   Pulse (!) 49   Temp 97.8 °F (36.6 °C) (Oral)   Resp 16   Ht 5' 7\" (1.702 m)   Wt 182 lb 8 oz (82.8 kg)   SpO2 98%   BMI 28.58 kg/m²       Pain Scale: 0 - No pain/10  Pain Location:       Health Maintenance Due   Topic Date Due    COVID-19 Vaccine (3 - Booster for Moderna series) 09/12/2021    Medicare Yearly Exam  01/09/2022         Coordination of Care Questionnaire:  :   1) Have you been to an emergency room, urgent care, or hospitalized since your last visit? If yes, where when, and reason for visit? no       2. Have seen or consulted any other health care provider since your last visit? If yes, where when, and reason for visit? NO      Patient is accompanied by self I have received verbal consent from Krissy Hawkins to discuss any/all medical information while they are present in the room.

## 2022-01-10 NOTE — PATIENT INSTRUCTIONS
Angina: Care Instructions  Your Care Instructions     You have a problem called angina. Angina happens when there is not enough blood flow to your heart muscle. Angina is a sign of coronary artery disease (CAD). CAD occurs when blood vessels that supply the heart become narrowed. Having CAD increases your risk of a heart attack. Chest pain or pressure is the most common symptom of angina. But some people have other symptoms, like:  · Pain, pressure, or a strange feeling in the back, neck, jaw, or upper belly, or in one or both shoulders or arms. · Shortness of breath. · Nausea or vomiting. · Lightheadedness or sudden weakness. · Fast or irregular heartbeat. Women are somewhat more likely than men to have angina symptoms like shortness of breath, nausea, and back or jaw pain. Angina can be dangerous. That's why it is important to pay attention to your symptoms. Know what is typical for you, learn how to control your symptoms, and understand when you need to get treatment. A change in your usual pattern of symptoms is an emergency. It may mean that you are having a heart attack. The doctor has checked you carefully, but problems can develop later. If you notice any problems or new symptoms, get medical treatment right away. Follow-up care is a key part of your treatment and safety. Be sure to make and go to all appointments, and call your doctor if you are having problems. It's also a good idea to know your test results and keep a list of the medicines you take. How can you care for yourself at home? Medicines    · If your doctor has given you nitroglycerin for angina symptoms, keep it with you at all times. If you have symptoms, sit down and rest, and take the first dose of nitroglycerin as directed. If your symptoms get worse or are not getting better within 5 minutes, call 911 right away. Stay on the phone.  The emergency  will give you further instructions.     · If your doctor advises it, take 1 low-dose aspirin a day to prevent heart attack.     · Be safe with medicines. Take your medicines exactly as prescribed. Call your doctor if you think you are having a problem with your medicine. You will get more details on the specific medicines your doctor prescribes. Lifestyle changes    · Do not smoke. If you need help quitting, talk to your doctor about stop-smoking programs and medicines. These can increase your chances of quitting for good.     · Eat a heart-healthy diet that is low in saturated fat and salt, and is high in fiber. Talk to your doctor or a dietitian about healthy eating.     · Stay at a healthy weight. Or lose weight if you need to. Activity    · Talk to your doctor about a level of activity that is safe for you.     · If an activity causes angina symptoms, stop and rest.   When should you call for help? Call 911 anytime you think you may need emergency care. For example, call if:    · You passed out (lost consciousness).     · You have symptoms of a heart attack. These may include:  ? Chest pain or pressure, or a strange feeling in the chest.  ? Sweating. ? Shortness of breath. ? Nausea or vomiting. ? Pain, pressure, or a strange feeling in the back, neck, jaw, or upper belly or in one or both shoulders or arms. ? Lightheadedness or sudden weakness. ? A fast or irregular heartbeat. After you call 911, the  may tell you to chew 1 adult-strength or 2 to 4 low-dose aspirin. Wait for an ambulance. Do not try to drive yourself.     · You have angina symptoms that do not go away with rest or are not getting better within 5 minutes after you take a dose of nitroglycerin. Call your doctor now if:    · Your angina symptoms seem worse but still follow your typical pattern. You can predict when symptoms will happen, but they may come on sooner, feel worse, or last longer.     · You feel dizzy or lightheaded, or you feel like you may faint.    Watch closely for changes in your health, and be sure to contact your doctor if you have any problems. Where can you learn more? Go to http://www.gray.com/  Enter H129 in the search box to learn more about \"Angina: Care Instructions. \"  Current as of: April 29, 2021               Content Version: 13.0  © 2556-3450 [employer]. Care instructions adapted under license by [employer] (which disclaims liability or warranty for this information). If you have questions about a medical condition or this instruction, always ask your healthcare professional. [address] any warranty or liability for your use of this information.

## 2022-01-12 NOTE — PROGRESS NOTES
Labs are essentially stable. Continue with the work-up for the neck as outlined at the visit including the addition of the Skelaxin.

## 2022-01-13 NOTE — PROGRESS NOTES
Labs are essentially stable.  Continue with the work-up for the neck as outlined at the visit including the addition of the Skelaxin. Letter generated to patient regarding.

## 2022-01-17 ENCOUNTER — HOSPITAL ENCOUNTER (OUTPATIENT)
Dept: MRI IMAGING | Age: 87
Discharge: HOME | End: 2022-01-17
Attending: INTERNAL MEDICINE
Payer: MEDICARE

## 2022-01-17 DIAGNOSIS — R29.898 RIGHT HAND WEAKNESS: ICD-10-CM

## 2022-01-17 PROCEDURE — 72141 MRI NECK SPINE W/O DYE: CPT

## 2022-01-18 DIAGNOSIS — M48.02 FORAMINAL STENOSIS OF CERVICAL REGION: Primary | ICD-10-CM

## 2022-01-18 NOTE — PROGRESS NOTES
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 Appointment see Dr. Mel Gregorio. Discussed with patient. Referral generated.

## 2022-01-31 ENCOUNTER — TELEPHONE (OUTPATIENT)
Dept: INTERNAL MEDICINE CLINIC | Age: 87
End: 2022-01-31

## 2022-01-31 NOTE — TELEPHONE ENCOUNTER
Patient called in. .    States he concerned about his PSA levels being 4.1 and wanted to know what Dr Mehul Beltran thinks about it. ... Also states his Vascular Surgeon friend states he needs to be tested for strokes.      [phone]

## 2022-02-01 ENCOUNTER — TRANSCRIBE ORDER (OUTPATIENT)
Dept: SCHEDULING | Age: 87
End: 2022-02-01

## 2022-02-01 DIAGNOSIS — I63.9 STROKE (HCC): Primary | ICD-10-CM

## 2022-02-02 RX ORDER — CIPROFLOXACIN 500 MG/1
500 TABLET ORAL 2 TIMES DAILY
Qty: 28 TABLET | Refills: 0 | Status: SHIPPED | OUTPATIENT
Start: 2022-02-02 | End: 2022-02-16

## 2022-02-02 NOTE — TELEPHONE ENCOUNTER
Patient walked into the office this morning to discuss test results. Rx to be sent to [facility] as requested and advised patient when he got home call and we will scheduled his f/up PSA level in 6 weeks.

## 2022-02-02 NOTE — TELEPHONE ENCOUNTER
RX refill request from the patient/pharmacy. Patient last seen 01- with labs, and needs to schedule a f/up PSA level in 6 weeks. Requested Prescriptions     Pending Prescriptions Disp Refills    ciprofloxacin HCl (CIPRO) 500 mg tablet 28 Tablet 0     Sig: Take 1 Tablet by mouth two (2) times a day for 14 days.

## 2022-02-07 PROBLEM — Z00.00 MEDICARE ANNUAL WELLNESS VISIT, SUBSEQUENT: Status: RESOLVED | Noted: 2017-12-07 | Resolved: 2022-02-07

## 2022-02-17 RX ORDER — AMLODIPINE BESYLATE 5 MG/1
TABLET ORAL
Qty: 90 TABLET | Refills: 1 | Status: SHIPPED | OUTPATIENT
Start: 2022-02-17 | End: 2022-08-15

## 2022-02-17 NOTE — TELEPHONE ENCOUNTER
RX refill request from the patient/pharmacy. Patient last seen 01- with labs, and next appt. scheduled for 03-  Requested Prescriptions     Pending Prescriptions Disp Refills    amLODIPine (NORVASC) 5 mg tablet [Pharmacy Med Name: AMLODIPINE BESYLATE 5 MG Tablet] 90 Tablet 1     Sig: TAKE 1 TABLET EVERY DAY   .

## 2022-03-06 DIAGNOSIS — E78.2 MIXED HYPERLIPIDEMIA: ICD-10-CM

## 2022-03-07 RX ORDER — ROSUVASTATIN CALCIUM 20 MG/1
TABLET, COATED ORAL
Qty: 90 TABLET | Refills: 3 | Status: SHIPPED | OUTPATIENT
Start: 2022-03-07

## 2022-03-07 NOTE — TELEPHONE ENCOUNTER
RX refill request from the patient/pharmacy. Patient last seen 01- with labs, and next appt. scheduled for 03-  Requested Prescriptions     Pending Prescriptions Disp Refills    rosuvastatin (CRESTOR) 20 mg tablet [Pharmacy Med Name: ROSUVASTATIN CALCIUM 20 MG Tablet] 90 Tablet 3     Sig: TAKE 1 TABLET EVERY DAY   .

## 2022-03-14 ENCOUNTER — LAB ONLY (OUTPATIENT)
Dept: INTERNAL MEDICINE CLINIC | Age: 87
End: 2022-03-14

## 2022-03-14 DIAGNOSIS — R97.20 ELEVATED PSA: Primary | ICD-10-CM

## 2022-03-15 LAB — PSA SERPL-MCNC: 3.7 NG/ML (ref 0.01–4)

## 2022-03-18 PROBLEM — R07.9 CHEST PAIN: Status: ACTIVE | Noted: 2021-05-12

## 2022-03-18 PROBLEM — G47.00 INSOMNIA: Status: ACTIVE | Noted: 2017-11-28

## 2022-03-18 PROBLEM — F32.A MILD DEPRESSION: Status: ACTIVE | Noted: 2018-06-08

## 2022-03-18 PROBLEM — H25.9 AGE-RELATED CATARACT OF BOTH EYES: Status: ACTIVE | Noted: 2019-04-08

## 2022-03-18 PROBLEM — R26.81 GAIT INSTABILITY: Status: ACTIVE | Noted: 2017-12-07

## 2022-03-18 PROBLEM — Z13.39 ALCOHOL SCREENING: Status: ACTIVE | Noted: 2019-12-06

## 2022-03-18 PROBLEM — K40.90 RIGHT INGUINAL HERNIA: Status: ACTIVE | Noted: 2021-05-25

## 2022-03-19 PROBLEM — R10.30 LOWER ABDOMINAL PAIN: Status: ACTIVE | Noted: 2019-05-03

## 2022-03-19 PROBLEM — L98.9 NON-HEALING SKIN LESION OF NOSE: Status: ACTIVE | Noted: 2017-12-07

## 2022-03-19 PROBLEM — M15.9 PRIMARY OSTEOARTHRITIS INVOLVING MULTIPLE JOINTS: Status: ACTIVE | Noted: 2017-11-28

## 2022-03-19 PROBLEM — R41.3 MEMORY DEFICIT: Status: ACTIVE | Noted: 2019-03-04

## 2022-03-19 PROBLEM — M15.0 PRIMARY OSTEOARTHRITIS INVOLVING MULTIPLE JOINTS: Status: ACTIVE | Noted: 2017-11-28

## 2022-03-19 PROBLEM — Z88.8 ALLERGY TO ACE INHIBITORS: Status: ACTIVE | Noted: 2017-11-28

## 2022-03-19 PROBLEM — M25.512 CHRONIC LEFT SHOULDER PAIN: Status: ACTIVE | Noted: 2021-06-23

## 2022-03-19 PROBLEM — L03.012 PARONYCHIA OF FINGER OF LEFT HAND: Status: ACTIVE | Noted: 2018-04-20

## 2022-03-19 PROBLEM — R97.20 ELEVATED PSA: Status: ACTIVE | Noted: 2017-11-28

## 2022-03-19 PROBLEM — K42.9 UMBILICAL HERNIA: Status: ACTIVE | Noted: 2021-05-21

## 2022-03-19 PROBLEM — N52.9 ED (ERECTILE DYSFUNCTION): Status: ACTIVE | Noted: 2017-11-28

## 2022-03-19 PROBLEM — I71.40 ABDOMINAL AORTIC ANEURYSM (AAA) WITHOUT RUPTURE (HCC): Status: ACTIVE | Noted: 2021-06-30
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
## 2022-03-19 PROBLEM — Z87.891 FORMER SMOKER: Status: ACTIVE | Noted: 2017-11-28

## 2022-03-19 PROBLEM — E53.8 VITAMIN B 12 DEFICIENCY: Status: ACTIVE | Noted: 2019-03-08

## 2022-03-19 PROBLEM — R73.02 GLUCOSE INTOLERANCE (IMPAIRED GLUCOSE TOLERANCE): Status: ACTIVE | Noted: 2017-11-28

## 2022-03-19 PROBLEM — S76.191A: Status: ACTIVE | Noted: 2020-12-07

## 2022-03-19 PROBLEM — M79.10 MYALGIA: Status: ACTIVE | Noted: 2018-02-02

## 2022-03-19 PROBLEM — G89.29 CHRONIC LEFT SHOULDER PAIN: Status: ACTIVE | Noted: 2021-06-23

## 2022-03-19 PROBLEM — M47.22 OSTEOARTHRITIS OF SPINE WITH RADICULOPATHY, CERVICAL REGION: Status: ACTIVE | Noted: 2022-01-10

## 2022-03-19 PROBLEM — M79.605 PAIN OF LEFT LOWER EXTREMITY: Status: ACTIVE | Noted: 2021-05-12

## 2022-03-20 PROBLEM — M54.2 NECK PAIN: Status: ACTIVE | Noted: 2022-01-04

## 2022-03-20 PROBLEM — I25.10 ASCVD (ARTERIOSCLEROTIC CARDIOVASCULAR DISEASE): Status: ACTIVE | Noted: 2017-11-28

## 2022-03-20 PROBLEM — F41.9 ANXIETY: Status: ACTIVE | Noted: 2017-11-28

## 2022-03-23 RX ORDER — PREGABALIN 25 MG/1
50 CAPSULE ORAL DAILY
Status: CANCELLED | OUTPATIENT
Start: 2022-03-30

## 2022-03-23 NOTE — PERIOP NOTES
[facility]  Preoperative Instructions        Surgery Date 3/30/2022     Time of Arrival To Be Called  Contact# [phone]    1. On the day of your surgery, please report to the Surgical Services Registration Desk and sign in at your designated time. The Surgery Center is located to the right of the Emergency Room. 2. You must have someone with you to drive you home. You should not drive a car for 24 hours following surgery. Please make arrangements for a friend or family member to stay with you for the first 24 hours after your surgery. 3. Do not have anything to eat or drink (including water, gum, mints, coffee, juice) after midnight  3/29/2022 . ? This may not apply to medications prescribed by your physician. ?(Please note below the special instructions with medications to take the morning of your procedure.)    4. We recommend you do not drink any alcoholic beverages for 24 hours before and after your surgery. 5. Contact your surgeons office for instructions on the following medications: non-steroidal anti-inflammatory drugs (i.e. Advil, Aleve), vitamins, and supplements. (Some surgeons will want you to stop these medications prior to surgery and others may allow you to take them)  **If you are currently taking Plavix, Coumadin, Aspirin and/or other blood-thinning agents, contact your surgeon for instructions. ** Your surgeon will partner with the physician prescribing these medications to determine if it is safe to stop or if you need to continue taking. Please do not stop taking these medications without instructions from your surgeon    6. Wear comfortable clothes. Wear glasses instead of contacts. Do not bring any money or jewelry. Please bring picture ID, insurance card, and any prearranged co-payment or hospital payment. Do not wear make-up, particularly mascara the morning of your surgery.   Do not wear nail polish, particularly if you are having foot /hand surgery. Wear your hair loose or down, no ponytails, buns, alejandra pins or clips. All body piercings must be removed. Please shower with antibacterial soap for three consecutive days before and on the morning of surgery, but do not apply any lotions, powders or deodorants after the shower on the day of surgery. Please use a fresh towels after each shower. Please sleep in clean clothes and change bed linens the night before surgery. Please do not shave for 48 hours prior to surgery. Shaving of the face is acceptable. 7. You should understand that if you do not follow these instructions your surgery may be cancelled. If your physical condition changes (I.e. fever, cold or flu) please contact your surgeon as soon as possible. 8. It is important that you be on time. If a situation occurs where you may be late, please call [phone] (OR Holding Area). 9. If you have any questions and or problems, please call [phone] (Pre-admission Testing). 10. Your surgery time may be subject to change. You will receive a phone call the evening prior if your time changes. 11.  If having outpatient surgery, you must have someone to drive you here, stay with you during the duration of your stay, and to drive you home at time of discharge. 12.  Due to current COVID restrictions, only ONE adult may accompany you the day of the procedure. We have limited seating available. If our waiting room is at capacity, your ride may be asked to remain in their vehicle. No children are allowed in the waiting room    Special Instructions: Follow instructions given to you by your doctor. TAKE ALL MEDICATIONS DAY OF SURGERY EXCEPT: vitamins or supplements    Holding Aspirin as directed by your doctor - stopped 3/23/2022    I understand a pre-operative phone call will be made to verify my surgery time.   In the event that I am not available, I give permission for a message to be left on my answering service and/or with another person?   yes         ___________________      __________   _________    (Signature of Patient)             (Witness)                (Date and Time)

## 2022-03-30 ENCOUNTER — ANESTHESIA EVENT (OUTPATIENT)
Dept: SURGERY | Age: 87
End: 2022-03-30
Payer: MEDICARE

## 2022-03-30 ENCOUNTER — HOSPITAL ENCOUNTER (OUTPATIENT)
Age: 87
Setting detail: OUTPATIENT SURGERY
Discharge: HOME | End: 2022-03-30
Attending: SPECIALIST | Admitting: SPECIALIST
Payer: MEDICARE

## 2022-03-30 ENCOUNTER — ANESTHESIA (OUTPATIENT)
Dept: SURGERY | Age: 87
End: 2022-03-30
Payer: MEDICARE

## 2022-03-30 VITALS
BODY MASS INDEX: 27.4 KG/M2 | SYSTOLIC BLOOD PRESSURE: 121 MMHG | TEMPERATURE: 97.5 F | WEIGHT: 180.78 LBS | DIASTOLIC BLOOD PRESSURE: 61 MMHG | HEART RATE: 57 BPM | RESPIRATION RATE: 14 BRPM | OXYGEN SATURATION: 99 % | HEIGHT: 68 IN

## 2022-03-30 DIAGNOSIS — Z41.9 SURGERY, ELECTIVE: Primary | ICD-10-CM

## 2022-03-30 PROCEDURE — 74011250636 HC RX REV CODE- 250/636: Performed by: NURSE ANESTHETIST, CERTIFIED REGISTERED

## 2022-03-30 PROCEDURE — L3650 SO 8 ABD RESTRAINT PRE OTS: HCPCS | Performed by: SPECIALIST

## 2022-03-30 PROCEDURE — 77030010507 HC ADH SKN DERMBND J&J -B: Performed by: SPECIALIST

## 2022-03-30 PROCEDURE — 74011000250 HC RX REV CODE- 250: Performed by: SPECIALIST

## 2022-03-30 PROCEDURE — 77030040356 HC CORD BPLR FRCP COVD -A: Performed by: SPECIALIST

## 2022-03-30 PROCEDURE — 74011000250 HC RX REV CODE- 250: Performed by: NURSE ANESTHETIST, CERTIFIED REGISTERED

## 2022-03-30 PROCEDURE — 74011250636 HC RX REV CODE- 250/636: Performed by: SPECIALIST

## 2022-03-30 PROCEDURE — 76060000033 HC ANESTHESIA 1 TO 1.5 HR: Performed by: SPECIALIST

## 2022-03-30 PROCEDURE — 77030003029 HC SUT VCRL J&J -B: Performed by: SPECIALIST

## 2022-03-30 PROCEDURE — 74011250637 HC RX REV CODE- 250/637: Performed by: SPECIALIST

## 2022-03-30 PROCEDURE — 76210000006 HC OR PH I REC 0.5 TO 1 HR: Performed by: SPECIALIST

## 2022-03-30 PROCEDURE — 74011250636 HC RX REV CODE- 250/636: Performed by: ANESTHESIOLOGY

## 2022-03-30 PROCEDURE — 76210000021 HC REC RM PH II 0.5 TO 1 HR: Performed by: SPECIALIST

## 2022-03-30 PROCEDURE — 76010000149 HC OR TIME 1 TO 1.5 HR: Performed by: SPECIALIST

## 2022-03-30 PROCEDURE — 77030002933 HC SUT MCRYL J&J -A: Performed by: SPECIALIST

## 2022-03-30 PROCEDURE — 77030020143 HC AIRWY LARYN INTUB CGAS -A: Performed by: NURSE ANESTHETIST, CERTIFIED REGISTERED

## 2022-03-30 PROCEDURE — 77030019908 HC STETH ESOPH SIMS -A: Performed by: NURSE ANESTHETIST, CERTIFIED REGISTERED

## 2022-03-30 PROCEDURE — 2709999900 HC NON-CHARGEABLE SUPPLY: Performed by: SPECIALIST

## 2022-03-30 PROCEDURE — 77030013079 HC BLNKT BAIR HGGR 3M -A: Performed by: NURSE ANESTHETIST, CERTIFIED REGISTERED

## 2022-03-30 RX ORDER — LIDOCAINE HYDROCHLORIDE 20 MG/ML
INJECTION, SOLUTION EPIDURAL; INFILTRATION; INTRACAUDAL; PERINEURAL AS NEEDED
Status: DISCONTINUED | OUTPATIENT
Start: 2022-03-30 | End: 2022-03-30 | Stop reason: HOSPADM

## 2022-03-30 RX ORDER — FENTANYL CITRATE 50 UG/ML
25 INJECTION, SOLUTION INTRAMUSCULAR; INTRAVENOUS
Status: DISCONTINUED | OUTPATIENT
Start: 2022-03-30 | End: 2022-03-30 | Stop reason: HOSPADM

## 2022-03-30 RX ORDER — FENTANYL CITRATE 50 UG/ML
INJECTION, SOLUTION INTRAMUSCULAR; INTRAVENOUS AS NEEDED
Status: DISCONTINUED | OUTPATIENT
Start: 2022-03-30 | End: 2022-03-30 | Stop reason: HOSPADM

## 2022-03-30 RX ORDER — HYDROCODONE BITARTRATE AND ACETAMINOPHEN 5; 325 MG/1; MG/1
1 TABLET ORAL
Qty: 15 TABLET | Refills: 0 | Status: SHIPPED | OUTPATIENT
Start: 2022-03-30 | End: 2022-04-04

## 2022-03-30 RX ORDER — MIDAZOLAM HYDROCHLORIDE 1 MG/ML
1 INJECTION, SOLUTION INTRAMUSCULAR; INTRAVENOUS AS NEEDED
Status: DISCONTINUED | OUTPATIENT
Start: 2022-03-30 | End: 2022-03-30 | Stop reason: HOSPADM

## 2022-03-30 RX ORDER — DEXAMETHASONE SODIUM PHOSPHATE 4 MG/ML
INJECTION, SOLUTION INTRA-ARTICULAR; INTRALESIONAL; INTRAMUSCULAR; INTRAVENOUS; SOFT TISSUE AS NEEDED
Status: DISCONTINUED | OUTPATIENT
Start: 2022-03-30 | End: 2022-03-30 | Stop reason: HOSPADM

## 2022-03-30 RX ORDER — SODIUM CHLORIDE, SODIUM LACTATE, POTASSIUM CHLORIDE, CALCIUM CHLORIDE 600; 310; 30; 20 MG/100ML; MG/100ML; MG/100ML; MG/100ML
25 INJECTION, SOLUTION INTRAVENOUS CONTINUOUS
Status: DISCONTINUED | OUTPATIENT
Start: 2022-03-30 | End: 2022-03-30 | Stop reason: HOSPADM

## 2022-03-30 RX ORDER — BUPIVACAINE HYDROCHLORIDE 5 MG/ML
INJECTION, SOLUTION EPIDURAL; INTRACAUDAL AS NEEDED
Status: DISCONTINUED | OUTPATIENT
Start: 2022-03-30 | End: 2022-03-30 | Stop reason: HOSPADM

## 2022-03-30 RX ORDER — PROPOFOL 10 MG/ML
INJECTION, EMULSION INTRAVENOUS AS NEEDED
Status: DISCONTINUED | OUTPATIENT
Start: 2022-03-30 | End: 2022-03-30 | Stop reason: HOSPADM

## 2022-03-30 RX ORDER — PREGABALIN 75 MG/1
150 CAPSULE ORAL ONCE
Status: COMPLETED | OUTPATIENT
Start: 2022-03-30 | End: 2022-03-30

## 2022-03-30 RX ORDER — ACETAMINOPHEN 500 MG
1000 TABLET ORAL ONCE
Status: COMPLETED | OUTPATIENT
Start: 2022-03-30 | End: 2022-03-30

## 2022-03-30 RX ORDER — LIDOCAINE HYDROCHLORIDE 10 MG/ML
0.1 INJECTION, SOLUTION EPIDURAL; INFILTRATION; INTRACAUDAL; PERINEURAL AS NEEDED
Status: DISCONTINUED | OUTPATIENT
Start: 2022-03-30 | End: 2022-03-30 | Stop reason: HOSPADM

## 2022-03-30 RX ORDER — CELECOXIB 200 MG/1
400 CAPSULE ORAL ONCE
Status: COMPLETED | OUTPATIENT
Start: 2022-03-30 | End: 2022-03-30

## 2022-03-30 RX ORDER — ONDANSETRON 2 MG/ML
4 INJECTION INTRAMUSCULAR; INTRAVENOUS AS NEEDED
Status: DISCONTINUED | OUTPATIENT
Start: 2022-03-30 | End: 2022-03-30 | Stop reason: HOSPADM

## 2022-03-30 RX ORDER — PHENYLEPHRINE HCL IN 0.9% NACL 0.4MG/10ML
SYRINGE (ML) INTRAVENOUS AS NEEDED
Status: DISCONTINUED | OUTPATIENT
Start: 2022-03-30 | End: 2022-03-30 | Stop reason: HOSPADM

## 2022-03-30 RX ORDER — HYDROMORPHONE HYDROCHLORIDE 1 MG/ML
.2-.5 INJECTION, SOLUTION INTRAMUSCULAR; INTRAVENOUS; SUBCUTANEOUS
Status: DISCONTINUED | OUTPATIENT
Start: 2022-03-30 | End: 2022-03-30 | Stop reason: HOSPADM

## 2022-03-30 RX ORDER — ONDANSETRON 2 MG/ML
INJECTION INTRAMUSCULAR; INTRAVENOUS AS NEEDED
Status: DISCONTINUED | OUTPATIENT
Start: 2022-03-30 | End: 2022-03-30 | Stop reason: HOSPADM

## 2022-03-30 RX ORDER — EPHEDRINE SULFATE/0.9% NACL/PF 50 MG/5 ML
SYRINGE (ML) INTRAVENOUS AS NEEDED
Status: DISCONTINUED | OUTPATIENT
Start: 2022-03-30 | End: 2022-03-30 | Stop reason: HOSPADM

## 2022-03-30 RX ORDER — FENTANYL CITRATE 50 UG/ML
50 INJECTION, SOLUTION INTRAMUSCULAR; INTRAVENOUS AS NEEDED
Status: DISCONTINUED | OUTPATIENT
Start: 2022-03-30 | End: 2022-03-30 | Stop reason: HOSPADM

## 2022-03-30 RX ADMIN — Medication 3 AMPULE: at 07:08

## 2022-03-30 RX ADMIN — PREGABALIN 150 MG: 75 CAPSULE ORAL at 07:08

## 2022-03-30 RX ADMIN — FENTANYL CITRATE 25 MCG: 50 INJECTION, SOLUTION INTRAMUSCULAR; INTRAVENOUS at 07:52

## 2022-03-30 RX ADMIN — Medication 40 MCG: at 08:27

## 2022-03-30 RX ADMIN — Medication 1000 MG: at 07:07

## 2022-03-30 RX ADMIN — SODIUM CHLORIDE, POTASSIUM CHLORIDE, SODIUM LACTATE AND CALCIUM CHLORIDE 25 ML/HR: 600; 310; 30; 20 INJECTION, SOLUTION INTRAVENOUS at 07:10

## 2022-03-30 RX ADMIN — CELECOXIB 400 MG: 200 CAPSULE ORAL at 07:07

## 2022-03-30 RX ADMIN — Medication 80 MCG: at 08:30

## 2022-03-30 RX ADMIN — Medication 80 MCG: at 08:43

## 2022-03-30 RX ADMIN — PROPOFOL 120 MG: 10 INJECTION, EMULSION INTRAVENOUS at 07:41

## 2022-03-30 RX ADMIN — Medication 40 MCG: at 08:17

## 2022-03-30 RX ADMIN — FENTANYL CITRATE 25 MCG: 50 INJECTION, SOLUTION INTRAMUSCULAR; INTRAVENOUS at 07:57

## 2022-03-30 RX ADMIN — Medication 10 MG: at 08:07

## 2022-03-30 RX ADMIN — ONDANSETRON HYDROCHLORIDE 4 MG: 2 INJECTION, SOLUTION INTRAMUSCULAR; INTRAVENOUS at 08:00

## 2022-03-30 RX ADMIN — DEXAMETHASONE SODIUM PHOSPHATE 4 MG: 4 INJECTION, SOLUTION INTRAMUSCULAR; INTRAVENOUS at 08:00

## 2022-03-30 RX ADMIN — LIDOCAINE HYDROCHLORIDE 80 MG: 20 INJECTION, SOLUTION EPIDURAL; INFILTRATION; INTRACAUDAL; PERINEURAL at 07:41

## 2022-03-30 RX ADMIN — Medication 40 MCG: at 08:11

## 2022-03-30 RX ADMIN — Medication 10 MG: at 08:04

## 2022-03-30 RX ADMIN — Medication 10 MG: at 08:01

## 2022-03-30 RX ADMIN — WATER 2 G: 1 INJECTION INTRAMUSCULAR; INTRAVENOUS; SUBCUTANEOUS at 07:50

## 2022-03-30 RX ADMIN — Medication 10 MG: at 07:59

## 2022-03-30 NOTE — DISCHARGE INSTRUCTIONS
SPINE DISCHARGE  INSTRUCTIONS    Sahara Rodriguez. Marian Closs, M.D. What can I do?  The only exercise you should do is walking. Start by walking twice a day for 5 minutes, then increase by  2-3 minutes every day until you reach 25 minutes twice a day. DO NOT sit for long periods of time (20 minutes at a time for the first couple of weeks, then gradually increase.  No heavy lifting (5 lbs max); no straining, twisting, or bending.  No driving until your physician tells you it is ok. It is, however, ok to ride short distances in a car.  Keep elbow elevated for two days       What can I eat?  You may resume your regular home diet as tolerated. If your throat is sore, you may want to eat soft food for the first few days. When can I take a shower?  . The dressing may be removed in 2 days. The small pieces of tape (steri strips)  underneath it should stay on. Let water run over them, then pat dry gently.  Do not take baths or soak in pools.      Medications:   Check with your physician before taking any anti-inflammatory medicines (Advil, Aleve, ibuprofen, aspirin).  Take prescription medicine as directed. DO NOT take more than prescribed. Call your physician if the prescribed dose is not sufficiently controlling your pain.  It is important to have regular bowel movements. Pain medications may cause constipation. Drink plenty of fluids, get enough fiber in your diet, and use stool softeners and drink prune juice to help prevent constipation. Do not take laxatives if at all possible except in severe situations. It can result in a vicious cycle of constipation and diarrhea.  Do not put any ointments or creams on your incision unless directed to do so by your physician.  Tobacco products should be avoided during the postoperative phase. When do I see the physician again?  Please call your physicians office as soon as you get home to schedule your 1st post operative appointment. You should be seen approximately two weeks after your surgery. At this appointment you will see your doctors Assistant for a wound check and to answer any questions you may have.  You will see your physician approximately six weeks after your surgery.      NOTIFY YOUR PHYSICIAN OF ANY OF THE FOLLOWING:     Fever above 101º for 24 hrs.  Nausea or vomiting.  Inability to urinate   Loss of bowel or bladder function (sudden onset of incontinence)   Changes in sensation (numbness, tingling, color change) in your extremities   Pain not relieved by your medicine.  Redness, swelling or drainage from your incision   Persistent pain in the calf of either leg   Development of severe pain      FOR APPOINTMENTS OR QUESTIONS CALL:    [facility] [address] [facility]  [phone]    DISCHARGE SUMMARY from Nurse    PATIENT INSTRUCTIONS:    After general anesthesia or intravenous sedation, for 24 hours or while taking prescription Narcotics:  · Limit your activities  · Do not drive and operate hazardous machinery  · Do not make important personal or business decisions  · Do  not drink alcoholic beverages  · If you have not urinated within 8 hours after discharge, please contact your surgeon on call. Report the following to your surgeon:  · Excessive pain, swelling, redness or odor of or around the surgical area  · Temperature over 100.5  · Nausea and vomiting lasting longer than 4 hours or if unable to take medications  · Any signs of decreased circulation or nerve impairment to extremity: change in color, persistent  numbness, tingling, coldness or increase pain  · Any questions    What to do at Home:    *  Please give a list of your current medications to your Primary Care Provider. *  Please update this list whenever your medications are discontinued, doses are      changed, or new medications (including over-the-counter products) are added.     *  Please carry medication information at all times in case of emergency situations. These are general instructions for a healthy lifestyle:    No smoking/ No tobacco products/ Avoid exposure to second hand smoke  Surgeon General's Warning:  Quitting smoking now greatly reduces serious risk to your health. Obesity, smoking, and sedentary lifestyle greatly increases your risk for illness    A healthy diet, regular physical exercise & weight monitoring are important for maintaining a healthy lifestyle    You may be retaining fluid if you have a history of heart failure or if you experience any of the following symptoms:  Weight gain of 3 pounds or more overnight or 5 pounds in a week, increased swelling in our hands or feet or shortness of breath while lying flat in bed. Please call your doctor as soon as you notice any of these symptoms; do not wait until your next office visit. The discharge information has been reviewed with the patient and caregiver. The patient and caregiver verbalized understanding. Discharge medications reviewed with the patient and caregiver and appropriate educational materials and side effects teaching were provided.   ___________________________________________________________________________________________________________________________________

## 2022-03-30 NOTE — BRIEF OP NOTE
Brief Postoperative Note    Patient: Aye Tellez  YOB: 1935  MRN: [id number]    Date of Procedure: 3/30/2022     Pre-Op Diagnosis: RIGHT ULNAR NEUROPATHY    Post-Op Diagnosis: Same as preoperative diagnosis.       Procedure(s):  RIGHT ULNAR NERVE DECOMPRESSION    Surgeon(s):  Curt Cornelius MD    Surgical Assistant: Surg Asst-1: Dusty Heimlich N    Anesthesia: General     Estimated Blood Loss (mL): Minimal    Complications: None    Specimens: * No specimens in log *     Implants: * No implants in log *    Drains: * No LDAs found *    Findings: good decompression    Electronically Signed by Bertrand Mckeon MD on 3/30/2022 at 9:01 AM

## 2022-03-30 NOTE — PERIOP NOTES
1205 Handoff Report from Operating Room to PACU    Report received from [address] [place] [address] and Elham Juarez CRNA regarding Cholo Due. Surgeon(s):  Brian Lebron MD  And Procedure(s) (LRB):  RIGHT ULNAR NERVE DECOMPRESSION (Right)  confirmed   with allergies and dressings discussed. Anesthesia type, drugs, patient history, complications, estimated blood loss, vital signs, intake and output, and last pain medication, lines and temperature were reviewed. 9569 Report given to Usha Morrow. Pt and all belongings transported to phase II.

## 2022-03-30 NOTE — ANESTHESIA POSTPROCEDURE EVALUATION
Procedure(s):  RIGHT ULNAR NERVE DECOMPRESSION. general    Anesthesia Post Evaluation      Multimodal analgesia: multimodal analgesia used between 6 hours prior to anesthesia start to PACU discharge  Patient location during evaluation: PACU  Level of consciousness: sleepy but conscious  Pain management: adequate  Airway patency: patent  Anesthetic complications: no  Cardiovascular status: acceptable  Respiratory status: acceptable  Hydration status: acceptable  Comments: +Post-Anesthesia Evaluation and Assessment    Patient: Roberto Maldonado MRN: [id number]  SSN: [id number]   YOB: 1935  Age: 81 y.o. Sex: male      Cardiovascular Function/Vital Signs    BP (!) 119/52   Pulse 63   Temp 36.6 °C (97.8 °F)   Resp 16   Ht 5' 8\" (1.727 m)   Wt 82 kg (180 lb 12.4 oz)   SpO2 96%   BMI 27.49 kg/m²     Patient is status post Procedure(s):  RIGHT ULNAR NERVE DECOMPRESSION. Nausea/Vomiting: Controlled. Postoperative hydration reviewed and adequate. Pain:  Pain Scale 1: Visual (03/30/22 0915)  Pain Intensity 1: 0 (03/30/22 0915)   Managed. Neurological Status:   Neuro (WDL): Exceptions to WDL (03/30/22 0859)   At baseline. Mental Status and Level of Consciousness: Arousable. Pulmonary Status:   O2 Device: None (Room air) (03/30/22 0915)   Adequate oxygenation and airway patent. Complications related to anesthesia: None    Post-anesthesia assessment completed. No concerns. Signed By: Modesta Landrum MD    3/30/2022  Post anesthesia nausea and vomiting:  controlled  Final Post Anesthesia Temperature Assessment:  Normothermia (36.0-37.5 degrees C)      INITIAL Post-op Vital signs:   Vitals Value Taken Time   /45 03/30/22 0930   Temp 36.6 °C (97.8 °F) 03/30/22 0859   Pulse 58 03/30/22 0931   Resp 13 03/30/22 0931   SpO2 98 % 03/30/22 0931   Vitals shown include unvalidated device data.

## 2022-03-30 NOTE — OP NOTES
[address]  OPERATIVE REPORT    Name:  Julieth Landaverde  MR#:  [id number]  :  1935  ACCOUNT #:  [de-identified]  DATE OF SERVICE:  2022    PREOPERATIVE DIAGNOSIS:  Right ulnar neuropathy. POSTOPERATIVE DIAGNOSIS:  Right ulnar neuropathy. PROCEDURE PERFORMED:  Right ulnar decompression. SURGEON:  Violetta Cortes MD    FIRST ASSISTANT:  OR staff. ANESTHESIA:  General endotracheal.    . SPECIMENS REMOVED:  n.    IMPLANTS:  n.  .    INDICATIONS:  The patient is an 81-year-old gentleman with ulnar neuropathy, this was proven by EMG. We decided to take him to the operating room to decompress his nerve. FINDINGS:  Good decompression. 2 g of Ancef was given prior to incision and orders were written to stop within 24 hours. SCDs were used for the entire case. PROCEDURE:  The patient was brought to the operating room. After appropriate anesthesia was administered, he was placed with his right arm flexed and rotated up and it was prepped and draped in the usual sterile fashion. Incision was marked out and a curvilinear incision along the course of the ulnar nerve, this was infiltrated with 1% lidocaine without epinephrine. Incision was made using a #15 blade and this was then dissected down so I could find the medial olecranon, this was then dissected further until I came up on the ulnar nerve, this was then decompressed proximally where it did seem to be a lot of compression across the elbow and then well into the distal forearm. There was a lot compression here and in fact the nerve was quite sensitive and the hand would jump as I was decompressing it alone. At the end, I could get a hemostat out the canal without any further compression. Hemostasis was obtained. The area was irrigated copiously. Closure was done in anatomical layers. The skin was reapproximated using Monocryl. Sponge and laps counts were correct x2. No complications.   Estimated blood loss was minimal.    Dr. Layne Giordano was present for the entire case from start to finish.       Salome Villeda MD      [id number]  D:  03/30/2022 9:15  T:  03/30/2022 10:51  JOB #:  [id number]

## 2022-03-30 NOTE — ANESTHESIA PREPROCEDURE EVALUATION
Anesthetic History   No history of anesthetic complications            Review of Systems / Medical History  Patient summary reviewed, nursing notes reviewed and pertinent labs reviewed    Pulmonary                Comments: Former smoker - Quit 1980 - 7.5 pack years   Neuro/Psych       CVA  TIA and psychiatric history    Comments: Gait instability    Depression  Anxiety Cardiovascular    Hypertension: well controlled          CAD, cardiac stents and hyperlipidemia    Exercise tolerance: >4 METS  Comments: S/P Coronary Stents x 6    Abdominal aortic aneurysm (AAA)      ECG (1/8/21):   Sinus  Bradycardia   WITHIN NORMAL LIMITS   GI/Hepatic/Renal               Comments: Ventral Hernia  Right Inguinal Hernia Endo/Other        Arthritis     Other Findings   Comments: Hearing loss - wears hearing aides      Osteoarthritis of spine with radiculopathy, cervical region           Physical Exam    Airway  Mallampati: III  TM Distance: > 6 cm  Neck ROM: normal range of motion   Mouth opening: Normal     Cardiovascular  Regular rate and rhythm,  S1 and S2 normal,  no murmur, click, rub, or gallop             Dental    Dentition: Caps/crowns     Pulmonary  Breath sounds clear to auscultation               Abdominal  GI exam deferred       Other Findings            Anesthetic Plan    ASA: 3  Anesthesia type: general    Monitoring Plan: BIS      Induction: Intravenous  Anesthetic plan and risks discussed with: Patient

## 2022-03-30 NOTE — PERIOP NOTES
For dc home  Vswnl. Denies pain, nausea. dsg to R arm d&i. Went over [employer] instructions w/pt and wife including Rx and f/up. Verbalized understanding. dc'd home.

## 2022-07-11 NOTE — PROGRESS NOTES
Chief Complaint   Patient presents with    Hypertension     6 month follow up    Cholesterol Problem       SUBJECTIVE:    David Fernandez is a 81 y.o. male who returns in follow-up of his medical problems include hypertension, hyperlipidemia, glucose intolerance, ASCVD, prior TIA, DJD and other multiple medical problems. He is taking his medications trying to follow his diet and try and remain physically active. He did have ulnar nerve repair 3 months ago and that seems to have gone well except he is still noting some weakness in the right hand. He denies any chest pain, shortness of breath, palpitations, PND, orthopnea or other cardiac or respiratory complaints except he is a little lightheaded sometimes when he stands up quickly and he is concerned it could be his blood pressure medication. He denies any GI or  complaints. He has no headaches, dizziness or neurologic complaints except he sometimes has a hard time focusing. He denies any change of his chronic arthritic complaints and there are no other complaints on complete review of systems. Current Outpatient Medications   Medication Sig Dispense Refill    rosuvastatin (CRESTOR) 20 mg tablet TAKE 1 TABLET EVERY DAY 90 Tablet 3    amLODIPine (NORVASC) 5 mg tablet TAKE 1 TABLET EVERY DAY 90 Tablet 1    escitalopram oxalate (LEXAPRO) 10 mg tablet TAKE 1 TABLET EVERY DAY 90 Tablet 3    tamsulosin (Flomax) 0.4 mg capsule Take 0.4 mg by mouth daily.  hydrALAZINE (APRESOLINE) 50 mg tablet TAKE 1 TABLET BY MOUTH TWICE DAILY (Patient taking differently: 50 mg daily. TAKE 1 TABLET BY MOUTH TWICE DAILY) 180 Tab 3    vit C/E/Zn/coppr/lutein/zeaxan (PRESERVISION AREDS-2 PO) Take 2 Capsules by mouth two (2) times a day.  krill-omega-3-dha-epa-lipids 714-62-39-75 mg cap Take  by mouth two (2) times a day.  ezetimibe (ZETIA) 10 mg tablet Take  by mouth daily.  nebivolol (BYSTOLIC) 5 mg tablet Take 5 mg by mouth daily.       aspirin 81 mg tablet Take 81 mg by mouth daily.  cholecalciferol, vitamin d3, (VITAMIN D) 1,000 unit tablet Take 2,000 Units by mouth daily.        Past Medical History:   Diagnosis Date    Allergy to ACE inhibitors 11/28/2017    Anxiety 11/28/2017    ASCVD (arteriosclerotic cardiovascular disease) 11/28/2017    CAD (coronary artery disease) 2000    MI     Cataract     Cataract 04/2019    Depression 11/28/2017    ED (erectile dysfunction) 11/28/2017    Elevated PSA 11/28/2017    Former smoker 11/28/2017    Glucose intolerance (impaired glucose tolerance) 11/28/2017    Hypercholesterolemia     Hypertension     Insomnia 11/28/2017    On statin therapy 11/28/2017    Other ill-defined conditions(799.89)     elevated lipids    Psychiatric disorder     depression /anxiety    Stroke [facility])     TIA     Past Surgical History:   Procedure Laterality Date    COLONOSCOPY N/A 7/30/2019    COLONOSCOPY performed by Dewight Severin., MD at [facility] ENDOSCOPY    COLONOSCOPY,REMV Vermell Lars  7/30/2019         COLORECTAL SCRN; HI RISK IND  9/10/2014         COLORECTAL SCRN; HI RISK IND  7/30/2019         HX HEENT Bilateral 06/03/2019    eye cataract    HX HERNIA REPAIR  08/04/2021    VENTRAL HERNIA REPAIR WITH MESH AND RIGHT INGUINAL HERNIA REPAIR WITH MESH     HX TONSILLECTOMY      IL ABDOMEN SURGERY PROC UNLISTED      hernia repair    IL CARDIAC SURG PROCEDURE UNLIST      stents x 6    IL COLSC FLX W/RMVL OF TUMOR POLYP LESION SNARE TQ  9/10/2014          Allergies   Allergen Reactions    Benazepril Rash and Swelling    Pork/Porcine Containing Products Hives       REVIEW OF SYSTEMS:  General: negative for - chills or fever, or weight loss or gain  ENT: negative for - headaches, nasal congestion or tinnitus  Eyes: no blurred or visual changes  Neck: No stiffness or swollen nodes  Respiratory: negative for - cough, hemoptysis, shortness of breath or wheezing  Cardiovascular : negative for - chest pain, edema, palpitations or shortness of breath  Gastrointestinal: negative for - abdominal pain, blood in stools, heartburn or nausea/vomiting  Genito-Urinary: no dysuria, trouble voiding, or hematuria  Musculoskeletal: negative for - gait disturbance, joint pain, joint stiffness or joint swelling  Neurological: no TIA or stroke symptoms  Hematologic: no bruises, no bleeding  Lymphatic: no swollen glands  Integument: no lumps, mole changes, nail changes or rash  Endocrine:no malaise/lethargy poly uria or polydipsia or unexpected weight changes        Social History     Socioeconomic History    Marital status:    Tobacco Use    Smoking status: Former Smoker     Packs/day: 1.50     Years: 5.00     Pack years: 7.50     Quit date:      Years since quittin.5    Smokeless tobacco: Never Used    Tobacco comment: smoke for 5 year    Vaping Use    Vaping Use: Never used   Substance and Sexual Activity    Alcohol use: No    Drug use: No     Family History   Problem Relation Age of Onset    Psychiatric Disorder Mother         depression    Heart Disease Father         aneurysym       OBJECTIVE:     Visit Vitals  /84 (BP 1 Location: Left upper arm, BP Patient Position: Sitting, BP Cuff Size: Adult)   Pulse (!) 55   Temp 97.7 °F (36.5 °C) (Oral)   Resp 16   Ht 5' 7\" (1.702 m)   Wt 180 lb 8 oz (81.9 kg)   SpO2 98%   BMI 28.27 kg/m²     CONSTITUTIONAL:   well nourished, appears age appropriate  EYES: sclera anicteric, PERRL, EOMI  ENMT:nares clear, moist mucous membranes, pharynx clear  NECK: supple.  Thyroid normal, No JVD or bruits  RESPIRATORY: Chest: clear to ascultation and percussion, normal inspiratory effort  CARDIOVASCULAR: Heart: regular rate and rhythm no murmurs, rubs or gallops, PMI not displaced, No thrills, no peripheral edema  GASTROINTESTINAL: Abdomen: non distended, soft, non tender, bowel sounds normal  HEMATOLOGIC: no purpura, petechiae or bruising  LYMPHATIC: No lymph node enlargemant  MUSCULOSKELETAL: Extremities: no active synovitis, pulse 1+   INTEGUMENT: No unusual rashes or suspicious skin lesions noted. Nails appear normal.  PERIPHERAL VASCULAR: normal pulses femoral, PT and DP  NEUROLOGIC: non-focal exam, A & O X 3  PSYCHIATRIC:, appropriate affect     ASSESSMENT:   1. Essential hypertension    2. Glucose intolerance (impaired glucose tolerance)    3. Mixed hyperlipidemia    4. ASCVD (arteriosclerotic cardiovascular disease)    5. Primary osteoarthritis involving multiple joints      Impression  1. Hypertension blood pressure is good by the nurse and by myself so I am reluctant to decrease his blood pressure medication. Pulse is 52 by my check. 2.  Glucose intolerance we will see what that status is  3. Hyperlipidemia prior lab reviewed repeat status pending  4. ASCVD clinically stable  Of 5 DJD that is stable  At this point I recommended working on core exercises and continue current treatment pending results of today's lab. Follow-up in 6 months or sooner if there is a problem. PLAN:  .  Orders Placed This Encounter    METABOLIC PANEL, COMPREHENSIVE    LIPID PANEL    CK    HEMOGLOBIN A1C WITH EAG         ATTENTION:   This medical record was transcribed using an electronic medical records system. Although proofread, it may and can contain electronic and spelling errors. Other human spelling and other errors may be present. Corrections may be executed at a later time. Please feel free to contact us for any clarifications as needed. Follow-up and Dispositions    · Return in about 6 months (around 1/12/2023). No results found for any visits on 07/12/22. Parrish Booker MD    The patient verbalized understanding of the problems and plans as explained.

## 2022-07-12 ENCOUNTER — OFFICE VISIT (OUTPATIENT)
Dept: INTERNAL MEDICINE CLINIC | Age: 87
End: 2022-07-12
Payer: MEDICARE

## 2022-07-12 VITALS
RESPIRATION RATE: 16 BRPM | HEART RATE: 55 BPM | OXYGEN SATURATION: 98 % | TEMPERATURE: 97.7 F | WEIGHT: 180.5 LBS | DIASTOLIC BLOOD PRESSURE: 84 MMHG | HEIGHT: 67 IN | BODY MASS INDEX: 28.33 KG/M2 | SYSTOLIC BLOOD PRESSURE: 136 MMHG

## 2022-07-12 DIAGNOSIS — R73.02 GLUCOSE INTOLERANCE (IMPAIRED GLUCOSE TOLERANCE): ICD-10-CM

## 2022-07-12 DIAGNOSIS — E78.2 MIXED HYPERLIPIDEMIA: ICD-10-CM

## 2022-07-12 DIAGNOSIS — I10 ESSENTIAL HYPERTENSION: Primary | ICD-10-CM

## 2022-07-12 DIAGNOSIS — M15.9 PRIMARY OSTEOARTHRITIS INVOLVING MULTIPLE JOINTS: ICD-10-CM

## 2022-07-12 DIAGNOSIS — I25.10 ASCVD (ARTERIOSCLEROTIC CARDIOVASCULAR DISEASE): ICD-10-CM

## 2022-07-12 PROCEDURE — G8754 DIAS BP LESS 90: HCPCS | Performed by: INTERNAL MEDICINE

## 2022-07-12 PROCEDURE — G8752 SYS BP LESS 140: HCPCS | Performed by: INTERNAL MEDICINE

## 2022-07-12 PROCEDURE — 1101F PT FALLS ASSESS-DOCD LE1/YR: CPT | Performed by: INTERNAL MEDICINE

## 2022-07-12 PROCEDURE — G8417 CALC BMI ABV UP PARAM F/U: HCPCS | Performed by: INTERNAL MEDICINE

## 2022-07-12 PROCEDURE — 99214 OFFICE O/P EST MOD 30 MIN: CPT | Performed by: INTERNAL MEDICINE

## 2022-07-12 PROCEDURE — G8427 DOCREV CUR MEDS BY ELIG CLIN: HCPCS | Performed by: INTERNAL MEDICINE

## 2022-07-12 PROCEDURE — G8536 NO DOC ELDER MAL SCRN: HCPCS | Performed by: INTERNAL MEDICINE

## 2022-07-12 PROCEDURE — G9717 DOC PT DX DEP/BP F/U NT REQ: HCPCS | Performed by: INTERNAL MEDICINE

## 2022-07-12 PROCEDURE — 1123F ACP DISCUSS/DSCN MKR DOCD: CPT | Performed by: INTERNAL MEDICINE

## 2022-07-12 NOTE — PROGRESS NOTES
Chief Complaint   Patient presents with    Hypertension     6 month follow up    Cholesterol Problem     Visit Vitals  /84 (BP 1 Location: Left upper arm, BP Patient Position: Sitting, BP Cuff Size: Adult)   Pulse (!) 55   Temp 97.7 °F (36.5 °C) (Oral)   Resp 16   Ht 5' 7\" (1.702 m)   Wt 180 lb 8 oz (81.9 kg)   SpO2 98%   BMI 28.27 kg/m²     1. Have you been to the ER, urgent care clinic since your last visit? Hospitalized since your last visit? [id number] [facility] for right ulnar nerve surgery    2. Have you seen or consulted any other health care providers outside of the [address] [place] [address] since your last visit? Include any pap smears or colon screening.  Dr. Mathews Valerie

## 2022-07-12 NOTE — PATIENT INSTRUCTIONS

## 2022-07-13 LAB
ALBUMIN SERPL-MCNC: 3.8 G/DL (ref 3.5–5)
ALBUMIN/GLOB SERPL: 1.2 {RATIO} (ref 1.1–2.2)
ALP SERPL-CCNC: 72 U/L (ref 45–117)
ALT SERPL-CCNC: 24 U/L (ref 12–78)
ANION GAP SERPL CALC-SCNC: 5 MMOL/L (ref 5–15)
AST SERPL-CCNC: 22 U/L (ref 15–37)
BILIRUB SERPL-MCNC: 0.8 MG/DL (ref 0.2–1)
BUN SERPL-MCNC: 19 MG/DL (ref 6–20)
BUN/CREAT SERPL: 17 (ref 12–20)
CALCIUM SERPL-MCNC: 9.3 MG/DL (ref 8.5–10.1)
CHLORIDE SERPL-SCNC: 106 MMOL/L (ref 97–108)
CHOLEST SERPL-MCNC: 130 MG/DL
CK SERPL-CCNC: 133 U/L (ref 39–308)
CO2 SERPL-SCNC: 27 MMOL/L (ref 21–32)
CREAT SERPL-MCNC: 1.09 MG/DL (ref 0.7–1.3)
EST. AVERAGE GLUCOSE BLD GHB EST-MCNC: 111 MG/DL
GLOBULIN SER CALC-MCNC: 3.3 G/DL (ref 2–4)
GLUCOSE SERPL-MCNC: 95 MG/DL (ref 65–100)
HBA1C MFR BLD: 5.5 % (ref 4–5.6)
HDLC SERPL-MCNC: 51 MG/DL
HDLC SERPL: 2.5 {RATIO} (ref 0–5)
LDLC SERPL CALC-MCNC: 66.6 MG/DL (ref 0–100)
POTASSIUM SERPL-SCNC: 4.7 MMOL/L (ref 3.5–5.1)
PROT SERPL-MCNC: 7.1 G/DL (ref 6.4–8.2)
SODIUM SERPL-SCNC: 138 MMOL/L (ref 136–145)
TRIGL SERPL-MCNC: 62 MG/DL (ref ?–150)
VLDLC SERPL CALC-MCNC: 12.4 MG/DL

## 2022-08-01 ENCOUNTER — TELEPHONE (OUTPATIENT)
Dept: INTERNAL MEDICINE CLINIC | Age: 87
End: 2022-08-01

## 2022-08-01 NOTE — TELEPHONE ENCOUNTER
Patient states he tested positive for COVID after returning from a trip from [facility]. C/o sore throat and legthargic. But his biggest concern is a spell that he had that he lost control of his bowels and there was blood visible and very dark. Referred patient to the ER for further evaluation.

## 2022-08-05 RX ORDER — TAMSULOSIN HYDROCHLORIDE 0.4 MG/1
0.4 CAPSULE ORAL DAILY
Qty: 90 CAPSULE | Refills: 0 | Status: SHIPPED | OUTPATIENT
Start: 2022-08-05 | End: 2022-10-14 | Stop reason: SDUPTHER

## 2022-08-05 NOTE — TELEPHONE ENCOUNTER
PCP: To Villegas MD    Last appt: 7/12/2022  Future Appointments   Date Time Provider Grant Myrick   8/10/2022  1:00 PM To Villegas MD [facility] AMB   1/12/2023  8:00 AM To Villegas MD [facility] AMB       Requested Prescriptions     Pending Prescriptions Disp Refills    tamsulosin (Flomax) 0.4 mg capsule       Sig: Take 1 Capsule by mouth in the morning.        Prior labs and Blood pressures:  BP Readings from Last 3 Encounters:   07/12/22 136/84   03/30/22 121/61   01/10/22 136/82     Lab Results   Component Value Date/Time    Sodium 138 07/12/2022 09:07 AM    Potassium 4.7 07/12/2022 09:07 AM    Chloride 106 07/12/2022 09:07 AM    CO2 27 07/12/2022 09:07 AM    Anion gap 5 07/12/2022 09:07 AM    Glucose 95 07/12/2022 09:07 AM    BUN 19 07/12/2022 09:07 AM    Creatinine 1.09 07/12/2022 09:07 AM    BUN/Creatinine ratio 17 07/12/2022 09:07 AM    GFR est AA >60 07/12/2022 09:07 AM    GFR est non-AA >60 07/12/2022 09:07 AM    Calcium 9.3 07/12/2022 09:07 AM     Lab Results   Component Value Date/Time    Hemoglobin A1c 5.5 07/12/2022 09:07 AM    Hemoglobin A1c (POC) 5.8 (A) 06/08/2018 08:57 AM     Lab Results   Component Value Date/Time    Cholesterol, total 130 07/12/2022 09:07 AM    Cholesterol (POC) 149.0 06/08/2018 08:57 AM    HDL Cholesterol 51 07/12/2022 09:07 AM    HDL Cholesterol (POC) 63.0 06/08/2018 08:57 AM    LDL Cholesterol (POC) 72.2 06/08/2018 08:57 AM    LDL, calculated 66.6 07/12/2022 09:07 AM    VLDL, calculated 12.4 07/12/2022 09:07 AM    Triglyceride 62 07/12/2022 09:07 AM    Triglycerides (POC) 69.0 06/08/2018 08:57 AM    CHOL/HDL Ratio 2.5 07/12/2022 09:07 AM     Lab Results   Component Value Date/Time    Vitamin D 25-Hydroxy 40.1 01/10/2022 09:09 AM       Lab Results   Component Value Date/Time    TSH 1.76 01/10/2022 09:09 AM    TSH, 3rd generation 1.39 01/08/2021 10:52 AM

## 2022-08-10 ENCOUNTER — OFFICE VISIT (OUTPATIENT)
Dept: INTERNAL MEDICINE CLINIC | Age: 87
End: 2022-08-10
Payer: MEDICARE

## 2022-08-10 VITALS
SYSTOLIC BLOOD PRESSURE: 136 MMHG | HEART RATE: 51 BPM | OXYGEN SATURATION: 98 % | BODY MASS INDEX: 28.83 KG/M2 | HEIGHT: 67 IN | RESPIRATION RATE: 16 BRPM | TEMPERATURE: 97.5 F | WEIGHT: 183.7 LBS | DIASTOLIC BLOOD PRESSURE: 82 MMHG

## 2022-08-10 DIAGNOSIS — I10 ESSENTIAL HYPERTENSION: Primary | ICD-10-CM

## 2022-08-10 DIAGNOSIS — R19.8 CHANGE IN BOWEL FUNCTION: ICD-10-CM

## 2022-08-10 PROCEDURE — G8427 DOCREV CUR MEDS BY ELIG CLIN: HCPCS | Performed by: INTERNAL MEDICINE

## 2022-08-10 PROCEDURE — G8536 NO DOC ELDER MAL SCRN: HCPCS | Performed by: INTERNAL MEDICINE

## 2022-08-10 PROCEDURE — G9717 DOC PT DX DEP/BP F/U NT REQ: HCPCS | Performed by: INTERNAL MEDICINE

## 2022-08-10 PROCEDURE — G8752 SYS BP LESS 140: HCPCS | Performed by: INTERNAL MEDICINE

## 2022-08-10 PROCEDURE — G8417 CALC BMI ABV UP PARAM F/U: HCPCS | Performed by: INTERNAL MEDICINE

## 2022-08-10 PROCEDURE — 1123F ACP DISCUSS/DSCN MKR DOCD: CPT | Performed by: INTERNAL MEDICINE

## 2022-08-10 PROCEDURE — 1101F PT FALLS ASSESS-DOCD LE1/YR: CPT | Performed by: INTERNAL MEDICINE

## 2022-08-10 PROCEDURE — 99213 OFFICE O/P EST LOW 20 MIN: CPT | Performed by: INTERNAL MEDICINE

## 2022-08-10 PROCEDURE — G8754 DIAS BP LESS 90: HCPCS | Performed by: INTERNAL MEDICINE

## 2022-08-10 RX ORDER — ESCITALOPRAM OXALATE 10 MG/1
TABLET ORAL
Qty: 90 TABLET | Refills: 3 | Status: SHIPPED | OUTPATIENT
Start: 2022-08-10

## 2022-08-10 NOTE — PROGRESS NOTES
Room: E1    Identified pt with two pt identifiers(name and ). Reviewed record in preparation for visit and have obtained necessary documentation. All patient medications has been reviewed. Chief Complaint   Patient presents with    Medication Management     Questions re: hydralazine long term usage      Follow-up     Stool issue; dark liquid stool while on vacation in [address] Maintenance Due   Topic    COVID-19 Vaccine (3 - Booster for Moderna series)       Vitals:    08/10/22 1301   BP: 136/82   Pulse: (!) 51   Resp: 16   Temp: 97.5 °F (36.4 °C)   TempSrc: Oral   SpO2: 98%   Weight: 183 lb 11.2 oz (83.3 kg)   Height: 5' 7\" (1.702 m)   PainSc:   0 - No pain       4. Have you been to the ER, urgent care clinic since your last visit? Hospitalized since your last visit? No    5. Have you seen or consulted any other health care providers outside of the [address] [place] [address] since your last visit? Include any pap smears or colon screening. No    Patient is accompanied by self I have received verbal consent from Adele Persaud to discuss any/all medical information while they are present in the room.

## 2022-08-10 NOTE — TELEPHONE ENCOUNTER
PCP: Merle Quezada MD    Last appt: 8/10/2022  Future Appointments   Date Time Provider Grant Myrick   1/12/2023  8:00 AM Merle Quezada MD [facility] AMB       Last refilled:9/2/21    Requested Prescriptions     Pending Prescriptions Disp Refills    escitalopram oxalate (LEXAPRO) 10 mg tablet [Pharmacy Med Name: ESCITALOPRAM OXALATE 10 MG Tablet] 90 Tablet 3     Sig: TAKE 1 TABLET EVERY DAY
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
## 2022-08-15 RX ORDER — AMLODIPINE BESYLATE 5 MG/1
TABLET ORAL
Qty: 90 TABLET | Refills: 1 | Status: SHIPPED | OUTPATIENT
Start: 2022-08-15

## 2022-08-15 NOTE — TELEPHONE ENCOUNTER
PCP: Rodríguez Patel MD    Last appt: 8/10/2022  Future Appointments   Date Time Provider Grant Myrick   1/12/2023  8:00 AM Rodríguez Patel MD [facility] AMB       Last refilled:2/17/22    Requested Prescriptions     Pending Prescriptions Disp Refills    amLODIPine (NORVASC) 5 mg tablet [Pharmacy Med Name: AMLODIPINE BESYLATE 5 MG Tablet] 90 Tablet 1     Sig: TAKE 1 TABLET EVERY DAY
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
## 2022-09-27 PROBLEM — Z01.810 PRE-OPERATIVE CARDIOVASCULAR EXAMINATION: Status: ACTIVE | Noted: 2022-09-27

## 2022-10-13 RX ORDER — HYDRALAZINE HYDROCHLORIDE 50 MG/1
TABLET, FILM COATED ORAL
Qty: 180 TABLET | Refills: 3 | Status: SHIPPED | OUTPATIENT
Start: 2022-10-13 | End: 2022-10-14 | Stop reason: SDUPTHER

## 2022-10-13 NOTE — TELEPHONE ENCOUNTER
RX refill request from the patient/pharmacy. Patient last seen 08- with labs, and next appt. scheduled for 01-  Requested Prescriptions     Pending Prescriptions Disp Refills    hydrALAZINE (APRESOLINE) 50 mg tablet [Pharmacy Med Name: HYDRALAZINE HYDROCHLORIDE 50 MG Tablet] 180 Tablet 3     Sig: TAKE 1 TABLET TWICE DAILY    .

## 2022-10-14 ENCOUNTER — OFFICE VISIT (OUTPATIENT)
Dept: INTERNAL MEDICINE CLINIC | Age: 87
End: 2022-10-14
Payer: MEDICARE

## 2022-10-14 VITALS
HEIGHT: 67 IN | DIASTOLIC BLOOD PRESSURE: 72 MMHG | WEIGHT: 181 LBS | BODY MASS INDEX: 28.41 KG/M2 | SYSTOLIC BLOOD PRESSURE: 134 MMHG | HEART RATE: 54 BPM | OXYGEN SATURATION: 97 % | TEMPERATURE: 98.7 F

## 2022-10-14 DIAGNOSIS — M25.512 CHRONIC LEFT SHOULDER PAIN: Primary | ICD-10-CM

## 2022-10-14 DIAGNOSIS — G89.29 CHRONIC LEFT SHOULDER PAIN: Primary | ICD-10-CM

## 2022-10-14 DIAGNOSIS — I10 ESSENTIAL HYPERTENSION: ICD-10-CM

## 2022-10-14 DIAGNOSIS — M15.9 PRIMARY OSTEOARTHRITIS INVOLVING MULTIPLE JOINTS: ICD-10-CM

## 2022-10-14 PROCEDURE — 1101F PT FALLS ASSESS-DOCD LE1/YR: CPT | Performed by: INTERNAL MEDICINE

## 2022-10-14 PROCEDURE — G8427 DOCREV CUR MEDS BY ELIG CLIN: HCPCS | Performed by: INTERNAL MEDICINE

## 2022-10-14 PROCEDURE — G9717 DOC PT DX DEP/BP F/U NT REQ: HCPCS | Performed by: INTERNAL MEDICINE

## 2022-10-14 PROCEDURE — G8752 SYS BP LESS 140: HCPCS | Performed by: INTERNAL MEDICINE

## 2022-10-14 PROCEDURE — G8754 DIAS BP LESS 90: HCPCS | Performed by: INTERNAL MEDICINE

## 2022-10-14 PROCEDURE — 99214 OFFICE O/P EST MOD 30 MIN: CPT | Performed by: INTERNAL MEDICINE

## 2022-10-14 PROCEDURE — 1123F ACP DISCUSS/DSCN MKR DOCD: CPT | Performed by: INTERNAL MEDICINE

## 2022-10-14 PROCEDURE — G8417 CALC BMI ABV UP PARAM F/U: HCPCS | Performed by: INTERNAL MEDICINE

## 2022-10-14 PROCEDURE — G8536 NO DOC ELDER MAL SCRN: HCPCS | Performed by: INTERNAL MEDICINE

## 2022-10-14 RX ORDER — TAMSULOSIN HYDROCHLORIDE 0.4 MG/1
0.4 CAPSULE ORAL DAILY
Qty: 90 CAPSULE | Refills: 1 | Status: SHIPPED | OUTPATIENT
Start: 2022-10-14

## 2022-10-14 RX ORDER — HYDRALAZINE HYDROCHLORIDE 50 MG/1
TABLET, FILM COATED ORAL
Qty: 180 TABLET | Refills: 3 | Status: SHIPPED | OUTPATIENT
Start: 2022-10-14

## 2022-10-14 NOTE — TELEPHONE ENCOUNTER
PCP: Padmaja Vargas MD    Last appt: 9/28/2022  Future Appointments   Date Time Provider Grant Myrick   10/14/2022  8:40 AM MD GALINA Angulo   1/12/2023  8:00 AM MD GALINA Angulo BS AMB       Requested Prescriptions     Pending Prescriptions Disp Refills    hydrALAZINE (APRESOLINE) 50 mg tablet 180 Tablet 3     Sig: TAKE 1 TABLET TWICE DAILY    tamsulosin (Flomax) 0.4 mg capsule 90 Capsule 1     Sig: Take 1 Capsule by mouth daily.          Other Comments:

## 2022-10-14 NOTE — TELEPHONE ENCOUNTER
PCP: Geoffrey Geronimo MD    Last appt: 9/28/2022  Future Appointments   Date Time Provider Grant Myrick   10/14/2022  8:40 AM Geoffrey Geronimo MD [facility] AMB   1/12/2023  8:00 AM Geoffrey Geronimo MD [facility] AMB       Requested Prescriptions     Pending Prescriptions Disp Refills    hydrALAZINE (APRESOLINE) 50 mg tablet 180 Tablet 3     Sig: TAKE 1 TABLET TWICE DAILY    tamsulosin (Flomax) 0.4 mg capsule 90 Capsule 1     Sig: Take 1 Capsule by mouth daily.        Prior labs and Blood pressures:  BP Readings from Last 3 Encounters:   08/10/22 136/82   07/12/22 136/84   03/30/22 121/61     Lab Results   Component Value Date/Time    Sodium 138 07/12/2022 09:07 AM    Potassium 4.7 07/12/2022 09:07 AM    Chloride 106 07/12/2022 09:07 AM    CO2 27 07/12/2022 09:07 AM    Anion gap 5 07/12/2022 09:07 AM    Glucose 95 07/12/2022 09:07 AM    BUN 19 07/12/2022 09:07 AM    Creatinine 1.09 07/12/2022 09:07 AM    BUN/Creatinine ratio 17 07/12/2022 09:07 AM    GFR est AA >60 07/12/2022 09:07 AM    GFR est non-AA >60 07/12/2022 09:07 AM    Calcium 9.3 07/12/2022 09:07 AM

## 2022-10-14 NOTE — PROGRESS NOTES
Madalyn Rosenthal is a 81 y.o. male     Chief Complaint   Patient presents with    Shoulder Pain     Left shoulder pain       Visit Vitals  /78 (BP 1 Location: Left upper arm, BP Patient Position: Sitting, BP Cuff Size: Large adult)   Pulse (!) 54   Temp 98.7 °F (37.1 °C) (Oral)   Ht 5' 7\" (1.702 m)   Wt 181 lb (82.1 kg)   SpO2 97%   BMI 28.35 kg/m²   pain in the left shoulder for 4 weeks      Health Maintenance Due   Topic Date Due    COVID-19 Vaccine (3 - Booster for Moderna series) 08/12/2021         1. \"Have you been to the ER, urgent care clinic since your last visit? Hospitalized since your last visit? \" No    2. \"Have you seen or consulted any other health care providers outside of the [address] [place] [address] since your last visit? \" No     3. For patients aged 39-70: Has the patient had a colonoscopy / FIT/ Cologuard? NA - based on age      If the patient is female:    4. For patients aged 41-77: Has the patient had a mammogram within the past 2 years? NA - based on age or sex      11. For patients aged 21-65: Has the patient had a pap smear?  NA - based on age or sex

## 2022-10-14 NOTE — PROGRESS NOTES
Subjective:   Stephen Carpenter is a 81 y.o. male      Chief Complaint   Patient presents with    Shoulder Pain     Left shoulder pain        History of present illness: He presents today with initial complaint for evaluation of left shoulder pain has been bothering for about 3 weeks. He thinks he injured a few years ago and seems to aggravate him and does a lot of head straining. There is no history of falls or trauma. Is been hurting over the anterior aspect for some time now for at least 3 weeks. Does increase with range of motion. He denies any chest pain, shortness of breath or cardiorespiratory complaints. There are no GI or  complaints. In addition to his left shoulder pain he also wants to discuss his hypertension as his blood pressure was up he did see his cardiologist and he has been monitoring his blood pressure since that time and he did bring me a record of his blood pressures from September 21 through October 11 where his blood pressures have ranged from a high of 129 systolic to a low of 526 and all the diastolics have been normal.  He did take his blood pressure cuff into his cardiology office and had it checked for accuracy and it was accurate. He denies any headaches, dizziness or neurologic complaints. He has no other complaints on complete review of systems.     Patient Active Problem List   Diagnosis Code    TIA (transient ischemic attack) G45.9    Syncope R55    Essential hypertension I10    Mixed hyperlipidemia E78.2    ED (erectile dysfunction) N52.9    Elevated PSA R97.20    Insomnia G47.00    Glucose intolerance (impaired glucose tolerance) R73.02    Former smoker Z87.891    Primary osteoarthritis involving multiple joints M15.9    ASCVD (arteriosclerotic cardiovascular disease) I25.10    Anxiety F41.9    Allergy to ACE inhibitors Z88.8    Non-healing skin lesion of nose L98.9    Gait instability R26.81    Myalgia M79.10    Paronychia of finger of left hand L03.012    Mild depression F32. 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    Change in bowel function R19.8    Pre-operative cardiovascular examination Z01.810      Past Medical History:   Diagnosis Date    Allergy to ACE inhibitors 11/28/2017    Anxiety 11/28/2017    ASCVD (arteriosclerotic cardiovascular disease) 11/28/2017    CAD (coronary artery disease) 2000    MI     Cataract     Cataract 04/2019    Depression 11/28/2017    ED (erectile dysfunction) 11/28/2017    Elevated PSA 11/28/2017    Former smoker 11/28/2017    Glucose intolerance (impaired glucose tolerance) 11/28/2017    Hypercholesterolemia     Hypertension     Insomnia 11/28/2017    On statin therapy 11/28/2017    Other ill-defined conditions(799.89)     elevated lipids    Psychiatric disorder     depression /anxiety    Stroke [facility])     TIA      Allergies   Allergen Reactions    Benazepril Rash and Swelling    Pork/Porcine Containing Products Hives      Family History   Problem Relation Age of Onset    Psychiatric Disorder Mother         depression    Heart Disease Father         aneurysym      Social History     Socioeconomic History    Marital status:      Spouse name: Not on file    Number of children: Not on file    Years of education: Not on file    Highest education level: Not on file   Occupational History    Not on file   Tobacco Use    Smoking status: Former     Packs/day: 1.50     Years: 5.00     Pack years: 7.50     Types: Cigarettes     Quit date: 37     Years since quittin.8    Smokeless tobacco: Never    Tobacco comments:     smoke for 5 year    Vaping Use    Vaping Use: Never used   Substance and Sexual Activity    Alcohol use: No    Drug use: No    Sexual activity: Not Currently   Other Topics Concern    Not on file   Social History Narrative    Not on file     Social Determinants of Health     Financial Resource Strain: Not on file   Food Insecurity: Not on file   Transportation Needs: Not on file   Physical Activity: Not on file   Stress: Not on file   Social Connections: Not on file   Intimate Partner Violence: Not on file   Housing Stability: Not on file     Prior to Admission medications    Medication Sig Start Date End Date Taking? Authorizing Provider   hydrALAZINE (APRESOLINE) 50 mg tablet TAKE 1 TABLET TWICE DAILY 10/13/22  Yes Jesús Hwang MD   amLODIPine (NORVASC) 5 mg tablet TAKE 1 TABLET EVERY DAY 8/15/22  Yes Jesús Hwang MD   escitalopram oxalate (LEXAPRO) 10 mg tablet TAKE 1 TABLET EVERY DAY 8/10/22  Yes Jesús Hwang MD   tamsulosin (Flomax) 0.4 mg capsule Take 1 Capsule by mouth in the morning. 22  Yes Jesús Hwang MD   rosuvastatin (CRESTOR) 20 mg tablet TAKE 1 TABLET EVERY DAY 3/7/22  Yes Jesús Hwang MD   vit C/E/Zn/coppr/lutein/zeaxan (PRESERVISION AREDS-2 PO) Take 2 Capsules by mouth two (2) times a day. Yes Provider, Historical   krill-omega-3-dha-epa-lipids 554-73-21-83 mg cap Take  by mouth two (2) times a day. Yes Provider, Historical   ezetimibe (ZETIA) 10 mg tablet Take  by mouth daily. Yes Provider, Historical   nebivoloL (BYSTOLIC) 5 mg tablet Take 5 mg by mouth daily. Yes Provider, Historical   aspirin 81 mg tablet Take 81 mg by mouth daily. Yes Provider, Historical   cholecalciferol (VITAMIN D3) (1000 Units /25 mcg) tablet Take 2,000 Units by mouth daily.    Yes Provider, Historical        Review of Systems              Constitutional:  He denies fever, weight loss, sweats or fatigue. EYES: No blurred or double vision,               ENT: no nasal congestion, no headache or dizziness. No difficulty with               swallowing, taste, speech or smell. Respiratory:  No cough, wheezing or shortness of breath. No sputum production. Cardiac:  Denies chest pain, palpitations, unexplained indigestion, syncope, edema, PND or orthopnea. GI:  No changes in bowel movements, no abdominal pain, no bloating, anorexia, nausea, vomiting or heartburn. :  No frequency or dysuria. Denies incontinence or sexual dysfunction. Extremities:  No joint pain, stiffness or swelling except left shoulder as noted  Back:.no pain or soreness  Skin:  No recent rashes or mole changes. Neurological:  No numbness, tingling, burning paresthesias or loss of motor strength. No syncope, dizziness, frequent headaches or memory loss. Hematologic:  No easy bruising  Lymphatic: No lymph node enlargement    Objective:     Vitals:    10/14/22 0855 10/14/22 0924   BP: 120/78 134/72   Pulse: (!) 54    Temp: 98.7 °F (37.1 °C)    TempSrc: Oral    SpO2: 97%    Weight: 181 lb (82.1 kg)    Height: 5' 7\" (1.702 m)    PainSc:   3    PainLoc: Shoulder        Body mass index is 28.35 kg/m². Physical Examination:              General Appearance:  Well-developed, well-nourished, no acute distress. HEENT:      Ears:  The TMs and ear canals were clear. Eyes:  The pupillary responses were normal.  Extraocular muscle function intact. Lids and conjunctiva not injected. Funduscopic exam revealed sharp disc margins. Nares: Clear w/o edema or erythema  Pharynx:  Clear with teeth in good repair. No masses were noted. Neck:  Supple without thyromegaly or adenopathy. No JVD noted. No carotid                bruits. Lungs:  Clear to auscultation and percussion. Cardiac:  Regular rate and rhythm without murmur. PMI not displaced.   No gallop, rub or click. Abdominal: Soft, non-tender, no hepata-spleenomegally or masses  Extremities:  No clubbing, cyanosis or edema. Left shoulder tender to palpation with mild discomfort on range of motion. There is no limitation of range of motion. Skin:  No rash or unusual mole changes noted. Lymph Nodes:  None felt in the cervical, supraclavicular, axillary or inguinal region. Neurological: . DTRs 2+ and symmetric. Station and gait normal.   Hematologic:   No purpura or petechiae        Assessment/Plan:         1. Chronic left shoulder pain    2. Essential hypertension    3. Primary osteoarthritis involving multiple joints        Impressions/Plan:  Impression  1. Chronic left shoulder pain x-ray obtained today does reveal some mild arthritis although nothing severe. We discussed treatment options to include anti-inflammatories, Tylenol arthritis or cortisone shot. He is elected to go with Tylenol arthritis. He will notify me if that is not effective. 2.  Hypertension appears to have a labile component but his blood pressure is very well controlled by both the nurse and when I checked it here at the office today. He was previously only taking his hydralazine once a day and after seeing the cardiologist he has been taking it twice a day as he was directed to do. I did explain to him that hydralazine would not be effective and less taken at least twice daily. 3.  DJD multiple joints I recommended as needed Tylenol arthritis for that  Follow-up per previous schedule or sooner should problems continue. Orders Placed This Encounter    XR SHOULDER LT AP/LAT MIN 2 V       Follow-up and Dispositions    Return At prior scheduled appointment. No results found for any visits on 10/14/22. Jayant Padilla MD    The patient was given after the visit summary the patient verbalized an understanding of the plans and problems as explained.

## 2022-10-27 PROBLEM — Z01.810 PRE-OPERATIVE CARDIOVASCULAR EXAMINATION: Status: RESOLVED | Noted: 2022-09-27 | Resolved: 2022-10-27

## 2023-01-11 PROBLEM — R07.9 CHEST PAIN: Status: RESOLVED | Noted: 2021-05-12 | Resolved: 2023-01-11

## 2023-01-11 PROBLEM — L98.9 NON-HEALING SKIN LESION OF NOSE: Status: RESOLVED | Noted: 2017-12-07 | Resolved: 2023-01-11

## 2023-01-11 PROBLEM — H25.9 AGE-RELATED CATARACT OF BOTH EYES: Status: RESOLVED | Noted: 2019-04-08 | Resolved: 2023-01-11

## 2023-01-11 PROBLEM — E55.9 VITAMIN D DEFICIENCY: Status: ACTIVE | Noted: 2023-01-11

## 2023-01-11 PROBLEM — R19.8 CHANGE IN BOWEL FUNCTION: Status: RESOLVED | Noted: 2022-08-10 | Resolved: 2023-01-11

## 2023-01-11 PROBLEM — M54.2 NECK PAIN: Status: RESOLVED | Noted: 2022-01-04 | Resolved: 2023-01-11

## 2023-01-11 PROBLEM — L03.012 PARONYCHIA OF FINGER OF LEFT HAND: Status: RESOLVED | Noted: 2018-04-20 | Resolved: 2023-01-11

## 2023-01-11 PROBLEM — E53.8 VITAMIN B 12 DEFICIENCY: Status: RESOLVED | Noted: 2019-03-08 | Resolved: 2023-01-11

## 2023-01-11 PROBLEM — M79.10 MYALGIA: Status: RESOLVED | Noted: 2018-02-02 | Resolved: 2023-01-11

## 2023-01-11 PROBLEM — S76.191A: Status: RESOLVED | Noted: 2020-12-07 | Resolved: 2023-01-11

## 2023-01-11 PROBLEM — R10.30 LOWER ABDOMINAL PAIN: Status: RESOLVED | Noted: 2019-05-03 | Resolved: 2023-01-11

## 2023-01-11 PROBLEM — Z00.00 MEDICARE ANNUAL WELLNESS VISIT, SUBSEQUENT: Status: ACTIVE | Noted: 2017-12-07

## 2023-01-11 PROBLEM — M79.605 PAIN OF LEFT LOWER EXTREMITY: Status: RESOLVED | Noted: 2021-05-12 | Resolved: 2023-01-11
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
## 2023-01-12 ENCOUNTER — OFFICE VISIT (OUTPATIENT)
Dept: INTERNAL MEDICINE CLINIC | Age: 88
End: 2023-01-12
Payer: MEDICARE

## 2023-01-12 VITALS
BODY MASS INDEX: 28.72 KG/M2 | DIASTOLIC BLOOD PRESSURE: 80 MMHG | RESPIRATION RATE: 16 BRPM | SYSTOLIC BLOOD PRESSURE: 138 MMHG | WEIGHT: 183 LBS | OXYGEN SATURATION: 98 % | HEART RATE: 54 BPM | TEMPERATURE: 97.3 F | HEIGHT: 67 IN

## 2023-01-12 DIAGNOSIS — Z87.891 FORMER SMOKER: ICD-10-CM

## 2023-01-12 DIAGNOSIS — E55.9 VITAMIN D DEFICIENCY: ICD-10-CM

## 2023-01-12 DIAGNOSIS — E78.2 MIXED HYPERLIPIDEMIA: ICD-10-CM

## 2023-01-12 DIAGNOSIS — I71.40 ABDOMINAL AORTIC ANEURYSM (AAA) WITHOUT RUPTURE, UNSPECIFIED PART: ICD-10-CM

## 2023-01-12 DIAGNOSIS — Z13.39 ALCOHOL SCREENING: ICD-10-CM

## 2023-01-12 DIAGNOSIS — G45.9 TIA (TRANSIENT ISCHEMIC ATTACK): ICD-10-CM

## 2023-01-12 DIAGNOSIS — I25.10 ASCVD (ARTERIOSCLEROTIC CARDIOVASCULAR DISEASE): ICD-10-CM

## 2023-01-12 DIAGNOSIS — R73.02 GLUCOSE INTOLERANCE (IMPAIRED GLUCOSE TOLERANCE): ICD-10-CM

## 2023-01-12 DIAGNOSIS — I10 ESSENTIAL HYPERTENSION: Primary | ICD-10-CM

## 2023-01-12 DIAGNOSIS — F32.A MILD DEPRESSION: ICD-10-CM

## 2023-01-12 DIAGNOSIS — F51.01 PRIMARY INSOMNIA: ICD-10-CM

## 2023-01-12 DIAGNOSIS — R97.20 ELEVATED PSA: ICD-10-CM

## 2023-01-12 DIAGNOSIS — R41.3 MEMORY DEFICIT: ICD-10-CM

## 2023-01-12 DIAGNOSIS — M15.9 PRIMARY OSTEOARTHRITIS INVOLVING MULTIPLE JOINTS: ICD-10-CM

## 2023-01-12 DIAGNOSIS — Z00.00 MEDICARE ANNUAL WELLNESS VISIT, SUBSEQUENT: ICD-10-CM

## 2023-01-12 DIAGNOSIS — F41.9 ANXIETY: ICD-10-CM

## 2023-01-12 PROCEDURE — G0442 ANNUAL ALCOHOL SCREEN 15 MIN: HCPCS | Performed by: INTERNAL MEDICINE

## 2023-01-12 PROCEDURE — G8427 DOCREV CUR MEDS BY ELIG CLIN: HCPCS | Performed by: INTERNAL MEDICINE

## 2023-01-12 PROCEDURE — 99214 OFFICE O/P EST MOD 30 MIN: CPT | Performed by: INTERNAL MEDICINE

## 2023-01-12 PROCEDURE — 93000 ELECTROCARDIOGRAM COMPLETE: CPT | Performed by: INTERNAL MEDICINE

## 2023-01-12 PROCEDURE — G8536 NO DOC ELDER MAL SCRN: HCPCS | Performed by: INTERNAL MEDICINE

## 2023-01-12 PROCEDURE — G9717 DOC PT DX DEP/BP F/U NT REQ: HCPCS | Performed by: INTERNAL MEDICINE

## 2023-01-12 PROCEDURE — 1101F PT FALLS ASSESS-DOCD LE1/YR: CPT | Performed by: INTERNAL MEDICINE

## 2023-01-12 PROCEDURE — G8417 CALC BMI ABV UP PARAM F/U: HCPCS | Performed by: INTERNAL MEDICINE

## 2023-01-12 PROCEDURE — 1123F ACP DISCUSS/DSCN MKR DOCD: CPT | Performed by: INTERNAL MEDICINE

## 2023-01-12 PROCEDURE — G0439 PPPS, SUBSEQ VISIT: HCPCS | Performed by: INTERNAL MEDICINE

## 2023-01-12 NOTE — PROGRESS NOTES
Chief Complaint   Patient presents with    Annual Wellness Visit     Visit Vitals  /80 (BP 1 Location: Left upper arm, BP Patient Position: Sitting, BP Cuff Size: Adult)   Pulse (!) 54   Temp 97.3 °F (36.3 °C) (Oral)   Resp 16   Ht 5' 7\" (1.702 m)   Wt 183 lb (83 kg)   SpO2 98%   BMI 28.66 kg/m²       Depression Risk Factor Screening:     3 most recent PHQ Screens 1/12/2023   Little interest or pleasure in doing things Not at all   Feeling down, depressed, irritable, or hopeless Not at all   Total Score PHQ 2 0   Trouble falling or staying asleep, or sleeping too much -   Feeling tired or having little energy -   Poor appetite, weight loss, or overeating -   Feeling bad about yourself - or that you are a failure or have let yourself or your family down -   Trouble concentrating on things such as school, work, reading, or watching TV -   Moving or speaking so slowly that other people could have noticed; or the opposite being so fidgety that others notice -   Thoughts of being better off dead, or hurting yourself in some way -   PHQ 9 Score -       Functional Ability and Level of Safety:     Activities of Daily Living  ADL Assessment 1/12/2023   Feeding yourself No Help Needed   Getting from bed to chair No Help Needed   Getting dressed No Help Needed   Bathing or showering No Help Needed   Walk across the room (includes cane/walker) No Help Needed   Using the telphone No Help Needed   Taking your medications No Help Needed   Preparing meals Help Needed   Managing money (expenses/bills) No Help Needed   Moderately strenuous housework (laundry) No Help Needed   Shopping for personal items (toiletries/medicines) No Help Needed   Shopping for groceries No Help Needed   Driving No Help Needed   Climbing a flight of stairs No Help Needed   Getting to places beyond walking distances No Help Needed       Fall Risk  Fall Risk Assessment, last 12 mths 1/12/2023   Able to walk? Yes   Fall in past 12 months?  1   Do you feel unsteady? 1   Are you worried about falling 0   Is TUG test greater than 12 seconds? 0   Is the gait abnormal? 0   Number of falls in past 12 months 1   Fall with injury? 0       Abuse Screen  Abuse Screening Questionnaire 1/12/2023   Do you ever feel afraid of your partner? N   Are you in a relationship with someone who physically or mentally threatens you? N   Is it safe for you to go home?  Y         Patient Care Team   Patient Care Team:  Christy Delarosa MD as PCP - General (Internal Medicine Physician)  Christy Delarosa MD as PCP - [address] [place] [address] [facility]  Loco Samuel MD as Surgeon (General Surgery)

## 2023-01-12 NOTE — PATIENT INSTRUCTIONS
Medicare Wellness Visit, Male    The best way to live healthy is to have a lifestyle where you eat a well-balanced diet, exercise regularly, limit alcohol use, and quit all forms of tobacco/nicotine, if applicable. Regular preventive services are another way to keep healthy. Preventive services (vaccines, screening tests, monitoring & exams) can help personalize your care plan, which helps you manage your own care. Screening tests can find health problems at the earliest stages, when they are easiest to treat. [facility] follows the current, evidence-based guidelines published by the [facility] ([facility]) when recommending preventive services for our patients. Because we follow these guidelines, sometimes recommendations change over time as research supports it. (For example, a prostate screening blood test is no longer routinely recommended for men with no symptoms). Of course, you and your doctor may decide to screen more often for some diseases, based on your risk and co-morbidities (chronic disease you are already diagnosed with). Preventive services for you include:  - Medicare offers their members a free annual wellness visit, which is time for you and your primary care provider to discuss and plan for your preventive service needs.  Take advantage of this benefit every year!    -Over the age of 72 should receive the recommended pneumonia vaccines.   -All adults should have a flu vaccine yearly.  -All adults should receive a tetanus vaccine every 10 years.  -Over the age of 48 should receive the shingles vaccines.    -All adults should be screened once for Hepatitis C.  -All adults age 38-68 who are overweight should have a diabetes screening test once every three years.   -Other screening tests & preventive services for persons with diabetes include: an eye exam to screen for diabetic retinopathy, a kidney function test, a foot exam, and stricter control over your cholesterol.   -Cardiovascular screening for adults with routine risk involves an electrocardiogram (ECG) at intervals determined by the provider.     -Colorectal cancer screening should be done for adults age 43-69 with no increased risk factors for colorectal cancer. There are a number of acceptable methods of screening for this type of cancer. Each test has its own benefits and drawbacks. Discuss with your provider what is most appropriate for you during your annual wellness visit. The different tests include: colonoscopy (considered the best screening method), a fecal occult blood test, a fecal DNA test, and sigmoidoscopy.    -Lung cancer screening is recommended annually with a low dose CT scan for adults between age 54 and 68, who have smoked at least 30 pack years (equivalent of 1 pack per day for 30 days), and who is a current smoker or quit less than 15 years ago. -An Abdominal Aortic Aneurysm (AAA) Screening is recommended for men age 73-68 who has ever smoked in their lifetime.      Here is a list of your current Health Maintenance items (your personalized list of preventive services) with a due date:  Health Maintenance Due   Topic Date Due    COVID-19 Vaccine (3 - Booster for Harle Distel series) 05/07/2021    Annual Well Visit  01/11/2023

## 2023-01-13 LAB
25(OH)D3 SERPL-MCNC: 27.7 NG/ML (ref 30–100)
ALBUMIN SERPL-MCNC: 4.1 G/DL (ref 3.5–5)
ALBUMIN/GLOB SERPL: 1.4 (ref 1.1–2.2)
ALP SERPL-CCNC: 63 U/L (ref 45–117)
ALT SERPL-CCNC: 26 U/L (ref 12–78)
ANION GAP SERPL CALC-SCNC: 3 MMOL/L (ref 5–15)
APPEARANCE UR: CLEAR
AST SERPL-CCNC: 22 U/L (ref 15–37)
BACTERIA URNS QL MICRO: NEGATIVE /HPF
BASOPHILS # BLD: 0.1 K/UL (ref 0–0.1)
BASOPHILS NFR BLD: 1 % (ref 0–1)
BILIRUB SERPL-MCNC: 0.9 MG/DL (ref 0.2–1)
BILIRUB UR QL: NEGATIVE
BUN SERPL-MCNC: 22 MG/DL (ref 6–20)
BUN/CREAT SERPL: 19 (ref 12–20)
CALCIUM SERPL-MCNC: 9 MG/DL (ref 8.5–10.1)
CHLORIDE SERPL-SCNC: 109 MMOL/L (ref 97–108)
CHOLEST SERPL-MCNC: 123 MG/DL
CK SERPL-CCNC: 168 U/L (ref 39–308)
CO2 SERPL-SCNC: 29 MMOL/L (ref 21–32)
COLOR UR: ABNORMAL
CREAT SERPL-MCNC: 1.16 MG/DL (ref 0.7–1.3)
DIFFERENTIAL METHOD BLD: NORMAL
EOSINOPHIL # BLD: 0.2 K/UL (ref 0–0.4)
EOSINOPHIL NFR BLD: 2 % (ref 0–7)
EPITH CASTS URNS QL MICRO: ABNORMAL /LPF
ERYTHROCYTE [DISTWIDTH] IN BLOOD BY AUTOMATED COUNT: 13.9 % (ref 11.5–14.5)
EST. AVERAGE GLUCOSE BLD GHB EST-MCNC: 108 MG/DL
GLOBULIN SER CALC-MCNC: 2.9 G/DL (ref 2–4)
GLUCOSE SERPL-MCNC: 91 MG/DL (ref 65–100)
GLUCOSE UR STRIP.AUTO-MCNC: NEGATIVE MG/DL
HBA1C MFR BLD: 5.4 % (ref 4–5.6)
HCT VFR BLD AUTO: 47.2 % (ref 36.6–50.3)
HDLC SERPL-MCNC: 48 MG/DL
HDLC SERPL: 2.6 (ref 0–5)
HGB BLD-MCNC: 15.2 G/DL (ref 12.1–17)
HGB UR QL STRIP: NEGATIVE
HYALINE CASTS URNS QL MICRO: ABNORMAL /LPF (ref 0–5)
IMM GRANULOCYTES # BLD AUTO: 0 K/UL (ref 0–0.04)
IMM GRANULOCYTES NFR BLD AUTO: 0 % (ref 0–0.5)
KETONES UR QL STRIP.AUTO: ABNORMAL MG/DL
LDLC SERPL CALC-MCNC: 62.4 MG/DL (ref 0–100)
LEUKOCYTE ESTERASE UR QL STRIP.AUTO: NEGATIVE
LYMPHOCYTES # BLD: 1.3 K/UL (ref 0.8–3.5)
LYMPHOCYTES NFR BLD: 19 % (ref 12–49)
MCH RBC QN AUTO: 30.3 PG (ref 26–34)
MCHC RBC AUTO-ENTMCNC: 32.2 G/DL (ref 30–36.5)
MCV RBC AUTO: 94.2 FL (ref 80–99)
MONOCYTES # BLD: 0.6 K/UL (ref 0–1)
MONOCYTES NFR BLD: 9 % (ref 5–13)
NEUTS SEG # BLD: 4.6 K/UL (ref 1.8–8)
NEUTS SEG NFR BLD: 69 % (ref 32–75)
NITRITE UR QL STRIP.AUTO: NEGATIVE
NRBC # BLD: 0 K/UL (ref 0–0.01)
NRBC BLD-RTO: 0 PER 100 WBC
PH UR STRIP: 7 (ref 5–8)
PLATELET # BLD AUTO: 220 K/UL (ref 150–400)
PMV BLD AUTO: 10.9 FL (ref 8.9–12.9)
POTASSIUM SERPL-SCNC: 5 MMOL/L (ref 3.5–5.1)
PROT SERPL-MCNC: 7 G/DL (ref 6.4–8.2)
PROT UR STRIP-MCNC: ABNORMAL MG/DL
PSA SERPL-MCNC: 4.4 NG/ML (ref 0.01–4)
RBC # BLD AUTO: 5.01 M/UL (ref 4.1–5.7)
RBC #/AREA URNS HPF: ABNORMAL /HPF (ref 0–5)
SODIUM SERPL-SCNC: 141 MMOL/L (ref 136–145)
SP GR UR REFRACTOMETRY: 1.02 (ref 1–1.03)
T4 SERPL-MCNC: 8.7 UG/DL (ref 4.5–12.1)
TRIGL SERPL-MCNC: 63 MG/DL (ref ?–150)
TSH SERPL DL<=0.05 MIU/L-ACNC: 1.85 UIU/ML (ref 0.36–3.74)
UA: UC IF INDICATED,UAUC: ABNORMAL
UROBILINOGEN UR QL STRIP.AUTO: 1 EU/DL (ref 0.2–1)
VLDLC SERPL CALC-MCNC: 12.6 MG/DL
WBC # BLD AUTO: 6.7 K/UL (ref 4.1–11.1)
WBC URNS QL MICRO: ABNORMAL /HPF (ref 0–4)

## 2023-01-17 RX ORDER — CIPROFLOXACIN 500 MG/1
500 TABLET ORAL 2 TIMES DAILY
Qty: 28 TABLET | Refills: 0 | Status: SHIPPED | OUTPATIENT
Start: 2023-01-17 | End: 2023-01-31

## 2023-01-17 NOTE — TELEPHONE ENCOUNTER
RX refill request from the patient/pharmacy. Patient last seen 01- with labs, and next appt. scheduled for 07-  Requested Prescriptions     Pending Prescriptions Disp Refills    ciprofloxacin HCl (CIPRO) 500 mg tablet 28 Tablet 0     Sig: Take 1 Tablet by mouth two (2) times a day for 14 days. Ellie Obregon

## 2023-02-10 PROBLEM — Z00.00 MEDICARE ANNUAL WELLNESS VISIT, SUBSEQUENT: Status: RESOLVED | Noted: 2017-12-07 | Resolved: 2023-02-10

## 2023-02-28 ENCOUNTER — LAB ONLY (OUTPATIENT)
Dept: INTERNAL MEDICINE CLINIC | Age: 88
End: 2023-02-28

## 2023-02-28 DIAGNOSIS — R97.20 ELEVATED PSA: Primary | ICD-10-CM
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
## 2023-03-01 ENCOUNTER — OFFICE VISIT (OUTPATIENT)
Dept: INTERNAL MEDICINE CLINIC | Age: 88
End: 2023-03-01
Payer: MEDICARE

## 2023-03-01 VITALS
DIASTOLIC BLOOD PRESSURE: 65 MMHG | OXYGEN SATURATION: 95 % | TEMPERATURE: 98 F | BODY MASS INDEX: 29.57 KG/M2 | RESPIRATION RATE: 16 BRPM | SYSTOLIC BLOOD PRESSURE: 124 MMHG | WEIGHT: 188.4 LBS | HEIGHT: 67 IN | HEART RATE: 56 BPM

## 2023-03-01 DIAGNOSIS — I71.40 ABDOMINAL AORTIC ANEURYSM (AAA) WITHOUT RUPTURE, UNSPECIFIED PART: ICD-10-CM

## 2023-03-01 DIAGNOSIS — I10 ESSENTIAL HYPERTENSION: Primary | ICD-10-CM

## 2023-03-01 DIAGNOSIS — I25.10 ASCVD (ARTERIOSCLEROTIC CARDIOVASCULAR DISEASE): ICD-10-CM

## 2023-03-01 DIAGNOSIS — R97.20 ELEVATED PSA: ICD-10-CM

## 2023-03-01 DIAGNOSIS — M15.9 PRIMARY OSTEOARTHRITIS INVOLVING MULTIPLE JOINTS: ICD-10-CM

## 2023-03-01 DIAGNOSIS — E78.2 MIXED HYPERLIPIDEMIA: ICD-10-CM

## 2023-03-01 DIAGNOSIS — R73.02 GLUCOSE INTOLERANCE (IMPAIRED GLUCOSE TOLERANCE): ICD-10-CM

## 2023-03-01 LAB — PSA SERPL-MCNC: 6.7 NG/ML (ref 0.01–4)

## 2023-03-01 PROCEDURE — 1101F PT FALLS ASSESS-DOCD LE1/YR: CPT | Performed by: INTERNAL MEDICINE

## 2023-03-01 PROCEDURE — 99214 OFFICE O/P EST MOD 30 MIN: CPT | Performed by: INTERNAL MEDICINE

## 2023-03-01 PROCEDURE — G8536 NO DOC ELDER MAL SCRN: HCPCS | Performed by: INTERNAL MEDICINE

## 2023-03-01 PROCEDURE — G9717 DOC PT DX DEP/BP F/U NT REQ: HCPCS | Performed by: INTERNAL MEDICINE

## 2023-03-01 PROCEDURE — G8417 CALC BMI ABV UP PARAM F/U: HCPCS | Performed by: INTERNAL MEDICINE

## 2023-03-01 PROCEDURE — 1123F ACP DISCUSS/DSCN MKR DOCD: CPT | Performed by: INTERNAL MEDICINE

## 2023-03-01 PROCEDURE — G8427 DOCREV CUR MEDS BY ELIG CLIN: HCPCS | Performed by: INTERNAL MEDICINE

## 2023-03-01 NOTE — PROGRESS NOTES
Verified Name and  of the patient. No chief complaint on file. Health maintenance will be addressed with Primary Care Provider at next visit. There were no vitals filed for this visit. 1. Have you been to the ER, urgent care clinic since your last visit? Hospitalized since your last visit? No    2. Have you seen or consulted any other health care providers outside of the [address] [place] [address] since your last visit? Include any pap smears or colon screening.  No

## 2023-03-01 NOTE — PROGRESS NOTES
Results have been reviewed and abnormal results noted. Please see documentation in new EMR. Please call the patient regarding his abnormal result. PSA is slightly higher so urology appointment. Discussed result with patient. Referral generated.

## 2023-03-20 RX ORDER — TAMSULOSIN HYDROCHLORIDE 0.4 MG/1
CAPSULE ORAL
Qty: 90 CAPSULE | Refills: 1 | Status: SHIPPED | OUTPATIENT
Start: 2023-03-20

## 2023-03-27 DIAGNOSIS — E78.2 MIXED HYPERLIPIDEMIA: ICD-10-CM

## 2023-03-27 RX ORDER — ROSUVASTATIN CALCIUM 20 MG/1
TABLET, COATED ORAL
Qty: 90 TABLET | Refills: 3 | Status: SHIPPED | OUTPATIENT
Start: 2023-03-27

## 2023-07-11 PROBLEM — I71.40 ABDOMINAL AORTIC ANEURYSM (AAA) WITHOUT RUPTURE (HCC): Status: ACTIVE | Noted: 2021-06-30

## 2023-07-11 PROBLEM — M15.0 PRIMARY OSTEOARTHRITIS INVOLVING MULTIPLE JOINTS: Status: ACTIVE | Noted: 2017-11-28

## 2023-07-11 PROBLEM — G47.00 INSOMNIA: Status: ACTIVE | Noted: 2017-11-28

## 2023-07-11 PROBLEM — F41.9 ANXIETY: Status: ACTIVE | Noted: 2017-11-28

## 2023-07-11 PROBLEM — R73.02 GLUCOSE INTOLERANCE (IMPAIRED GLUCOSE TOLERANCE): Status: ACTIVE | Noted: 2017-11-28

## 2023-07-11 PROBLEM — F32.A MILD DEPRESSION: Status: ACTIVE | Noted: 2018-06-08

## 2023-07-11 PROBLEM — R97.20 ELEVATED PSA: Status: ACTIVE | Noted: 2017-11-28

## 2023-07-11 PROBLEM — R41.3 MEMORY DEFICIT: Status: ACTIVE | Noted: 2019-03-04

## 2023-07-11 PROBLEM — E55.9 VITAMIN D DEFICIENCY: Status: ACTIVE | Noted: 2023-01-11

## 2023-07-11 PROBLEM — Z87.891 FORMER SMOKER: Status: ACTIVE | Noted: 2017-11-28

## 2023-07-11 PROBLEM — I25.10 ASCVD (ARTERIOSCLEROTIC CARDIOVASCULAR DISEASE): Status: ACTIVE | Noted: 2017-11-28

## 2023-07-11 PROBLEM — M15.9 PRIMARY OSTEOARTHRITIS INVOLVING MULTIPLE JOINTS: Status: ACTIVE | Noted: 2017-11-28

## 2023-07-11 NOTE — PROGRESS NOTES
Chief Complaint   Patient presents with    Hypertension     3 month f/up    Hyperlipidemia    Blood Sugar Problem    Incontinence       SUBJECTIVE:    Alber Green is a 81 y.o. male who returns in follow-up for his medical problems include hypertension, hyperlipidemia, DJD, glucose intolerance, depression with anxiety and other medical problems. He is adjusting to moving into [employer] but is not the same as what he is used to. He currently denies any chest pain, shortness of breath or cardiac respiratory complaints. There are no GI or  complaints. There are no headaches, dizziness or neurologic complaints. He has no current active arthritic complaints and there are no other complaints on complete review of systems. Current Outpatient Medications   Medication Sig Dispense Refill    Multiple Vitamins-Minerals (PRESERVISION AREDS PO) Take 2 capsules by mouth daily      aspirin 81 MG chewable tablet Take 1 tablet by mouth daily      Krill Oil 500 MG CAPS Take 2 capsules by mouth      finasteride (PROSCAR) 5 MG tablet Take 1 tablet by mouth daily 90 tablet 3    amLODIPine (NORVASC) 5 MG tablet TAKE 1 TABLET EVERY DAY      vitamin D (CHOLECALCIFEROL) 25 MCG (1000 UT) TABS tablet Take 2 tablets by mouth daily      escitalopram (LEXAPRO) 10 MG tablet TAKE 1 TABLET EVERY DAY      ezetimibe (ZETIA) 10 MG tablet Take by mouth daily      hydrALAZINE (APRESOLINE) 50 MG tablet TAKE 1 TABLET TWICE DAILY      nebivolol (BYSTOLIC) 5 MG tablet Take 1 tablet by mouth daily      rosuvastatin (CRESTOR) 20 MG tablet TAKE 1 TABLET EVERY DAY      tamsulosin (FLOMAX) 0.4 MG capsule Take 1 capsule by mouth daily       No current facility-administered medications for this visit.      Past Medical History:   Diagnosis Date    Allergy to ACE inhibitors 11/28/2017    Anxiety 11/28/2017    ASCVD (arteriosclerotic cardiovascular disease) 11/28/2017    CAD (coronary artery disease) 2000    MI     Cataract     Cataract

## 2023-07-12 ENCOUNTER — OFFICE VISIT (OUTPATIENT)
Facility: CLINIC | Age: 88
End: 2023-07-12

## 2023-07-12 VITALS
DIASTOLIC BLOOD PRESSURE: 68 MMHG | WEIGHT: 184 LBS | HEIGHT: 67 IN | HEART RATE: 52 BPM | OXYGEN SATURATION: 96 % | BODY MASS INDEX: 28.88 KG/M2 | TEMPERATURE: 98 F | SYSTOLIC BLOOD PRESSURE: 112 MMHG

## 2023-07-12 DIAGNOSIS — M15.9 PRIMARY OSTEOARTHRITIS INVOLVING MULTIPLE JOINTS: ICD-10-CM

## 2023-07-12 DIAGNOSIS — I10 ESSENTIAL HYPERTENSION: Primary | ICD-10-CM

## 2023-07-12 DIAGNOSIS — E78.2 MIXED HYPERLIPIDEMIA: ICD-10-CM

## 2023-07-12 DIAGNOSIS — R73.02 GLUCOSE INTOLERANCE (IMPAIRED GLUCOSE TOLERANCE): ICD-10-CM

## 2023-07-12 DIAGNOSIS — G45.9 TIA (TRANSIENT ISCHEMIC ATTACK): ICD-10-CM

## 2023-07-12 DIAGNOSIS — I25.10 ASCVD (ARTERIOSCLEROTIC CARDIOVASCULAR DISEASE): ICD-10-CM

## 2023-07-12 RX ORDER — ASPIRIN 81 MG/1
81 TABLET, CHEWABLE ORAL DAILY
COMMUNITY

## 2023-07-12 RX ORDER — FINASTERIDE 5 MG/1
5 TABLET, FILM COATED ORAL DAILY
Qty: 90 TABLET | Refills: 3 | Status: SHIPPED | OUTPATIENT
Start: 2023-07-12

## 2023-07-12 RX ORDER — KRILL/OM-3/DHA/EPA/PHOSPHO/AST 500MG-86MG
2 CAPSULE ORAL
COMMUNITY

## 2023-07-12 ASSESSMENT — PATIENT HEALTH QUESTIONNAIRE - PHQ9
SUM OF ALL RESPONSES TO PHQ9 QUESTIONS 1 & 2: 0
SUM OF ALL RESPONSES TO PHQ QUESTIONS 1-9: 0
SUM OF ALL RESPONSES TO PHQ QUESTIONS 1-9: 0
2. FEELING DOWN, DEPRESSED OR HOPELESS: 0
SUM OF ALL RESPONSES TO PHQ QUESTIONS 1-9: 0
1. LITTLE INTEREST OR PLEASURE IN DOING THINGS: 0
SUM OF ALL RESPONSES TO PHQ QUESTIONS 1-9: 0

## 2023-07-12 NOTE — PROGRESS NOTES
Chief Complaint   Patient presents with    Hypertension     3 month f/up    Hyperlipidemia    Blood Sugar Problem    Incontinence      1. Have you been to the ER, urgent care clinic since your last visit? Hospitalized since your last visit? No    2. Have you seen or consulted any other health care providers outside of the [address] [place] [place] [address] Avenue since your last visit? Include any pap smears or colon screening. Has seen his urologist and eye doctor.

## 2023-07-13 LAB
ALBUMIN SERPL-MCNC: 3.6 G/DL (ref 3.5–5)
ALBUMIN/GLOB SERPL: 1.2 (ref 1.1–2.2)
ALP SERPL-CCNC: 60 U/L (ref 45–117)
ALT SERPL-CCNC: 25 U/L (ref 12–78)
ANION GAP SERPL CALC-SCNC: 4 MMOL/L (ref 5–15)
AST SERPL-CCNC: 18 U/L (ref 15–37)
BILIRUB SERPL-MCNC: 0.6 MG/DL (ref 0.2–1)
BUN SERPL-MCNC: 22 MG/DL (ref 6–20)
BUN/CREAT SERPL: 20 (ref 12–20)
CALCIUM SERPL-MCNC: 9.2 MG/DL (ref 8.5–10.1)
CHLORIDE SERPL-SCNC: 107 MMOL/L (ref 97–108)
CHOLEST SERPL-MCNC: 120 MG/DL
CK SERPL-CCNC: 122 U/L (ref 39–308)
CO2 SERPL-SCNC: 30 MMOL/L (ref 21–32)
CREAT SERPL-MCNC: 1.08 MG/DL (ref 0.7–1.3)
EST. AVERAGE GLUCOSE BLD GHB EST-MCNC: 100 MG/DL
GLOBULIN SER CALC-MCNC: 2.9 G/DL (ref 2–4)
GLUCOSE SERPL-MCNC: 109 MG/DL (ref 65–100)
HBA1C MFR BLD: 5.1 % (ref 4–5.6)
HDLC SERPL-MCNC: 50 MG/DL
HDLC SERPL: 2.4 (ref 0–5)
LDLC SERPL CALC-MCNC: 57.6 MG/DL (ref 0–100)
POTASSIUM SERPL-SCNC: 4.8 MMOL/L (ref 3.5–5.1)
PROT SERPL-MCNC: 6.5 G/DL (ref 6.4–8.2)
SODIUM SERPL-SCNC: 141 MMOL/L (ref 136–145)
TRIGL SERPL-MCNC: 62 MG/DL
VLDLC SERPL CALC-MCNC: 12.4 MG/DL

## 2023-07-20 ENCOUNTER — TELEPHONE (OUTPATIENT)
Facility: CLINIC | Age: 88
End: 2023-07-20

## 2023-07-20 NOTE — TELEPHONE ENCOUNTER
Patient states he would like a referral faxed to Nohemy De Jesus at [facility] for PT- balance & shoulder. Please fax to [phone].

## 2023-08-08 ENCOUNTER — OFFICE VISIT (OUTPATIENT)
Facility: CLINIC | Age: 88
End: 2023-08-08
Payer: MEDICARE

## 2023-08-08 VITALS
BODY MASS INDEX: 28.88 KG/M2 | RESPIRATION RATE: 16 BRPM | TEMPERATURE: 98 F | DIASTOLIC BLOOD PRESSURE: 70 MMHG | OXYGEN SATURATION: 97 % | WEIGHT: 184 LBS | HEIGHT: 67 IN | HEART RATE: 50 BPM | SYSTOLIC BLOOD PRESSURE: 138 MMHG

## 2023-08-08 DIAGNOSIS — L98.9 SKIN LESION: Primary | ICD-10-CM

## 2023-08-08 PROCEDURE — 99213 OFFICE O/P EST LOW 20 MIN: CPT | Performed by: INTERNAL MEDICINE

## 2023-08-08 PROCEDURE — G8419 CALC BMI OUT NRM PARAM NOF/U: HCPCS | Performed by: INTERNAL MEDICINE

## 2023-08-08 PROCEDURE — G8427 DOCREV CUR MEDS BY ELIG CLIN: HCPCS | Performed by: INTERNAL MEDICINE

## 2023-08-08 PROCEDURE — 1036F TOBACCO NON-USER: CPT | Performed by: INTERNAL MEDICINE

## 2023-08-08 PROCEDURE — 1123F ACP DISCUSS/DSCN MKR DOCD: CPT | Performed by: INTERNAL MEDICINE

## 2023-08-25 RX ORDER — AMLODIPINE BESYLATE 5 MG/1
TABLET ORAL
Qty: 90 TABLET | Refills: 1 | Status: SHIPPED | OUTPATIENT
Start: 2023-08-25

## 2023-08-25 NOTE — TELEPHONE ENCOUNTER
PCP: Jayy Fletcher MD    Last appt: 8/8/23  Future Appointments   Date Time Provider [address]   1/12/2024  8:00 AM Adrien Florian MD [facility] AMB       Last refilled:8/15/22    Requested Prescriptions     Pending Prescriptions Disp Refills    amLODIPine (NORVASC) 5 MG tablet [Pharmacy Med Name: AMLODIPINE BESYLATE 5 MG Tablet] 90 tablet 1     Sig: TAKE 1 TABLET EVERY DAY

## 2024-01-10 PROBLEM — Z00.00 MEDICARE ANNUAL WELLNESS VISIT, SUBSEQUENT: Status: ACTIVE | Noted: 2017-12-07

## 2024-01-15 RX ORDER — HYDRALAZINE HYDROCHLORIDE 50 MG/1
TABLET, FILM COATED ORAL
Qty: 60 TABLET | Refills: 0 | Status: SHIPPED | OUTPATIENT
Start: 2024-01-15

## 2024-01-15 RX ORDER — ESCITALOPRAM OXALATE 10 MG/1
TABLET ORAL
Qty: 30 TABLET | Refills: 0 | Status: SHIPPED | OUTPATIENT
Start: 2024-01-15

## 2024-01-15 NOTE — TELEPHONE ENCOUNTER
PCP: Rafal Phan MD    Last appt: 8/8/2023    No future appointments.    Requested Prescriptions     Pending Prescriptions Disp Refills    escitalopram (LEXAPRO) 10 MG tablet [Pharmacy Med Name: ESCITALOPRAM OXALATE 10 MG Tablet] 30 tablet 0     Sig: TAKE 1 TABLET EVERY DAY    hydrALAZINE (APRESOLINE) 50 MG tablet [Pharmacy Med Name: HYDRALAZINE HYDROCHLORIDE 50 MG Tablet] 60 tablet 0     Sig: TAKE 1 TABLET TWICE DAILY

## 2024-01-19 ENCOUNTER — OFFICE VISIT (OUTPATIENT)
Facility: CLINIC | Age: 89
End: 2024-01-19

## 2024-01-19 VITALS
DIASTOLIC BLOOD PRESSURE: 84 MMHG | WEIGHT: 183 LBS | BODY MASS INDEX: 27.74 KG/M2 | TEMPERATURE: 98 F | OXYGEN SATURATION: 97 % | RESPIRATION RATE: 17 BRPM | HEART RATE: 55 BPM | HEIGHT: 68 IN | SYSTOLIC BLOOD PRESSURE: 138 MMHG

## 2024-01-19 DIAGNOSIS — G45.9 TIA (TRANSIENT ISCHEMIC ATTACK): ICD-10-CM

## 2024-01-19 DIAGNOSIS — F51.01 PRIMARY INSOMNIA: ICD-10-CM

## 2024-01-19 DIAGNOSIS — F32.A MILD DEPRESSION: ICD-10-CM

## 2024-01-19 DIAGNOSIS — Z87.891 FORMER SMOKER: ICD-10-CM

## 2024-01-19 DIAGNOSIS — I71.40 ABDOMINAL AORTIC ANEURYSM (AAA) WITHOUT RUPTURE, UNSPECIFIED PART (HCC): ICD-10-CM

## 2024-01-19 DIAGNOSIS — R97.20 ELEVATED PSA: ICD-10-CM

## 2024-01-19 DIAGNOSIS — M15.9 PRIMARY OSTEOARTHRITIS INVOLVING MULTIPLE JOINTS: ICD-10-CM

## 2024-01-19 DIAGNOSIS — R41.3 MEMORY DEFICIT: ICD-10-CM

## 2024-01-19 DIAGNOSIS — Z00.00 MEDICARE ANNUAL WELLNESS VISIT, SUBSEQUENT: ICD-10-CM

## 2024-01-19 DIAGNOSIS — R73.02 GLUCOSE INTOLERANCE (IMPAIRED GLUCOSE TOLERANCE): ICD-10-CM

## 2024-01-19 DIAGNOSIS — F41.9 ANXIETY: ICD-10-CM

## 2024-01-19 DIAGNOSIS — E78.2 MIXED HYPERLIPIDEMIA: ICD-10-CM

## 2024-01-19 DIAGNOSIS — E55.9 VITAMIN D DEFICIENCY: ICD-10-CM

## 2024-01-19 DIAGNOSIS — I25.10 ASCVD (ARTERIOSCLEROTIC CARDIOVASCULAR DISEASE): ICD-10-CM

## 2024-01-19 DIAGNOSIS — Z13.39 ALCOHOL SCREENING: ICD-10-CM

## 2024-01-19 DIAGNOSIS — I10 ESSENTIAL HYPERTENSION: Primary | ICD-10-CM

## 2024-01-19 LAB
ALBUMIN SERPL-MCNC: 3.8 G/DL (ref 3.5–5)
ALBUMIN/GLOB SERPL: 1.2 (ref 1.1–2.2)
ALP SERPL-CCNC: 66 U/L (ref 45–117)
ALT SERPL-CCNC: 25 U/L (ref 12–78)
ANION GAP SERPL CALC-SCNC: 7 MMOL/L (ref 5–15)
APPEARANCE UR: CLEAR
AST SERPL-CCNC: 21 U/L (ref 15–37)
BACTERIA URNS QL MICRO: NEGATIVE /HPF
BASOPHILS # BLD: 0.1 K/UL (ref 0–0.1)
BASOPHILS NFR BLD: 1 % (ref 0–1)
BILIRUB SERPL-MCNC: 0.8 MG/DL (ref 0.2–1)
BILIRUB UR QL: NEGATIVE
BUN SERPL-MCNC: 23 MG/DL (ref 6–20)
BUN/CREAT SERPL: 20 (ref 12–20)
CALCIUM SERPL-MCNC: 8.7 MG/DL (ref 8.5–10.1)
CHLORIDE SERPL-SCNC: 107 MMOL/L (ref 97–108)
CHOLEST SERPL-MCNC: 131 MG/DL
CK SERPL-CCNC: 107 U/L (ref 39–308)
CO2 SERPL-SCNC: 28 MMOL/L (ref 21–32)
COLOR UR: ABNORMAL
CREAT SERPL-MCNC: 1.14 MG/DL (ref 0.7–1.3)
DIFFERENTIAL METHOD BLD: NORMAL
EOSINOPHIL # BLD: 0.3 K/UL (ref 0–0.4)
EOSINOPHIL NFR BLD: 4 % (ref 0–7)
EPITH CASTS URNS QL MICRO: ABNORMAL /LPF
ERYTHROCYTE [DISTWIDTH] IN BLOOD BY AUTOMATED COUNT: 13.6 % (ref 11.5–14.5)
EST. AVERAGE GLUCOSE BLD GHB EST-MCNC: 105 MG/DL
GLOBULIN SER CALC-MCNC: 3.3 G/DL (ref 2–4)
GLUCOSE SERPL-MCNC: 100 MG/DL (ref 65–100)
GLUCOSE UR STRIP.AUTO-MCNC: NEGATIVE MG/DL
HBA1C MFR BLD: 5.3 % (ref 4–5.6)
HCT VFR BLD AUTO: 46.3 % (ref 36.6–50.3)
HDLC SERPL-MCNC: 51 MG/DL
HDLC SERPL: 2.6 (ref 0–5)
HGB BLD-MCNC: 15 G/DL (ref 12.1–17)
HGB UR QL STRIP: NEGATIVE
HYALINE CASTS URNS QL MICRO: ABNORMAL /LPF (ref 0–5)
IMM GRANULOCYTES # BLD AUTO: 0 K/UL (ref 0–0.04)
IMM GRANULOCYTES NFR BLD AUTO: 0 % (ref 0–0.5)
KETONES UR QL STRIP.AUTO: NEGATIVE MG/DL
LDLC SERPL CALC-MCNC: 66.2 MG/DL (ref 0–100)
LEUKOCYTE ESTERASE UR QL STRIP.AUTO: ABNORMAL
LYMPHOCYTES # BLD: 1.3 K/UL (ref 0.8–3.5)
LYMPHOCYTES NFR BLD: 19 % (ref 12–49)
MCH RBC QN AUTO: 30.6 PG (ref 26–34)
MCHC RBC AUTO-ENTMCNC: 32.4 G/DL (ref 30–36.5)
MCV RBC AUTO: 94.5 FL (ref 80–99)
MONOCYTES # BLD: 0.6 K/UL (ref 0–1)
MONOCYTES NFR BLD: 9 % (ref 5–13)
NEUTS SEG # BLD: 4.7 K/UL (ref 1.8–8)
NEUTS SEG NFR BLD: 67 % (ref 32–75)
NITRITE UR QL STRIP.AUTO: NEGATIVE
NRBC # BLD: 0 K/UL (ref 0–0.01)
NRBC BLD-RTO: 0 PER 100 WBC
PH UR STRIP: 6 (ref 5–8)
PLATELET # BLD AUTO: 210 K/UL (ref 150–400)
PMV BLD AUTO: 10.9 FL (ref 8.9–12.9)
POTASSIUM SERPL-SCNC: 4.3 MMOL/L (ref 3.5–5.1)
PROT SERPL-MCNC: 7.1 G/DL (ref 6.4–8.2)
PROT UR STRIP-MCNC: ABNORMAL MG/DL
PSA SERPL-MCNC: 3.1 NG/ML (ref 0.01–4)
RBC # BLD AUTO: 4.9 M/UL (ref 4.1–5.7)
RBC #/AREA URNS HPF: ABNORMAL /HPF (ref 0–5)
SODIUM SERPL-SCNC: 142 MMOL/L (ref 136–145)
SP GR UR REFRACTOMETRY: 1.02 (ref 1–1.03)
T4 FREE SERPL-MCNC: 1 NG/DL (ref 0.8–1.5)
TRIGL SERPL-MCNC: 69 MG/DL
TSH SERPL DL<=0.05 MIU/L-ACNC: 2 UIU/ML (ref 0.36–3.74)
UROBILINOGEN UR QL STRIP.AUTO: 0.2 EU/DL (ref 0.2–1)
VLDLC SERPL CALC-MCNC: 13.8 MG/DL
WBC # BLD AUTO: 7 K/UL (ref 4.1–11.1)
WBC URNS QL MICRO: ABNORMAL /HPF (ref 0–4)

## 2024-01-19 SDOH — ECONOMIC STABILITY: HOUSING INSECURITY
IN THE LAST 12 MONTHS, WAS THERE A TIME WHEN YOU DID NOT HAVE A STEADY PLACE TO SLEEP OR SLEPT IN A SHELTER (INCLUDING NOW)?: NO

## 2024-01-19 SDOH — ECONOMIC STABILITY: FOOD INSECURITY: WITHIN THE PAST 12 MONTHS, YOU WORRIED THAT YOUR FOOD WOULD RUN OUT BEFORE YOU GOT MONEY TO BUY MORE.: NEVER TRUE

## 2024-01-19 SDOH — ECONOMIC STABILITY: FOOD INSECURITY: WITHIN THE PAST 12 MONTHS, THE FOOD YOU BOUGHT JUST DIDN'T LAST AND YOU DIDN'T HAVE MONEY TO GET MORE.: NEVER TRUE

## 2024-01-19 SDOH — ECONOMIC STABILITY: INCOME INSECURITY: HOW HARD IS IT FOR YOU TO PAY FOR THE VERY BASICS LIKE FOOD, HOUSING, MEDICAL CARE, AND HEATING?: VERY HARD

## 2024-01-19 ASSESSMENT — PATIENT HEALTH QUESTIONNAIRE - PHQ9
3. TROUBLE FALLING OR STAYING ASLEEP: 1
4. FEELING TIRED OR HAVING LITTLE ENERGY: 0
SUM OF ALL RESPONSES TO PHQ QUESTIONS 1-9: 1
6. FEELING BAD ABOUT YOURSELF - OR THAT YOU ARE A FAILURE OR HAVE LET YOURSELF OR YOUR FAMILY DOWN: 0
2. FEELING DOWN, DEPRESSED OR HOPELESS: 0
8. MOVING OR SPEAKING SO SLOWLY THAT OTHER PEOPLE COULD HAVE NOTICED. OR THE OPPOSITE, BEING SO FIGETY OR RESTLESS THAT YOU HAVE BEEN MOVING AROUND A LOT MORE THAN USUAL: 0
9. THOUGHTS THAT YOU WOULD BE BETTER OFF DEAD, OR OF HURTING YOURSELF: 0
SUM OF ALL RESPONSES TO PHQ9 QUESTIONS 1 & 2: 0
5. POOR APPETITE OR OVEREATING: 0
1. LITTLE INTEREST OR PLEASURE IN DOING THINGS: 0
7. TROUBLE CONCENTRATING ON THINGS, SUCH AS READING THE NEWSPAPER OR WATCHING TELEVISION: 0
SUM OF ALL RESPONSES TO PHQ QUESTIONS 1-9: 1
SUM OF ALL RESPONSES TO PHQ QUESTIONS 1-9: 1
10. IF YOU CHECKED OFF ANY PROBLEMS, HOW DIFFICULT HAVE THESE PROBLEMS MADE IT FOR YOU TO DO YOUR WORK, TAKE CARE OF THINGS AT HOME, OR GET ALONG WITH OTHER PEOPLE: 0
SUM OF ALL RESPONSES TO PHQ QUESTIONS 1-9: 1

## 2024-01-19 ASSESSMENT — LIFESTYLE VARIABLES
HOW OFTEN DO YOU HAVE A DRINK CONTAINING ALCOHOL: NEVER
HOW MANY STANDARD DRINKS CONTAINING ALCOHOL DO YOU HAVE ON A TYPICAL DAY: PATIENT DOES NOT DRINK

## 2024-01-19 NOTE — PROGRESS NOTES
Medicare Annual Wellness Visit    Jareth Sutton is here for Medicare AWV    Assessment & Plan   Essential hypertension  -     Urinalysis with Microscopic; Future  -     TSH; Future  -     T4, Free; Future  -     Comprehensive Metabolic Panel; Future  -     CBC with Auto Differential; Future  Glucose intolerance (impaired glucose tolerance)  -     Hemoglobin A1C; Future  Mixed hyperlipidemia  -     Lipid Panel; Future  -     CK; Future  ASCVD (arteriosclerotic cardiovascular disease)  Primary osteoarthritis involving multiple joints  Abdominal aortic aneurysm (AAA) without rupture, unspecified part (HCC)  TIA (transient ischemic attack)  Anxiety  Mild depression  Primary insomnia  Memory deficit  Elevated PSA  -     PSA Screening; Future  Former smoker  Vitamin D deficiency  -     Vitamin D 25 Hydroxy; Future  Alcohol screening  -     DE ANNUAL ALCOHOL SCREEN 15 MIN  Medicare annual wellness visit, subsequent  -     AMB POC EKG ROUTINE W/ 12 LEADS, SCREEN ()    ASSESSMENT:   1. Essential hypertension    2. Glucose intolerance (impaired glucose tolerance)    3. Mixed hyperlipidemia    4. ASCVD (arteriosclerotic cardiovascular disease)    5. Primary osteoarthritis involving multiple joints    6. Abdominal aortic aneurysm (AAA) without rupture, unspecified part (HCC)    7. TIA (transient ischemic attack)    8. Anxiety    9. Mild depression    10. Primary insomnia    11. Memory deficit    12. Elevated PSA    13. Former smoker    14. Vitamin D deficiency    15. Alcohol screening    16. Medicare annual wellness visit, subsequent      Impression  1.  Hypertension that is controlled so continue current therapy reviewed with him.  2.  Glucose intolerance repeat status pending prior lab reviewed I will make adjustments if necessary.  3 hyperlipidemia prior lab reviewed repeat status is pending  4 ASCVD clinically stable.  EKG obtained today sinus bradycardia with poor R wave progression with no acute process.  5

## 2024-01-20 LAB — 25(OH)D3 SERPL-MCNC: 45 NG/ML (ref 30–100)

## 2024-02-06 RX ORDER — DOXYCYCLINE HYCLATE 100 MG
100 TABLET ORAL 2 TIMES DAILY
Qty: 20 TABLET | Refills: 0 | Status: SHIPPED | OUTPATIENT
Start: 2024-02-06 | End: 2024-02-16

## 2024-02-06 NOTE — TELEPHONE ENCOUNTER
PCP: Rafal Phan MD    Last appt: 1/19/2024    Future Appointments   Date Time Provider Department Center   7/19/2024  8:10 AM Rafal Phan MD [facility]       Requested Prescriptions     Pending Prescriptions Disp Refills    doxycycline hyclate (VIBRA-TABS) 100 MG tablet 20 tablet 0     Sig: Take 1 tablet by mouth 2 times daily for 10 days

## 2024-02-09 PROBLEM — Z00.00 MEDICARE ANNUAL WELLNESS VISIT, SUBSEQUENT: Status: RESOLVED | Noted: 2017-12-07 | Resolved: 2024-02-09

## 2024-02-14 RX ORDER — DOXYCYCLINE HYCLATE 100 MG
100 TABLET ORAL 2 TIMES DAILY
Qty: 20 TABLET | Refills: 0 | Status: SHIPPED | OUTPATIENT
Start: 2024-02-14 | End: 2024-02-24

## 2024-02-16 RX ORDER — HYDRALAZINE HYDROCHLORIDE 50 MG/1
TABLET, FILM COATED ORAL
Qty: 60 TABLET | Refills: 3 | Status: SHIPPED | OUTPATIENT
Start: 2024-02-16

## 2024-02-16 NOTE — TELEPHONE ENCOUNTER
PCP: Rafal Phan MD    Last appt: 1/19/2024    Future Appointments   Date Time Provider Department Center   7/19/2024  8:10 AM Rafal Phan MD [facility]       Requested Prescriptions     Pending Prescriptions Disp Refills    hydrALAZINE (APRESOLINE) 50 MG tablet [Pharmacy Med Name: HYDRALAZINE HYDROCHLORIDE 50 MG Tablet] 60 tablet 3     Sig: TAKE 1 TABLET TWICE DAILY

## 2024-03-12 ENCOUNTER — NURSE ONLY (OUTPATIENT)
Facility: CLINIC | Age: 89
End: 2024-03-12

## 2024-03-12 DIAGNOSIS — R82.998 URINE WBC INCREASED: ICD-10-CM

## 2024-03-12 DIAGNOSIS — R82.998 URINE WBC INCREASED: Primary | ICD-10-CM

## 2024-03-13 LAB
APPEARANCE UR: ABNORMAL
BACTERIA URNS QL MICRO: ABNORMAL /HPF
BILIRUB UR QL: NEGATIVE
COLOR UR: ABNORMAL
EPITH CASTS URNS QL MICRO: ABNORMAL /LPF
GLUCOSE UR STRIP.AUTO-MCNC: NEGATIVE MG/DL
HGB UR QL STRIP: NEGATIVE
HYALINE CASTS URNS QL MICRO: ABNORMAL /LPF (ref 0–5)
KETONES UR QL STRIP.AUTO: ABNORMAL MG/DL
LEUKOCYTE ESTERASE UR QL STRIP.AUTO: NEGATIVE
NITRITE UR QL STRIP.AUTO: NEGATIVE
PH UR STRIP: 5 (ref 5–8)
PROT UR STRIP-MCNC: NEGATIVE MG/DL
RBC #/AREA URNS HPF: ABNORMAL /HPF (ref 0–5)
SP GR UR REFRACTOMETRY: 1.02 (ref 1–1.03)
UROBILINOGEN UR QL STRIP.AUTO: 0.2 EU/DL (ref 0.2–1)
WBC URNS QL MICRO: ABNORMAL /HPF (ref 0–4)

## 2024-03-14 LAB
BACTERIA SPEC CULT: NORMAL
SERVICE CMNT-IMP: NORMAL

## 2024-04-01 RX ORDER — ROSUVASTATIN CALCIUM 20 MG/1
TABLET, COATED ORAL
Qty: 90 TABLET | Refills: 3 | Status: SHIPPED | OUTPATIENT
Start: 2024-04-01

## 2024-04-01 NOTE — TELEPHONE ENCOUNTER
PCP: Rafal Phan MD    Last appt: 1/19/2024    Future Appointments   Date Time Provider Department Center   7/19/2024  8:10 AM Rafal Phan MD [facility]       Requested Prescriptions     Pending Prescriptions Disp Refills    rosuvastatin (CRESTOR) 20 MG tablet [Pharmacy Med Name: ROSUVASTATIN CALCIUM 20 MG Tablet] 90 tablet 3     Sig: TAKE 1 TABLET EVERY DAY

## 2024-04-02 RX ORDER — ESCITALOPRAM OXALATE 10 MG/1
TABLET ORAL
Qty: 30 TABLET | Refills: 11 | Status: SHIPPED | OUTPATIENT
Start: 2024-04-02

## 2024-04-02 NOTE — TELEPHONE ENCOUNTER
PCP: Rafal Phan MD    Last appt: 1/19/2024    Future Appointments   Date Time Provider Department Center   7/19/2024  8:10 AM Rafal Phan MD [facility]       Requested Prescriptions     Pending Prescriptions Disp Refills    escitalopram (LEXAPRO) 10 MG tablet [Pharmacy Med Name: ESCITALOPRAM OXALATE 10 MG Tablet] 30 tablet 11     Sig: TAKE 1 TABLET EVERY DAY

## 2024-05-31 RX ORDER — TAMSULOSIN HYDROCHLORIDE 0.4 MG/1
0.4 CAPSULE ORAL DAILY
Qty: 90 CAPSULE | Refills: 3 | Status: SHIPPED | OUTPATIENT
Start: 2024-05-31

## 2024-05-31 RX ORDER — HYDRALAZINE HYDROCHLORIDE 50 MG/1
TABLET, FILM COATED ORAL
Qty: 180 TABLET | Refills: 3 | Status: SHIPPED | OUTPATIENT
Start: 2024-05-31

## 2024-05-31 NOTE — TELEPHONE ENCOUNTER
RX refill request from the patient/pharmacy. Patient last seen 03- with labs, and next appt. scheduled for 07-  Requested Prescriptions     Pending Prescriptions Disp Refills    hydrALAZINE (APRESOLINE) 50 MG tablet [Pharmacy Med Name: HYDRALAZINE HYDROCHLORIDE 50 MG Tablet] 180 tablet 3     Sig: TAKE 1 TABLET TWICE DAILY    tamsulosin (FLOMAX) 0.4 MG capsule [Pharmacy Med Name: TAMSULOSIN HYDROCHLORIDE 0.4 MG Capsule] 90 capsule 3     Sig: TAKE 1 CAPSULE EVERY DAY    .

## 2024-07-19 ENCOUNTER — TELEPHONE (OUTPATIENT)
Facility: CLINIC | Age: 89
End: 2024-07-19

## 2024-07-19 DIAGNOSIS — R26.89 BALANCE PROBLEM: Primary | ICD-10-CM

## 2024-07-19 DIAGNOSIS — Z91.81 RISK FOR FALLS: ICD-10-CM

## 2024-07-19 DIAGNOSIS — G89.29 CHRONIC LOW BACK PAIN, UNSPECIFIED BACK PAIN LATERALITY, UNSPECIFIED WHETHER SCIATICA PRESENT: ICD-10-CM

## 2024-07-19 DIAGNOSIS — M54.50 CHRONIC LOW BACK PAIN, UNSPECIFIED BACK PAIN LATERALITY, UNSPECIFIED WHETHER SCIATICA PRESENT: ICD-10-CM

## 2024-07-19 NOTE — TELEPHONE ENCOUNTER
Patient is requesting a referral for PT. He has back pain and balance issues. Please advise.    [phone]    He would like to go to PT at [facility] but still needs a referral.

## 2024-07-19 NOTE — TELEPHONE ENCOUNTER
Boo Cary, APRN - NP  Gayathri Rouse LPN  Caller: Unspecified (Today,  8:11 AM)  Referral sent. Fax to [facility]      Referral faxed

## 2024-08-25 ENCOUNTER — APPOINTMENT (OUTPATIENT)
Facility: HOSPITAL | Age: 89
End: 2024-08-25
Payer: MEDICARE

## 2024-08-25 ENCOUNTER — HOSPITAL ENCOUNTER (OUTPATIENT)
Facility: HOSPITAL | Age: 89
Setting detail: OBSERVATION
LOS: 1 days | Discharge: HOME | End: 2024-08-26
Attending: EMERGENCY MEDICINE | Admitting: INTERNAL MEDICINE
Payer: MEDICARE

## 2024-08-25 DIAGNOSIS — R42 DIZZINESS: Primary | ICD-10-CM

## 2024-08-25 DIAGNOSIS — H53.8 BLURRED VISION, BILATERAL: ICD-10-CM

## 2024-08-25 DIAGNOSIS — G45.9 TIA (TRANSIENT ISCHEMIC ATTACK): ICD-10-CM

## 2024-08-25 PROBLEM — I61.9 CVA (CEREBROVASCULAR ACCIDENT DUE TO INTRACEREBRAL HEMORRHAGE) (HCC): Status: ACTIVE | Noted: 2024-08-25

## 2024-08-25 LAB
ALBUMIN SERPL-MCNC: 3.9 G/DL (ref 3.5–5)
ALBUMIN/GLOB SERPL: 1.2 (ref 1.1–2.2)
ALP SERPL-CCNC: 74 U/L (ref 45–117)
ALT SERPL-CCNC: 35 U/L (ref 12–78)
ANION GAP SERPL CALC-SCNC: 5 MMOL/L (ref 5–15)
AST SERPL-CCNC: 29 U/L (ref 15–37)
BASOPHILS # BLD: 0.1 K/UL (ref 0–0.1)
BASOPHILS NFR BLD: 1 % (ref 0–1)
BILIRUB SERPL-MCNC: 0.6 MG/DL (ref 0.2–1)
BUN SERPL-MCNC: 24 MG/DL (ref 6–20)
BUN/CREAT SERPL: 20 (ref 12–20)
CALCIUM SERPL-MCNC: 9.5 MG/DL (ref 8.5–10.1)
CHLORIDE SERPL-SCNC: 107 MMOL/L (ref 97–108)
CO2 SERPL-SCNC: 28 MMOL/L (ref 21–32)
CREAT SERPL-MCNC: 1.23 MG/DL (ref 0.7–1.3)
DIFFERENTIAL METHOD BLD: NORMAL
EOSINOPHIL # BLD: 0.2 K/UL (ref 0–0.4)
EOSINOPHIL NFR BLD: 2 % (ref 0–7)
ERYTHROCYTE [DISTWIDTH] IN BLOOD BY AUTOMATED COUNT: 13.9 % (ref 11.5–14.5)
GLOBULIN SER CALC-MCNC: 3.2 G/DL (ref 2–4)
GLUCOSE BLD STRIP.AUTO-MCNC: 100 MG/DL (ref 65–117)
GLUCOSE SERPL-MCNC: 113 MG/DL (ref 65–100)
HCT VFR BLD AUTO: 45.5 % (ref 36.6–50.3)
HGB BLD-MCNC: 15.2 G/DL (ref 12.1–17)
IMM GRANULOCYTES # BLD AUTO: 0 K/UL (ref 0–0.04)
IMM GRANULOCYTES NFR BLD AUTO: 0 % (ref 0–0.5)
INR PPP: 1 (ref 0.9–1.1)
LYMPHOCYTES # BLD: 1.9 K/UL (ref 0.8–3.5)
LYMPHOCYTES NFR BLD: 20 % (ref 12–49)
MCH RBC QN AUTO: 31.1 PG (ref 26–34)
MCHC RBC AUTO-ENTMCNC: 33.4 G/DL (ref 30–36.5)
MCV RBC AUTO: 93.2 FL (ref 80–99)
MONOCYTES # BLD: 0.8 K/UL (ref 0–1)
MONOCYTES NFR BLD: 8 % (ref 5–13)
NEUTS SEG # BLD: 6.4 K/UL (ref 1.8–8)
NEUTS SEG NFR BLD: 69 % (ref 32–75)
NRBC # BLD: 0 K/UL (ref 0–0.01)
NRBC BLD-RTO: 0 PER 100 WBC
PLATELET # BLD AUTO: 230 K/UL (ref 150–400)
PMV BLD AUTO: 9.7 FL (ref 8.9–12.9)
POTASSIUM SERPL-SCNC: 5 MMOL/L (ref 3.5–5.1)
PROT SERPL-MCNC: 7.1 G/DL (ref 6.4–8.2)
PROTHROMBIN TIME: 10.8 SEC (ref 9–11.1)
RBC # BLD AUTO: 4.88 M/UL (ref 4.1–5.7)
SERVICE CMNT-IMP: NORMAL
SODIUM SERPL-SCNC: 140 MMOL/L (ref 136–145)
TROPONIN I SERPL HS-MCNC: 56 NG/L (ref 0–76)
WBC # BLD AUTO: 9.4 K/UL (ref 4.1–11.1)

## 2024-08-25 PROCEDURE — 70498 CT ANGIOGRAPHY NECK: CPT

## 2024-08-25 PROCEDURE — 85610 PROTHROMBIN TIME: CPT

## 2024-08-25 PROCEDURE — 2580000003 HC RX 258: Performed by: INTERNAL MEDICINE

## 2024-08-25 PROCEDURE — 84484 ASSAY OF TROPONIN QUANT: CPT

## 2024-08-25 PROCEDURE — 82962 GLUCOSE BLOOD TEST: CPT

## 2024-08-25 PROCEDURE — 85025 COMPLETE CBC W/AUTO DIFF WBC: CPT

## 2024-08-25 PROCEDURE — 70450 CT HEAD/BRAIN W/O DYE: CPT

## 2024-08-25 PROCEDURE — 93005 ELECTROCARDIOGRAM TRACING: CPT | Performed by: EMERGENCY MEDICINE

## 2024-08-25 PROCEDURE — 6370000000 HC RX 637 (ALT 250 FOR IP): Performed by: HOSPITALIST

## 2024-08-25 PROCEDURE — 70551 MRI BRAIN STEM W/O DYE: CPT

## 2024-08-25 PROCEDURE — 0042T CT BRAIN PERFUSION: CPT

## 2024-08-25 PROCEDURE — 80053 COMPREHEN METABOLIC PANEL: CPT

## 2024-08-25 PROCEDURE — 6360000002 HC RX W HCPCS: Performed by: INTERNAL MEDICINE

## 2024-08-25 PROCEDURE — 70496 CT ANGIOGRAPHY HEAD: CPT

## 2024-08-25 PROCEDURE — 6370000000 HC RX 637 (ALT 250 FOR IP): Performed by: INTERNAL MEDICINE

## 2024-08-25 PROCEDURE — 99285 EMERGENCY DEPT VISIT HI MDM: CPT

## 2024-08-25 PROCEDURE — 36415 COLL VENOUS BLD VENIPUNCTURE: CPT

## 2024-08-25 PROCEDURE — 6360000004 HC RX CONTRAST MEDICATION: Performed by: EMERGENCY MEDICINE

## 2024-08-25 PROCEDURE — 1100000000 HC RM PRIVATE

## 2024-08-25 RX ORDER — SODIUM CHLORIDE 0.9 % (FLUSH) 0.9 %
5-40 SYRINGE (ML) INJECTION EVERY 12 HOURS SCHEDULED
Status: DISCONTINUED | OUTPATIENT
Start: 2024-08-25 | End: 2024-08-26 | Stop reason: HOSPADM

## 2024-08-25 RX ORDER — CALCIUM CARBONATE 500 MG/1
500 TABLET, CHEWABLE ORAL 3 TIMES DAILY PRN
Status: DISCONTINUED | OUTPATIENT
Start: 2024-08-25 | End: 2024-08-26 | Stop reason: HOSPADM

## 2024-08-25 RX ORDER — TAMSULOSIN HYDROCHLORIDE 0.4 MG/1
0.4 CAPSULE ORAL DAILY
Status: DISCONTINUED | OUTPATIENT
Start: 2024-08-26 | End: 2024-08-26 | Stop reason: HOSPADM

## 2024-08-25 RX ORDER — ACETAMINOPHEN 325 MG/1
650 TABLET ORAL EVERY 4 HOURS PRN
Status: DISCONTINUED | OUTPATIENT
Start: 2024-08-25 | End: 2024-08-26 | Stop reason: HOSPADM

## 2024-08-25 RX ORDER — ESCITALOPRAM OXALATE 10 MG/1
10 TABLET ORAL DAILY
Status: DISCONTINUED | OUTPATIENT
Start: 2024-08-26 | End: 2024-08-26 | Stop reason: HOSPADM

## 2024-08-25 RX ORDER — AMLODIPINE BESYLATE 5 MG/1
5 TABLET ORAL DAILY
Status: DISCONTINUED | OUTPATIENT
Start: 2024-08-26 | End: 2024-08-26 | Stop reason: HOSPADM

## 2024-08-25 RX ORDER — IOPAMIDOL 755 MG/ML
100 INJECTION, SOLUTION INTRAVASCULAR
Status: COMPLETED | OUTPATIENT
Start: 2024-08-25 | End: 2024-08-25

## 2024-08-25 RX ORDER — LABETALOL HYDROCHLORIDE 5 MG/ML
10 INJECTION, SOLUTION INTRAVENOUS EVERY 10 MIN PRN
Status: DISCONTINUED | OUTPATIENT
Start: 2024-08-25 | End: 2024-08-26 | Stop reason: HOSPADM

## 2024-08-25 RX ORDER — ROSUVASTATIN CALCIUM 20 MG/1
20 TABLET, COATED ORAL DAILY
Status: DISCONTINUED | OUTPATIENT
Start: 2024-08-25 | End: 2024-08-26 | Stop reason: HOSPADM

## 2024-08-25 RX ORDER — VITAMIN B COMPLEX
2000 TABLET ORAL DAILY
Status: DISCONTINUED | OUTPATIENT
Start: 2024-08-26 | End: 2024-08-26 | Stop reason: HOSPADM

## 2024-08-25 RX ORDER — HYDRALAZINE HYDROCHLORIDE 50 MG/1
50 TABLET, FILM COATED ORAL 2 TIMES DAILY
Status: DISCONTINUED | OUTPATIENT
Start: 2024-08-26 | End: 2024-08-26 | Stop reason: HOSPADM

## 2024-08-25 RX ORDER — ONDANSETRON 2 MG/ML
4 INJECTION INTRAMUSCULAR; INTRAVENOUS EVERY 6 HOURS PRN
Status: DISCONTINUED | OUTPATIENT
Start: 2024-08-25 | End: 2024-08-26 | Stop reason: HOSPADM

## 2024-08-25 RX ORDER — METOPROLOL SUCCINATE 50 MG/1
50 TABLET, EXTENDED RELEASE ORAL DAILY
Status: DISCONTINUED | OUTPATIENT
Start: 2024-08-26 | End: 2024-08-26 | Stop reason: HOSPADM

## 2024-08-25 RX ORDER — POLYETHYLENE GLYCOL 3350 17 G/17G
17 POWDER, FOR SOLUTION ORAL DAILY PRN
Status: DISCONTINUED | OUTPATIENT
Start: 2024-08-25 | End: 2024-08-26 | Stop reason: HOSPADM

## 2024-08-25 RX ORDER — EZETIMIBE 10 MG/1
10 TABLET ORAL DAILY
Status: DISCONTINUED | OUTPATIENT
Start: 2024-08-25 | End: 2024-08-25

## 2024-08-25 RX ORDER — SODIUM CHLORIDE 0.9 % (FLUSH) 0.9 %
5-40 SYRINGE (ML) INJECTION PRN
Status: DISCONTINUED | OUTPATIENT
Start: 2024-08-25 | End: 2024-08-26 | Stop reason: HOSPADM

## 2024-08-25 RX ORDER — SODIUM CHLORIDE 9 MG/ML
INJECTION, SOLUTION INTRAVENOUS PRN
Status: DISCONTINUED | OUTPATIENT
Start: 2024-08-25 | End: 2024-08-26 | Stop reason: HOSPADM

## 2024-08-25 RX ORDER — FINASTERIDE 5 MG/1
5 TABLET, FILM COATED ORAL DAILY
Status: DISCONTINUED | OUTPATIENT
Start: 2024-08-25 | End: 2024-08-26 | Stop reason: HOSPADM

## 2024-08-25 RX ORDER — ONDANSETRON 4 MG/1
4 TABLET, ORALLY DISINTEGRATING ORAL EVERY 8 HOURS PRN
Status: DISCONTINUED | OUTPATIENT
Start: 2024-08-25 | End: 2024-08-26 | Stop reason: HOSPADM

## 2024-08-25 RX ORDER — ASPIRIN 81 MG/1
81 TABLET, CHEWABLE ORAL DAILY
Status: DISCONTINUED | OUTPATIENT
Start: 2024-08-25 | End: 2024-08-26 | Stop reason: HOSPADM

## 2024-08-25 RX ORDER — ENOXAPARIN SODIUM 100 MG/ML
40 INJECTION SUBCUTANEOUS DAILY
Status: DISCONTINUED | OUTPATIENT
Start: 2024-08-25 | End: 2024-08-26 | Stop reason: HOSPADM

## 2024-08-25 RX ADMIN — IOPAMIDOL 100 ML: 755 INJECTION, SOLUTION INTRAVENOUS at 17:52

## 2024-08-25 RX ADMIN — ONDANSETRON 4 MG: 2 INJECTION INTRAMUSCULAR; INTRAVENOUS at 20:13

## 2024-08-25 RX ADMIN — ENOXAPARIN SODIUM 40 MG: 100 INJECTION SUBCUTANEOUS at 20:13

## 2024-08-25 RX ADMIN — SODIUM CHLORIDE, PRESERVATIVE FREE 10 ML: 5 INJECTION INTRAVENOUS at 22:31

## 2024-08-25 RX ADMIN — ASPIRIN 81 MG: 81 TABLET, CHEWABLE ORAL at 20:13

## 2024-08-25 RX ADMIN — CALCIUM CARBONATE (ANTACID) CHEW TAB 500 MG 500 MG: 500 CHEW TAB at 22:30

## 2024-08-25 ASSESSMENT — LIFESTYLE VARIABLES
HOW MANY STANDARD DRINKS CONTAINING ALCOHOL DO YOU HAVE ON A TYPICAL DAY: PATIENT DOES NOT DRINK
HOW OFTEN DO YOU HAVE A DRINK CONTAINING ALCOHOL: NEVER

## 2024-08-25 NOTE — ED NOTES
Perfect Served provider the following:    \"Pt c/o new onset tingling in bilateral arms and reports his face feels different.\"    Per provider,     \"Please call rapid team and Inhouse hospitalist to see patient to assess pt at bedside\"

## 2024-08-25 NOTE — PROGRESS NOTES
Responded to Rapid Response and patient received on room air with SpO2 of 97% and HR 51bpm. Per RRT RN  rapid called for a change in symptoms of new onset CVA. No respiratory intervention needed at this time.

## 2024-08-25 NOTE — H&P
Alcohol use: No        Family History   Problem Relation Age of Onset    Heart Disease Father         aneurysym    Psychiatric Disorder Mother         depression       Allergies   Allergen Reactions    Benazepril Rash and Swelling        Prior to Admission medications    Medication Sig Start Date End Date Taking? Authorizing Provider   hydrALAZINE (APRESOLINE) 50 MG tablet TAKE 1 TABLET TWICE DAILY 24   Rafal Phan MD   tamsulosin (FLOMAX) 0.4 MG capsule TAKE 1 CAPSULE EVERY DAY 24   Rafal Phan MD   escitalopram (LEXAPRO) 10 MG tablet TAKE 1 TABLET EVERY DAY 24   Rafal Phan MD   rosuvastatin (CRESTOR) 20 MG tablet TAKE 1 TABLET EVERY DAY 24   Rafal Phan MD   amLODIPine (NORVASC) 5 MG tablet TAKE 1 TABLET EVERY DAY 23   Rafal Phan MD   Multiple Vitamins-Minerals (PRESERVISION AREDS PO) Take 2 capsules by mouth daily    Moreno Hyman MD   aspirin 81 MG chewable tablet Take 1 tablet by mouth daily    Moreno Hyman MD   Krill Oil 500 MG CAPS Take 2 capsules by mouth    ProviderMoreno MD   finasteride (PROSCAR) 5 MG tablet Take 1 tablet by mouth daily 23   Rafal Phan MD   vitamin D (CHOLECALCIFEROL) 25 MCG (1000 UT) TABS tablet Take 2 tablets by mouth daily    Automatic Reconciliation, Ar   ezetimibe (ZETIA) 10 MG tablet Take by mouth daily    Automatic Reconciliation, Ar   nebivolol (BYSTOLIC) 5 MG tablet Take 1 tablet by mouth daily    Automatic Reconciliation, Ar         Objective:   VITALS:    Patient Vitals for the past 24 hrs:   BP Temp Temp src Pulse Resp SpO2 Height Weight   24 1715 (!) 145/65 97.7 °F (36.5 °C) Oral (!) 49 18 100 % 1.727 m (5' 8\") 83.9 kg (185 lb)       Temp (24hrs), Av.7 °F (36.5 °C), Min:97.7 °F (36.5 °C), Max:97.7 °F (36.5 °C)        O2 Device: None (Room air)    Wt Readings from Last 12 Encounters:   24 83.9 kg (185 lb)   24 83 kg (183 lb)   23 83.5 kg (184 lb)    07/12/23 83.5 kg (184 lb)   03/01/23 85.5 kg (188 lb 6.4 oz)   01/12/23 83 kg (183 lb)   10/14/22 82.1 kg (181 lb)   08/10/22 83.3 kg (183 lb 11.2 oz)   07/12/22 81.9 kg (180 lb 8 oz)   01/10/22 82.8 kg (182 lb 8 oz)   01/04/22 87.3 kg (192 lb 6.4 oz)   09/01/21 79.4 kg (175 lb)         PHYSICAL EXAM:  General:    Alert, cooperative, appears stated age.     Lungs:   CTA b/l.  No wheezing or Rhonchi. No rales.  Chest wall:  No tenderness.  No accessory muscle use.  Heart:   Regular  rhythm,  No  Murmur.   No edema  Abdomen:   Soft, NT. ND  BS+  Extremities: No cyanosis.  No clubbing,      Skin turgor normal, Radial dial pulse 2+. Capillary refill normal  Neurologic: No facial asymmetry. No aphasia or slurred speech. Symmetrical strength, Sensation grossly intact. AAOx4.         LAB DATA REVIEWED:    Recent Results (from the past 12 hour(s))   POCT Glucose    Collection Time: 08/25/24  5:20 PM   Result Value Ref Range    POC Glucose 100 65 - 117 mg/dL    Performed by: Timothy Gardiner RN    CBC with Auto Differential    Collection Time: 08/25/24  5:39 PM   Result Value Ref Range    WBC 9.4 4.1 - 11.1 K/uL    RBC 4.88 4.10 - 5.70 M/uL    Hemoglobin 15.2 12.1 - 17.0 g/dL    Hematocrit 45.5 36.6 - 50.3 %    MCV 93.2 80.0 - 99.0 FL    MCH 31.1 26.0 - 34.0 PG    MCHC 33.4 30.0 - 36.5 g/dL    RDW 13.9 11.5 - 14.5 %    Platelets 230 150 - 400 K/uL    MPV 9.7 8.9 - 12.9 FL    Nucleated RBCs 0.0 0  WBC    nRBC 0.00 0.00 - 0.01 K/uL    Neutrophils % 69 32 - 75 %    Lymphocytes % 20 12 - 49 %    Monocytes % 8 5 - 13 %    Eosinophils % 2 0 - 7 %    Basophils % 1 0 - 1 %    Immature Granulocytes % 0 0.0 - 0.5 %    Neutrophils Absolute 6.4 1.8 - 8.0 K/UL    Lymphocytes Absolute 1.9 0.8 - 3.5 K/UL    Monocytes Absolute 0.8 0.0 - 1.0 K/UL    Eosinophils Absolute 0.2 0.0 - 0.4 K/UL    Basophils Absolute 0.1 0.0 - 0.1 K/UL    Immature Granulocytes Absolute 0.0 0.00 - 0.04 K/UL    Differential Type AUTOMATED    evidence of acute infarct. Heavy bilateral vertebral artery calcification is seen. The bone windows demonstrate no abnormalities. The visualized portions of the paranasal sinuses and mastoid air cells are clear.     No acute intracranial process identified Electronically signed by Evelina Butcher     _______________________________________________________________________    TOTAL TIME:  76 Minutes    Critical Care Provided     Minutes non procedure based    Signed: Les Verduzco MD    Procedures: see electronic medical records for all procedures/Xrays and details which were not copied into this note but were reviewed prior to creation of Plan.

## 2024-08-25 NOTE — PROGRESS NOTES
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 Neural foraminal stenoses: severe bilateral C3-C4, C6-C7; left and severe  right C4-C6.  2. Canal stenoses: moderate C3-C6.  3. More mild stenoses as described above.    Oh Baumann Jr, MD

## 2024-08-25 NOTE — ED NOTES
Teleneurologist on Tele[employer]. Per Teleneurologist, no TNK and proceed with CTA with contrast/perfusion.

## 2024-08-26 ENCOUNTER — TELEPHONE (OUTPATIENT)
Facility: HOSPITAL | Age: 89
End: 2024-08-26

## 2024-08-26 VITALS
WEIGHT: 185 LBS | HEART RATE: 48 BPM | OXYGEN SATURATION: 100 % | BODY MASS INDEX: 28.04 KG/M2 | HEIGHT: 68 IN | RESPIRATION RATE: 18 BRPM | DIASTOLIC BLOOD PRESSURE: 49 MMHG | SYSTOLIC BLOOD PRESSURE: 120 MMHG | TEMPERATURE: 97.7 F

## 2024-08-26 PROBLEM — G45.9 TRANSIENT ISCHEMIC ATTACK: Status: ACTIVE | Noted: 2024-08-26

## 2024-08-26 LAB
ANION GAP SERPL CALC-SCNC: 8 MMOL/L (ref 5–15)
BUN SERPL-MCNC: 19 MG/DL (ref 6–20)
BUN/CREAT SERPL: 19 (ref 12–20)
CALCIUM SERPL-MCNC: 9.2 MG/DL (ref 8.5–10.1)
CHLORIDE SERPL-SCNC: 107 MMOL/L (ref 97–108)
CHOLEST SERPL-MCNC: 115 MG/DL
CO2 SERPL-SCNC: 23 MMOL/L (ref 21–32)
CREAT SERPL-MCNC: 1.01 MG/DL (ref 0.7–1.3)
ERYTHROCYTE [DISTWIDTH] IN BLOOD BY AUTOMATED COUNT: 13.7 % (ref 11.5–14.5)
EST. AVERAGE GLUCOSE BLD GHB EST-MCNC: 108 MG/DL
GLUCOSE SERPL-MCNC: 102 MG/DL (ref 65–100)
HBA1C MFR BLD: 5.4 % (ref 4–5.6)
HCT VFR BLD AUTO: 44.2 % (ref 36.6–50.3)
HDLC SERPL-MCNC: 48 MG/DL
HDLC SERPL: 2.4 (ref 0–5)
HGB BLD-MCNC: 14.8 G/DL (ref 12.1–17)
LDLC SERPL CALC-MCNC: 56.2 MG/DL (ref 0–100)
MCH RBC QN AUTO: 31.1 PG (ref 26–34)
MCHC RBC AUTO-ENTMCNC: 33.5 G/DL (ref 30–36.5)
MCV RBC AUTO: 92.9 FL (ref 80–99)
NRBC # BLD: 0 K/UL (ref 0–0.01)
NRBC BLD-RTO: 0 PER 100 WBC
PLATELET # BLD AUTO: 208 K/UL (ref 150–400)
PMV BLD AUTO: 10 FL (ref 8.9–12.9)
POTASSIUM SERPL-SCNC: 3.7 MMOL/L (ref 3.5–5.1)
RBC # BLD AUTO: 4.76 M/UL (ref 4.1–5.7)
SODIUM SERPL-SCNC: 138 MMOL/L (ref 136–145)
TRIGL SERPL-MCNC: 54 MG/DL
VLDLC SERPL CALC-MCNC: 10.8 MG/DL
WBC # BLD AUTO: 9.5 K/UL (ref 4.1–11.1)

## 2024-08-26 PROCEDURE — 92610 EVALUATE SWALLOWING FUNCTION: CPT

## 2024-08-26 PROCEDURE — 83036 HEMOGLOBIN GLYCOSYLATED A1C: CPT

## 2024-08-26 PROCEDURE — 80048 BASIC METABOLIC PNL TOTAL CA: CPT

## 2024-08-26 PROCEDURE — 6360000002 HC RX W HCPCS: Performed by: INTERNAL MEDICINE

## 2024-08-26 PROCEDURE — 2580000003 HC RX 258: Performed by: INTERNAL MEDICINE

## 2024-08-26 PROCEDURE — 85027 COMPLETE CBC AUTOMATED: CPT

## 2024-08-26 PROCEDURE — 36415 COLL VENOUS BLD VENIPUNCTURE: CPT

## 2024-08-26 PROCEDURE — 97535 SELF CARE MNGMENT TRAINING: CPT | Performed by: OCCUPATIONAL THERAPIST

## 2024-08-26 PROCEDURE — 6370000000 HC RX 637 (ALT 250 FOR IP): Performed by: INTERNAL MEDICINE

## 2024-08-26 PROCEDURE — 97165 OT EVAL LOW COMPLEX 30 MIN: CPT | Performed by: OCCUPATIONAL THERAPIST

## 2024-08-26 PROCEDURE — 99222 1ST HOSP IP/OBS MODERATE 55: CPT | Performed by: INTERNAL MEDICINE

## 2024-08-26 PROCEDURE — 80061 LIPID PANEL: CPT

## 2024-08-26 PROCEDURE — 97116 GAIT TRAINING THERAPY: CPT

## 2024-08-26 PROCEDURE — 97162 PT EVAL MOD COMPLEX 30 MIN: CPT

## 2024-08-26 RX ORDER — CLOPIDOGREL BISULFATE 75 MG/1
75 TABLET ORAL DAILY
Status: DISCONTINUED | OUTPATIENT
Start: 2024-08-26 | End: 2024-08-26 | Stop reason: HOSPADM

## 2024-08-26 RX ORDER — NEBIVOLOL 2.5 MG/1
5 TABLET ORAL DAILY
Qty: 60 TABLET | Refills: 0 | Status: SHIPPED | OUTPATIENT
Start: 2024-08-26 | End: 2024-09-25

## 2024-08-26 RX ORDER — CLOPIDOGREL BISULFATE 75 MG/1
75 TABLET ORAL DAILY
Qty: 20 TABLET | Refills: 0 | Status: SHIPPED | OUTPATIENT
Start: 2024-08-27 | End: 2024-09-16

## 2024-08-26 RX ADMIN — ASPIRIN 81 MG: 81 TABLET, CHEWABLE ORAL at 08:53

## 2024-08-26 RX ADMIN — AMLODIPINE BESYLATE 5 MG: 5 TABLET ORAL at 08:52

## 2024-08-26 RX ADMIN — Medication 2000 UNITS: at 08:52

## 2024-08-26 RX ADMIN — ESCITALOPRAM OXALATE 10 MG: 10 TABLET ORAL at 08:53

## 2024-08-26 RX ADMIN — HYDRALAZINE HYDROCHLORIDE 50 MG: 50 TABLET ORAL at 08:53

## 2024-08-26 RX ADMIN — ENOXAPARIN SODIUM 40 MG: 100 INJECTION SUBCUTANEOUS at 08:52

## 2024-08-26 RX ADMIN — CLOPIDOGREL BISULFATE 75 MG: 75 TABLET ORAL at 11:24

## 2024-08-26 RX ADMIN — SODIUM CHLORIDE, PRESERVATIVE FREE 10 ML: 5 INJECTION INTRAVENOUS at 08:54

## 2024-08-26 RX ADMIN — METOPROLOL SUCCINATE 50 MG: 50 TABLET, EXTENDED RELEASE ORAL at 08:53

## 2024-08-26 RX ADMIN — FINASTERIDE 5 MG: 5 TABLET, FILM COATED ORAL at 08:52

## 2024-08-26 RX ADMIN — ROSUVASTATIN CALCIUM 20 MG: 20 TABLET, FILM COATED ORAL at 08:53

## 2024-08-26 NOTE — PLAN OF CARE
Problem: Occupational Therapy - Adult  Goal: By Discharge: Performs self-care activities at highest level of function for planned discharge setting.  See evaluation for individualized goals.  Description: FUNCTIONAL STATUS PRIOR TO ADMISSION:  ambulated without assist devices, stood to bathe, driving and was fishing on a boat just prior to admit, going to OP PT for balance training, reports history of balance deficits losing balance forward   , ADL Assistance: Independent,  ,  ,  ,  ,  , Homemaking Assistance: Independent, Ambulation Assistance: Independent, Transfer Assistance: Independent, Active : Yes     HOME SUPPORT: Patient lived with wife in ILF in first floor apartment at [facility].    Occupational Therapy Goals:  Initiated 8/26/2024  1.  Patient will perform grooming with [place] within 7 day(s).  2.  Patient will perform upper body dressing and lower body dressing with [place] within 7 day(s).  3.  Patient will perform toileting with [place] within 7 day(s).  4.  Patient will perform toilet transfers with [place]  within 7 day(s).  5.  Patient will perform one IADL task in standing with [place] within 7 days.   8/26/2024 1348 by Bethany Roberts, OTR/L  Outcome: Not Progressing     Problem: Occupational Therapy - Adult  Goal: By Discharge: Performs self-care activities at highest level of function for planned discharge setting.  See evaluation for individualized goals.  Description: FUNCTIONAL STATUS PRIOR TO ADMISSION:  ambulated without assist devices, stood to bathe, driving and was fishing on a boat just prior to admit, going to OP PT for balance training, reports history of balance deficits losing balance forward   , ADL Assistance: Independent,  ,  ,  ,  ,  , Homemaking Assistance: Independent, Ambulation Assistance: Independent, Transfer Assistance: Independent, Active : Yes     HOME SUPPORT: Patient lived with wife in ILF in first floor apartment at

## 2024-08-26 NOTE — ED NOTES
TRANSFER - OUT REPORT:    Verbal report given to EMILY Conway on Jareth Sutton  being transferred to [facility] for routine progression of patient care       Report consisted of patient's Situation, Background, Assessment and   Recommendations(SBAR).     Information from the following report(s) Nurse Handoff Report, ED Encounter Summary, ED SBAR, and MAR was reviewed with the receiving nurse.    Deweese Fall Assessment:    Presents to emergency department  because of falls (Syncope, seizure, or loss of consciousness): No  Age > 70: Yes  Altered Mental Status, Intoxication with alcohol or substance confusion (Disorientation, impaired judgment, poor safety awaremess, or inability to follow instructions): No  Impaired Mobility: Ambulates or transfers with assistive devices or assistance; Unable to ambulate or transer.: Yes  Nursing Judgement: Yes          Lines:   Peripheral IV 08/25/24 Right Antecubital (Active)        Opportunity for questions and clarification was provided.      Patient transported with:  Monitor and Tech

## 2024-08-26 NOTE — PROGRESS NOTES
Speech LAnguage Pathology EVALUATION/DISCHARGE    Patient: Jareth Sutton (88 y.o. male)  Date: 8/26/2024  Primary Diagnosis: TIA (transient ischemic attack) [G45.9]  Dizziness [R42]  CVA (cerebrovascular accident due to intracerebral hemorrhage) (HCC) [I61.9]  Blurred vision, bilateral [H53.8]       Precautions:  Fall Risk                  ASSESSMENT :  The patient presents with a functional oropharyngeal swallow. Patient endorsed abnormal facial tingling/sensation yesterday, which has now resolved per patient report. Mastication assessed to be timely and complete. Patient tolerated single and sequential sips of thin liquid without overt difficulty. No signs of aspiration observed.    Patient declined changes in motor speech, language, or cognitive function. No further acute SLP needs at this time. Will complete orders.    Patient will be discharged from skilled speech-language pathology services at this time.     PLAN :  Recommendations and Planned Interventions:  Diet: Regular and thin liquids  -- Medication as tolerated    Acute SLP Services: No, patient will be discharged from acute skilled speech-language pathology at this time.  Discharge Recommendations: No, additional SLP treatment not indicated at discharge     SUBJECTIVE:   Patient stated, “How now brown cow.. How's that?”    OBJECTIVE:     Past Medical History:   Diagnosis Date    Allergy to ACE inhibitors 11/28/2017    Anxiety 11/28/2017    ASCVD (arteriosclerotic cardiovascular disease) 11/28/2017    CAD (coronary artery disease) 2000    MI     Cataract     Cataract 04/2019    Depression 11/28/2017    ED (erectile dysfunction) 11/28/2017    Elevated PSA 11/28/2017    Former smoker 11/28/2017    Glucose intolerance (impaired glucose tolerance) 11/28/2017    Hypercholesterolemia     Hypertension     Insomnia 11/28/2017    Macular degeneration     On statin therapy 11/28/2017    Other ill-defined conditions(799.89)     elevated lipids     Psychiatric disorder     depression /anxiety    Stroke (HCC)     TIA     Past Surgical History:   Procedure Laterality Date    COLONOSCOPY N/A 7/30/2019    COLONOSCOPY performed by Marck Salinas Jr., MD at [facility] ENDOSCOPY    COLONOSCOPY,CORKYV ISABEL,SNARE  7/30/2019         COLORECTAL SCRN; HI RISK IND  7/30/2019         COLORECTAL SCRN; HI RISK IND  9/10/2014         COLSC FLX W/RMVL OF TUMOR POLYP LESION SNARE TQ  9/10/2014         HEENT Bilateral 06/03/2019    eye cataract    HERNIA REPAIR  08/04/2021    VENTRAL HERNIA REPAIR WITH MESH AND RIGHT INGUINAL HERNIA REPAIR WITH MESH     ND UNLISTED PROCEDURE ABDOMEN PERITONEUM & OMENTUM      hernia repair    ND UNLISTED PROCEDURE CARDIAC SURGERY      stents x 6    TONSILLECTOMY       Prior Level of Function/Home Situation:   Social/Functional History  Lives With: Spouse  Type of Home: Senior housing apartment (independent apartment at [facility])  Home Layout: One level  Home Access: Elevator, Level entry  Home Equipment: None  Has the patient had two or more falls in the past year or any fall with injury in the past year?: No  ADL Assistance: Independent  Homemaking Assistance: Independent  Ambulation Assistance: Independent  Transfer Assistance: Independent  Active : Yes  Mode of Transportation: Car    Baseline Assessment:  Current Diet : Regular  Current Liquid Diet : Thin  Prior Dysphagia History: Patient denied history of dysphagia  Patient Complaint: None stated    Cognitive and Communication Status:  Neurologic State: Alert  Orientation Level: Oriented x4  Cognition: Appropriate for age attention/concentration and Follows commands    Dysphagia:  Oral Assessment:  Oral Motor   Labial: No impairment  Dentition: Natural;Full;Intact  Oral Hygiene: Moist  Lingual: No impairment  Velum: No Impairment  Mandible: No impairment  P.O. Trials:  PO Trials  Assessment Method(s): Observation  Patient Position: Upright in chair  Vocal Quality: No

## 2024-08-26 NOTE — PROGRESS NOTES
Dorieal complete and note to follow.  Pt is mobilizing and performing ADLS at a SBA to CGA level.  He reports slight dizziness with change in position and with turning.  With tracking left and right in horizontal plane pt has increased saccades.  As of note pt was on a boat fishing for a day recently.  He reports chronic double vision due to previous cataract surgery and does have a history of macular degeneration.  Pt reports improvement in vision since admit but is unable to read small print at a distance on the TV.  He reports at baseline he is able to read this type of print without difficulty.  No OT needs at discharge.  Did recommend shower chair with back to prevent falls.

## 2024-08-26 NOTE — PLAN OF CARE
Problem: Occupational Therapy - Adult  Goal: By Discharge: Performs self-care activities at highest level of function for planned discharge setting.  See evaluation for individualized goals.  Description: FUNCTIONAL STATUS PRIOR TO ADMISSION:  ambulated without assist devices, stood to bathe, driving and was fishing on a boat just prior to admit, going to OP PT for balance training, reports history of balance deficits losing balance forward   , ADL Assistance: Independent,  ,  ,  ,  ,  , Homemaking Assistance: Independent, Ambulation Assistance: Independent, Transfer Assistance: Independent, Active : Yes     HOME SUPPORT: Patient lived with wife in ILF in first floor apartment at [facility].    Occupational Therapy Goals:  Initiated 8/26/2024  1.  Patient will perform grooming with [place] within 7 day(s).  2.  Patient will perform upper body dressing and lower body dressing with [place] within 7 day(s).  3.  Patient will perform toileting with [place] within 7 day(s).  4.  Patient will perform toilet transfers with [place]  within 7 day(s).  5.  Patient will perform one IADL task in standing with [place] within 7 days.   Outcome: Not Progressing  OCCUPATIONAL THERAPY EVALUATION    Patient: Jareth Sutton (88 y.o. male)  Date: 8/26/2024  Primary Diagnosis: TIA (transient ischemic attack) [G45.9]  Dizziness [R42]  CVA (cerebrovascular accident due to intracerebral hemorrhage) ([facility]) [I61.9]  Blurred vision, bilateral [H53.8]         Precautions: Fall Risk (DNR)                  ASSESSMENT :  The patient is limited by decreased functional mobility, independence in ADLs, high-level IADLs, body mechanics, balance, decreased distance vision reading small print at bottom of screen, and slight report of dizziness with mobility .    Pt is mobilizing and performing ADLS at a SBA to CGA level.  He reports slight dizziness with change in position and with turning.  With tracking left  be independent is allowed.    Score Interpretation (from Sinoff 1997)    Independent   60-79 Minimally independent   40-59 Partially dependent   20-39 Very dependent   <20 Totally dependent     Srinivas Altamirano., BarthelJORGE. (1965). Functional evaluation: the Barthel Index. Md [facility] Med J (142.  JANEEN Camacho, HOLLY Ortiz., JAXSON Horn., Sarath, ALFIE. (1999). Measuring the change indisability after inpatient rehabilitation; comparison of the responsiveness of the Barthel Index and Functional [place] Measure. Journal of Neurology, Neurosurgery, and Psychiatry, 66(4), 480-484.  Van LEXX Diop.A, BARBARA Castanon, & Ally MBETO. (2004.) Assessment of post-stroke quality of life in cost-effectiveness studies: The usefulness of the Barthel Index and the EuroQoL-5D. Quality of Life Research, 13, 427-43       Pain Ratin/10     Activity Tolerance:   Fair     After treatment:   Patient left in no apparent distress sitting up in chair, Bed/ chair alarm activated, Caregiver / family present, and Updated patient's board on functional status and mobility recommendations    COMMUNICATION/EDUCATION:   The patient's plan of care was discussed with: physical therapist, registered nurse, and patient    Patient Education  Education Given To: Patient  Education Provided: Role of Therapy;Plan of Care;Fall Prevention Strategies;ADL Adaptive Strategies  Education Method: Verbal  Barriers to Learning: None  Education Outcome: Verbalized understanding;Continued education needed    Thank you for this referral.  Bethany Roberts OTR/L  Minutes: 22    Occupational Therapy Evaluation Charge Determination   History Examination Decision-Making   LOW Complexity : Brief history review  LOW Complexity: 1-3 Performance deficits relating to physical, cognitive, or psychosocial skills that result in activity limitations and/or participation restrictions LOW Complexity: No comorbidities that affect functional

## 2024-08-26 NOTE — CONSULTS
Date of Consultation:  August 26, 2024    Referring Physician: Dax BAZAN    Reason for Consultation: Dizziness, blurry vision      Chief Complaint   Patient presents with    Blurred Vision     Pt arrives via wheelchair to triage, reports he was watching TV and had sudden onset of dizziness and blurry vision around 1530 today. Denies any unilateral weakness or changes in speech.     Dizziness       History of Present Illness:   Jareth Sutton is a 88 y.o. male with history of CAD, MI, cataracts, macular degeneration, history of former tobacco and alcohol use, hyperlipidemia, hypertension, who presents with acute bilateral blurry vision and unsteady gait/dizziness.    Patient reports improving symptoms over the course of 24 hours.  He reportedly had been on a boat and had physically overexerted himself earlier in the morning, went to [employer] later in the day and after 2 hours while sitting and watching TV developed acute onset of bilateral blurry vision, closing each eye with improvement in symptoms, as well as dizziness but denies room spinning sensation.  Denies any lightheadedness.  No fall.  He had no lateralizing deficits.  He only drank 1 bottle of water the entire morning and had been outside earlier in the day fishing.    Overnight he had a rapid response called due to some burning and tingling in his bilateral forearms which has since resolved.  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    He takes aspirin daily.    Past Medical History:   Diagnosis Date    Allergy to ACE inhibitors 11/28/2017    Anxiety 11/28/2017    ASCVD (arteriosclerotic cardiovascular disease) 11/28/2017    CAD (coronary artery disease) 2000    MI     Cataract     Cataract 04/2019    Depression 11/28/2017    ED (erectile dysfunction) 11/28/2017    Elevated PSA 11/28/2017    Former smoker 11/28/2017    Glucose intolerance (impaired glucose tolerance) 11/28/2017     evidence of nystagmus or ptosis   Facial sensation intact to light touch and pinprick  Facial movements symmetric; no facial droop  Hearing intact to soft rub bilaterally   Shoulder shrug symmetric and strong   Tongue protrusion full and midline    Motor:   Normal tone   Drift: No evidence of pronator drift   Finger tapping equal and symmetric      Strength testing:   deltoid triceps biceps Wrist ext. Wrist flex. intrinsics   Right 5 5 5 5 5 5   Left 5 5 5 5 5 5      Hip flex. Hip ext. Knee ext.  Knee flex Dorsi flex Plantar flex   Right  5 NT 5 5 5 5   Left  5 NT 5 5 5 5       Sensory:  Intact to light touch throughout     Reflexes: 1+ throughout    Cerebellar testing:  No dysmetria. Normal rapid alternating movements; finger-to-nose and heel-to- shin testing are within normal limits.      Data:     Lab Results   Component Value Date/Time     08/26/2024 05:53 AM    K 3.7 08/26/2024 05:53 AM     08/26/2024 05:53 AM    BUN 19 08/26/2024 05:53 AM    WBC 9.5 08/26/2024 05:53 AM    HCT 44.2 08/26/2024 05:53 AM    HGB 14.8 08/26/2024 05:53 AM     08/26/2024 05:53 AM    LDL 56.2 08/26/2024 05:53 AM    LDL 66.2 01/19/2024 09:00 AM       Imaging:  Reviewed independently.    IMPRESSION/RECOMMENDATIONS:  Jareth Sutton is a 88 y.o. male who presents with acute onset of bilateral blurry vision and unsteady gait with dizziness.  Head CT, CTP, CTA head and neck unremarkable for any significant flow-limiting stenosis or LVO.  Brain MRI was normal. He has some age related WMD minimal, and incidental temporal scalp lipoma noted.     Differential diagnosis includes possible TIA.  I spoke with the patient stating that it is difficult to completely assess the possibility given that he does have risk factors for ischemic disease.  We decided together that we will treat this as though it was possible TIA.  Dehydration also may have played some role in his symptoms.    #: acute b/l vision changes, unsteady gait,  resolving. Etiology possibly TIA.  - Recommend maintaining SBP <220/120 for 24 hrs from symptom onset and then BP goal is less than 140/90 (this is the long term goal)   - would recommend to continue aspirin 81mg daily for secondary stroke prevention; will add Plavix 75 mg daily for 21 days, then aspirin monotherapy.  ABCD2 score is 4.  - Risk factor modification: Hemoglobin A1c at goal, LDL 56   - if LDL > 70, would start atorvastatin 40mg daily    - please check hepatic panel prior to initiation of statin  - would monitor on telemetry to rule out arrhythmias    - please obtain PT/OT and speech consultations     We discussed extensively the importance of lifestyle modification including smoking cessation, diet, and incorporating exercise into daily routine.       Thank you very much for this consultation.  Neurology will sign off.  Patient should follow-up with neurology in 6 to 8 weeks.    I spent 55 minutes providing care to this acutely ill inpatient with > 50% of the time counseling as well as reviewing the patient's chart, notes, labs, medications and preparing documentation along with assisting in the coordination of care of the patient on the patient's hospital floor/unit.       Umm Madera,

## 2024-08-26 NOTE — PROGRESS NOTES
RAPID RESPONSE TEAM    Overhead rapid response paged to room # ER24/24  at 3034    Reason for rapid response: Bilateral arm tingling     Initial assessment: Patiently lying in bed, does not appear in any distress. C/o tingling in BUE. Patient has full sensation in both extremities and equal strong  bilaterally. Patient has no other complaints at this time. Patient is being admitted for CVA workup. NIH stroke scale done with primary RN and score is zero.     Vitals:  P 50  SpO2 96% RA  RR 13  /75 (97)    Dr. Baumann at bedside to assess patient. No significant changes from previous exam. Plan is to complete MRI and transfer to neurology unit.      Outcome:   pt to remain in room # ER24/24     Please call with any questions or concerns    Lilo Rushing RN  Rapid Response Team  Ext [phone]

## 2024-08-26 NOTE — PLAN OF CARE
Problem: Physical Therapy - Adult  Goal: By Discharge: Performs mobility at highest level of function for planned discharge setting.  See evaluation for individualized goals.  Description: FUNCTIONAL STATUS PRIOR TO ADMISSION: Patient was independent and active without use of DME. Was going to OP PT at [facility] to work on his balance.    HOME SUPPORT PRIOR TO ADMISSION: The patient lived with his wife in a 1st floor apartment on the independent side at [facility].    Physical Therapy Goals  Initiated 8/26/2024  1.  Patient will move from supine to sit and sit to supine in bed with modified independence within 7 day(s).    2.  Patient will perform sit to stand with modified independence within 7 day(s).  3.  Patient will transfer from bed to chair and chair to bed with modified independence using the least restrictive device within 7 day(s).  4.  Patient will ambulate with modified independence for 300 feet with the least restrictive device within 7 day(s).     Outcome: Progressing     PHYSICAL THERAPY EVALUATION    Patient: Jareth Sutton (88 y.o. male)  Date: 8/26/2024  Primary Diagnosis: TIA (transient ischemic attack) [G45.9]  Dizziness [R42]  CVA (cerebrovascular accident due to intracerebral hemorrhage) ([facility]) [I61.9]  Blurred vision, bilateral [H53.8]       Precautions: Restrictions/Precautions: Fall Risk                      ASSESSMENT :   DEFICITS/IMPAIRMENTS:   The patient is limited by decreased functional mobility, coordination, balance. Patient admitted with blurry vision and unsteady gait. Of note he had been on a boat on the [facility] [place] for the entire day. Head CT and MRI are negative. He reports still experiencing some blurry vision but its getting better. He does have a history of macular degeneration and cataracts. Denies any sensation changes.     Based on the impairments listed above the patient is below his prior level of function. Today he performed bed mobility with        Home Situation:  Social/Functional History  Lives With: Spouse  Type of Home: Senior housing apartment (independent apartment at [facility])  Home Layout: One level  Home Access: Elevator, Level entry  Home Equipment: None  Has the patient had two or more falls in the past year or any fall with injury in the past year?: No  ADL Assistance: Independent  Homemaking Assistance: Independent  Ambulation Assistance: Independent  Transfer Assistance: Independent  Active : Yes  Mode of Transportation: Car    Cognitive/Behavioral Status:  Orientation  Overall Orientation Status: Within Normal Limits  Orientation Level: Oriented X4  Cognition  Overall Cognitive Status: WNL    Hearing:   Hearing  Hearing: Exceptions to WFL  Hearing Exceptions: Bilateral hearing aid, Hard of hearing/hearing concerns    Vision/Perceptual:          Vision  Vision: Impaired  Vision Exceptions: Wears glasses at all times (macular degeneration)       Strength:    Strength: Within functional limits    Tone & Sensation:   Tone: Normal  Sensation: Intact    Coordination:  Coordination: Generally decreased, functional    Range Of Motion:  AROM: Within functional limits       Functional Mobility:  Bed Mobility:     Bed Mobility Training  Bed Mobility Training: Yes  Supine to Sit: Supervision  Scooting: Supervision  Transfers:     Transfer Training  Transfer Training: Yes  Interventions: Verbal cues;Safety awareness training  Sit to Stand: Stand-by assistance  Stand to Sit: Stand-by assistance  Bed to Chair: Contact-guard assistance  Balance:     Balance  Sitting: Intact  Standing: Impaired  Standing - Static: Good;Unsupported  Standing - Dynamic: Fair;Unsupported  Ambulation/Gait Training:    Gait  Gait Training: Yes  Overall Level of Assistance: Contact-guard assistance  Distance (ft): 35 Feet  Assistive Device: Gait belt;Walker, rolling  Interventions: Verbal cues;Safety awareness training  Gait Abnormalities: Trunk sway                                                                                                                                             Pain Ratin/10     Activity Tolerance:   Good    After treatment:   Patient left in no apparent distress sitting up in chair, Call bell within reach, and Bed/ chair alarm activated    COMMUNICATION/EDUCATION:   The patient's plan of care was discussed with: registered nurse    Patient Education  Education Given To: Patient;Family  Education Provided: Role of Therapy;Plan of Care;Equipment  Education Provided Comments: recommend using rollator at this time for improved steadiness and decreased fall risk  Education Method: Verbal  Barriers to Learning: None  Education Outcome: Verbalized understanding;Continued education needed    Thank you for this referral.  Radha Rodriguez, PT  Minutes: 19      Physical Therapy Evaluation Charge Determination   History Examination Presentation Decision-Making   MEDIUM  Complexity : 1-2 comorbidities / personal factors will impact the outcome/ POC  MEDIUM Complexity : 3 Standardized tests and measures addressin body structure, function, activity limitation and / or participation in recreation  MEDIUM Complexity : Evolving with changing characteristics  Tinetti Gait and Balance  MEDIUM   Based on the above components, the patient evaluation is determined to be of the following complexity level: Medium

## 2024-08-26 NOTE — CARE COORDINATION
1053 - Per IDRs, possible d/c today. Plan to return to [facility] with continued outpatient PT/OT. MD aware of need for script. Wife at bedside to transport. SMAARTER tool completed and on door frame. Noted need for Rollator, CM placed referral. No rollators in house, CM will request delivery. CM to complete full assessment if able, otherwise pt is clear from CM.     1401 - Rollator approved, will be delivered to pt's home.     JA Queen  Care Management  [facility]  [phone]

## 2024-08-26 NOTE — DISCHARGE INSTRUCTIONS
DISCHARGE DIAGNOSIS:  Blurry vision and unsteady gait concerning for posterior CVA  Hypertension   CAD status post stent  BPH       MEDICATIONS:  It is important that you take the medication exactly as they are prescribed.   Keep your medication in the bottles provided by the pharmacist and keep a list of the medication names, dosages, and times to be taken in your wallet.   Do not take other medications without consulting your doctor.     Pain Management: per above medications    What to do at Home    Recommended diet:  regular diet    Recommended activity: activity as tolerated    If you have questions regarding the hospital related prescriptions or hospital related issues please call [facility] at . You can always direct your questions to your primary care doctor if you are unable to reach your hospital physician; your PCP works as an extension of your hospital doctor just like your hospital doctor is an extension of your PCP for your time at the hospital ([facility]).    If you experience any of the following symptoms then please call your primary care physician or return to the emergency room if you cannot get hold of your doctor:  Fever, chills, nausea, vomiting, diarrhea, change in mentation, falling, bleeding, shortness of breath,

## 2024-08-26 NOTE — DISCHARGE SUMMARY
Hospitalist Discharge Summary     Patient ID:  Jareth Sutton  [id number]  88 y.o.  1935  8/25/2024    PCP on record: Rafal Phan MD    Admit date: 8/25/2024  Discharge date and time: 8/26/2024    DISCHARGE DIAGNOSIS:  Blurry vision and unsteady gait concerning for posterior CVA  Hypertension  CAD s/p stent   BPH     CONSULTATIONS:  IP CONSULT TO TELE-NEUROLOGY  IP CONSULT TO NEUROLOGY  IP CONSULT TO CASE MANAGEMENT    Excerpted HPI from H&P of Les Verduzco MD:  CHIEF COMPLAINT: Blurry vision  HISTORY OF PRESENT ILLNESS:     Jareth Sutton is a 88 y.o.  male with PMHx significant for depression, hypertension hyperlipidemia who presented to the ED with sudden onset of blurry vision.  Patient reported that he went [place], came back in 2-hour prior to presented to the ED, had a sudden onset of blurry vision and dizziness.  No focal deficit associated.  Patient reported unsteady gait  Yesterday, patient reported being  on a fishing boat the whole day  CT head was within normal limit  CTA of the head and neck did not show any major vessel occlusion   we were asked to admit for work up and evaluation of the above problems.        ______________________________________________________________________  DISCHARGE SUMMARY/HOSPITAL COURSE:  for full details see H&P, daily progress notes, labs, consult notes.     Blurry vision and unsteady gait concerning for posterior CVA  Hypertension  This is a 88-year-old male with history of CAD status post stent who presented to the ED with complaint of blurry vision and unsteady gait.  MRI of the brain was negative for stroke, patient was seen by neurology who cleared him to be discharged.  Neurology discontinued 2D echo ordered.  Plavix was added for 21 days in addition to baby aspirin  Resume BP meds  Patient was noticed to be bradycardic with heart rate around 48, bystolic reduced from 5 mg to 2.5 mg.  Patient should follow with his

## 2024-08-26 NOTE — ACP (ADVANCE CARE PLANNING)
Advance Care Planning Note      NAME: Jareth Sutton   :  1935   MRN:  [id number]     Date/Time:  2024 10:47 PM    Active Diagnoses:      These active diagnoses are of sufficient risk that focused discussion on advance care planning is indicated in order to allow the patient to thoughtfully consider personal goals of care, and if situations arise that prevent the ability to personally give input, to ensure appropriate representation of their personal desires for different levels and aggressiveness of care.     Discussion:   Code status addressed and wants to be a Do Not Resuscitate.  Patient wants central line and vasopressors if needed. .  Patient  would like to assign his wife  Herman Ruiz   as the surrogate decision maker.    Persons present and participating in discussion: Jareth Sutton, Les Verduzco MD, [facility]       Time Spent:   Total time spent face-to-face in education and discussion:   16  minutes.         Les Verduzco MD   Hospitalist

## 2024-08-26 NOTE — PROGRESS NOTES
End of Shift Note    Bedside shift change report given to RN (oncoming nurse) by Man iFshman LPN (offgoing nurse).  Report included the following information SBAR, Kardex, OR Summary, Procedure Summary, Intake/Output, MAR, and Recent Results    Shift worked:  night     Shift summary and any significant changes:    Pt VSS   Pt voiding via urinal   Q4hr neuro check   Bilat tingling in arms subsided overnight  MRI (-)       Concerns for physician to address:  See above      Zone phone for oncoming shift:  [phone]       Activity:     Number times ambulated in hallways past shift: 0  Number of times OOB to chair past shift: 1 (EOB)    Cardiac:   Cardiac Monitoring: Yes           Access:  Current line(s): PIV     Genitourinary:   Urinary status: voiding    Respiratory:      Chronic home O2 use?: NO  Incentive spirometer at bedside: YES       GI:     Current diet:  ADULT DIET; Regular; Low Fat/Low Chol/High Fiber/LAWRENCE  Passing flatus: YES  Tolerating current diet: YES       Pain Management:   Patient states pain is manageable on current regimen: YES    Skin:     Interventions: float heels, PT/OT consult, limit briefs, and internal/external urinary devices    Patient Safety:  Fall Score:    Interventions: bed/chair alarm, gripper socks, pt to call before getting OOB, stay with me (per policy), and sitter at bedside       Length of Stay:  Expected LOS: 3  Actual LOS: 1      Man Fishman LPN

## 2024-08-26 NOTE — PROGRESS NOTES
Attempted to schedule PCP hospital follow up appointment. Unable to reach anyone, left voicemail. [facility] placed [facility] information AVS for patient resource. Pending patient discharge. Nichol Graves, Care Management Assistant

## 2024-08-26 NOTE — ED NOTES
Pt transported to MRI. MRI aware pt to be transported to [facility] Rm 129 after MRI. Bed Watch completed.

## 2024-08-27 LAB
EKG ATRIAL RATE: 52 BPM
EKG DIAGNOSIS: NORMAL
EKG P AXIS: 64 DEGREES
EKG P-R INTERVAL: 256 MS
EKG Q-T INTERVAL: 446 MS
EKG QRS DURATION: 94 MS
EKG QTC CALCULATION (BAZETT): 414 MS
EKG R AXIS: -34 DEGREES
EKG T AXIS: 7 DEGREES
EKG VENTRICULAR RATE: 52 BPM

## 2024-08-27 NOTE — ED PROVIDER NOTES
Never Used   Substance Use Topics    Alcohol use: No    Drug use: No       Allergies:  Allergies   Allergen Reactions    Benazepril Rash and Swelling     Review of Systems   A Review of Systems was reviewed by me today during this encounter.  Pertinent positive and negative elements are noted in the HPI and MDM sections.    Review of Systems   Eyes:  Positive for visual disturbance.   Neurological:  Positive for dizziness. Negative for speech difficulty, light-headedness and headaches.   All other systems reviewed and are negative.      Physical Exam   Vital Signs  No data found.       Physical Exam  Vitals reviewed.   Constitutional:       General: He is not in acute distress.     Appearance: Normal appearance. He is not ill-appearing.   Cardiovascular:      Rate and Rhythm: Normal rate and regular rhythm.   Pulmonary:      Effort: Pulmonary effort is normal. No respiratory distress.      Breath sounds: No wheezing or rhonchi.   Abdominal:      General: Abdomen is flat. There is no distension.      Palpations: Abdomen is soft.      Tenderness: There is no abdominal tenderness.   Skin:     General: Skin is warm and dry.   Neurological:      General: No focal deficit present.      Mental Status: He is alert and oriented to person, place, and time.         Diagnostic Study Results   Labs  Results for orders placed or performed during the hospital encounter of 08/25/24   CBC with Auto Differential   Result Value Ref Range    WBC 9.4 4.1 - 11.1 K/uL    RBC 4.88 4.10 - 5.70 M/uL    Hemoglobin 15.2 12.1 - 17.0 g/dL    Hematocrit 45.5 36.6 - 50.3 %    MCV 93.2 80.0 - 99.0 FL    MCH 31.1 26.0 - 34.0 PG    MCHC 33.4 30.0 - 36.5 g/dL    RDW 13.9 11.5 - 14.5 %    Platelets 230 150 - 400 K/uL    MPV 9.7 8.9 - 12.9 FL    Nucleated RBCs 0.0 0  WBC    nRBC 0.00 0.00 - 0.01 K/uL    Neutrophils % 69 32 - 75 %    Lymphocytes % 20 12 - 49 %    Monocytes % 8 5 - 13 %    Eosinophils % 2 0 - 7 %    Basophils % 1 0 - 1 %     Panel w/ Reflex to MG   Result Value Ref Range    Sodium 138 136 - 145 mmol/L    Potassium 3.7 3.5 - 5.1 mmol/L    Chloride 107 97 - 108 mmol/L    CO2 23 21 - 32 mmol/L    Anion Gap 8 5 - 15 mmol/L    Glucose 102 (H) 65 - 100 mg/dL    BUN 19 6 - 20 MG/DL    Creatinine 1.01 0.70 - 1.30 MG/DL    BUN/Creatinine Ratio 19 12 - 20      Est, Glom Filt Rate 72 >60 ml/min/1.73m2    Calcium 9.2 8.5 - 10.1 MG/DL   POCT Glucose   Result Value Ref Range    POC Glucose 100 65 - 117 mg/dL    Performed by: Timothy Gardiner RN    EKG 12 Lead   Result Value Ref Range    Ventricular Rate 52 BPM    Atrial Rate 52 BPM    P-R Interval 256 ms    QRS Duration 94 ms    Q-T Interval 446 ms    QTc Calculation (Bazett) 414 ms    P Axis 64 degrees    R Axis -34 degrees    T Axis 7 degrees    Diagnosis       Sinus bradycardia with 1st degree AV block  Left axis deviation  When compared with ECG of 01-JAN-2022 06:05,  premature atrial complexes are no longer present  Confirmed by Enrique Springer MD ([id number]) on 8/26/2024 9:24:03 AM        ==============================================================    Radiologic Studies  MRI brain without contrast   Final Result   No acute intracranial abnormality.      Electronically signed by William Mccall      CTA HEAD NECK W CONTRAST   Final Result         1. No acute large vessel arterial occlusion or cerebral perfusion abnormality.   Mild to moderate atherosclerosis.               Electronically signed by Gwen Tucker      CT BRAIN PERFUSION   Final Result         1. No acute large vessel arterial occlusion or cerebral perfusion abnormality.   Mild to moderate atherosclerosis.               Electronically signed by Gwen Tucker      CT HEAD WO CONTRAST   Final Result   No acute intracranial process identified            Electronically signed by Evelina Butcher             Critical Care and Billable Procedures   EKG reviewed by ED Physician Ellison in the absence of a cardiologist: Yes  EKG below  vision symptoms.    Formally he has no detectable deficits and has NIH stroke scale of 0, with normal finger-to-nose cerebellar testing and a negative test of skew.    No acute findings on his head CT and CTA.    I suspect that his symptoms are more related to vestibular disturbance secondary to being on a boat all day yesterday.    Admit for possible posterior stroke, needs MRI and further evaluation.      Consults:  IP CONSULT TO TELE-NEUROLOGY  IP CONSULT TO NEUROLOGY  IP CONSULT TO CASE MANAGEMENT      Final Diagnosis:   1. Dizziness    2. Blurred vision, bilateral    3. TIA (transient ischemic attack)        Additional documentation if relevant for this encounter       Critical Care time exclusive of other billable procedures:   40 minutes      Diagnosis and Disposition     Disposition: Admitted 08/25/2024 06:18:52 PM     Final Diagnosis:   1. Dizziness    2. Blurred vision, bilateral    3. TIA (transient ischemic attack)           Medication List        START taking these medications      clopidogrel 75 MG tablet  Commonly known as: PLAVIX  Take 1 tablet by mouth daily for 20 doses            CHANGE how you take these medications      nebivolol 2.5 MG tablet  Commonly known as: BYSTOLIC  Take 2 tablets by mouth daily  What changed: medication strength            CONTINUE taking these medications      amLODIPine 5 MG tablet  Commonly known as: NORVASC  TAKE 1 TABLET EVERY DAY     aspirin 81 MG chewable tablet     escitalopram 10 MG tablet  Commonly known as: LEXAPRO  TAKE 1 TABLET EVERY DAY     ezetimibe 10 MG tablet  Commonly known as: ZETIA     finasteride 5 MG tablet  Commonly known as: Proscar  Take 1 tablet by mouth daily     hydrALAZINE 50 MG tablet  Commonly known as: APRESOLINE  TAKE 1 TABLET TWICE DAILY     Krill Oil 500 MG Caps     PRESERVISION AREDS PO     rosuvastatin 20 MG tablet  Commonly known as: CRESTOR  TAKE 1 TABLET EVERY DAY     tamsulosin 0.4 MG capsule  Commonly known as: FLOMAX  TAKE 1

## 2024-08-28 ENCOUNTER — TELEPHONE (OUTPATIENT)
Age: 89
End: 2024-08-28

## 2024-08-28 NOTE — TELEPHONE ENCOUNTER
----- Message from TASNEEM HAQ LPN sent at 8/27/2024 12:30 PM EDT -----  Regarding: REUBEN Call  The above patient was discharged on 8/26/24 from [facility] with a diagnosis of CVA. He/she does not have a REUBEN appt scheduled. Please call the patient within the required two business days, and document that call using the below smartphrase: .TCMOFFICE.    Thank you.

## 2024-08-28 NOTE — TELEPHONE ENCOUNTER
Care Transitions Initial Follow Up Call    Outreach made within 2 business days of discharge: Yes    Patient: Jareth Sutton Patient : 1935   MRN: [id number]  Reason for Admission: blurred vision and unsteady gain  Discharge Date: 24       Spoke with: patient    Discharge department/facility: [facility]    TCM Interactive Patient Contact:  Was patient able to fill all prescriptions: Yes  Was patient instructed to bring all medications to the follow-up visit: Yes  Is patient taking all medications as directed in the discharge summary? Yes  Does patient understand their discharge instructions: Yes  Does patient have questions or concerns that need addressed prior to 7-14 day follow up office visit:- no    Additional needs identified to be addressed with provider  No needs identified             Scheduled appointment with PCP within 7-14 days    Follow Up  Future Appointments   Date Time Provider Department Center   9/3/2024  9:00 AM [facility]   2024  9:20 AM Rafal Phan MD [facility] DEP       Patricia Cavazos RN

## 2024-08-28 NOTE — TELEPHONE ENCOUNTER
----- Message from TASNEEM HAQ LPN sent at 8/27/2024 12:30 PM EDT -----  Regarding: REUBEN Call  The above patient was discharged on 8/26/24 from [facility] with a diagnosis of CVA. He/she does not have a REUBEN appt scheduled. Please call the patient within the required two business days, and document that call using the below smartphrase: .TCMOFFICE.    Thank you.

## 2024-09-03 ENCOUNTER — OFFICE VISIT (OUTPATIENT)
Facility: CLINIC | Age: 89
End: 2024-09-03

## 2024-09-03 VITALS
TEMPERATURE: 97.9 F | BODY MASS INDEX: 28.21 KG/M2 | DIASTOLIC BLOOD PRESSURE: 64 MMHG | HEART RATE: 50 BPM | SYSTOLIC BLOOD PRESSURE: 141 MMHG | OXYGEN SATURATION: 97 % | WEIGHT: 185.5 LBS

## 2024-09-03 DIAGNOSIS — Z09 HOSPITAL DISCHARGE FOLLOW-UP: Primary | ICD-10-CM

## 2024-09-03 DIAGNOSIS — E78.2 MIXED HYPERLIPIDEMIA: ICD-10-CM

## 2024-09-03 DIAGNOSIS — H35.30 MACULAR DEGENERATION OF BOTH EYES, UNSPECIFIED TYPE: ICD-10-CM

## 2024-09-03 DIAGNOSIS — R00.1 BRADYCARDIA: ICD-10-CM

## 2024-09-03 DIAGNOSIS — I10 ESSENTIAL HYPERTENSION: ICD-10-CM

## 2024-09-03 DIAGNOSIS — R26.89 BALANCE PROBLEM: ICD-10-CM

## 2024-09-03 DIAGNOSIS — G45.9 TIA (TRANSIENT ISCHEMIC ATTACK): ICD-10-CM

## 2024-09-03 NOTE — PROGRESS NOTES
Chief Complaint   Patient presents with    Follow-Up from Hospital     REUBEN    1. Have you been to the ER, urgent care clinic since your last visit?  Hospitalized since your last visit?yes    2. Have you seen or consulted any other health care providers outside of the [facility] since your last visit?  Include any pap smears or colon screening. no

## 2024-09-03 NOTE — PROGRESS NOTES
Mr. Jareth Sutton is a 88 y.o. year old male, he is seen today for Transition of Care services following a hospital discharge for TIA on 8/25/2024 through 8/26/2024.  Our office Nurse Navigator performed an outreach to Mr. Sutton on 8/28/2024 (within 2 business days of discharge) to complete medication reconciliation and a telephonic assessment of his condition.    Per review of the hospital record, while he was an inpatient:  As excerpted from discharge summary 8/26/24:    Admit date: 8/25/2024  Discharge date and time: 8/26/2024     DISCHARGE DIAGNOSIS:  Blurry vision and unsteady gait concerning for posterior CVA  Hypertension  CAD s/p stent   BPH      CONSULTATIONS:  IP CONSULT TO TELE-NEUROLOGY  IP CONSULT TO NEUROLOGY  IP CONSULT TO CASE MANAGEMENT     Excerpted HPI from H&P of Les Verduzco MD:  CHIEF COMPLAINT: Blurry vision  HISTORY OF PRESENT ILLNESS:     Jareth Sutton is a 88 y.o.  male with PMHx significant for depression, hypertension hyperlipidemia who presented to the ED with sudden onset of blurry vision.  Patient reported that he went to [employer], came back in 2-hour prior to presented to the ED, had a sudden onset of blurry vision and dizziness.  No focal deficit associated.  Patient reported unsteady gait  Yesterday, patient reported being  on a fishing boat the whole day  CT head was within normal limit  CTA of the head and neck did not show any major vessel occlusion   we were asked to admit for work up and evaluation of the above problems.         ______________________________________________________________________  DISCHARGE SUMMARY/HOSPITAL COURSE:  for full details see H&P, daily progress notes, labs, consult notes.      Blurry vision and unsteady gait concerning for posterior CVA  Hypertension  This is a 88-year-old male with history of CAD status post stent who presented to the ED with complaint of blurry vision and unsteady gait.  MRI of the brain was negative for

## 2024-09-04 ENCOUNTER — TELEPHONE (OUTPATIENT)
Facility: CLINIC | Age: 89
End: 2024-09-04

## 2024-09-04 ENCOUNTER — HOSPITAL ENCOUNTER (EMERGENCY)
Facility: HOSPITAL | Age: 89
Discharge: HOME | End: 2024-09-04
Attending: EMERGENCY MEDICINE
Payer: MEDICARE

## 2024-09-04 VITALS
HEART RATE: 53 BPM | TEMPERATURE: 97.5 F | BODY MASS INDEX: 29.57 KG/M2 | RESPIRATION RATE: 12 BRPM | HEIGHT: 68 IN | WEIGHT: 195.11 LBS | SYSTOLIC BLOOD PRESSURE: 140 MMHG | OXYGEN SATURATION: 97 % | DIASTOLIC BLOOD PRESSURE: 75 MMHG

## 2024-09-04 DIAGNOSIS — H81.10 BENIGN PAROXYSMAL POSITIONAL VERTIGO, UNSPECIFIED LATERALITY: Primary | ICD-10-CM

## 2024-09-04 LAB
ALBUMIN SERPL-MCNC: 3.6 G/DL (ref 3.5–5)
ALBUMIN/GLOB SERPL: 1 (ref 1.1–2.2)
ALP SERPL-CCNC: 71 U/L (ref 45–117)
ALT SERPL-CCNC: 29 U/L (ref 12–78)
ANION GAP SERPL CALC-SCNC: 5 MMOL/L (ref 5–15)
APPEARANCE UR: CLEAR
AST SERPL-CCNC: 24 U/L (ref 15–37)
BACTERIA URNS QL MICRO: NEGATIVE /HPF
BASOPHILS # BLD: 0.1 K/UL (ref 0–0.1)
BASOPHILS NFR BLD: 1 % (ref 0–1)
BILIRUB SERPL-MCNC: 0.7 MG/DL (ref 0.2–1)
BILIRUB UR QL: NEGATIVE
BUN SERPL-MCNC: 16 MG/DL (ref 6–20)
BUN/CREAT SERPL: 15 (ref 12–20)
CALCIUM SERPL-MCNC: 9.1 MG/DL (ref 8.5–10.1)
CHLORIDE SERPL-SCNC: 107 MMOL/L (ref 97–108)
CO2 SERPL-SCNC: 28 MMOL/L (ref 21–32)
COLOR UR: NORMAL
COMMENT:: NORMAL
CREAT SERPL-MCNC: 1.05 MG/DL (ref 0.7–1.3)
DIFFERENTIAL METHOD BLD: NORMAL
EOSINOPHIL # BLD: 0.3 K/UL (ref 0–0.4)
EOSINOPHIL NFR BLD: 4 % (ref 0–7)
EPITH CASTS URNS QL MICRO: NORMAL /LPF
ERYTHROCYTE [DISTWIDTH] IN BLOOD BY AUTOMATED COUNT: 14 % (ref 11.5–14.5)
GLOBULIN SER CALC-MCNC: 3.6 G/DL (ref 2–4)
GLUCOSE SERPL-MCNC: 102 MG/DL (ref 65–100)
GLUCOSE UR STRIP.AUTO-MCNC: NEGATIVE MG/DL
HCT VFR BLD AUTO: 48.8 % (ref 36.6–50.3)
HGB BLD-MCNC: 15.9 G/DL (ref 12.1–17)
HGB UR QL STRIP: NEGATIVE
HYALINE CASTS URNS QL MICRO: NORMAL /LPF (ref 0–2)
IMM GRANULOCYTES # BLD AUTO: 0 K/UL (ref 0–0.04)
IMM GRANULOCYTES NFR BLD AUTO: 0 % (ref 0–0.5)
KETONES UR QL STRIP.AUTO: NEGATIVE MG/DL
LEUKOCYTE ESTERASE UR QL STRIP.AUTO: NEGATIVE
LYMPHOCYTES # BLD: 1.6 K/UL (ref 0.8–3.5)
LYMPHOCYTES NFR BLD: 20 % (ref 12–49)
MAGNESIUM SERPL-MCNC: 2.2 MG/DL (ref 1.6–2.4)
MCH RBC QN AUTO: 30.5 PG (ref 26–34)
MCHC RBC AUTO-ENTMCNC: 32.6 G/DL (ref 30–36.5)
MCV RBC AUTO: 93.7 FL (ref 80–99)
MONOCYTES # BLD: 0.6 K/UL (ref 0–1)
MONOCYTES NFR BLD: 8 % (ref 5–13)
NEUTS SEG # BLD: 5.1 K/UL (ref 1.8–8)
NEUTS SEG NFR BLD: 67 % (ref 32–75)
NITRITE UR QL STRIP.AUTO: NEGATIVE
NRBC # BLD: 0 K/UL (ref 0–0.01)
NRBC BLD-RTO: 0 PER 100 WBC
PH UR STRIP: 7.5 (ref 5–8)
PLATELET # BLD AUTO: 218 K/UL (ref 150–400)
PMV BLD AUTO: 9.8 FL (ref 8.9–12.9)
POTASSIUM SERPL-SCNC: 4.6 MMOL/L (ref 3.5–5.1)
PROT SERPL-MCNC: 7.2 G/DL (ref 6.4–8.2)
PROT UR STRIP-MCNC: NEGATIVE MG/DL
RBC # BLD AUTO: 5.21 M/UL (ref 4.1–5.7)
RBC #/AREA URNS HPF: NORMAL /HPF (ref 0–5)
SODIUM SERPL-SCNC: 140 MMOL/L (ref 136–145)
SP GR UR REFRACTOMETRY: 1.01
SPECIMEN HOLD: NORMAL
TROPONIN I SERPL HS-MCNC: 13 NG/L (ref 0–76)
URINE CULTURE IF INDICATED: NORMAL
UROBILINOGEN UR QL STRIP.AUTO: 0.2 EU/DL (ref 0.2–1)
WBC # BLD AUTO: 7.7 K/UL (ref 4.1–11.1)
WBC URNS QL MICRO: NORMAL /HPF (ref 0–4)

## 2024-09-04 PROCEDURE — 6370000000 HC RX 637 (ALT 250 FOR IP): Performed by: EMERGENCY MEDICINE

## 2024-09-04 PROCEDURE — 97530 THERAPEUTIC ACTIVITIES: CPT

## 2024-09-04 PROCEDURE — 80053 COMPREHEN METABOLIC PANEL: CPT

## 2024-09-04 PROCEDURE — 99284 EMERGENCY DEPT VISIT MOD MDM: CPT

## 2024-09-04 PROCEDURE — 97116 GAIT TRAINING THERAPY: CPT

## 2024-09-04 PROCEDURE — 36415 COLL VENOUS BLD VENIPUNCTURE: CPT

## 2024-09-04 PROCEDURE — 83735 ASSAY OF MAGNESIUM: CPT

## 2024-09-04 PROCEDURE — 95992 CANALITH REPOSITIONING PROC: CPT

## 2024-09-04 PROCEDURE — 85025 COMPLETE CBC W/AUTO DIFF WBC: CPT

## 2024-09-04 PROCEDURE — 6360000002 HC RX W HCPCS: Performed by: EMERGENCY MEDICINE

## 2024-09-04 PROCEDURE — 93005 ELECTROCARDIOGRAM TRACING: CPT | Performed by: EMERGENCY MEDICINE

## 2024-09-04 PROCEDURE — 81001 URINALYSIS AUTO W/SCOPE: CPT

## 2024-09-04 PROCEDURE — 97161 PT EVAL LOW COMPLEX 20 MIN: CPT

## 2024-09-04 PROCEDURE — 84484 ASSAY OF TROPONIN QUANT: CPT

## 2024-09-04 PROCEDURE — 96374 THER/PROPH/DIAG INJ IV PUSH: CPT

## 2024-09-04 RX ORDER — DIAZEPAM 2 MG
2 TABLET ORAL ONCE
Status: DISCONTINUED | OUTPATIENT
Start: 2024-09-04 | End: 2024-09-04

## 2024-09-04 RX ORDER — MECLIZINE HCL 12.5 MG 12.5 MG/1
25 TABLET ORAL
Status: COMPLETED | OUTPATIENT
Start: 2024-09-04 | End: 2024-09-04

## 2024-09-04 RX ORDER — MECLIZINE HYDROCHLORIDE 25 MG/1
25 TABLET ORAL 3 TIMES DAILY PRN
Qty: 30 TABLET | Refills: 0 | Status: SHIPPED | OUTPATIENT
Start: 2024-09-04 | End: 2024-09-14

## 2024-09-04 RX ORDER — KETOROLAC TROMETHAMINE 30 MG/ML
15 INJECTION, SOLUTION INTRAMUSCULAR; INTRAVENOUS
Status: COMPLETED | OUTPATIENT
Start: 2024-09-04 | End: 2024-09-04

## 2024-09-04 RX ORDER — DIAZEPAM 2 MG
2 TABLET ORAL EVERY 6 HOURS PRN
Status: DISCONTINUED | OUTPATIENT
Start: 2024-09-04 | End: 2024-09-04

## 2024-09-04 RX ADMIN — MECLIZINE 25 MG: 12.5 TABLET ORAL at 10:37

## 2024-09-04 RX ADMIN — KETOROLAC TROMETHAMINE 15 MG: 30 INJECTION, SOLUTION INTRAMUSCULAR at 13:12

## 2024-09-04 ASSESSMENT — PAIN - FUNCTIONAL ASSESSMENT: PAIN_FUNCTIONAL_ASSESSMENT: NONE - DENIES PAIN

## 2024-09-04 NOTE — ED PROVIDER NOTES
[facility] EMERGENCY DEPT  EMERGENCY DEPARTMENT ENCOUNTER       Pt Name: Jareth Sutton  MRN: [id number]  Birthdate 1935  Date of evaluation: 9/4/2024  Provider: Italo Lindsay DO   PCP: Rafal Phan MD  Note Started: 10:21 AM EDT 9/4/24     CHIEF COMPLAINT       Chief Complaint   Patient presents with    Dizziness     Pt ambulated to ED for increased weakness, unsteadiness, and dizziness. He was previously seen here last week the same sx, but notes this morning they have worsened.         HISTORY OF PRESENT ILLNESS: 1 or more elements      History From: patient, History limited by: none     Jareth Sutton is a 88 y.o. male presents to the emergency department by private vehicle for evaluation of unsteady gait and dizziness.       Please See [facility] for Additional Details of the HPI/PMH  Nursing Notes were all reviewed and agreed with or any disagreements were addressed in the HPI.     REVIEW OF SYSTEMS        Positives and Pertinent negatives as per HPI.    PAST HISTORY     Past Medical History:  Past Medical History:   Diagnosis Date    Allergy to ACE inhibitors 11/28/2017    Anxiety 11/28/2017    ASCVD (arteriosclerotic cardiovascular disease) 11/28/2017    CAD (coronary artery disease) 2000    MI     Cataract     Cataract 04/2019    Depression 11/28/2017    ED (erectile dysfunction) 11/28/2017    Elevated PSA 11/28/2017    Former smoker 11/28/2017    Glucose intolerance (impaired glucose tolerance) 11/28/2017    Hypercholesterolemia     Hypertension     Insomnia 11/28/2017    Macular degeneration     On statin therapy 11/28/2017    Other ill-defined conditions(799.89)     elevated lipids    Psychiatric disorder     depression /anxiety    Stroke (HCC)     TIA       Past Surgical History:  Past Surgical History:   Procedure Laterality Date    COLONOSCOPY N/A 7/30/2019    COLONOSCOPY performed by Marck Salinas Jr., MD at [facility] ENDOSCOPY    COLONOSCOPY,MADISON HALL,KEILA  7/30/2019

## 2024-09-04 NOTE — DISCHARGE INSTRUCTIONS
For the next 3 days schedule the meclizine every 8 hours.  Then can be used on an as-needed basis.

## 2024-09-04 NOTE — ED NOTES
Called Physical therapy and discussed the possibility of coming down today for eval, and they said there was no one available today. Provider Neftali bailon.

## 2024-09-04 NOTE — ED NOTES
Ambulated pt.  Pt states he feels better than when he came in but feels like he would fall without assistance.  Provider aware and ordering PT.

## 2024-09-04 NOTE — PROGRESS NOTES
PHYSICAL THERAPY EVALUATION/DISCHARGE    Patient: Jareth Sutton (88 y.o. male)  Date: 9/4/2024  Primary Diagnosis: No admission diagnoses are documented for this encounter.       Precautions: Fall Risk                      ASSESSMENT AND RECOMMENDATIONS: (pt seen in the ED at request of Dr. Italo Lindsay)    Based on the objective data below, the patient comes to the ED with c/o severe imbalance this am. He was in the ED approx 10 days ago with similar sxs. He returned home, states he used a walker initially, but then felt better and returned it to PT at his [facility] he was being seen by PT staff at  for OPPT. He does have a hx of some balance issues, reports no falls and prior to 10 days ago, had been fully independent without a device. He had been seen when he was here 10 days ago by PT and was recommended a rollator which was ordered but had not been delivered today while pt and wife here again.     Pt states his feeling of off balance did not totally resolve since his last visit but had improved until this am. He is not sure exactly what he was doing at the time but suddenly became much more off balance and felt if he would try to walk, he would fall. Wife was not able to safely assist him with that degree of unsteadiness. He initially stated that he did not feel \"spinning dizzy\" but his description of sxs does include visual oscillation, laterally, unsure which direction. He states he feels it when he is getting up to sitting from supine and when he turns his head quickly. He reports a general sensation of feeling woozy when moving. BP did show an initial drop in standing but recovered with movement and symptoms did not appear to be lightheadedness in any form. (See doc flow) He reports some blurry vision associated with the symptoms. No pain, no ear ringing, does wear hearing aids with no sudden change in hearing associated, no recent URI.    Pt overall showing functional strength,

## 2024-09-04 NOTE — TELEPHONE ENCOUNTER
Patient called regarding dizziness and very unsteady.  Unable to get up without falling over.  Came in yesterday for REUBEN.  No improvement from last ER visit and work up.  Advised patient to go to ER for further evaluation.  Patient states he will call 911 to get him there.

## 2024-09-05 ENCOUNTER — TELEPHONE (OUTPATIENT)
Facility: HOSPITAL | Age: 89
End: 2024-09-05

## 2024-09-05 LAB
EKG ATRIAL RATE: 47 BPM
EKG DIAGNOSIS: NORMAL
EKG P AXIS: 70 DEGREES
EKG P-R INTERVAL: 228 MS
EKG Q-T INTERVAL: 454 MS
EKG QRS DURATION: 92 MS
EKG QTC CALCULATION (BAZETT): 401 MS
EKG R AXIS: -8 DEGREES
EKG T AXIS: 11 DEGREES
EKG VENTRICULAR RATE: 47 BPM

## 2024-09-05 NOTE — TELEPHONE ENCOUNTER
CM received incoming call from pt's daughter Reshma [phone] reporting that pt's rollator has not been delivered. Chart reviewed, noted that rollator order was submitted from inpatient CM on 8/26/24 prior to pt's d/c from hospital. CM contacted [facility] Phone: [phone] where order was submitted to. [facility] reporting that DME order was cancelled, and that CM would need to re-submit via [employer]. CM re-submitted order on Careport, noting that this is original order from 8/26/24 and order needs to be fulfilled and delivered to pt's home ASAP.     [facility] accepted new request for order, reporting: \"Order processed and sent to scheduling team to contact the family for delivery.\"      CAMACHO Dwyer.  Care Manager, [facility]  [phone]/Available on Perfect Serve

## 2024-09-20 DIAGNOSIS — I10 ESSENTIAL HYPERTENSION: Primary | ICD-10-CM

## 2024-09-20 RX ORDER — FINASTERIDE 5 MG/1
5 TABLET, FILM COATED ORAL DAILY
Qty: 90 TABLET | Refills: 0 | Status: SHIPPED | OUTPATIENT
Start: 2024-09-20

## 2024-09-20 RX ORDER — AMLODIPINE BESYLATE 5 MG/1
TABLET ORAL
Qty: 90 TABLET | Refills: 0 | Status: SHIPPED | OUTPATIENT
Start: 2024-09-20

## 2024-11-11 ENCOUNTER — OFFICE VISIT (OUTPATIENT)
Facility: CLINIC | Age: 89
End: 2024-11-11

## 2024-11-11 VITALS
WEIGHT: 186.4 LBS | DIASTOLIC BLOOD PRESSURE: 86 MMHG | BODY MASS INDEX: 28.25 KG/M2 | TEMPERATURE: 98.1 F | HEIGHT: 68 IN | SYSTOLIC BLOOD PRESSURE: 144 MMHG | HEART RATE: 48 BPM | OXYGEN SATURATION: 97 %

## 2024-11-11 DIAGNOSIS — M15.0 PRIMARY OSTEOARTHRITIS INVOLVING MULTIPLE JOINTS: ICD-10-CM

## 2024-11-11 DIAGNOSIS — R73.02 GLUCOSE INTOLERANCE (IMPAIRED GLUCOSE TOLERANCE): ICD-10-CM

## 2024-11-11 DIAGNOSIS — E78.2 MIXED HYPERLIPIDEMIA: ICD-10-CM

## 2024-11-11 DIAGNOSIS — I25.10 ASCVD (ARTERIOSCLEROTIC CARDIOVASCULAR DISEASE): ICD-10-CM

## 2024-11-11 DIAGNOSIS — G45.9 TIA (TRANSIENT ISCHEMIC ATTACK): ICD-10-CM

## 2024-11-11 DIAGNOSIS — Z23 NEEDS FLU SHOT: ICD-10-CM

## 2024-11-11 DIAGNOSIS — I10 ESSENTIAL HYPERTENSION: Primary | ICD-10-CM

## 2024-11-11 ASSESSMENT — PATIENT HEALTH QUESTIONNAIRE - PHQ9
SUM OF ALL RESPONSES TO PHQ QUESTIONS 1-9: 0
SUM OF ALL RESPONSES TO PHQ9 QUESTIONS 1 & 2: 0
1. LITTLE INTEREST OR PLEASURE IN DOING THINGS: NOT AT ALL
2. FEELING DOWN, DEPRESSED OR HOPELESS: NOT AT ALL
SUM OF ALL RESPONSES TO PHQ QUESTIONS 1-9: 0

## 2024-11-11 NOTE — PROGRESS NOTES
Jareth Sutton is a 89 y.o. male     Chief Complaint   Patient presents with    Hypertension     6 month f/up    Hyperlipidemia    Blood Sugar Problem       \"Have you been to the ER, urgent care clinic since your last visit?  Hospitalized since your last visit?\"    09- [facility]    “Have you seen or consulted any other health care providers outside of [facility] since your last visit?”    no                      
is a problem.  I will call with lab results in interim.    PLAN:  1. Essential hypertension  2. Mixed hyperlipidemia  -     CK; Future  -     Lipid Panel; Future  -     Comprehensive Metabolic Panel; Future  3. Glucose intolerance (impaired glucose tolerance)  -     Hemoglobin A1C; Future  4. ASCVD (arteriosclerotic cardiovascular disease)  5. Primary osteoarthritis involving multiple joints  6. TIA (transient ischemic attack)  7. Needs flu shot  -     Influenza, FLUAD Trivalent, (age 65 y+), IM, Preservative Free, 0.5mL          ATTENTION:   This medical record was transcribed using an electronic medical records system.  Although proofread, it may and can contain electronic and spelling errors.  Other human spelling and other errors may be present.  Corrections may be executed at a later time.  Please feel free to contact us for any clarifications as needed.      No follow-up provider specified.    No results found for any visits on 11/11/24.    Rafal Phan MD    The patient verbalized understanding of the problems and plans as explained.

## 2024-11-12 LAB
ALBUMIN SERPL-MCNC: 3.7 G/DL (ref 3.5–5)
ALBUMIN/GLOB SERPL: 1.2 (ref 1.1–2.2)
ALP SERPL-CCNC: 66 U/L (ref 45–117)
ALT SERPL-CCNC: 25 U/L (ref 12–78)
ANION GAP SERPL CALC-SCNC: 1 MMOL/L (ref 2–12)
AST SERPL-CCNC: 20 U/L (ref 15–37)
BILIRUB SERPL-MCNC: 0.7 MG/DL (ref 0.2–1)
BUN SERPL-MCNC: 19 MG/DL (ref 6–20)
BUN/CREAT SERPL: 17 (ref 12–20)
CALCIUM SERPL-MCNC: 8.6 MG/DL (ref 8.5–10.1)
CHLORIDE SERPL-SCNC: 107 MMOL/L (ref 97–108)
CHOLEST SERPL-MCNC: 128 MG/DL
CK SERPL-CCNC: 89 U/L (ref 39–308)
CO2 SERPL-SCNC: 30 MMOL/L (ref 21–32)
COMMENT:: NORMAL
CREAT SERPL-MCNC: 1.09 MG/DL (ref 0.7–1.3)
EST. AVERAGE GLUCOSE BLD GHB EST-MCNC: 105 MG/DL
GLOBULIN SER CALC-MCNC: 3 G/DL (ref 2–4)
GLUCOSE SERPL-MCNC: 90 MG/DL (ref 65–100)
HBA1C MFR BLD: 5.3 % (ref 4–5.6)
HDLC SERPL-MCNC: 50 MG/DL
HDLC SERPL: 2.6 (ref 0–5)
LDLC SERPL CALC-MCNC: 61.2 MG/DL (ref 0–100)
POTASSIUM SERPL-SCNC: 4.2 MMOL/L (ref 3.5–5.1)
PROT SERPL-MCNC: 6.7 G/DL (ref 6.4–8.2)
SODIUM SERPL-SCNC: 138 MMOL/L (ref 136–145)
SPECIMEN HOLD: NORMAL
TRIGL SERPL-MCNC: 84 MG/DL
VLDLC SERPL CALC-MCNC: 16.8 MG/DL

## 2024-11-30 DIAGNOSIS — I10 ESSENTIAL HYPERTENSION: ICD-10-CM

## 2024-12-02 RX ORDER — AMLODIPINE BESYLATE 5 MG/1
TABLET ORAL
Qty: 90 TABLET | Refills: 3 | Status: SHIPPED | OUTPATIENT
Start: 2024-12-02

## 2024-12-02 NOTE — TELEPHONE ENCOUNTER
RX refill request from the patient/pharmacy. Patient last seen 11- with labs, and next appt. scheduled for 01-  Requested Prescriptions     Pending Prescriptions Disp Refills    amLODIPine (NORVASC) 5 MG tablet [Pharmacy Med Name: amLODIPine Besylate Oral Tablet 5 MG] 90 tablet 3     Sig: TAKE 1 TABLET EVERY DAY    .

## 2024-12-10 RX ORDER — FINASTERIDE 5 MG/1
5 TABLET, FILM COATED ORAL DAILY
Qty: 90 TABLET | Refills: 3 | Status: SHIPPED | OUTPATIENT
Start: 2024-12-10

## 2024-12-10 NOTE — TELEPHONE ENCOUNTER
RX refill request from the patient/pharmacy. Patient last seen 11- with labs, and next appt. scheduled for 01-  Requested Prescriptions     Pending Prescriptions Disp Refills    finasteride (PROSCAR) 5 MG tablet [Pharmacy Med Name: Finasteride Oral Tablet 5 MG] 90 tablet 3     Sig: TAKE 1 TABLET EVERY DAY    .
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
## 2025-01-14 RX ORDER — AZITHROMYCIN 250 MG/1
TABLET, FILM COATED ORAL
Qty: 6 TABLET | Refills: 0 | Status: SHIPPED | OUTPATIENT
Start: 2025-01-14 | End: 2025-01-24

## 2025-02-17 NOTE — PROGRESS NOTES
Lipid profile is good and BMP is all good except for potassium is slightly elevated which I believe that is around his baseline.  Make sure he is not taking a potassium of salt substitutes. Discussed with patient. Patient's diet does include food sources all that are high in potassium. Discussed decreasing these in his diet. Patient's Name: Peter Carpio    Procedure Date: 02/17/25  Procedure Time 1530    Procedure Performed: Central line removal     The Central Venous Catheter (CVC) was removed per provider order.     The central venous catheter was located: Right Internal Jugular vein, with a total of 3 lumens.     The patient was placed in Supine position with Insertion site lower than heart.     Valsalva response achieved by: Patient instructed to take a deep breath and bear down while the CVC was removed with a constant steady motion.     Firm pressure was applied at the access site for 5 minutes until bleeding stopped.     Post removal site assessment; Clean and dry without bleeding. Occlusive dressing applied: Yes    CVC tip was inspected and intact: Yes    Tip was sent to lab for culture per provider order: No    Patient tolerated the procedure: well    2nd RN present during removal (if applicable) Harjit Arredondozukathy Hernandes, RN   2/17/2025   5:38 PM

## 2025-02-20 ENCOUNTER — OFFICE VISIT (OUTPATIENT)
Facility: CLINIC | Age: 89
End: 2025-02-20

## 2025-02-20 VITALS
DIASTOLIC BLOOD PRESSURE: 78 MMHG | BODY MASS INDEX: 28.4 KG/M2 | TEMPERATURE: 98.7 F | HEART RATE: 52 BPM | RESPIRATION RATE: 16 BRPM | WEIGHT: 187.4 LBS | OXYGEN SATURATION: 95 % | HEIGHT: 68 IN | SYSTOLIC BLOOD PRESSURE: 136 MMHG

## 2025-02-20 DIAGNOSIS — G45.9 TIA (TRANSIENT ISCHEMIC ATTACK): ICD-10-CM

## 2025-02-20 DIAGNOSIS — F51.01 PRIMARY INSOMNIA: ICD-10-CM

## 2025-02-20 DIAGNOSIS — I10 ESSENTIAL HYPERTENSION: Primary | ICD-10-CM

## 2025-02-20 DIAGNOSIS — E55.9 VITAMIN D DEFICIENCY: ICD-10-CM

## 2025-02-20 DIAGNOSIS — Z13.39 ALCOHOL SCREENING: ICD-10-CM

## 2025-02-20 DIAGNOSIS — R41.3 MEMORY DEFICIT: ICD-10-CM

## 2025-02-20 DIAGNOSIS — F41.9 ANXIETY: ICD-10-CM

## 2025-02-20 DIAGNOSIS — E78.2 MIXED HYPERLIPIDEMIA: ICD-10-CM

## 2025-02-20 DIAGNOSIS — I25.10 ASCVD (ARTERIOSCLEROTIC CARDIOVASCULAR DISEASE): ICD-10-CM

## 2025-02-20 DIAGNOSIS — M15.0 PRIMARY OSTEOARTHRITIS INVOLVING MULTIPLE JOINTS: ICD-10-CM

## 2025-02-20 DIAGNOSIS — R97.20 ELEVATED PSA: ICD-10-CM

## 2025-02-20 DIAGNOSIS — I71.40 ABDOMINAL AORTIC ANEURYSM (AAA) WITHOUT RUPTURE, UNSPECIFIED PART: ICD-10-CM

## 2025-02-20 DIAGNOSIS — Z00.00 MEDICARE ANNUAL WELLNESS VISIT, SUBSEQUENT: ICD-10-CM

## 2025-02-20 DIAGNOSIS — H35.30 MACULAR DEGENERATION OF BOTH EYES, UNSPECIFIED TYPE: ICD-10-CM

## 2025-02-20 DIAGNOSIS — F32.A MILD DEPRESSION: ICD-10-CM

## 2025-02-20 DIAGNOSIS — Z87.891 FORMER SMOKER: ICD-10-CM

## 2025-02-20 DIAGNOSIS — R73.02 GLUCOSE INTOLERANCE (IMPAIRED GLUCOSE TOLERANCE): ICD-10-CM

## 2025-02-20 PROBLEM — Z86.73 HISTORY OF STROKE: Status: RESOLVED | Noted: 2025-02-20 | Resolved: 2025-02-20

## 2025-02-20 PROBLEM — Z86.73 HISTORY OF STROKE: Status: ACTIVE | Noted: 2025-02-20

## 2025-02-20 PROBLEM — I61.9 CVA (CEREBROVASCULAR ACCIDENT DUE TO INTRACEREBRAL HEMORRHAGE) (HCC): Status: RESOLVED | Noted: 2024-08-25 | Resolved: 2025-02-20

## 2025-02-20 LAB
25(OH)D3 SERPL-MCNC: 43.7 NG/ML (ref 30–100)
ALBUMIN SERPL-MCNC: 3.7 G/DL (ref 3.5–5)
ALBUMIN/GLOB SERPL: 1.2 (ref 1.1–2.2)
ALP SERPL-CCNC: 68 U/L (ref 45–117)
ALT SERPL-CCNC: 25 U/L (ref 12–78)
ANION GAP SERPL CALC-SCNC: 4 MMOL/L (ref 2–12)
APPEARANCE UR: CLEAR
AST SERPL-CCNC: 22 U/L (ref 15–37)
BACTERIA URNS QL MICRO: NEGATIVE /HPF
BASOPHILS # BLD: 0.07 K/UL (ref 0–0.1)
BASOPHILS NFR BLD: 1 % (ref 0–1)
BILIRUB SERPL-MCNC: 0.8 MG/DL (ref 0.2–1)
BILIRUB UR QL: NEGATIVE
BUN SERPL-MCNC: 18 MG/DL (ref 6–20)
BUN/CREAT SERPL: 16 (ref 12–20)
CALCIUM SERPL-MCNC: 9.1 MG/DL (ref 8.5–10.1)
CHLORIDE SERPL-SCNC: 107 MMOL/L (ref 97–108)
CHOLEST SERPL-MCNC: 136 MG/DL
CK SERPL-CCNC: 116 U/L (ref 39–308)
CO2 SERPL-SCNC: 29 MMOL/L (ref 21–32)
COLOR UR: ABNORMAL
CREAT SERPL-MCNC: 1.1 MG/DL (ref 0.7–1.3)
DIFFERENTIAL METHOD BLD: NORMAL
EOSINOPHIL # BLD: 0.1 K/UL (ref 0–0.4)
EOSINOPHIL NFR BLD: 1.4 % (ref 0–7)
EPITH CASTS URNS QL MICRO: ABNORMAL /LPF
ERYTHROCYTE [DISTWIDTH] IN BLOOD BY AUTOMATED COUNT: 14.4 % (ref 11.5–14.5)
EST. AVERAGE GLUCOSE BLD GHB EST-MCNC: 111 MG/DL
GLOBULIN SER CALC-MCNC: 3.1 G/DL (ref 2–4)
GLUCOSE SERPL-MCNC: 96 MG/DL (ref 65–100)
GLUCOSE UR STRIP.AUTO-MCNC: NEGATIVE MG/DL
HBA1C MFR BLD: 5.5 % (ref 4–5.6)
HCT VFR BLD AUTO: 50.1 % (ref 36.6–50.3)
HDLC SERPL-MCNC: 50 MG/DL
HDLC SERPL: 2.7 (ref 0–5)
HGB BLD-MCNC: 16.3 G/DL (ref 12.1–17)
HGB UR QL STRIP: NEGATIVE
HYALINE CASTS URNS QL MICRO: ABNORMAL /LPF (ref 0–5)
IMM GRANULOCYTES # BLD AUTO: 0.02 K/UL (ref 0–0.04)
IMM GRANULOCYTES NFR BLD AUTO: 0.3 % (ref 0–0.5)
KETONES UR QL STRIP.AUTO: NEGATIVE MG/DL
LDLC SERPL CALC-MCNC: 70.8 MG/DL (ref 0–100)
LEUKOCYTE ESTERASE UR QL STRIP.AUTO: NEGATIVE
LYMPHOCYTES # BLD: 1.24 K/UL (ref 0.8–3.5)
LYMPHOCYTES NFR BLD: 17.8 % (ref 12–49)
MCH RBC QN AUTO: 29.9 PG (ref 26–34)
MCHC RBC AUTO-ENTMCNC: 32.5 G/DL (ref 30–36.5)
MCV RBC AUTO: 91.9 FL (ref 80–99)
MONOCYTES # BLD: 0.56 K/UL (ref 0–1)
MONOCYTES NFR BLD: 8 % (ref 5–13)
NEUTS SEG # BLD: 4.98 K/UL (ref 1.8–8)
NEUTS SEG NFR BLD: 71.5 % (ref 32–75)
NITRITE UR QL STRIP.AUTO: NEGATIVE
NRBC # BLD: 0 K/UL (ref 0–0.01)
NRBC BLD-RTO: 0 PER 100 WBC
PH UR STRIP: 6 (ref 5–8)
PLATELET # BLD AUTO: 258 K/UL (ref 150–400)
PMV BLD AUTO: 10.6 FL (ref 8.9–12.9)
POTASSIUM SERPL-SCNC: 4.2 MMOL/L (ref 3.5–5.1)
PROT SERPL-MCNC: 6.8 G/DL (ref 6.4–8.2)
PROT UR STRIP-MCNC: ABNORMAL MG/DL
PSA SERPL-MCNC: 5.3 NG/ML (ref 0.01–4)
RBC # BLD AUTO: 5.45 M/UL (ref 4.1–5.7)
RBC #/AREA URNS HPF: ABNORMAL /HPF (ref 0–5)
SODIUM SERPL-SCNC: 140 MMOL/L (ref 136–145)
SP GR UR REFRACTOMETRY: 1.02 (ref 1–1.03)
T4 FREE SERPL-MCNC: 1 NG/DL (ref 0.8–1.5)
TRIGL SERPL-MCNC: 76 MG/DL
TSH SERPL DL<=0.05 MIU/L-ACNC: 1.88 UIU/ML (ref 0.36–3.74)
UROBILINOGEN UR QL STRIP.AUTO: 0.2 EU/DL (ref 0.2–1)
VLDLC SERPL CALC-MCNC: 15.2 MG/DL
WBC # BLD AUTO: 7 K/UL (ref 4.1–11.1)
WBC URNS QL MICRO: ABNORMAL /HPF (ref 0–4)

## 2025-02-20 ASSESSMENT — PATIENT HEALTH QUESTIONNAIRE - PHQ9
SUM OF ALL RESPONSES TO PHQ QUESTIONS 1-9: 0
1. LITTLE INTEREST OR PLEASURE IN DOING THINGS: NOT AT ALL
8. MOVING OR SPEAKING SO SLOWLY THAT OTHER PEOPLE COULD HAVE NOTICED. OR THE OPPOSITE, BEING SO FIGETY OR RESTLESS THAT YOU HAVE BEEN MOVING AROUND A LOT MORE THAN USUAL: NOT AT ALL
3. TROUBLE FALLING OR STAYING ASLEEP: NOT AT ALL
SUM OF ALL RESPONSES TO PHQ QUESTIONS 1-9: 0
7. TROUBLE CONCENTRATING ON THINGS, SUCH AS READING THE NEWSPAPER OR WATCHING TELEVISION: NOT AT ALL
SUM OF ALL RESPONSES TO PHQ QUESTIONS 1-9: 0
10. IF YOU CHECKED OFF ANY PROBLEMS, HOW DIFFICULT HAVE THESE PROBLEMS MADE IT FOR YOU TO DO YOUR WORK, TAKE CARE OF THINGS AT HOME, OR GET ALONG WITH OTHER PEOPLE: NOT DIFFICULT AT ALL
SUM OF ALL RESPONSES TO PHQ9 QUESTIONS 1 & 2: 0
2. FEELING DOWN, DEPRESSED OR HOPELESS: NOT AT ALL
6. FEELING BAD ABOUT YOURSELF - OR THAT YOU ARE A FAILURE OR HAVE LET YOURSELF OR YOUR FAMILY DOWN: NOT AT ALL
5. POOR APPETITE OR OVEREATING: NOT AT ALL
4. FEELING TIRED OR HAVING LITTLE ENERGY: NOT AT ALL
9. THOUGHTS THAT YOU WOULD BE BETTER OFF DEAD, OR OF HURTING YOURSELF: NOT AT ALL
SUM OF ALL RESPONSES TO PHQ QUESTIONS 1-9: 0

## 2025-02-20 NOTE — PATIENT INSTRUCTIONS
information.    Personalized Preventive Plan for Jareth Sutton - 2/20/2025  Medicare offers a range of preventive health benefits. Some of the tests and screenings are paid in full while other may be subject to a deductible, co-insurance, and/or copay.  Some of these benefits include a comprehensive review of your medical history including lifestyle, illnesses that may run in your family, and various assessments and screenings as appropriate.  After reviewing your medical record and screening and assessments performed today your provider may have ordered immunizations, labs, imaging, and/or referrals for you.  A list of these orders (if applicable) as well as your Preventive Care list are included within your After Visit Summary for your review.

## 2025-02-20 NOTE — PROGRESS NOTES
Jareth Sutton is a 89 y.o. male     Chief Complaint   Patient presents with    Medicare AWV       /78 (Site: Left Upper Arm, Position: Sitting, Cuff Size: Medium Adult)   Pulse 52   Temp 98.7 °F (37.1 °C) (Oral)   Resp 16   Ht 1.727 m (5' 8\")   Wt 85 kg (187 lb 6.4 oz)   SpO2 95%   BMI 28.49 kg/m²     Health Maintenance Due   Topic Date Due    Respiratory Syncytial Virus (RSV) Pregnant or age 60 yrs+ (1 - 1-dose 75+ series) Never done    Pneumococcal 50+ years Vaccine (2 of 2 - PPSV23) 12/22/2016    COVID-19 Vaccine (3 - 2024-25 season) 09/01/2024         \"Have you been to the ER, urgent care clinic since your last visit?  Hospitalized since your last visit?\"    NO    “Have you seen or consulted any other health care providers outside of [facility] since your last visit?”    NO
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
## 2025-02-24 ENCOUNTER — TELEPHONE (OUTPATIENT)
Facility: CLINIC | Age: 89
End: 2025-02-24

## 2025-02-27 ENCOUNTER — TELEPHONE (OUTPATIENT)
Facility: CLINIC | Age: 89
End: 2025-02-27

## 2025-02-27 NOTE — TELEPHONE ENCOUNTER
Order for US Abdominal Aorta Limited needs to be changed to:    US Retroperitoneal LTD-ING (G code 5498)    Per [facility] scheduling dept

## 2025-03-03 RX ORDER — ESCITALOPRAM OXALATE 10 MG/1
TABLET ORAL
Qty: 90 TABLET | Refills: 1 | Status: SHIPPED | OUTPATIENT
Start: 2025-03-03

## 2025-03-03 RX ORDER — ROSUVASTATIN CALCIUM 20 MG/1
TABLET, COATED ORAL
Qty: 90 TABLET | Refills: 1 | Status: SHIPPED | OUTPATIENT
Start: 2025-03-03

## 2025-03-03 NOTE — TELEPHONE ENCOUNTER
PCP: Rafal Phan MD    Last appt: 2/20/2025    Future Appointments   Date Time Provider Department Center   8/20/2025  8:00 AM Rafal Phan MD [facility] DEP       Requested Prescriptions     Pending Prescriptions Disp Refills    escitalopram (LEXAPRO) 10 MG tablet [Pharmacy Med Name: Escitalopram Oxalate Oral Tablet 10 MG] 90 tablet 1     Sig: TAKE 1 TABLET EVERY DAY    rosuvastatin (CRESTOR) 20 MG tablet [Pharmacy Med Name: Rosuvastatin Calcium Oral Tablet 20 MG] 90 tablet 1     Sig: TAKE 1 TABLET EVERY DAY

## 2025-03-12 ENCOUNTER — TRANSCRIBE ORDERS (OUTPATIENT)
Facility: HOSPITAL | Age: 89
End: 2025-03-12

## 2025-03-12 DIAGNOSIS — I71.40 ABDOMINAL AORTIC ANEURYSM (AAA) WITHOUT RUPTURE, UNSPECIFIED PART: Primary | ICD-10-CM

## 2025-03-18 ENCOUNTER — RESULTS FOLLOW-UP (OUTPATIENT)
Facility: HOSPITAL | Age: 89
End: 2025-03-18

## 2025-03-18 ENCOUNTER — HOSPITAL ENCOUNTER (OUTPATIENT)
Facility: HOSPITAL | Age: 89
Discharge: HOME | End: 2025-03-21
Attending: INTERNAL MEDICINE
Payer: MEDICARE

## 2025-03-18 DIAGNOSIS — I71.40 ABDOMINAL AORTIC ANEURYSM (AAA) WITHOUT RUPTURE, UNSPECIFIED PART: ICD-10-CM

## 2025-03-18 PROCEDURE — 76706 US ABDL AORTA SCREEN AAA: CPT

## 2025-03-22 PROBLEM — Z00.00 MEDICARE ANNUAL WELLNESS VISIT, SUBSEQUENT: Status: RESOLVED | Noted: 2017-12-07 | Resolved: 2025-03-22

## 2025-03-24 ENCOUNTER — TELEPHONE (OUTPATIENT)
Facility: CLINIC | Age: 89
End: 2025-03-24

## 2025-04-14 PROBLEM — M25.551 RIGHT HIP PAIN: Status: ACTIVE | Noted: 2025-04-14

## 2025-04-15 ENCOUNTER — APPOINTMENT (OUTPATIENT)
Facility: HOSPITAL | Age: 89
End: 2025-04-15
Payer: MEDICARE

## 2025-04-15 ENCOUNTER — OFFICE VISIT (OUTPATIENT)
Facility: CLINIC | Age: 89
End: 2025-04-15
Payer: MEDICARE

## 2025-04-15 ENCOUNTER — HOSPITAL ENCOUNTER (EMERGENCY)
Facility: HOSPITAL | Age: 89
Discharge: ANOTHER HOSPITAL | End: 2025-04-15
Attending: STUDENT IN AN ORGANIZED HEALTH CARE EDUCATION/TRAINING PROGRAM
Payer: MEDICARE

## 2025-04-15 ENCOUNTER — HOSPITAL ENCOUNTER (INPATIENT)
Facility: HOSPITAL | Age: 89
LOS: 2 days | Discharge: HOME HEALTH | DRG: 084 | End: 2025-04-17
Attending: INTERNAL MEDICINE | Admitting: INTERNAL MEDICINE
Payer: MEDICARE

## 2025-04-15 VITALS
SYSTOLIC BLOOD PRESSURE: 140 MMHG | TEMPERATURE: 98.1 F | BODY MASS INDEX: 28.34 KG/M2 | HEIGHT: 68 IN | WEIGHT: 187 LBS | OXYGEN SATURATION: 97 % | HEART RATE: 56 BPM | DIASTOLIC BLOOD PRESSURE: 80 MMHG

## 2025-04-15 VITALS
HEART RATE: 60 BPM | OXYGEN SATURATION: 95 % | HEIGHT: 68 IN | WEIGHT: 184 LBS | DIASTOLIC BLOOD PRESSURE: 71 MMHG | BODY MASS INDEX: 27.89 KG/M2 | RESPIRATION RATE: 15 BRPM | TEMPERATURE: 98.6 F | SYSTOLIC BLOOD PRESSURE: 171 MMHG

## 2025-04-15 DIAGNOSIS — J01.00 ACUTE NON-RECURRENT MAXILLARY SINUSITIS: ICD-10-CM

## 2025-04-15 DIAGNOSIS — I10 ESSENTIAL HYPERTENSION: ICD-10-CM

## 2025-04-15 DIAGNOSIS — S06.5X0A TRAUMATIC SUBDURAL HEMORRHAGE WITHOUT LOSS OF CONSCIOUSNESS, INITIAL ENCOUNTER (HCC): Primary | ICD-10-CM

## 2025-04-15 DIAGNOSIS — M25.551 RIGHT HIP PAIN: Primary | ICD-10-CM

## 2025-04-15 DIAGNOSIS — S01.81XA FACIAL LACERATION, INITIAL ENCOUNTER: ICD-10-CM

## 2025-04-15 DIAGNOSIS — S00.83XA CONTUSION OF FOREHEAD, INITIAL ENCOUNTER: ICD-10-CM

## 2025-04-15 DIAGNOSIS — S09.8XXA BLUNT HEAD TRAUMA, INITIAL ENCOUNTER: ICD-10-CM
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
## 2025-04-15 PROBLEM — I62.00 SUBDURAL HEMORRHAGE (HCC): Status: ACTIVE | Noted: 2025-04-15

## 2025-04-15 PROBLEM — S06.5XAA SDH (SUBDURAL HEMATOMA) (HCC): Status: ACTIVE | Noted: 2025-04-15

## 2025-04-15 LAB
ALBUMIN SERPL-MCNC: 3.1 G/DL (ref 3.5–5)
ALBUMIN/GLOB SERPL: 1 (ref 1.1–2.2)
ALP SERPL-CCNC: 72 U/L (ref 45–117)
ALT SERPL-CCNC: 25 U/L (ref 12–78)
ANION GAP SERPL CALC-SCNC: 4 MMOL/L (ref 2–12)
ASPIRIN: 505 ARU
AST SERPL-CCNC: 17 U/L (ref 15–37)
BASOPHILS # BLD: 0.07 K/UL (ref 0–0.1)
BASOPHILS NFR BLD: 0.5 % (ref 0–1)
BILIRUB SERPL-MCNC: 1.1 MG/DL (ref 0.2–1)
BUN SERPL-MCNC: 18 MG/DL (ref 6–20)
BUN/CREAT SERPL: 15 (ref 12–20)
CALCIUM SERPL-MCNC: 8.6 MG/DL (ref 8.5–10.1)
CHLORIDE SERPL-SCNC: 105 MMOL/L (ref 97–108)
CO2 SERPL-SCNC: 30 MMOL/L (ref 21–32)
CREAT SERPL-MCNC: 1.21 MG/DL (ref 0.7–1.3)
DIFFERENTIAL METHOD BLD: ABNORMAL
EOSINOPHIL # BLD: 0.12 K/UL (ref 0–0.4)
EOSINOPHIL NFR BLD: 0.9 % (ref 0–7)
ERYTHROCYTE [DISTWIDTH] IN BLOOD BY AUTOMATED COUNT: 14.5 % (ref 11.5–14.5)
GLOBULIN SER CALC-MCNC: 3.1 G/DL (ref 2–4)
GLUCOSE SERPL-MCNC: 131 MG/DL (ref 65–100)
HCT VFR BLD AUTO: 46.8 % (ref 36.6–50.3)
HGB BLD-MCNC: 15.5 G/DL (ref 12.1–17)
IMM GRANULOCYTES # BLD AUTO: 0.07 K/UL (ref 0–0.04)
IMM GRANULOCYTES NFR BLD AUTO: 0.5 % (ref 0–0.5)
INR PPP: 1 (ref 0.9–1.1)
LYMPHOCYTES # BLD: 1.61 K/UL (ref 0.8–3.5)
LYMPHOCYTES NFR BLD: 12.2 % (ref 12–49)
MAGNESIUM SERPL-MCNC: 2.1 MG/DL (ref 1.6–2.4)
MCH RBC QN AUTO: 31.1 PG (ref 26–34)
MCHC RBC AUTO-ENTMCNC: 33.1 G/DL (ref 30–36.5)
MCV RBC AUTO: 93.8 FL (ref 80–99)
MONOCYTES # BLD: 0.78 K/UL (ref 0–1)
MONOCYTES NFR BLD: 5.9 % (ref 5–13)
NEUTS SEG # BLD: 10.56 K/UL (ref 1.8–8)
NEUTS SEG NFR BLD: 80 % (ref 32–75)
NRBC # BLD: 0 K/UL (ref 0–0.01)
NRBC BLD-RTO: 0 PER 100 WBC
PLATELET # BLD AUTO: 215 K/UL (ref 150–400)
PMV BLD AUTO: 9.4 FL (ref 8.9–12.9)
POTASSIUM SERPL-SCNC: 3.9 MMOL/L (ref 3.5–5.1)
PROT SERPL-MCNC: 6.2 G/DL (ref 6.4–8.2)
PROTHROMBIN TIME: 10.9 SEC (ref 9.2–11.2)
RBC # BLD AUTO: 4.99 M/UL (ref 4.1–5.7)
SODIUM SERPL-SCNC: 139 MMOL/L (ref 136–145)
WBC # BLD AUTO: 13.2 K/UL (ref 4.1–11.1)

## 2025-04-15 PROCEDURE — 36415 COLL VENOUS BLD VENIPUNCTURE: CPT

## 2025-04-15 PROCEDURE — 72125 CT NECK SPINE W/O DYE: CPT

## 2025-04-15 PROCEDURE — 6370000000 HC RX 637 (ALT 250 FOR IP): Performed by: STUDENT IN AN ORGANIZED HEALTH CARE EDUCATION/TRAINING PROGRAM

## 2025-04-15 PROCEDURE — 80053 COMPREHEN METABOLIC PANEL: CPT

## 2025-04-15 PROCEDURE — 85610 PROTHROMBIN TIME: CPT

## 2025-04-15 PROCEDURE — 1159F MED LIST DOCD IN RCRD: CPT | Performed by: INTERNAL MEDICINE

## 2025-04-15 PROCEDURE — 1123F ACP DISCUSS/DSCN MKR DOCD: CPT | Performed by: INTERNAL MEDICINE

## 2025-04-15 PROCEDURE — 85576 BLOOD PLATELET AGGREGATION: CPT

## 2025-04-15 PROCEDURE — 1160F RVW MEDS BY RX/DR IN RCRD: CPT | Performed by: INTERNAL MEDICINE

## 2025-04-15 PROCEDURE — 2580000003 HC RX 258: Performed by: NURSE PRACTITIONER

## 2025-04-15 PROCEDURE — 6370000000 HC RX 637 (ALT 250 FOR IP): Performed by: NURSE PRACTITIONER

## 2025-04-15 PROCEDURE — G8419 CALC BMI OUT NRM PARAM NOF/U: HCPCS | Performed by: INTERNAL MEDICINE

## 2025-04-15 PROCEDURE — 2500000003 HC RX 250 WO HCPCS: Performed by: NURSE PRACTITIONER

## 2025-04-15 PROCEDURE — 99214 OFFICE O/P EST MOD 30 MIN: CPT | Performed by: INTERNAL MEDICINE

## 2025-04-15 PROCEDURE — 70450 CT HEAD/BRAIN W/O DYE: CPT

## 2025-04-15 PROCEDURE — 83735 ASSAY OF MAGNESIUM: CPT

## 2025-04-15 PROCEDURE — G8427 DOCREV CUR MEDS BY ELIG CLIN: HCPCS | Performed by: INTERNAL MEDICINE

## 2025-04-15 PROCEDURE — 2000000000 HC ICU R&B

## 2025-04-15 PROCEDURE — 1125F AMNT PAIN NOTED PAIN PRSNT: CPT | Performed by: INTERNAL MEDICINE

## 2025-04-15 PROCEDURE — 6360000002 HC RX W HCPCS: Performed by: NURSE PRACTITIONER

## 2025-04-15 PROCEDURE — 85025 COMPLETE CBC W/AUTO DIFF WBC: CPT

## 2025-04-15 PROCEDURE — 99285 EMERGENCY DEPT VISIT HI MDM: CPT

## 2025-04-15 PROCEDURE — 1036F TOBACCO NON-USER: CPT | Performed by: INTERNAL MEDICINE

## 2025-04-15 RX ORDER — AZITHROMYCIN 250 MG/1
250 TABLET, FILM COATED ORAL DAILY
Status: DISCONTINUED | OUTPATIENT
Start: 2025-04-16 | End: 2025-04-17 | Stop reason: HOSPADM

## 2025-04-15 RX ORDER — ONDANSETRON 4 MG/1
4 TABLET, ORALLY DISINTEGRATING ORAL ONCE
Status: COMPLETED | OUTPATIENT
Start: 2025-04-15 | End: 2025-04-15

## 2025-04-15 RX ORDER — SODIUM CHLORIDE 0.9 % (FLUSH) 0.9 %
5-40 SYRINGE (ML) INJECTION EVERY 12 HOURS SCHEDULED
Status: DISCONTINUED | OUTPATIENT
Start: 2025-04-15 | End: 2025-04-17 | Stop reason: HOSPADM

## 2025-04-15 RX ORDER — EZETIMIBE 10 MG/1
10 TABLET ORAL NIGHTLY
Status: DISCONTINUED | OUTPATIENT
Start: 2025-04-15 | End: 2025-04-17 | Stop reason: HOSPADM

## 2025-04-15 RX ORDER — ACETAMINOPHEN 500 MG
1000 TABLET ORAL
Status: COMPLETED | OUTPATIENT
Start: 2025-04-15 | End: 2025-04-15

## 2025-04-15 RX ORDER — AMLODIPINE BESYLATE 5 MG/1
5 TABLET ORAL
Status: COMPLETED | OUTPATIENT
Start: 2025-04-15 | End: 2025-04-15

## 2025-04-15 RX ORDER — ROSUVASTATIN CALCIUM 10 MG/1
20 TABLET, COATED ORAL DAILY
Status: DISCONTINUED | OUTPATIENT
Start: 2025-04-15 | End: 2025-04-17 | Stop reason: HOSPADM

## 2025-04-15 RX ORDER — AZITHROMYCIN 250 MG/1
TABLET, FILM COATED ORAL
Qty: 6 TABLET | Refills: 0 | Status: SHIPPED | OUTPATIENT
Start: 2025-04-15 | End: 2025-04-25

## 2025-04-15 RX ORDER — VITAMIN B COMPLEX
2000 TABLET ORAL DAILY
Status: DISCONTINUED | OUTPATIENT
Start: 2025-04-15 | End: 2025-04-17 | Stop reason: HOSPADM

## 2025-04-15 RX ORDER — ESCITALOPRAM OXALATE 20 MG/1
20 TABLET ORAL DAILY
Qty: 30 TABLET | Refills: 5 | Status: SHIPPED | OUTPATIENT
Start: 2025-04-15

## 2025-04-15 RX ORDER — M-VIT,TX,IRON,MINS/CALC/FOLIC 27MG-0.4MG
1 TABLET ORAL DAILY
Status: DISCONTINUED | OUTPATIENT
Start: 2025-04-15 | End: 2025-04-17 | Stop reason: HOSPADM

## 2025-04-15 RX ORDER — ESCITALOPRAM OXALATE 20 MG/1
20 TABLET ORAL DAILY
Qty: 30 TABLET | Refills: 5 | Status: SHIPPED | OUTPATIENT
Start: 2025-04-15 | End: 2025-04-15

## 2025-04-15 RX ORDER — LIDOCAINE 4 G/G
1 PATCH TOPICAL DAILY
Status: DISCONTINUED | OUTPATIENT
Start: 2025-04-15 | End: 2025-04-17 | Stop reason: HOSPADM

## 2025-04-15 RX ORDER — AMLODIPINE BESYLATE 5 MG/1
5 TABLET ORAL DAILY
Status: DISCONTINUED | OUTPATIENT
Start: 2025-04-16 | End: 2025-04-17 | Stop reason: HOSPADM

## 2025-04-15 RX ORDER — SODIUM CHLORIDE 9 MG/ML
INJECTION, SOLUTION INTRAVENOUS PRN
Status: DISCONTINUED | OUTPATIENT
Start: 2025-04-15 | End: 2025-04-17 | Stop reason: HOSPADM

## 2025-04-15 RX ORDER — ACETAMINOPHEN 650 MG/1
650 SUPPOSITORY RECTAL EVERY 6 HOURS PRN
Status: DISCONTINUED | OUTPATIENT
Start: 2025-04-15 | End: 2025-04-17 | Stop reason: HOSPADM

## 2025-04-15 RX ORDER — HYDRALAZINE HYDROCHLORIDE 50 MG/1
50 TABLET, FILM COATED ORAL 2 TIMES DAILY
Status: DISCONTINUED | OUTPATIENT
Start: 2025-04-16 | End: 2025-04-17 | Stop reason: HOSPADM

## 2025-04-15 RX ORDER — AZITHROMYCIN 250 MG/1
TABLET, FILM COATED ORAL
Qty: 6 TABLET | Refills: 0 | Status: SHIPPED | OUTPATIENT
Start: 2025-04-15 | End: 2025-04-15

## 2025-04-15 RX ORDER — ACETAMINOPHEN 325 MG/1
650 TABLET ORAL EVERY 6 HOURS PRN
Status: DISCONTINUED | OUTPATIENT
Start: 2025-04-15 | End: 2025-04-17 | Stop reason: HOSPADM

## 2025-04-15 RX ORDER — ONDANSETRON 2 MG/ML
4 INJECTION INTRAMUSCULAR; INTRAVENOUS EVERY 6 HOURS PRN
Status: DISCONTINUED | OUTPATIENT
Start: 2025-04-15 | End: 2025-04-17 | Stop reason: HOSPADM

## 2025-04-15 RX ORDER — SODIUM CHLORIDE 0.9 % (FLUSH) 0.9 %
5-40 SYRINGE (ML) INJECTION PRN
Status: DISCONTINUED | OUTPATIENT
Start: 2025-04-15 | End: 2025-04-17 | Stop reason: HOSPADM

## 2025-04-15 RX ORDER — AZITHROMYCIN 250 MG/1
500 TABLET, FILM COATED ORAL ONCE
Status: COMPLETED | OUTPATIENT
Start: 2025-04-15 | End: 2025-04-15

## 2025-04-15 RX ORDER — HYDRALAZINE HYDROCHLORIDE 50 MG/1
25 TABLET, FILM COATED ORAL
Status: COMPLETED | OUTPATIENT
Start: 2025-04-15 | End: 2025-04-15

## 2025-04-15 RX ORDER — MULTIVITAMIN WITH IRON
1 TABLET ORAL DAILY
COMMUNITY

## 2025-04-15 RX ORDER — ESCITALOPRAM OXALATE 10 MG/1
20 TABLET ORAL DAILY
Status: DISCONTINUED | OUTPATIENT
Start: 2025-04-15 | End: 2025-04-17 | Stop reason: HOSPADM

## 2025-04-15 RX ADMIN — ACETAMINOPHEN 650 MG: 325 TABLET ORAL at 21:45

## 2025-04-15 RX ADMIN — AMLODIPINE BESYLATE 5 MG: 5 TABLET ORAL at 16:06

## 2025-04-15 RX ADMIN — ROSUVASTATIN 20 MG: 10 TABLET, FILM COATED ORAL at 21:44

## 2025-04-15 RX ADMIN — HYDRALAZINE HYDROCHLORIDE 25 MG: 50 TABLET ORAL at 16:05

## 2025-04-15 RX ADMIN — ONDANSETRON 4 MG: 4 TABLET, ORALLY DISINTEGRATING ORAL at 14:18

## 2025-04-15 RX ADMIN — ESCITALOPRAM OXALATE 20 MG: 10 TABLET ORAL at 21:44

## 2025-04-15 RX ADMIN — ACETAMINOPHEN 1000 MG: 500 TABLET, FILM COATED ORAL at 14:18

## 2025-04-15 RX ADMIN — AZITHROMYCIN 500 MG: 250 TABLET, FILM COATED ORAL at 22:56

## 2025-04-15 RX ADMIN — EZETIMIBE 10 MG: 10 TABLET ORAL at 21:20

## 2025-04-15 RX ADMIN — Medication 1 TABLET: at 21:44

## 2025-04-15 RX ADMIN — SODIUM CHLORIDE 5 MG/HR: 0.9 INJECTION, SOLUTION INTRAVENOUS at 20:38

## 2025-04-15 RX ADMIN — FAMOTIDINE 20 MG: 10 INJECTION, SOLUTION INTRAVENOUS at 21:45

## 2025-04-15 RX ADMIN — Medication 2000 UNITS: at 21:45

## 2025-04-15 RX ADMIN — SODIUM CHLORIDE, PRESERVATIVE FREE 10 ML: 5 INJECTION INTRAVENOUS at 21:48

## 2025-04-15 ASSESSMENT — PATIENT HEALTH QUESTIONNAIRE - PHQ9
SUM OF ALL RESPONSES TO PHQ QUESTIONS 1-9: 1
2. FEELING DOWN, DEPRESSED OR HOPELESS: NOT AT ALL
SUM OF ALL RESPONSES TO PHQ QUESTIONS 1-9: 1
1. LITTLE INTEREST OR PLEASURE IN DOING THINGS: SEVERAL DAYS

## 2025-04-15 ASSESSMENT — PAIN DESCRIPTION - PAIN TYPE
TYPE: ACUTE PAIN

## 2025-04-15 ASSESSMENT — PAIN DESCRIPTION - LOCATION
LOCATION_2: HIP
LOCATION: HEAD
LOCATION_2: HIP
LOCATION: HEAD
LOCATION_2: HIP
LOCATION: HEAD
LOCATION: HEAD

## 2025-04-15 ASSESSMENT — PAIN DESCRIPTION - INTENSITY
RATING_2: 6
RATING_2: 4
RATING_2: 4

## 2025-04-15 ASSESSMENT — PAIN DESCRIPTION - FREQUENCY
FREQUENCY: CONTINUOUS

## 2025-04-15 ASSESSMENT — PAIN DESCRIPTION - ORIENTATION
ORIENTATION_2: RIGHT
ORIENTATION: RIGHT
ORIENTATION_2: RIGHT
ORIENTATION: RIGHT
ORIENTATION: RIGHT
ORIENTATION_2: RIGHT

## 2025-04-15 ASSESSMENT — PAIN - FUNCTIONAL ASSESSMENT
PAIN_FUNCTIONAL_ASSESSMENT: ACTIVITIES ARE NOT PREVENTED
PAIN_FUNCTIONAL_ASSESSMENT: ACTIVITIES ARE NOT PREVENTED
PAIN_FUNCTIONAL_ASSESSMENT_SITE2: PREVENTS OR INTERFERES SOME ACTIVE ACTIVITIES AND ADLS
PAIN_FUNCTIONAL_ASSESSMENT: ACTIVITIES ARE NOT PREVENTED

## 2025-04-15 ASSESSMENT — PAIN DESCRIPTION - DESCRIPTORS
DESCRIPTORS_2: ACHING
DESCRIPTORS_2: ACHING
DESCRIPTORS: ACHING
DESCRIPTORS_2: ACHING
DESCRIPTORS: ACHING
DESCRIPTORS: ACHING

## 2025-04-15 ASSESSMENT — PAIN SCALES - GENERAL
PAINLEVEL_OUTOF10: 3
PAINLEVEL_OUTOF10: 4

## 2025-04-15 ASSESSMENT — PAIN DESCRIPTION - ONSET
ONSET: ON-GOING

## 2025-04-15 NOTE — PROGRESS NOTES
Subjective:   Jareth Sutton is a 89 y.o. male      Chief Complaint   Patient presents with    Hip Pain     right    Cough    Congestion        History of present illness: He presents complaining of a cough and congestion has been going on for last several days.  He notes is probably been a week to 10 days.  He does have some purulent drainage.  He notes no fevers or chills.  There is no shortness of breath.  He has also been having some problems with his right hip but he has been seen by orthopedics Dr. Bone who saw him about 10 days ago and injected his right hip although that has not seemed to help.  He does have a follow-up appointment scheduled with him later today.  Addendum: Leaving the office building he stepped off of the sidewalk as he was not looking down and lost his balance falling and striking his forehead with a significant abrasion and contusion of the right side of his head.  He had no loss of consciousness.  He had no increase of his hip discomfort.    Patient Active Problem List   Diagnosis    Alcohol screening    Mixed hyperlipidemia    Right inguinal hernia    Mild depression    Insomnia    Gait instability    Essential hypertension    Former smoker    ED (erectile dysfunction)    Memory deficit    Chronic left shoulder pain    Primary osteoarthritis involving multiple joints    Elevated PSA    Umbilical hernia    Allergy to ACE inhibitors    Abdominal aortic aneurysm (AAA) without rupture    TIA (transient ischemic attack)    Syncope    Osteoarthritis of spine with radiculopathy, cervical region    Glucose intolerance (impaired glucose tolerance)    ASCVD (arteriosclerotic cardiovascular disease)    Anxiety    Vitamin D deficiency    Macular degeneration of both eyes    Right hip pain    Acute non-recurrent maxillary sinusitis      Past Medical History:   Diagnosis Date    Allergy to ACE inhibitors 11/28/2017    Anxiety 11/28/2017    ASCVD (arteriosclerotic cardiovascular disease)

## 2025-04-15 NOTE — PROGRESS NOTES
Pt. Arrived from [facility] at 1900.  Neuro check done and charted.  VSS.  No orders, intensivist made aware, will see pt. Soon.  Report given to Yaneth.

## 2025-04-15 NOTE — PROGRESS NOTES
ICH Documentation Note, Chart Review Only     Brief HPI: Traumatic ICH after stepping off sidewalk at PCP office    Vitals      BP Readings from Last 3 Encounters:   04/15/25 (!) 180/58   04/15/25 (!) 140/80   02/20/25 136/78        Medical hx  Active Problems:    * No active hospital problems. *  Resolved Problems:    * No resolved hospital problems. *      OAC/APT?   - Aspirin     Reversal agent: Not indicated     ICH Score ON   ARRIVAL:    Velarde Peters/Modified Black on arrival (if indicated):      Traumatic ICH, scoring not indicated    Imaging:   CT Result (most recent):  CT CERVICAL SPINE WO CONTRAST 04/15/2025    Narrative  EXAM:  CT CERVICAL SPINE WO CONTRAST    INDICATION:  fall    COMPARISON:  concurrently performed CTA chest dated 4/15/2025, and 8/25/2024 CTA  head/neck.    TECHNIQUE:   Unenhanced multislice helical CT of the cervical spine was  performed in the axial plane.  Coronal and sagittal reconstructions were  obtained.  CT dose reduction was achieved through use of a standardized protocol  tailored for this examination and automatic exposure control for dose  modulation.    FINDINGS:  Visualized posterior cranial fossa demonstrate partially imaged hyperdense  subdural hematoma extending along the left lateral tentorial surface and  subjacent posterior falx cerebri, better demonstrated on concurrently performed  head CT. Craniocervical junction articulations are anatomic.  No significant vertebral listhesis. Facet joints alignments are anatomic.  No acute displaced fracture or vertebral body compression deformity.  Multilevel degenerative disease changes. Associated areas of at least mild to  moderate spinal canal and moderate bilateral neuroforaminal narrowing at C5-C6.  Additional mild spinal canal narrowing and moderate to severe left greater than  right neuroforaminal narrowing at C3-C4 and C6-C7. There is at least moderate  right neuroforaminal narrowing at C5-C6.    The paraspinal soft

## 2025-04-15 NOTE — ED PROVIDER NOTES
Patient was given the following medications:  Medications   acetaminophen (TYLENOL) tablet 1,000 mg (1,000 mg Oral Given 4/15/25 1418)   ondansetron (ZOFRAN-ODT) disintegrating tablet 4 mg (4 mg Oral Given 4/15/25 1418)   amLODIPine (NORVASC) tablet 5 mg (5 mg Oral Given 4/15/25 1606)   hydrALAZINE (APRESOLINE) tablet 25 mg (25 mg Oral Given 4/15/25 1605)       CONSULTS: (Who and What was discussed)  None      Chronic Conditions:   Past Medical History:   Diagnosis Date    Allergy to ACE inhibitors 11/28/2017    Anxiety 11/28/2017    ASCVD (arteriosclerotic cardiovascular disease) 11/28/2017    CAD (coronary artery disease) 2000    MI     Cataract     Cataract 04/2019    Depression 11/28/2017    ED (erectile dysfunction) 11/28/2017    Elevated PSA 11/28/2017    Former smoker 11/28/2017    Glucose intolerance (impaired glucose tolerance) 11/28/2017    Hypercholesterolemia     Hypertension     Insomnia 11/28/2017    Macular degeneration     On statin therapy 11/28/2017    Other ill-defined conditions(799.89)     elevated lipids    Psychiatric disorder     depression /anxiety    Stroke (HCC)     TIA       Social Determinants affecting Dx or Tx: None    Records Reviewed (source and summary of external notes): Old Medical Records, Nursing Notes, Prior ED/Hospital Visits    CC/HPI Summary, DDx, ED Course, and Reassessment:   MDM  89-year-old male presents for ground-level fall at home.  No syncope, no loss of consciousness, no anticoagulation use.  Bruising and ecchymosis on the right orbit, no proptosis, no vision changes, no neurologic deficits.  Does report dull headache and nausea.  Will offer medications here for symptom management, will obtain CT imaging of the head and neck given his advanced age.  May need blood work pending workup.  At this time he has no distracting injuries, no complaints of long bone deformity or pain.  No chest pain or shortness of breath.  He states he tripped in a hole in his yard.

## 2025-04-15 NOTE — PROGRESS NOTES
Jareth Sutton is a 89 y.o. male     Chief Complaint   Patient presents with    Hip Pain     right    Cough    Congestion       \"Have you been to the ER, urgent care clinic since your last visit?  Hospitalized since your last visit?\"    NO    “Have you seen or consulted any other health care providers outside of [facility] since your last visit?”    [facility]

## 2025-04-16 ENCOUNTER — APPOINTMENT (OUTPATIENT)
Facility: HOSPITAL | Age: 89
DRG: 084 | End: 2025-04-16
Attending: INTERNAL MEDICINE
Payer: MEDICARE

## 2025-04-16 LAB
ANION GAP SERPL CALC-SCNC: 3 MMOL/L (ref 2–12)
BUN SERPL-MCNC: 14 MG/DL (ref 6–20)
BUN/CREAT SERPL: 16 (ref 12–20)
CALCIUM SERPL-MCNC: 8.1 MG/DL (ref 8.5–10.1)
CHLORIDE SERPL-SCNC: 109 MMOL/L (ref 97–108)
CHOLEST SERPL-MCNC: 108 MG/DL
CO2 SERPL-SCNC: 25 MMOL/L (ref 21–32)
CREAT SERPL-MCNC: 0.88 MG/DL (ref 0.7–1.3)
ERYTHROCYTE [DISTWIDTH] IN BLOOD BY AUTOMATED COUNT: 14.1 % (ref 11.5–14.5)
EST. AVERAGE GLUCOSE BLD GHB EST-MCNC: 108 MG/DL
GLUCOSE SERPL-MCNC: 144 MG/DL (ref 65–100)
HBA1C MFR BLD: 5.4 % (ref 4–5.6)
HCT VFR BLD AUTO: 43.6 % (ref 36.6–50.3)
HDLC SERPL-MCNC: 44 MG/DL
HDLC SERPL: 2.5 (ref 0–5)
HGB BLD-MCNC: 14.6 G/DL (ref 12.1–17)
LDLC SERPL CALC-MCNC: 52.4 MG/DL (ref 0–100)
MCH RBC QN AUTO: 31.1 PG (ref 26–34)
MCHC RBC AUTO-ENTMCNC: 33.5 G/DL (ref 30–36.5)
MCV RBC AUTO: 93 FL (ref 80–99)
NRBC # BLD: 0 K/UL (ref 0–0.01)
NRBC BLD-RTO: 0 PER 100 WBC
PLATELET # BLD AUTO: 206 K/UL (ref 150–400)
PMV BLD AUTO: 9.7 FL (ref 8.9–12.9)
POTASSIUM SERPL-SCNC: 3.7 MMOL/L (ref 3.5–5.1)
RBC # BLD AUTO: 4.69 M/UL (ref 4.1–5.7)
SODIUM SERPL-SCNC: 137 MMOL/L (ref 136–145)
TRIGL SERPL-MCNC: 58 MG/DL
VLDLC SERPL CALC-MCNC: 11.6 MG/DL
WBC # BLD AUTO: 13.1 K/UL (ref 4.1–11.1)

## 2025-04-16 PROCEDURE — 6370000000 HC RX 637 (ALT 250 FOR IP): Performed by: NURSE PRACTITIONER

## 2025-04-16 PROCEDURE — 80048 BASIC METABOLIC PNL TOTAL CA: CPT

## 2025-04-16 PROCEDURE — 92610 EVALUATE SWALLOWING FUNCTION: CPT

## 2025-04-16 PROCEDURE — 80061 LIPID PANEL: CPT

## 2025-04-16 PROCEDURE — 70450 CT HEAD/BRAIN W/O DYE: CPT

## 2025-04-16 PROCEDURE — 83036 HEMOGLOBIN GLYCOSYLATED A1C: CPT

## 2025-04-16 PROCEDURE — 2500000003 HC RX 250 WO HCPCS: Performed by: NURSE PRACTITIONER

## 2025-04-16 PROCEDURE — 85027 COMPLETE CBC AUTOMATED: CPT

## 2025-04-16 PROCEDURE — 2060000000 HC ICU INTERMEDIATE R&B

## 2025-04-16 PROCEDURE — 97530 THERAPEUTIC ACTIVITIES: CPT

## 2025-04-16 PROCEDURE — 97535 SELF CARE MNGMENT TRAINING: CPT

## 2025-04-16 PROCEDURE — 97166 OT EVAL MOD COMPLEX 45 MIN: CPT

## 2025-04-16 PROCEDURE — 92523 SPEECH SOUND LANG COMPREHEN: CPT

## 2025-04-16 PROCEDURE — 6360000002 HC RX W HCPCS: Performed by: NURSE PRACTITIONER

## 2025-04-16 PROCEDURE — 99232 SBSQ HOSP IP/OBS MODERATE 35: CPT | Performed by: NURSE PRACTITIONER

## 2025-04-16 PROCEDURE — 97161 PT EVAL LOW COMPLEX 20 MIN: CPT

## 2025-04-16 PROCEDURE — 2580000003 HC RX 258: Performed by: NURSE PRACTITIONER

## 2025-04-16 RX ORDER — INDOMETHACIN 25 MG/1
CAPSULE ORAL
Status: DISCONTINUED
Start: 2025-04-16 | End: 2025-04-16 | Stop reason: WASHOUT

## 2025-04-16 RX ORDER — EPINEPHRINE 0.1 MG/ML
INJECTION INTRAVENOUS
Status: DISCONTINUED
Start: 2025-04-16 | End: 2025-04-16 | Stop reason: WASHOUT

## 2025-04-16 RX ORDER — ATROPINE SULFATE 0.1 MG/ML
INJECTION INTRAVENOUS
Status: DISCONTINUED
Start: 2025-04-16 | End: 2025-04-16 | Stop reason: WASHOUT

## 2025-04-16 RX ORDER — POLYETHYLENE GLYCOL 3350 17 G/17G
17 POWDER, FOR SOLUTION ORAL DAILY PRN
Status: DISCONTINUED | OUTPATIENT
Start: 2025-04-16 | End: 2025-04-17 | Stop reason: HOSPADM

## 2025-04-16 RX ORDER — AMIODARONE HYDROCHLORIDE 150 MG/3ML
INJECTION, SOLUTION INTRAVENOUS
Status: DISCONTINUED
Start: 2025-04-16 | End: 2025-04-16 | Stop reason: WASHOUT

## 2025-04-16 RX ORDER — OXYCODONE HYDROCHLORIDE 5 MG/1
2.5 TABLET ORAL ONCE
Refills: 0 | Status: COMPLETED | OUTPATIENT
Start: 2025-04-16 | End: 2025-04-16

## 2025-04-16 RX ORDER — BENZONATATE 100 MG/1
100 CAPSULE ORAL 3 TIMES DAILY PRN
Status: DISCONTINUED | OUTPATIENT
Start: 2025-04-16 | End: 2025-04-17 | Stop reason: HOSPADM

## 2025-04-16 RX ADMIN — ROSUVASTATIN 20 MG: 10 TABLET, FILM COATED ORAL at 08:32

## 2025-04-16 RX ADMIN — Medication 1 TABLET: at 08:33

## 2025-04-16 RX ADMIN — SODIUM CHLORIDE, PRESERVATIVE FREE 10 ML: 5 INJECTION INTRAVENOUS at 23:10

## 2025-04-16 RX ADMIN — SODIUM CHLORIDE, PRESERVATIVE FREE 10 ML: 5 INJECTION INTRAVENOUS at 08:34

## 2025-04-16 RX ADMIN — OXYCODONE HYDROCHLORIDE 2.5 MG: 5 TABLET ORAL at 02:01

## 2025-04-16 RX ADMIN — Medication 2000 UNITS: at 08:32

## 2025-04-16 RX ADMIN — ESCITALOPRAM OXALATE 20 MG: 10 TABLET ORAL at 08:32

## 2025-04-16 RX ADMIN — SODIUM CHLORIDE 5 MG/HR: 0.9 INJECTION, SOLUTION INTRAVENOUS at 08:00

## 2025-04-16 RX ADMIN — AZITHROMYCIN DIHYDRATE 250 MG: 250 TABLET, FILM COATED ORAL at 08:33

## 2025-04-16 RX ADMIN — AMLODIPINE BESYLATE 5 MG: 5 TABLET ORAL at 08:31

## 2025-04-16 RX ADMIN — HYDRALAZINE HYDROCHLORIDE 50 MG: 50 TABLET ORAL at 08:32

## 2025-04-16 RX ADMIN — HYDRALAZINE HYDROCHLORIDE 50 MG: 50 TABLET ORAL at 23:07

## 2025-04-16 RX ADMIN — EZETIMIBE 10 MG: 10 TABLET ORAL at 23:07

## 2025-04-16 RX ADMIN — FAMOTIDINE 20 MG: 10 INJECTION, SOLUTION INTRAVENOUS at 08:33

## 2025-04-16 RX ADMIN — ACETAMINOPHEN 650 MG: 325 TABLET ORAL at 09:02

## 2025-04-16 ASSESSMENT — PAIN SCALES - GENERAL
PAINLEVEL_OUTOF10: 2
PAINLEVEL_OUTOF10: 4
PAINLEVEL_OUTOF10: 3

## 2025-04-16 ASSESSMENT — PAIN DESCRIPTION - ORIENTATION: ORIENTATION: RIGHT

## 2025-04-16 ASSESSMENT — PAIN DESCRIPTION - LOCATION
LOCATION: HIP

## 2025-04-16 ASSESSMENT — PAIN DESCRIPTION - DESCRIPTORS: DESCRIPTORS: ACHING

## 2025-04-16 NOTE — PROGRESS NOTES
4 Eyes Skin Assessment     NAME:  Jareth Sutton  YOB: 1935  MEDICAL RECORD NUMBER:  [id number]    The patient is being assessed for  Admission    I agree that at least one RN has performed a thorough Head to Toe Skin Assessment on the patient. ALL assessment sites listed below have been assessed.      Areas assessed by both nurses:    Head, Face, Ears, Shoulders, Back, Chest, Arms, Elbows, Hands, Sacrum. Buttock, Coccyx, Ischium, Legs. Feet and Heels, Under Medical Devices , and Other N?A        Does the Patient have a Wound? No noted wound(s)       Cesario Prevention initiated by RN: Yes  Wound Care Orders initiated by RN: No    Pressure Injury (Stage 3,4, Unstageable, DTI, NWPT, and Complex wounds) if present, place Wound referral order by RN under : No    New Ostomies, if present place, Ostomy referral order under : No     Nurse 1 eSignature: Electronically signed by Yaneth Travis RN on 4/15/25 at 11:30 PM EDT    **SHARE this note so that the co-signing nurse can place an eSignature**    Nurse 2 eSignature: Electronically signed by Annalise Bone RN on 4/15/25 at 11:33 PM EDT

## 2025-04-16 NOTE — ED NOTES
Patient has subdural bleed per Dr. Hicks.  Dr. Hicks to room to discuss with patient and family.  Patient to be transferred to [place].  Placed on cardiac monitor.  EKG and labs obtained.  Family instructed to keep patient NPO at this time.  Pending transfer.

## 2025-04-16 NOTE — H&P
History and Physical    Date of Service:  4/16/2025  Primary Care Provider: Rafal Phan MD  Source of information: patient, electronic medical record    Chief Complaint: No chief complaint on file.      History of Presenting Illness:   Jareth Sutton is a 89 y.o. male with a pmhx CAD, HTN, dyslipidemia, macular degeneration, past stroke, and depression/anxiety who was admitted to the ICU on 4/15 for left SDH s/p fall.   Serial CT head showed stable subdural hematoma with new hemorrhage in bilateral occipital horns, neurosurgery is okay with downgrade to neurosurgical stepdown given stability of neuroexam     REVIEW OF SYSTEMS:  A comprehensive review of systems was negative except for that written in the History of Present Illness.     Past Medical History:   Diagnosis Date    Allergy to ACE inhibitors 11/28/2017    Anxiety 11/28/2017    ASCVD (arteriosclerotic cardiovascular disease) 11/28/2017    CAD (coronary artery disease) 2000    MI     Cataract     Cataract 04/2019    Depression 11/28/2017    ED (erectile dysfunction) 11/28/2017    Elevated PSA 11/28/2017    Former smoker 11/28/2017    Glucose intolerance (impaired glucose tolerance) 11/28/2017    Hypercholesterolemia     Hypertension     Insomnia 11/28/2017    Macular degeneration     On statin therapy 11/28/2017    Other ill-defined conditions(799.89)     elevated lipids    Psychiatric disorder     depression /anxiety    Stroke (HCC)     TIA      Past Surgical History:   Procedure Laterality Date    COLONOSCOPY N/A 7/30/2019    COLONOSCOPY performed by Marck Salinas Jr., MD at [facility] ENDOSCOPY    COLONOSCOPY,MADISON HALL,SNARE  7/30/2019         COLORECTAL SCRN; HI RISK IND  7/30/2019         COLORECTAL SCRN; HI RISK IND  9/10/2014         COLSC FLX W/RMVL OF TUMOR POLYP LESION SNARE TQ  9/10/2014         HEENT Bilateral 06/03/2019    eye cataract    HERNIA REPAIR  08/04/2021    VENTRAL HERNIA REPAIR WITH MESH AND RIGHT

## 2025-04-16 NOTE — PROGRESS NOTES
Occupational Therapy  Orders received, chart reviewed and patient evaluated by occupational therapy. Pending progression with skilled acute occupational therapy, recommend:    Intermittent occupational therapy up to 2-3x/week in previous living setting    Recommend with nursing patient to complete as able in order to maintain strength, endurance and independence: OOB to chair 3x/day, ADLs with min A and performing toileting with min A assist. Thank you for your assistance.     Full evaluation to follow.      Debo Martinez OT

## 2025-04-16 NOTE — CARE COORDINATION
Care Management Initial Assessment       RUR: 12 %   Readmission? No  1st IM letter given? Yes - 4/16 04/16/25 1208   Service Assessment   Patient Orientation Alert and Oriented;Person;Place;Situation;Self   Cognition Alert   History Provided By Patient   Primary Caregiver Self   Support Systems Spouse/Significant Other  (Wife Sara Sutton C: [phone])   Patient's Healthcare Decision Maker is: Named in Scanned ACP Document   PCP Verified by CM Yes  (Dr Rafal Phan)   Last Visit to PCP Within last 3 months   Prior Functional Level Independent in ADLs/IADLs   Current Functional Level Assistance with the following:;Mobility   Can patient return to prior living arrangement Unknown at present   Ability to make needs known: Good   Family able to assist with home care needs: Yes   Would you like for me to discuss the discharge plan with any other family members/significant others, and if so, who? Yes  (Wife)   Financial Resources Medicare  (Medicare A,B with [employer] secondary)   Community Resources None   Social/Functional History   Lives With Spouse   Type of Home Apartment  ([facility])   Home Layout One level   Home Access Level entry   Bathroom Shower/Tub Walk-in shower   Bathroom Toilet Standard   Bathroom Equipment Built-in shower seat;Grab bars in shower   Home Equipment Rollator  (Does not use)   Receives Help From Family   Prior Level of Assist for ADLs Independent   Prior Level of Assist for Homemaking Independent   Homemaking Responsibilities Yes   Ambulation Assistance Independent   Prior Level of Assist for Transfers Independent   Active  Yes   Mode of Transportation Car   Occupation Retired   Discharge Planning   Living Arrangements Spouse/Significant Other   Current Services Prior To Admission None   Potential Assistance Needed N/A   Potential Assistance Purchasing Medications No   History of falls? 1     Care manager met with patient and his wife Sara Sutton to introduce self  and explain role. Patient is independent with ADL's, no previous HH/DME or IPR needs. Patient and his wife live in Independent living at [facility], [address] drive. He confirmed his PCP to be Dr Rafal Phan and sees him a couple of times a year and uses the [facility] in [place] as his pharmacy. Confirmed demographic information. CM will follow for transitions of care.  Laura Martinez RN,Care Management

## 2025-04-16 NOTE — PROGRESS NOTES
Neurosurgery Progress Note            Admit Date: 4/15/2025   LOS: 1 day        Daily Progress Note: 2025      Subjective:   The patient has a history of macular degeneration and fell after leaving the doctor's office today he thinks due to a hole. He fell hitting the right side of his face on the ground. He takes ASA due to previous history of TIA and cardiac stents. CT head in ER at [facility] revealed a left parafalcine and tentorial SDH. Pt transferred to [facility] for further evaluation. On Cardene for BP control. The patient lives at [facility].    Objective:     Vital signs  Temp (24hrs), Av.3 °F (36.8 °C), Min:97.7 °F (36.5 °C), Max:98.8 °F (37.1 °C)   No intake/output data recorded.   1901 -  0700  In: 572 [P.O.:350; I.V.:222]  Out: 1855 [Urine:1855]    /62   Pulse 64   Temp 98.3 °F (36.8 °C) (Oral)   Resp 22   Wt 82.5 kg (181 lb 14.1 oz)   SpO2 91%   BMI 27.65 kg/m²          Pain control       PT/OT  Gait                 Physical Exam:  Gen:NAD.  Neuro: A&Ox3. Follows commands. Speech clear. Affect normal.  PERRL. EOMI. Face symmetric. Tongue midline.  CRAIN. Strength 5/5 in UE and LE BL.  Negative drift.  Gait deferred.  Skin: Bruising to right face and chin    24 hour results:    Recent Results (from the past 24 hours)   EKG 12 Lead    Collection Time: 04/15/25  4:15 PM   Result Value Ref Range    Ventricular Rate 58 BPM    Atrial Rate 58 BPM    P-R Interval 206 ms    QRS Duration 96 ms    Q-T Interval 412 ms    QTc Calculation (Bazett) 404 ms    P Axis 43 degrees    R Axis -32 degrees    T Axis 9 degrees    Diagnosis       Sinus bradycardia  Left axis deviation  Minimal voltage criteria for LVH, may be normal variant  Septal infarct , age undetermined  When compared with ECG of 04-SEP-2024 10:06,  Septal infarct is now present     CBC with Auto Differential    Collection Time: 04/15/25  4:33 PM   Result Value Ref Range    WBC 13.2 (H) 4.1 - 11.1 K/uL    RBC 4.99 4.10 - 5.70

## 2025-04-16 NOTE — PLAN OF CARE
Problem: Occupational Therapy - Adult  Goal: By Discharge: Performs self-care activities at highest level of function for planned discharge setting.  See evaluation for individualized goals.  Description: FUNCTIONAL STATUS PRIOR TO ADMISSION:  patient was INDP with ADLs and IADLs, does not use AD for functional mobility. Walking to the dining room at his facility multiple times a day.     HOME SUPPORT: Lives at [facility] with spouse     Occupational Therapy Goals  Initiated 4/16/2025   1.  Patient will perform upper body dressing with [place] within 7 day(s).  2.  Patient will perform lower body dressing with [place] within 7 day(s).  3.  Patient will perform bathing with [place] within 7 day(s).  4.  Patient will perform toilet transfers with [place] within 7 day(s).  5.  Patient will perform all aspects of toileting with [place] within 7 day(s).  6.  Patient will participate in upper extremity therapeutic exercise/activities with [place] within 7 day(s).    7.  Patient will utilize energy conservation techniques during functional activities with verbal, visual, and tactile cues within 7 day(s).  8.  Patient will improve their Fugl Baumann score by 1-5 points in prep for ADLs within 7 days.   Outcome: Progressing   OCCUPATIONAL THERAPY EVALUATION    Patient: Jareth Sutton (89 y.o. male)  Date: 4/16/2025  Primary Diagnosis: Subdural hemorrhage (HCC) [I62.00]  SDH (subdural hematoma) (HCC) [S06.5XAA]         Precautions: Fall Risk, Bed Alarm (SBP <160)                  ASSESSMENT :  The patient is limited by decreased functional mobility, independence in ADLs, high-level IADLs, strength, activity tolerance, endurance, coordination, balance, orthostatic hypotension, increased pain levels following admission for L SDH s/p GLF. Patient reports visual deficits from macular degeneration, hip pain, as well as R hand deficits at baseline. Patient tolerated standing grooming tasks

## 2025-04-16 NOTE — PROGRESS NOTES
CRITICAL CARE  PROGRESS NOTE      Name: Jareth Sutton   : 1935   MRN: [id number]   Date: 2025      REASON FOR ICU ADMISSION:  SDH     BRIEF PATIENT SUMMARY AND HOSPITAL COURSE   Jareth Sutton is an 89 y.o. male with a history of CAD, hypertension, macular degeneration, TIA, anxiety and depression, who presented to [facility] ED status post mechanical fall on 4/15/25.  Patient resides at an assisted living facility and went to the doctor's office and fell right after leaving the office.  He did hit his head and his face. Head CT was done in the ED and showed left subdural hemorrhage.  Patient was transferred to [facility] ICU.  Neurosurgery was consulted after arrival to ICU.  He does take a baby 81 mg aspirin daily in the evenings. Started on Cardene after arrival for hypertension.     : No overnight events. Head CT with stable SDH however some new blood in bilateral occipital horns. Otherwise doing well and mental status unchanged. Discussed code status as some prior records indicated DNR, patient and wife confirm that they have standing DNR in place and request this be reflected in the EMR as well. Otherwise mobilize with therapies and wean cardene, transfer out of ICU.     COMPREHENSIVE ASSESSMENT & PLAN     Subdural Hemorrhage, left-sided, post fall  Neurosurgery following  Head CT   Systolic B/P goal less than 160 mHg  Cardene PRN for the above goal, wean as tolerated  Every 1 hr neuro checks   Hbg A1C pending, LDL at goal < 70  Repeat head CT this AM with stable SDH however new hemorrhage in bilateral occipital horns   If any neurological decline or change sent for stat CTH.   Send Asprin test.   No anticoagulation or anti platelets.   PT/OT/SLP evals     Hypertensive emergency  Cardene as needed for the above goal  Continue home blood pressure medications - Norvasc, Hydralazine  Hold home  Nebivolol given borderline bradycardia     Depression, Anxiety  Continue  patient.  I have discussed the case and the plan and management of the patient's care with the consulting services, the bedside nurses and the respiratory therapist.      CRITICAL CARE DOCUMENTATION  This patient has a high probability of imminent, clinically significant deterioration, which requires the highest level of preparedness to intervene urgently. I participated in the decision-making and personally managed or directed the management of the following life and organ supporting interventions that required my frequent assessment to treat or prevent imminent deterioration.    I personally spent 35 minutes of critical care time.  This is time spent at this critically ill patient's bedside actively involved in patient care as well as the coordination of care.  This does not include any procedural time which has been billed separately.    Jc Roy PA-C   Critical Care Medicine  [facility]  04/16/25 7:55 AM

## 2025-04-16 NOTE — PROGRESS NOTES
Spiritual Health History and Assessment/Progress Note  [facility]    Rituals, Rites and Sacraments,  ,  ,      Name: Jareth Sutton MRN: [id number]    Age: 89 y.o.     Sex: male   Language: English   Denominational: Quaker   Subdural hemorrhage (HCC)     Date: 4/16/2025            Total Time Calculated: 5 min              Spiritual Assessment began in [facility] 7S1 INTENSIVE CARE        Referral/Consult From: Clergy/   Encounter Overview/Reason: Rituals, Rites and Sacraments  Service Provided For: Patient    Nancy, Belief, Meaning:   Patient is connected with a nancy tradition or spiritual practice  Family/Friends are connected with a nancy tradition or spiritual practice      Importance and Influence:  Patient has spiritual/personal beliefs that influence decisions regarding their health  Family/Friends have spiritual/personal beliefs that influence decisions regarding the patient's health    Community:  Patient is connected with a spiritual community  Family/Friends are connected with a spiritual community:    Assessment and Plan of Care:     Patient Interventions include: Provided sacramental/Restorationism ritual  Family/Friends Interventions include: Provided sacramental/Restorationism ritual    Patient Plan of Care: Spiritual Care available upon further referral  Family/Friends Plan of Care: Spiritual Care available upon further referral    Electronically signed by Chaplain ZULY on 4/16/2025 at 1:15 PM     [employer] visit.  Mr. Sutton is Quaker. His wife was with him.  They shared he fell coming out of his doctor's office.  They attend [employer] [facility] and  asked about Sr. Nancy Olmedo.  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 Prayer and communion offered.  Let them know about the Mass schedule for Holy Week.     Sr. JOSH Pollack, RN, ACSW, LCSW   Page:  [phone]

## 2025-04-16 NOTE — CONSULTS
88yo on baby ASA daily s/p GLF with left diffuse SDH - parafalcine and along tentorium.  Minimal mass effect.  No surgical intervention.  Agree with BP monitoring and goal of SBP<140.  Okay to eat.  Repeat CT in am.  Will follow with you.

## 2025-04-16 NOTE — PLAN OF CARE
Problem: Physical Therapy - Adult  Goal: By Discharge: Performs mobility at highest level of function for planned discharge setting.  See evaluation for individualized goals.  Description: FUNCTIONAL STATUS PRIOR TO ADMISSION: Patient was independent and active without use of DME.    HOME SUPPORT PRIOR TO ADMISSION: The patient lived with spouse.    Physical Therapy Goals  Initiated 4/16/2025  1.  Patient will move from supine to sit and sit to supine, scoot up and down, and roll side to side in bed with supervision/set-up within 7 day(s).    2.  Patient will perform sit to stand with supervision/set-up within 7 day(s).  3.  Patient will transfer from bed to chair and chair to bed with supervision/set-up using the least restrictive device within 7 day(s).  4.  Patient will ambulate with supervision/set-up for 200 feet with the least restrictive device within 7 day(s).   5.  Patient will ascend/descend 4 stairs with one handrail(s) with supervision/set-up within 7 day(s).   6.  Patient will improve Rubio Balance score by 7 points within 7 days.           Outcome: Progressing       PHYSICAL THERAPY EVALUATION    Patient: Jareth Sutton (89 y.o. male)  Date: 4/16/2025  Primary Diagnosis: Subdural hemorrhage (HCC) [I62.00]  SDH (subdural hematoma) (HCC) [S06.5XAA]       Precautions: Restrictions/Precautions  Restrictions/Precautions: Fall Risk            ASSESSMENT :   DEFICITS/IMPAIRMENTS: Patient is an 89 y.o. male with a history of CAD, hypertension, macular degeneration, TIA, with left SDH after mechanical fall on 4/15/25.      The patient is limited by decreased endurance, balance who would benefit from PT to address these impairments. Patient performed standing balance activity with stand by assist while reaching across midline for functional task. Patient ambulated without device initially, but needed hand held assist after approx 75 feet secondary to increased fatigue and lateral sway.   Patient stated

## 2025-04-16 NOTE — PLAN OF CARE
Problem: Chronic Conditions and Co-morbidities  Goal: Patient's chronic conditions and co-morbidity symptoms are monitored and maintained or improved  Outcome: Progressing  Flowsheets (Taken 4/15/2025 2000)  Care Plan - Patient's Chronic Conditions and Co-Morbidity Symptoms are Monitored and Maintained or Improved:   Monitor and assess patient's chronic conditions and comorbid symptoms for stability, deterioration, or improvement   Collaborate with multidisciplinary team to address chronic and comorbid conditions and prevent exacerbation or deterioration   Update acute care plan with appropriate goals if chronic or comorbid symptoms are exacerbated and prevent overall improvement and discharge     Problem: Discharge Planning  Goal: Discharge to home or other facility with appropriate resources  Outcome: Progressing  Flowsheets (Taken 4/15/2025 2000)  Discharge to home or other facility with appropriate resources:   Identify barriers to discharge with patient and caregiver   Arrange for needed discharge resources and transportation as appropriate   Identify discharge learning needs (meds, wound care, etc)   Arrange for interpreters to assist at discharge as needed   Refer to discharge planning if patient needs post-hospital services based on physician order or complex needs related to functional status, cognitive ability or social support system     Problem: Pain  Goal: Verbalizes/displays adequate comfort level or baseline comfort level  Outcome: Progressing     Problem: Safety - Adult  Goal: Free from fall injury  Outcome: Progressing

## 2025-04-16 NOTE — PLAN OF CARE
Problem: Chronic Conditions and Co-morbidities  Goal: Patient's chronic conditions and co-morbidity symptoms are monitored and maintained or improved  4/16/2025 1552 by Tonie Goldberg RN  Outcome: Progressing  4/16/2025 0756 by Yaneth Travis RN  Outcome: Progressing  Flowsheets (Taken 4/15/2025 2000)  Care Plan - Patient's Chronic Conditions and Co-Morbidity Symptoms are Monitored and Maintained or Improved:   Monitor and assess patient's chronic conditions and comorbid symptoms for stability, deterioration, or improvement   Collaborate with multidisciplinary team to address chronic and comorbid conditions and prevent exacerbation or deterioration   Update acute care plan with appropriate goals if chronic or comorbid symptoms are exacerbated and prevent overall improvement and discharge     Problem: Discharge Planning  Goal: Discharge to home or other facility with appropriate resources  4/16/2025 1552 by Tonie Goldberg RN  Outcome: Progressing  4/16/2025 0756 by Yaneth Travis RN  Outcome: Progressing  Flowsheets (Taken 4/15/2025 2000)  Discharge to home or other facility with appropriate resources:   Identify barriers to discharge with patient and caregiver   Arrange for needed discharge resources and transportation as appropriate   Identify discharge learning needs (meds, wound care, etc)   Arrange for interpreters to assist at discharge as needed   Refer to discharge planning if patient needs post-hospital services based on physician order or complex needs related to functional status, cognitive ability or social support system     Problem: Pain  Goal: Verbalizes/displays adequate comfort level or baseline comfort level  4/16/2025 1552 by Tonie Goldberg RN  Outcome: Progressing  4/16/2025 0756 by Yaneth Travis RN  Outcome: Progressing     Problem: Safety - Adult  Goal: Free from fall injury  4/16/2025 1552 by Tonie Goldberg RN  Outcome: Progressing  4/16/2025 0756 by Yaneth Travis RN  Outcome:

## 2025-04-16 NOTE — PROGRESS NOTES
Patient presents to the ED for ground level fall after he tripped in parking lot at Dr. Crandall's office.  Seen today for cough for 5 days.  Patient has abrasions to right forehead, right wrist, right 4th finger, and right palm.  Laceration to right upper eyelid.  All wounds cleansed with wound cleanser and saline.  Dr. Hicks to repair eyelid laceration with dermabond.  Headache improving after Tylenol.  Patient complaints of chronic right hip pain.  Position adjusted for comfort.

## 2025-04-16 NOTE — PLAN OF CARE
Speech LAnguage Pathology EVALUATION    Patient: Jareth Sutton (89 y.o. male)  Date: 4/16/2025  Primary Diagnosis: Subdural hemorrhage (HCC) [I62.00]  SDH (subdural hematoma) (HCC) [S06.5XAA]       Precautions:  Fall Risk, Bed Alarm                  ASSESSMENT :  Patient presented to ED with fall with subsequent SDH.  Patient passed swallow screen, NIH: 0.  CT of head on 4/15 indicated the following: Extensive left-sided subdural hemorrhage as above. No hydrocephalus or herniation. Right periorbital soft tissue hematoma. No acute fracture.  CT of head on 4/16 indicated the following: Unchanged subdural hemorrhage as detailed above. Small amount of intraventricular hemorrhage in the occipital horns of both lateral ventricles.    Swallowing: Patient observed with puree, solid, and thin liquids with adequate mastication, bolus formation and oral clearance with no overt s/s aspiration.  Oropharyngeal swallow appears seemingly intact.  Patient with throat clear noted after all p.o. trials, but not during.  No skilled SLP treatment indicated.    Assessed cognitive communication with the Orientation Log (O Log) and Cognitive Log (Cog Log).  Patient scored 28/30 on the orientation log, above the normal cut off score of 25/30.  He scored 23/30 on the Cog Log, below the normal cut off score of 25/30.  Deficits noted with verbal recall, attention, and verbal organization.  Will benefit from continued SLP treatment to address cognitive communication skills.    Patient will benefit from skilled intervention to address the above impairments.     PLAN :  Recommendations and Planned Interventions:  Diet: Regular and thin liquids  --meds as tolerated     Recommend next SLP session: cognitive communication treatment    Acute SLP Services: SLP Plan of Care: 3 times/week. Patient's rehabilitation potential is considered to be Good.  Discharge Recommendations: Yes, recommend SLP treatment at next level of care     SUBJECTIVE:

## 2025-04-16 NOTE — H&P
Name: Jareth Sutton   : 1935   MRN: [id number]   Date: 4/15/2025      REASON FOR ICU ADMISSION:  SDH    ASSESSMENT and PLAN     Subdural Hemorrhage, left-sided, post fall  Systolic B/P goal less than 140 mHg  Cardene PRN for the above goal  Every 1 hr neuro checks   Hbg A1C and lipid panel pending, LDL goal <70  PT/OT/SLP evals  Neurosurgery following  Recheck CT head w/o contrast in am   If any neurological decline or change sent for stat CTH.   Send Asprin test.   No anticoagulation or anti platelets.     Hypertensive emergency  Cardene as needed for the above goal  Continue home blood pressure medications - Norvasc, Hydralazine    Depression anxiety  Continue Lexapro   Continue MVI and Vit D    Hypercholesterolemia  Continue Zetia and Crestor    HISTORY OF PRESENT ILLNESS:     Jareth Sutton is a 89 y.o. male with a history of CAD, hypertension, macular degeneration, TIA, anxiety and depression, who presented to [facility] ED post mechanical fall.  Patient states that he lives at a assisted living facility and went to the doctor's office and fell right after leaving the office.  He did hit his head and his face.  Has a large right facial bruise.  And chin bruise.  Head CT was done in the ED and showed left subdural hemorrhage.  Patient was transferred to [facility] ICU.  Neurosurgery was consulted after arrival to ICU.  Patient is awake alert oriented.  Follows commands appropriately in all extremities.  No weakness, no facial droop, no slurred speech, vision at baseline, no focal deficits appreciated.  Patient states that he does take a baby 81 mg aspirin daily in the evenings.  However he did not take his dose yesterday.  Currently patient is hypertensive we will start on Cardene now.       ICU DAILY CHECKLIST     Code Status: Full code  DVT Prophylaxis: SCDs  T/L/D: PIVs  SUP: Pepcid  Diet: Regular  Activity Level: up with assist   ABCDEF Bundle/Checklist

## 2025-04-17 ENCOUNTER — APPOINTMENT (OUTPATIENT)
Facility: HOSPITAL | Age: 89
DRG: 084 | End: 2025-04-17
Attending: INTERNAL MEDICINE
Payer: MEDICARE

## 2025-04-17 VITALS
TEMPERATURE: 97.7 F | WEIGHT: 188.71 LBS | SYSTOLIC BLOOD PRESSURE: 152 MMHG | OXYGEN SATURATION: 93 % | HEART RATE: 69 BPM | DIASTOLIC BLOOD PRESSURE: 68 MMHG | RESPIRATION RATE: 18 BRPM | BODY MASS INDEX: 28.69 KG/M2

## 2025-04-17 LAB
ANION GAP SERPL CALC-SCNC: 2 MMOL/L (ref 2–12)
APPEARANCE UR: CLEAR
BACTERIA URNS QL MICRO: NEGATIVE /HPF
BILIRUB UR QL: NEGATIVE
BUN SERPL-MCNC: 13 MG/DL (ref 6–20)
BUN/CREAT SERPL: 14 (ref 12–20)
CALCIUM SERPL-MCNC: 8.7 MG/DL (ref 8.5–10.1)
CHLORIDE SERPL-SCNC: 106 MMOL/L (ref 97–108)
CO2 SERPL-SCNC: 28 MMOL/L (ref 21–32)
COLOR UR: ABNORMAL
CREAT SERPL-MCNC: 0.95 MG/DL (ref 0.7–1.3)
EPITH CASTS URNS QL MICRO: ABNORMAL /LPF
ERYTHROCYTE [DISTWIDTH] IN BLOOD BY AUTOMATED COUNT: 14 % (ref 11.5–14.5)
GLUCOSE SERPL-MCNC: 109 MG/DL (ref 65–100)
GLUCOSE UR STRIP.AUTO-MCNC: NEGATIVE MG/DL
HCT VFR BLD AUTO: 44.5 % (ref 36.6–50.3)
HGB BLD-MCNC: 15.4 G/DL (ref 12.1–17)
HGB UR QL STRIP: NEGATIVE
KETONES UR QL STRIP.AUTO: ABNORMAL MG/DL
LEUKOCYTE ESTERASE UR QL STRIP.AUTO: NEGATIVE
MCH RBC QN AUTO: 31.2 PG (ref 26–34)
MCHC RBC AUTO-ENTMCNC: 34.6 G/DL (ref 30–36.5)
MCV RBC AUTO: 90.3 FL (ref 80–99)
NITRITE UR QL STRIP.AUTO: NEGATIVE
NRBC # BLD: 0 K/UL (ref 0–0.01)
NRBC BLD-RTO: 0 PER 100 WBC
PH UR STRIP: 6.5 (ref 5–8)
PLATELET # BLD AUTO: 202 K/UL (ref 150–400)
PMV BLD AUTO: 9.7 FL (ref 8.9–12.9)
POTASSIUM SERPL-SCNC: 3.7 MMOL/L (ref 3.5–5.1)
PROT UR STRIP-MCNC: 30 MG/DL
RBC # BLD AUTO: 4.93 M/UL (ref 4.1–5.7)
RBC #/AREA URNS HPF: ABNORMAL /HPF (ref 0–5)
SODIUM SERPL-SCNC: 136 MMOL/L (ref 136–145)
SP GR UR REFRACTOMETRY: 1.02 (ref 1–1.03)
URINE CULTURE IF INDICATED: ABNORMAL
UROBILINOGEN UR QL STRIP.AUTO: 1 EU/DL (ref 0.2–1)
WBC # BLD AUTO: 15.4 K/UL (ref 4.1–11.1)
WBC URNS QL MICRO: ABNORMAL /HPF (ref 0–4)

## 2025-04-17 PROCEDURE — 85027 COMPLETE CBC AUTOMATED: CPT

## 2025-04-17 PROCEDURE — 97530 THERAPEUTIC ACTIVITIES: CPT

## 2025-04-17 PROCEDURE — 6370000000 HC RX 637 (ALT 250 FOR IP): Performed by: NURSE PRACTITIONER

## 2025-04-17 PROCEDURE — 80048 BASIC METABOLIC PNL TOTAL CA: CPT

## 2025-04-17 PROCEDURE — 71045 X-RAY EXAM CHEST 1 VIEW: CPT

## 2025-04-17 PROCEDURE — 97535 SELF CARE MNGMENT TRAINING: CPT

## 2025-04-17 PROCEDURE — 81001 URINALYSIS AUTO W/SCOPE: CPT

## 2025-04-17 PROCEDURE — 6370000000 HC RX 637 (ALT 250 FOR IP): Performed by: STUDENT IN AN ORGANIZED HEALTH CARE EDUCATION/TRAINING PROGRAM

## 2025-04-17 PROCEDURE — 2500000003 HC RX 250 WO HCPCS: Performed by: NURSE PRACTITIONER

## 2025-04-17 RX ORDER — AMLODIPINE BESYLATE 5 MG/1
5 TABLET ORAL ONCE
Status: COMPLETED | OUTPATIENT
Start: 2025-04-17 | End: 2025-04-17

## 2025-04-17 RX ORDER — ACETAMINOPHEN 500 MG
1000 TABLET ORAL 3 TIMES DAILY
Status: DISCONTINUED | OUTPATIENT
Start: 2025-04-17 | End: 2025-04-17 | Stop reason: HOSPADM

## 2025-04-17 RX ORDER — LIDOCAINE 4 G/G
1 PATCH TOPICAL DAILY
Qty: 30 EACH | Refills: 0 | Status: SHIPPED | OUTPATIENT
Start: 2025-04-17 | End: 2025-05-17

## 2025-04-17 RX ORDER — AMLODIPINE BESYLATE 5 MG/1
10 TABLET ORAL DAILY
Qty: 60 TABLET | Refills: 0 | Status: SHIPPED | OUTPATIENT
Start: 2025-04-17 | End: 2025-05-17

## 2025-04-17 RX ORDER — ACETAMINOPHEN 500 MG
1000 TABLET ORAL 3 TIMES DAILY
Qty: 84 TABLET | Refills: 0 | Status: SHIPPED | OUTPATIENT
Start: 2025-04-17 | End: 2025-05-01

## 2025-04-17 RX ORDER — AMLODIPINE BESYLATE 5 MG/1
5 TABLET ORAL DAILY PRN
Status: DISCONTINUED | OUTPATIENT
Start: 2025-04-17 | End: 2025-04-17

## 2025-04-17 RX ORDER — AMLODIPINE BESYLATE 5 MG/1
10 TABLET ORAL DAILY
Status: DISCONTINUED | OUTPATIENT
Start: 2025-04-18 | End: 2025-04-17 | Stop reason: HOSPADM

## 2025-04-17 RX ADMIN — Medication 1 TABLET: at 08:42

## 2025-04-17 RX ADMIN — ACETAMINOPHEN 650 MG: 325 TABLET ORAL at 08:42

## 2025-04-17 RX ADMIN — AZITHROMYCIN DIHYDRATE 250 MG: 250 TABLET, FILM COATED ORAL at 08:42

## 2025-04-17 RX ADMIN — ACETAMINOPHEN 650 MG: 325 TABLET ORAL at 02:57

## 2025-04-17 RX ADMIN — Medication 2000 UNITS: at 08:42

## 2025-04-17 RX ADMIN — ESCITALOPRAM OXALATE 20 MG: 10 TABLET ORAL at 08:42

## 2025-04-17 RX ADMIN — ROSUVASTATIN 20 MG: 10 TABLET, FILM COATED ORAL at 08:42

## 2025-04-17 RX ADMIN — SODIUM CHLORIDE, PRESERVATIVE FREE 10 ML: 5 INJECTION INTRAVENOUS at 08:41

## 2025-04-17 RX ADMIN — HYDRALAZINE HYDROCHLORIDE 50 MG: 50 TABLET ORAL at 08:42

## 2025-04-17 RX ADMIN — ACETAMINOPHEN 1000 MG: 500 TABLET ORAL at 15:56

## 2025-04-17 RX ADMIN — AMLODIPINE BESYLATE 5 MG: 5 TABLET ORAL at 08:42

## 2025-04-17 RX ADMIN — AMLODIPINE BESYLATE 5 MG: 5 TABLET ORAL at 11:32

## 2025-04-17 ASSESSMENT — PAIN SCALES - GENERAL
PAINLEVEL_OUTOF10: 3
PAINLEVEL_OUTOF10: 3
PAINLEVEL_OUTOF10: 2

## 2025-04-17 ASSESSMENT — PAIN DESCRIPTION - LOCATION: LOCATION: HIP

## 2025-04-17 ASSESSMENT — PAIN DESCRIPTION - ORIENTATION: ORIENTATION: RIGHT

## 2025-04-17 NOTE — PLAN OF CARE
Problem: Occupational Therapy - Adult  Goal: By Discharge: Performs self-care activities at highest level of function for planned discharge setting.  See evaluation for individualized goals.  Description: FUNCTIONAL STATUS PRIOR TO ADMISSION:  patient was INDP with ADLs and IADLs, does not use AD for functional mobility. Walking to the dining room at his facility multiple times a day.     HOME SUPPORT: Lives at [facility] with spouse     Occupational Therapy Goals  Initiated 4/16/2025   1.  Patient will perform upper body dressing with [place] within 7 day(s).  2.  Patient will perform lower body dressing with [place] within 7 day(s).  3.  Patient will perform bathing with [place] within 7 day(s).  4.  Patient will perform toilet transfers with [place] within 7 day(s).  5.  Patient will perform all aspects of toileting with [place] within 7 day(s).  6.  Patient will participate in upper extremity therapeutic exercise/activities with [place] within 7 day(s).    7.  Patient will utilize energy conservation techniques during functional activities with verbal, visual, and tactile cues within 7 day(s).  8.  Patient will improve their Fugl Baumann score by 1-5 points in prep for ADLs within 7 days.   Outcome: Progressing   OCCUPATIONAL THERAPY TREATMENT/Discharge  Patient: Jareth Sutton (89 y.o. male)  Date: 4/17/2025  Primary Diagnosis: Subdural hemorrhage (HCC) [I62.00]  SDH (subdural hematoma) (HCC) [S06.5XAA]       Precautions: Fall Risk, Bed Alarm (SBP <160)                Chart, occupational therapy assessment, plan of care, and goals were reviewed.    ASSESSMENT  Patient continues to benefit from skilled OT services and is progressing towards goals. Pt received supine in bed with supportive daughter present in the room.  Adjusted RW to correct height (daughter purchased at pharmacy).  Pt accessed bathroom using RW to toilet and also brushed teeth at sink with supervision.

## 2025-04-17 NOTE — CARE COORDINATION
Transition of Care Plan: anticipate d/c home to [facility] with in house PT/OT, BRISA faxed PT/OT orders to: [facility] at [phone];    Pt's family to purchase a RW from [facility] OP pharmacy as he was provided a Rollator last year and Rw would not be covered under insurance due to being less than five years    Family transport    RUR: 11%  Prior Level of Functioning: Independent  Disposition: Home with HH  DAIJA: 4/17   Follow up appointments: as directed on AVS  DME needed:   Transportation at discharge: spouse  IM/IMM Medicare/ letter given: 4/16/25  Emergency Contact: Spouse, Sara Sutton [phone]  Discharge Caregiver contacted prior to discharge? yes  Care Conference needed? no  Barriers to discharge: none  -1030-CM reviewed pt chart and noted that pt/ot has recommended HH pt/ot. CM met with pt and spouse at bedside and pt is agreeable to in house HH At [facility]. CM spoke with Dariela of [facility] to inform of d/c plan. [phone] and that in house provider can service pt for pt/ot needs.  Estephanie Reyna RN BSN CCM

## 2025-04-17 NOTE — PLAN OF CARE
Problem: Physical Therapy - Adult  Goal: By Discharge: Performs mobility at highest level of function for planned discharge setting.  See evaluation for individualized goals.  Description: FUNCTIONAL STATUS PRIOR TO ADMISSION: Patient was independent and active without use of DME.    HOME SUPPORT PRIOR TO ADMISSION: The patient lived with spouse.    Physical Therapy Goals  Initiated 4/16/2025  1.  Patient will move from supine to sit and sit to supine, scoot up and down, and roll side to side in bed with supervision/set-up within 7 day(s).    2.  Patient will perform sit to stand with supervision/set-up within 7 day(s).  3.  Patient will transfer from bed to chair and chair to bed with supervision/set-up using the least restrictive device within 7 day(s).  4.  Patient will ambulate with supervision/set-up for 200 feet with the least restrictive device within 7 day(s).   5.  Patient will ascend/descend 4 stairs with one handrail(s) with supervision/set-up within 7 day(s).   6.  Patient will improve Rubio Balance score by 7 points within 7 days.           Outcome: Progressing       PHYSICAL THERAPY TREATMENT    Patient: Jareth Sutton (89 y.o. male)  Date: 4/17/2025  Diagnosis: Subdural hemorrhage (HCC) [I62.00]  SDH (subdural hematoma) (HCC) [S06.5XAA] Subdural hemorrhage (HCC)      Precautions: Restrictions/Precautions  Restrictions/Precautions: Fall Risk, Bed Alarm (SBP <160)            ASSESSMENT:  Patient continues to benefit from skilled PT services and is progressing towards goals. Patient ambulated approx 100 feet with cane and contact guard assist - patient with some lateral sway, decreased stride length and increased anxiety about falling. Patient then ambulated approx 150 feet with rolling walker with stand by to supervision - patient stated that he felt much more comfortable and stable using rolling walker.   Informed  about need for rolling walker for home use.   Further mobilization

## 2025-04-17 NOTE — CARE COORDINATION
04/17/25 1059   Condition of Participation: Discharge Planning   The Plan for Transition of Care is related to the following treatment goals: HH   The Patient and/or Patient Representative was provided with a Choice of Provider? Patient   The Patient and/Or Patient Representative agree with the Discharge Plan? Yes   Freedom of Choice list was provided with basic dialogue that supports the patient's individualized plan of care/goals, treatment preferences, and shares the quality data associated with the providers?  Yes

## 2025-04-17 NOTE — PLAN OF CARE
Problem: Physical Therapy - Adult  Goal: By Discharge: Performs mobility at highest level of function for planned discharge setting.  See evaluation for individualized goals.  Description: FUNCTIONAL STATUS PRIOR TO ADMISSION: Patient was independent and active without use of DME.    HOME SUPPORT PRIOR TO ADMISSION: The patient lived with spouse.    Physical Therapy Goals  Initiated 4/16/2025  1.  Patient will move from supine to sit and sit to supine, scoot up and down, and roll side to side in bed with supervision/set-up within 7 day(s).    2.  Patient will perform sit to stand with supervision/set-up within 7 day(s).  3.  Patient will transfer from bed to chair and chair to bed with supervision/set-up using the least restrictive device within 7 day(s).  4.  Patient will ambulate with supervision/set-up for 200 feet with the least restrictive device within 7 day(s).   5.  Patient will ascend/descend 4 stairs with one handrail(s) with supervision/set-up within 7 day(s).   6.  Patient will improve Rubio Balance score by 7 points within 7 days.           4/17/2025 1444 by Wang Jett, PT  Outcome: Progressing

## 2025-04-17 NOTE — DISCHARGE SUMMARY
Discharge Summary       PATIENT ID: Jareth Sutton  MRN: [id number]   YOB: 1935    DATE OF ADMISSION: 4/15/2025  7:00 PM    DATE OF DISCHARGE: 04/17/25    PRIMARY CARE PROVIDER: Rafal Phan MD     ATTENDING PHYSICIAN: Beatriz Jordan MD   DISCHARGING PROVIDER: Beatriz Jordan MD    To contact this individual call [phone] and ask the  to page.  If unavailable ask to be transferred the Adult Hospitalist Department.    CONSULTATIONS: IP CONSULT TO NEUROSURGERY  IP CONSULT TO CASE MANAGEMENT  IP CONSULT HOME HEALTH    PROCEDURES/SURGERIES: * No surgery found *     ADMITTING DIAGNOSES & HOSPITAL COURSE:   HPI: \"Jareth Sutton is a 89 y.o. male with a pmhx CAD, HTN, dyslipidemia, macular degeneration, past stroke, and depression/anxiety who was admitted to the ICU on 4/15 for left SDH s/p fall.   Serial CT head showed stable subdural hematoma with new hemorrhage in bilateral occipital horns, neurosurgery is okay with downgrade to neurosurgical stepdown given stability of neuroexam \"        DISCHARGE DIAGNOSES / PLAN:      #Subdural hematoma  - Repeat CT head with subdural hemorrhage that is stable, and no hemorrhage in bilateral occipital horns  - Neurosurgery following  - Aspirin on hold per NSGY hold for 2 weeks  - PT/OT/speech  - Systolic blood pressure goal less than 140 > increase norvasc monitor bp op fu w/ pcp      #Hypertensive emergency  - Continue Cardene as needed for systolic blood pressure goal less than 140  - Restart home BP meds adjusted as above   - Holding home Nebivolol in the setting of bradycardia     #Depression/anxiety  - Continue home Lexapro     #Dyslipidemia  - Continue home Zetia, and Crestor    Hip pain  - Had hip x-ray done 2/2025 known arthritis this is chronic for patient recommend scheduled Tylenol 1000 mg 3 times daily, lidocaine patch if no improvement recommend outpatient follow-up with orthopedics/pcp    Greater than 50 minutes  [phone] - F [phone]  [address], [facility] [address]      Phone: [phone]   acetaminophen 500 MG tablet  amLODIPine 5 MG tablet  lidocaine 4 % external patch           NOTIFY YOUR PHYSICIAN FOR ANY OF THE FOLLOWING:   Fever over 101 degrees for 24 hours.   Chest pain, shortness of breath, fever, chills, nausea, vomiting, diarrhea, change in mentation, falling, weakness, bleeding. Severe pain or pain not relieved by medications.  Or, any other signs or symptoms that you may have questions about.    DISPOSITION:    Home With:   OT  PT x HH  RN       Long term SNF/Inpatient Rehab    Independent/assisted living    Hospice    Other:       PATIENT CONDITION AT DISCHARGE:     Functional status    Poor    x Deconditioned     Independent      Cognition     Lucid     Forgetful     Dementia      Catheters/lines (plus indication)    Benjamin     PICC     PEG     None      Code status     Full code     DNR      PHYSICAL EXAMINATION AT DISCHARGE:    General : alert x 3, awake, no acute distress,   HEENT: PEERL, EOMI, moist mucus membrane bruising noted over L side of face/perorbital  Neck: supple, no JVD, no meningeal signs  Chest: Clear to auscultation bilaterally   CVS: S1 S2 heard, Capillary refill less than 2 seconds  Abd: soft/ Non tender, non distended, BS physiological,   Ext: no clubbing, no cyanosis, no edema, brisk 2+ DP pulses  Neuro/Psych: pleasant mood and affect, CN 2-12 grossly intact, sensory grossly within normal limit, Strength 5/5 in all extremities  Skin: warm     CHRONIC MEDICAL DIAGNOSES:  Principal Problem:    Subdural hemorrhage (HCC)  Active Problems:    SDH (subdural hematoma) (HCC)  Resolved Problems:    * No resolved hospital problems. *        Greater than 31 minutes were spent with the patient on counseling and coordination of care    Signed:   Beatriz Jordan MD  4/17/2025  2:56 PM

## 2025-04-17 NOTE — PLAN OF CARE
Problem: Chronic Conditions and Co-morbidities  Goal: Patient's chronic conditions and co-morbidity symptoms are monitored and maintained or improved  4/17/2025 0401 by Tamia Kerns RN  Outcome: Progressing  Flowsheets (Taken 4/16/2025 2000)  Care Plan - Patient's Chronic Conditions and Co-Morbidity Symptoms are Monitored and Maintained or Improved: Monitor and assess patient's chronic conditions and comorbid symptoms for stability, deterioration, or improvement  4/16/2025 1552 by Tonie Goldberg RN  Outcome: Progressing     Problem: Discharge Planning  Goal: Discharge to home or other facility with appropriate resources  4/17/2025 0401 by Tamia Kerns RN  Outcome: Progressing  Flowsheets (Taken 4/16/2025 2000)  Discharge to home or other facility with appropriate resources: Identify barriers to discharge with patient and caregiver  4/16/2025 1552 by Tonie Goldberg RN  Outcome: Progressing     Problem: Pain  Goal: Verbalizes/displays adequate comfort level or baseline comfort level  4/17/2025 0401 by Tamia Kerns RN  Outcome: Progressing  4/16/2025 1552 by Tonie Goldberg RN  Outcome: Progressing     Problem: Safety - Adult  Goal: Free from fall injury  4/17/2025 0401 by Tamia Kerns RN  Outcome: Progressing  Flowsheets (Taken 4/16/2025 2000)  Free From Fall Injury: Instruct family/caregiver on patient safety  4/16/2025 1552 by Tonie Goldberg RN  Outcome: Progressing

## 2025-04-17 NOTE — PLAN OF CARE
Problem: Chronic Conditions and Co-morbidities  Goal: Patient's chronic conditions and co-morbidity symptoms are monitored and maintained or improved  4/17/2025 1213 by Tonie Goldberg RN  Outcome: Progressing  4/17/2025 0401 by Tamia Kerns RN  Outcome: Progressing  Flowsheets (Taken 4/16/2025 2000)  Care Plan - Patient's Chronic Conditions and Co-Morbidity Symptoms are Monitored and Maintained or Improved: Monitor and assess patient's chronic conditions and comorbid symptoms for stability, deterioration, or improvement     Problem: Discharge Planning  Goal: Discharge to home or other facility with appropriate resources  4/17/2025 1213 by Tonie Goldberg RN  Outcome: Progressing  4/17/2025 0401 by Tamia Kerns RN  Outcome: Progressing  Flowsheets (Taken 4/16/2025 2000)  Discharge to home or other facility with appropriate resources: Identify barriers to discharge with patient and caregiver     Problem: Pain  Goal: Verbalizes/displays adequate comfort level or baseline comfort level  4/17/2025 1213 by Tonie Goldberg RN  Outcome: Progressing  4/17/2025 0401 by Tamia Kerns RN  Outcome: Progressing     Problem: Safety - Adult  Goal: Free from fall injury  4/17/2025 1213 by Tonie Goldberg RN  Outcome: Progressing  4/17/2025 0401 by Tamia Kerns RN  Outcome: Progressing  Flowsheets (Taken 4/16/2025 2000)  Free From Fall Injury: Instruct family/caregiver on patient safety      No

## 2025-04-18 ENCOUNTER — TELEPHONE (OUTPATIENT)
Facility: CLINIC | Age: 89
End: 2025-04-18

## 2025-04-18 NOTE — TELEPHONE ENCOUNTER
Rafal Phan MD     The above patient was discharged on 4/17/25 from [facility] with a diagnosis of subdural hemorrhage. He has a REUBEN appt scheduled. Please call the patient within the required two business days, and document that call using the below smartphrase: .TCMOFFICE.    Thank you.

## 2025-04-19 ENCOUNTER — HOSPITAL ENCOUNTER (INPATIENT)
Facility: HOSPITAL | Age: 89
LOS: 1 days | Discharge: HOME | DRG: 083 | End: 2025-04-20
Attending: EMERGENCY MEDICINE | Admitting: INTERNAL MEDICINE
Payer: MEDICARE

## 2025-04-19 ENCOUNTER — APPOINTMENT (OUTPATIENT)
Facility: HOSPITAL | Age: 89
DRG: 083 | End: 2025-04-19
Payer: MEDICARE

## 2025-04-19 DIAGNOSIS — R51.9 NONINTRACTABLE HEADACHE, UNSPECIFIED CHRONICITY PATTERN, UNSPECIFIED HEADACHE TYPE: Primary | ICD-10-CM

## 2025-04-19 LAB
ALBUMIN SERPL-MCNC: 3.4 G/DL (ref 3.5–5)
ALBUMIN/GLOB SERPL: 0.8 (ref 1.1–2.2)
ALP SERPL-CCNC: 89 U/L (ref 45–117)
ALT SERPL-CCNC: 33 U/L (ref 12–78)
ANION GAP SERPL CALC-SCNC: 8 MMOL/L (ref 2–12)
AST SERPL-CCNC: 18 U/L (ref 15–37)
BASOPHILS # BLD: 0.03 K/UL (ref 0–0.1)
BASOPHILS NFR BLD: 0.2 % (ref 0–1)
BILIRUB SERPL-MCNC: 0.8 MG/DL (ref 0.2–1)
BUN SERPL-MCNC: 19 MG/DL (ref 6–20)
BUN/CREAT SERPL: 18 (ref 12–20)
CALCIUM SERPL-MCNC: 9.6 MG/DL (ref 8.5–10.1)
CHLORIDE SERPL-SCNC: 99 MMOL/L (ref 97–108)
CO2 SERPL-SCNC: 25 MMOL/L (ref 21–32)
COMMENT:: NORMAL
CREAT SERPL-MCNC: 1.07 MG/DL (ref 0.7–1.3)
DIFFERENTIAL METHOD BLD: ABNORMAL
EKG ATRIAL RATE: 58 BPM
EKG DIAGNOSIS: NORMAL
EKG P AXIS: 43 DEGREES
EKG P-R INTERVAL: 206 MS
EKG Q-T INTERVAL: 412 MS
EKG QRS DURATION: 96 MS
EKG QTC CALCULATION (BAZETT): 404 MS
EKG R AXIS: -32 DEGREES
EKG T AXIS: 9 DEGREES
EKG VENTRICULAR RATE: 58 BPM
EOSINOPHIL # BLD: 0.02 K/UL (ref 0–0.4)
EOSINOPHIL NFR BLD: 0.1 % (ref 0–7)
ERYTHROCYTE [DISTWIDTH] IN BLOOD BY AUTOMATED COUNT: 13.8 % (ref 11.5–14.5)
GLOBULIN SER CALC-MCNC: 4.4 G/DL (ref 2–4)
GLUCOSE SERPL-MCNC: 102 MG/DL (ref 65–100)
HCT VFR BLD AUTO: 48.5 % (ref 36.6–50.3)
HGB BLD-MCNC: 16.1 G/DL (ref 12.1–17)
IMM GRANULOCYTES # BLD AUTO: 0.12 K/UL (ref 0–0.04)
IMM GRANULOCYTES NFR BLD AUTO: 0.8 % (ref 0–0.5)
INR PPP: 1 (ref 0.9–1.1)
LYMPHOCYTES # BLD: 1.16 K/UL (ref 0.8–3.5)
LYMPHOCYTES NFR BLD: 7.9 % (ref 12–49)
MCH RBC QN AUTO: 30.4 PG (ref 26–34)
MCHC RBC AUTO-ENTMCNC: 33.2 G/DL (ref 30–36.5)
MCV RBC AUTO: 91.5 FL (ref 80–99)
MONOCYTES # BLD: 0.94 K/UL (ref 0–1)
MONOCYTES NFR BLD: 6.4 % (ref 5–13)
NEUTS SEG # BLD: 12.41 K/UL (ref 1.8–8)
NEUTS SEG NFR BLD: 84.6 % (ref 32–75)
NRBC # BLD: 0 K/UL (ref 0–0.01)
NRBC BLD-RTO: 0 PER 100 WBC
PLATELET # BLD AUTO: 257 K/UL (ref 150–400)
PMV BLD AUTO: 10 FL (ref 8.9–12.9)
POTASSIUM SERPL-SCNC: 3.7 MMOL/L (ref 3.5–5.1)
PROT SERPL-MCNC: 7.8 G/DL (ref 6.4–8.2)
PROTHROMBIN TIME: 10.5 SEC (ref 9.2–11.2)
RBC # BLD AUTO: 5.3 M/UL (ref 4.1–5.7)
SODIUM SERPL-SCNC: 132 MMOL/L (ref 136–145)
SPECIMEN HOLD: NORMAL
WBC # BLD AUTO: 14.7 K/UL (ref 4.1–11.1)

## 2025-04-19 PROCEDURE — 96376 TX/PRO/DX INJ SAME DRUG ADON: CPT

## 2025-04-19 PROCEDURE — 80053 COMPREHEN METABOLIC PANEL: CPT

## 2025-04-19 PROCEDURE — 85025 COMPLETE CBC W/AUTO DIFF WBC: CPT

## 2025-04-19 PROCEDURE — 96375 TX/PRO/DX INJ NEW DRUG ADDON: CPT

## 2025-04-19 PROCEDURE — 96361 HYDRATE IV INFUSION ADD-ON: CPT

## 2025-04-19 PROCEDURE — 70450 CT HEAD/BRAIN W/O DYE: CPT

## 2025-04-19 PROCEDURE — 2060000000 HC ICU INTERMEDIATE R&B

## 2025-04-19 PROCEDURE — 6370000000 HC RX 637 (ALT 250 FOR IP): Performed by: EMERGENCY MEDICINE

## 2025-04-19 PROCEDURE — 99285 EMERGENCY DEPT VISIT HI MDM: CPT

## 2025-04-19 PROCEDURE — 6360000002 HC RX W HCPCS: Performed by: EMERGENCY MEDICINE

## 2025-04-19 PROCEDURE — 85610 PROTHROMBIN TIME: CPT

## 2025-04-19 PROCEDURE — 2580000003 HC RX 258: Performed by: EMERGENCY MEDICINE

## 2025-04-19 PROCEDURE — 96374 THER/PROPH/DIAG INJ IV PUSH: CPT

## 2025-04-19 RX ORDER — 0.9 % SODIUM CHLORIDE 0.9 %
500 INTRAVENOUS SOLUTION INTRAVENOUS ONCE
Status: COMPLETED | OUTPATIENT
Start: 2025-04-19 | End: 2025-04-19

## 2025-04-19 RX ORDER — DIPHENHYDRAMINE HYDROCHLORIDE 50 MG/ML
25 INJECTION, SOLUTION INTRAMUSCULAR; INTRAVENOUS
Status: COMPLETED | OUTPATIENT
Start: 2025-04-19 | End: 2025-04-19

## 2025-04-19 RX ORDER — FENTANYL CITRATE 50 UG/ML
25 INJECTION, SOLUTION INTRAMUSCULAR; INTRAVENOUS
Refills: 0 | Status: COMPLETED | OUTPATIENT
Start: 2025-04-19 | End: 2025-04-19

## 2025-04-19 RX ORDER — HYDRALAZINE HYDROCHLORIDE 25 MG/1
50 TABLET, FILM COATED ORAL
Status: COMPLETED | OUTPATIENT
Start: 2025-04-19 | End: 2025-04-19

## 2025-04-19 RX ORDER — PROCHLORPERAZINE EDISYLATE 5 MG/ML
10 INJECTION INTRAMUSCULAR; INTRAVENOUS ONCE
Status: COMPLETED | OUTPATIENT
Start: 2025-04-19 | End: 2025-04-19

## 2025-04-19 RX ORDER — ACETAMINOPHEN 500 MG
1000 TABLET ORAL
Status: COMPLETED | OUTPATIENT
Start: 2025-04-19 | End: 2025-04-19

## 2025-04-19 RX ADMIN — FENTANYL CITRATE 25 MCG: 50 INJECTION INTRAMUSCULAR; INTRAVENOUS at 12:27

## 2025-04-19 RX ADMIN — HYDRALAZINE HYDROCHLORIDE 50 MG: 25 TABLET ORAL at 19:57

## 2025-04-19 RX ADMIN — FENTANYL CITRATE 25 MCG: 50 INJECTION INTRAMUSCULAR; INTRAVENOUS at 17:12

## 2025-04-19 RX ADMIN — ACETAMINOPHEN 1000 MG: 500 TABLET ORAL at 19:04

## 2025-04-19 RX ADMIN — PROCHLORPERAZINE EDISYLATE 10 MG: 5 INJECTION INTRAMUSCULAR; INTRAVENOUS at 12:30

## 2025-04-19 RX ADMIN — SODIUM CHLORIDE 500 ML: 0.9 INJECTION, SOLUTION INTRAVENOUS at 12:22

## 2025-04-19 RX ADMIN — DIPHENHYDRAMINE HYDROCHLORIDE 25 MG: 50 INJECTION INTRAMUSCULAR; INTRAVENOUS at 12:25

## 2025-04-19 ASSESSMENT — PAIN DESCRIPTION - LOCATION
LOCATION: HEAD

## 2025-04-19 ASSESSMENT — PAIN DESCRIPTION - PAIN TYPE
TYPE: ACUTE PAIN

## 2025-04-19 ASSESSMENT — PAIN DESCRIPTION - ORIENTATION
ORIENTATION: ANTERIOR
ORIENTATION: RIGHT;LEFT;LOWER;MID

## 2025-04-19 ASSESSMENT — PAIN - FUNCTIONAL ASSESSMENT
PAIN_FUNCTIONAL_ASSESSMENT: PREVENTS OR INTERFERES WITH MANY ACTIVE NOT PASSIVE ACTIVITIES
PAIN_FUNCTIONAL_ASSESSMENT: NONE - DENIES PAIN
PAIN_FUNCTIONAL_ASSESSMENT: PREVENTS OR INTERFERES WITH MANY ACTIVE NOT PASSIVE ACTIVITIES
PAIN_FUNCTIONAL_ASSESSMENT: 0-10
PAIN_FUNCTIONAL_ASSESSMENT: PREVENTS OR INTERFERES WITH MANY ACTIVE NOT PASSIVE ACTIVITIES
PAIN_FUNCTIONAL_ASSESSMENT: PREVENTS OR INTERFERES SOME ACTIVE ACTIVITIES AND ADLS
PAIN_FUNCTIONAL_ASSESSMENT: ACTIVITIES ARE NOT PREVENTED
PAIN_FUNCTIONAL_ASSESSMENT: PREVENTS OR INTERFERES SOME ACTIVE ACTIVITIES AND ADLS

## 2025-04-19 ASSESSMENT — PAIN SCALES - GENERAL
PAINLEVEL_OUTOF10: 10
PAINLEVEL_OUTOF10: 5
PAINLEVEL_OUTOF10: 8
PAINLEVEL_OUTOF10: 8
PAINLEVEL_OUTOF10: 7
PAINLEVEL_OUTOF10: 8
PAINLEVEL_OUTOF10: 8
PAINLEVEL_OUTOF10: 10

## 2025-04-19 ASSESSMENT — PAIN DESCRIPTION - DESCRIPTORS
DESCRIPTORS: ACHING
DESCRIPTORS: ACHING
DESCRIPTORS: SHARP
DESCRIPTORS: ACHING
DESCRIPTORS: THROBBING
DESCRIPTORS: ACHING

## 2025-04-19 ASSESSMENT — PAIN DESCRIPTION - ONSET
ONSET: PROGRESSIVE
ONSET: ON-GOING

## 2025-04-19 ASSESSMENT — PAIN DESCRIPTION - FREQUENCY
FREQUENCY: CONTINUOUS

## 2025-04-19 NOTE — ED NOTES
3:56 PM  Change of shift. Care of patient taken over from Dr Goldberg; H&P reviewed, bedside handoff complete.  Awaiting CT head to r/o recurrent/worsening SDH.  Headache improved with compazine and benadryl.       6:05 PM  Discussed with Dr Schafer, neurosurgery.  Recent SDH, discharged yesterday.  Today increasing headache.  CT head stable but family very anxious and requesting to speak with neurosurgery.      Perfect Serve Consult for Admission  7:42 PM    ED Room Number: ER12/12  Patient Name and age:  Jareth Sutton 89 y.o.  male  Working Diagnosis:   1. Nonintractable headache, unspecified chronicity pattern, unspecified headache type        COVID-19 Suspicion: No  Sepsis present:  No  Reassessment needed: No  Code Status:  Full Code  Readmission: Yes  Isolation Requirements: no  Recommended Level of Care: telemetry  Department: [facility] Adult ED - [phone]  Consulting Provider: Lyubov      Other:  h/o  parafalcine SDH after fall on 4/15.  Discharged yesterday.  Today had increased headache and difficulty with ambulation.  Repeat CT head today stable but family not comfortable with patient returning home given generalized weakness and headache.  Discussed with Neurosurgery, Dr Schafer who is aware.           Jaqueline Samaniego MD  04/19/25 4164

## 2025-04-19 NOTE — ED PROVIDER NOTES
[facility] EMERGENCY DEPARTMENT  EMERGENCY DEPARTMENT ENCOUNTER      Pt Name: Jareth Sutton  MRN: [id number]  Birthdate 1935  Date of evaluation: 4/19/2025  Provider: Gumaro Goldberg DO      HISTORY OF PRESENT ILLNESS      HPI  89-year-old male with recent SDH, discharged yesterday, returns to the emergency department noting increased headache this morning that has been unalleviated by Tylenol.  He states that his last dose of Tylenol was at about 7 AM.  He denies any further trauma to his head.  He notes some associated nausea but denies any light sensitivity, no numbness or weakness or vision changes or speech changes.      Nursing Notes were reviewed.    REVIEW OF SYSTEMS         Review of Systems        PAST MEDICAL HISTORY     Past Medical History:   Diagnosis Date    Allergy to ACE inhibitors 11/28/2017    Anxiety 11/28/2017    ASCVD (arteriosclerotic cardiovascular disease) 11/28/2017    CAD (coronary artery disease) 2000    MI     Cataract     Cataract 04/2019    Depression 11/28/2017    ED (erectile dysfunction) 11/28/2017    Elevated PSA 11/28/2017    Former smoker 11/28/2017    Glucose intolerance (impaired glucose tolerance) 11/28/2017    Hypercholesterolemia     Hypertension     Insomnia 11/28/2017    Macular degeneration     On statin therapy 11/28/2017    Other ill-defined conditions(799.89)     elevated lipids    Psychiatric disorder     depression /anxiety    Stroke (HCC)     TIA         SURGICAL HISTORY       Past Surgical History:   Procedure Laterality Date    COLONOSCOPY N/A 7/30/2019    COLONOSCOPY performed by Marck Salinas Jr., MD at hospitals ENDOSCOPY    COLONOSCOPY,MADISON HALL,SNARE  7/30/2019         COLORECTAL SCRN; HI RISK IND  7/30/2019         COLORECTAL SCRN; HI RISK IND  9/10/2014         COLSC FLX W/RMVL OF TUMOR POLYP LESION SNARE TQ  9/10/2014         HEENT Bilateral 06/03/2019    eye cataract    HERNIA REPAIR  08/04/2021    VENTRAL HERNIA REPAIR WITH MESH    Skin:     General: Skin is warm and dry.   Neurological:      General: No focal deficit present.      Mental Status: He is alert and oriented to person, place, and time.           EMERGENCY DEPARTMENT COURSE and DIFFERENTIAL DIAGNOSIS/MDM:   Vitals:    Vitals:    04/19/25 1030   Weight: 83.3 kg (183 lb 10.3 oz)   Height: 1.727 m (5' 8\")         Medical Decision Making  Amount and/or Complexity of Data Reviewed  Labs: ordered.  Radiology: ordered.    Risk  Prescription drug management.    89-year-old male with recent admission for ICH presents with increased headache this morning.  Afebrile with vital signs stable no acute distress, neuro intact, as above.  Treated symptomatically in the ED with IV fluid bolus, Benadryl, Compazine, fentanyl with improvement in symptoms.    Labs returned showing WBC 14.7, normal Hg at 16.1, normal PLT, unremarkable electrolytes, bicarb, anion gap, renal function, LFTs, normal INR.  CT head ordered to further evaluate.  While awaiting this result his care was signed out to Dr. Samaniego at 3 PM.  Please see her note for further information regarding remainder of his care while in the ED.      REASSESSMENT          CONSULTS:  None    PROCEDURES:     Procedures        (Please note that portions of this note were completed with a voice recognition program.  Efforts were made to edit the dictations but occasionally words are mis-transcribed.)    Gumaro Goldberg DO (electronically signed)  Emergency Attending Physician              Gumaro Goldberg DO  04/19/25 1945

## 2025-04-19 NOTE — ED TRIAGE NOTES
Patient was discharged home yesterday after falling on Tuesday and being diagnosed with head bleed. Today has had headache that is unrelieved by Tylenol.

## 2025-04-20 ENCOUNTER — APPOINTMENT (OUTPATIENT)
Facility: HOSPITAL | Age: 89
DRG: 083 | End: 2025-04-20
Payer: MEDICARE

## 2025-04-20 VITALS
SYSTOLIC BLOOD PRESSURE: 162 MMHG | OXYGEN SATURATION: 92 % | BODY MASS INDEX: 25.02 KG/M2 | HEIGHT: 68 IN | WEIGHT: 165.1 LBS | HEART RATE: 70 BPM | TEMPERATURE: 98.4 F | RESPIRATION RATE: 27 BRPM | DIASTOLIC BLOOD PRESSURE: 78 MMHG

## 2025-04-20 LAB
ALBUMIN SERPL-MCNC: 3 G/DL (ref 3.5–5)
ALBUMIN/GLOB SERPL: 0.8 (ref 1.1–2.2)
ALP SERPL-CCNC: 80 U/L (ref 45–117)
ALT SERPL-CCNC: 26 U/L (ref 12–78)
ANION GAP SERPL CALC-SCNC: 9 MMOL/L (ref 2–12)
AST SERPL-CCNC: 11 U/L (ref 15–37)
BASOPHILS # BLD: 0.05 K/UL (ref 0–0.1)
BASOPHILS NFR BLD: 0.4 % (ref 0–1)
BILIRUB SERPL-MCNC: 0.8 MG/DL (ref 0.2–1)
BUN SERPL-MCNC: 19 MG/DL (ref 6–20)
BUN/CREAT SERPL: 22 (ref 12–20)
CALCIUM SERPL-MCNC: 9.3 MG/DL (ref 8.5–10.1)
CHLORIDE SERPL-SCNC: 101 MMOL/L (ref 97–108)
CO2 SERPL-SCNC: 24 MMOL/L (ref 21–32)
CREAT SERPL-MCNC: 0.86 MG/DL (ref 0.7–1.3)
CRP SERPL-MCNC: 1.26 MG/DL (ref 0–0.3)
DIFFERENTIAL METHOD BLD: ABNORMAL
EOSINOPHIL # BLD: 0.01 K/UL (ref 0–0.4)
EOSINOPHIL NFR BLD: 0.1 % (ref 0–7)
ERYTHROCYTE [DISTWIDTH] IN BLOOD BY AUTOMATED COUNT: 14 % (ref 11.5–14.5)
FOLATE SERPL-MCNC: 23.3 NG/ML (ref 5–21)
GLOBULIN SER CALC-MCNC: 4 G/DL (ref 2–4)
GLUCOSE SERPL-MCNC: 116 MG/DL (ref 65–100)
HCT VFR BLD AUTO: 45.3 % (ref 36.6–50.3)
HGB BLD-MCNC: 15.5 G/DL (ref 12.1–17)
IMM GRANULOCYTES # BLD AUTO: 0.07 K/UL (ref 0–0.04)
IMM GRANULOCYTES NFR BLD AUTO: 0.5 % (ref 0–0.5)
LYMPHOCYTES # BLD: 1.53 K/UL (ref 0.8–3.5)
LYMPHOCYTES NFR BLD: 11.5 % (ref 12–49)
MAGNESIUM SERPL-MCNC: 2.1 MG/DL (ref 1.6–2.4)
MCH RBC QN AUTO: 30.4 PG (ref 26–34)
MCHC RBC AUTO-ENTMCNC: 34.2 G/DL (ref 30–36.5)
MCV RBC AUTO: 88.8 FL (ref 80–99)
MONOCYTES # BLD: 0.82 K/UL (ref 0–1)
MONOCYTES NFR BLD: 6.1 % (ref 5–13)
NEUTS SEG # BLD: 10.88 K/UL (ref 1.8–8)
NEUTS SEG NFR BLD: 81.4 % (ref 32–75)
NRBC # BLD: 0 K/UL (ref 0–0.01)
NRBC BLD-RTO: 0 PER 100 WBC
PHOSPHATE SERPL-MCNC: 3 MG/DL (ref 2.6–4.7)
PLATELET # BLD AUTO: 253 K/UL (ref 150–400)
PMV BLD AUTO: 9.8 FL (ref 8.9–12.9)
POTASSIUM SERPL-SCNC: 4 MMOL/L (ref 3.5–5.1)
PROT SERPL-MCNC: 7 G/DL (ref 6.4–8.2)
RBC # BLD AUTO: 5.1 M/UL (ref 4.1–5.7)
SODIUM SERPL-SCNC: 134 MMOL/L (ref 136–145)
TROPONIN I SERPL HS-MCNC: 30 NG/L (ref 0–76)
TSH SERPL DL<=0.05 MIU/L-ACNC: 0.67 UIU/ML (ref 0.36–3.74)
VIT B12 SERPL-MCNC: 510 PG/ML (ref 193–986)
WBC # BLD AUTO: 13.4 K/UL (ref 4.1–11.1)

## 2025-04-20 PROCEDURE — 84100 ASSAY OF PHOSPHORUS: CPT

## 2025-04-20 PROCEDURE — 6360000002 HC RX W HCPCS: Performed by: INTERNAL MEDICINE

## 2025-04-20 PROCEDURE — 84443 ASSAY THYROID STIM HORMONE: CPT

## 2025-04-20 PROCEDURE — 83735 ASSAY OF MAGNESIUM: CPT

## 2025-04-20 PROCEDURE — 71045 X-RAY EXAM CHEST 1 VIEW: CPT

## 2025-04-20 PROCEDURE — 2500000003 HC RX 250 WO HCPCS: Performed by: INTERNAL MEDICINE

## 2025-04-20 PROCEDURE — 86140 C-REACTIVE PROTEIN: CPT

## 2025-04-20 PROCEDURE — 6370000000 HC RX 637 (ALT 250 FOR IP): Performed by: INTERNAL MEDICINE

## 2025-04-20 PROCEDURE — 92610 EVALUATE SWALLOWING FUNCTION: CPT

## 2025-04-20 PROCEDURE — 80053 COMPREHEN METABOLIC PANEL: CPT

## 2025-04-20 PROCEDURE — 2580000003 HC RX 258: Performed by: INTERNAL MEDICINE

## 2025-04-20 PROCEDURE — 82746 ASSAY OF FOLIC ACID SERUM: CPT

## 2025-04-20 PROCEDURE — 84484 ASSAY OF TROPONIN QUANT: CPT

## 2025-04-20 PROCEDURE — 6370000000 HC RX 637 (ALT 250 FOR IP): Performed by: HOSPITALIST

## 2025-04-20 PROCEDURE — 82607 VITAMIN B-12: CPT

## 2025-04-20 PROCEDURE — 85025 COMPLETE CBC W/AUTO DIFF WBC: CPT

## 2025-04-20 RX ORDER — ACETAMINOPHEN 650 MG/1
650 SUPPOSITORY RECTAL EVERY 6 HOURS PRN
Status: DISCONTINUED | OUTPATIENT
Start: 2025-04-20 | End: 2025-04-20 | Stop reason: HOSPADM

## 2025-04-20 RX ORDER — NEBIVOLOL 5 MG/1
5 TABLET ORAL DAILY
COMMUNITY
Start: 2025-03-04

## 2025-04-20 RX ORDER — VITAMIN B COMPLEX
2000 TABLET ORAL DAILY
Status: DISCONTINUED | OUTPATIENT
Start: 2025-04-20 | End: 2025-04-20 | Stop reason: HOSPADM

## 2025-04-20 RX ORDER — METOPROLOL SUCCINATE 50 MG/1
50 TABLET, EXTENDED RELEASE ORAL DAILY
Status: DISCONTINUED | OUTPATIENT
Start: 2025-04-20 | End: 2025-04-20 | Stop reason: HOSPADM

## 2025-04-20 RX ORDER — POLYETHYLENE GLYCOL 3350 17 G/17G
17 POWDER, FOR SOLUTION ORAL DAILY PRN
Status: DISCONTINUED | OUTPATIENT
Start: 2025-04-20 | End: 2025-04-20 | Stop reason: HOSPADM

## 2025-04-20 RX ORDER — EZETIMIBE 10 MG/1
10 TABLET ORAL NIGHTLY
Status: DISCONTINUED | OUTPATIENT
Start: 2025-04-20 | End: 2025-04-20 | Stop reason: HOSPADM

## 2025-04-20 RX ORDER — BUTALBITAL, ACETAMINOPHEN AND CAFFEINE 50; 325; 40 MG/1; MG/1; MG/1
1 TABLET ORAL EVERY 6 HOURS PRN
Qty: 20 TABLET | Refills: 0 | Status: SHIPPED | OUTPATIENT
Start: 2025-04-20 | End: 2025-04-24 | Stop reason: SDUPTHER

## 2025-04-20 RX ORDER — BENZONATATE 100 MG/1
100 CAPSULE ORAL 3 TIMES DAILY PRN
Status: DISCONTINUED | OUTPATIENT
Start: 2025-04-20 | End: 2025-04-20 | Stop reason: HOSPADM

## 2025-04-20 RX ORDER — AMLODIPINE BESYLATE 5 MG/1
10 TABLET ORAL DAILY
Status: DISCONTINUED | OUTPATIENT
Start: 2025-04-20 | End: 2025-04-20 | Stop reason: HOSPADM

## 2025-04-20 RX ORDER — OXYCODONE HYDROCHLORIDE 5 MG/1
5 TABLET ORAL EVERY 4 HOURS PRN
Refills: 0 | Status: DISCONTINUED | OUTPATIENT
Start: 2025-04-20 | End: 2025-04-20 | Stop reason: HOSPADM

## 2025-04-20 RX ORDER — M-VIT,TX,IRON,MINS/CALC/FOLIC 27MG-0.4MG
1 TABLET ORAL DAILY
Status: DISCONTINUED | OUTPATIENT
Start: 2025-04-20 | End: 2025-04-20 | Stop reason: HOSPADM

## 2025-04-20 RX ORDER — AMLODIPINE BESYLATE 5 MG/1
5 TABLET ORAL DAILY
Status: DISCONTINUED | OUTPATIENT
Start: 2025-04-20 | End: 2025-04-20

## 2025-04-20 RX ORDER — ROSUVASTATIN CALCIUM 10 MG/1
20 TABLET, COATED ORAL DAILY
Status: DISCONTINUED | OUTPATIENT
Start: 2025-04-20 | End: 2025-04-20 | Stop reason: HOSPADM

## 2025-04-20 RX ORDER — ESCITALOPRAM OXALATE 10 MG/1
20 TABLET ORAL DAILY
Status: DISCONTINUED | OUTPATIENT
Start: 2025-04-20 | End: 2025-04-20 | Stop reason: HOSPADM

## 2025-04-20 RX ORDER — ACETAMINOPHEN 500 MG
1000 TABLET ORAL EVERY 12 HOURS
Status: DISCONTINUED | OUTPATIENT
Start: 2025-04-20 | End: 2025-04-20 | Stop reason: HOSPADM

## 2025-04-20 RX ORDER — SODIUM CHLORIDE 0.9 % (FLUSH) 0.9 %
5-40 SYRINGE (ML) INJECTION PRN
Status: DISCONTINUED | OUTPATIENT
Start: 2025-04-20 | End: 2025-04-20 | Stop reason: HOSPADM

## 2025-04-20 RX ORDER — MORPHINE SULFATE 2 MG/ML
2 INJECTION, SOLUTION INTRAMUSCULAR; INTRAVENOUS EVERY 4 HOURS PRN
Refills: 0 | Status: DISCONTINUED | OUTPATIENT
Start: 2025-04-20 | End: 2025-04-20 | Stop reason: HOSPADM

## 2025-04-20 RX ORDER — ONDANSETRON 2 MG/ML
4 INJECTION INTRAMUSCULAR; INTRAVENOUS EVERY 6 HOURS PRN
Status: DISCONTINUED | OUTPATIENT
Start: 2025-04-20 | End: 2025-04-20 | Stop reason: HOSPADM

## 2025-04-20 RX ORDER — HYDRALAZINE HYDROCHLORIDE 50 MG/1
50 TABLET, FILM COATED ORAL 2 TIMES DAILY
Status: DISCONTINUED | OUTPATIENT
Start: 2025-04-20 | End: 2025-04-20 | Stop reason: HOSPADM

## 2025-04-20 RX ORDER — ACETAMINOPHEN 325 MG/1
650 TABLET ORAL EVERY 6 HOURS PRN
Status: DISCONTINUED | OUTPATIENT
Start: 2025-04-20 | End: 2025-04-20 | Stop reason: HOSPADM

## 2025-04-20 RX ORDER — AZITHROMYCIN 250 MG/1
250 TABLET, FILM COATED ORAL DAILY
Status: DISCONTINUED | OUTPATIENT
Start: 2025-04-20 | End: 2025-04-20 | Stop reason: DRUGHIGH

## 2025-04-20 RX ORDER — LIDOCAINE 4 G/G
1 PATCH TOPICAL DAILY
Status: DISCONTINUED | OUTPATIENT
Start: 2025-04-20 | End: 2025-04-20 | Stop reason: HOSPADM

## 2025-04-20 RX ORDER — SODIUM CHLORIDE 9 MG/ML
INJECTION, SOLUTION INTRAVENOUS PRN
Status: DISCONTINUED | OUTPATIENT
Start: 2025-04-20 | End: 2025-04-20 | Stop reason: HOSPADM

## 2025-04-20 RX ORDER — ACETAMINOPHEN 500 MG
1000 TABLET ORAL 3 TIMES DAILY
Status: DISCONTINUED | OUTPATIENT
Start: 2025-04-20 | End: 2025-04-20

## 2025-04-20 RX ORDER — BUTALBITAL, ACETAMINOPHEN AND CAFFEINE 50; 325; 40 MG/1; MG/1; MG/1
1 TABLET ORAL EVERY 4 HOURS PRN
Status: DISCONTINUED | OUTPATIENT
Start: 2025-04-20 | End: 2025-04-20 | Stop reason: HOSPADM

## 2025-04-20 RX ORDER — SODIUM CHLORIDE 0.9 % (FLUSH) 0.9 %
5-40 SYRINGE (ML) INJECTION EVERY 12 HOURS SCHEDULED
Status: DISCONTINUED | OUTPATIENT
Start: 2025-04-20 | End: 2025-04-20 | Stop reason: HOSPADM

## 2025-04-20 RX ADMIN — BUTALBITAL, ACETAMINOPHEN, AND CAFFEINE 1 TABLET: 50; 325; 40 TABLET ORAL at 11:18

## 2025-04-20 RX ADMIN — ESCITALOPRAM OXALATE 20 MG: 10 TABLET ORAL at 09:45

## 2025-04-20 RX ADMIN — ROSUVASTATIN 20 MG: 10 TABLET, FILM COATED ORAL at 09:46

## 2025-04-20 RX ADMIN — Medication 1 TABLET: at 09:45

## 2025-04-20 RX ADMIN — HYDRALAZINE HYDROCHLORIDE 50 MG: 50 TABLET ORAL at 09:45

## 2025-04-20 RX ADMIN — ONDANSETRON 4 MG: 2 INJECTION, SOLUTION INTRAMUSCULAR; INTRAVENOUS at 06:11

## 2025-04-20 RX ADMIN — ACETAMINOPHEN 650 MG: 325 TABLET ORAL at 05:42

## 2025-04-20 RX ADMIN — METOPROLOL SUCCINATE 50 MG: 50 TABLET, EXTENDED RELEASE ORAL at 09:45

## 2025-04-20 RX ADMIN — SODIUM CHLORIDE, PRESERVATIVE FREE 10 ML: 5 INJECTION INTRAVENOUS at 09:46

## 2025-04-20 RX ADMIN — AMLODIPINE BESYLATE 10 MG: 5 TABLET ORAL at 09:45

## 2025-04-20 RX ADMIN — AZITHROMYCIN MONOHYDRATE 500 MG: 500 INJECTION, POWDER, LYOPHILIZED, FOR SOLUTION INTRAVENOUS at 06:02

## 2025-04-20 RX ADMIN — Medication 2000 UNITS: at 09:45

## 2025-04-20 ASSESSMENT — PAIN DESCRIPTION - LOCATION: LOCATION: HEAD

## 2025-04-20 ASSESSMENT — PAIN DESCRIPTION - DESCRIPTORS: DESCRIPTORS: ACHING

## 2025-04-20 ASSESSMENT — PAIN SCALES - GENERAL: PAINLEVEL_OUTOF10: 4

## 2025-04-20 NOTE — PROGRESS NOTES
Neurosurgery    Small parafalcine SDH    Repeat head CT stable.  Came in with continued HA    No neurosurgical treatment needed    Treat Ha conservatively

## 2025-04-20 NOTE — PROGRESS NOTES
Physical Therapy 4/20/2025     Orders acknowledged & chart reviewed up to date. Conferred with RN who states patient is about to go off the unit for a CT of the pelvis. Will continue to follow and complete PT evaluation as able & appropriate.    Thank you,  Sydni Bradley PT, DPT, NCS

## 2025-04-20 NOTE — ED NOTES
TRANSFER - OUT REPORT:    Verbal report given to EMILY thrasher on Jareth Sutton  being transferred to [facility] for routine progression of patient care       Report consisted of patient's Situation, Background, Assessment and   Recommendations(SBAR).     Information from the following report(s) Nurse Handoff Report, Index, ED Encounter Summary, ED SBAR, Adult Overview, MAR, and Med Rec Status was reviewed with the receiving nurse.    [place] Fall Assessment:    Presents to emergency department  because of falls (Syncope, seizure, or loss of consciousness): No  Age > 70: Yes  Altered Mental Status, Intoxication with alcohol or substance confusion (Disorientation, impaired judgment, poor safety awaremess, or inability to follow instructions): No  Impaired Mobility: Ambulates or transfers with assistive devices or assistance; Unable to ambulate or transer.: No  Nursing Judgement: Yes          Lines:   Peripheral IV 04/19/25 Right Antecubital (Active)        Opportunity for questions and clarification was provided.      Patient transported with:  Monitor and Registered Nurse

## 2025-04-20 NOTE — PLAN OF CARE
Problem: Chronic Conditions and Co-morbidities  Goal: Patient's chronic conditions and co-morbidity symptoms are monitored and maintained or improved  Outcome: Progressing  Flowsheets (Taken 4/19/2025 2211)  Care Plan - Patient's Chronic Conditions and Co-Morbidity Symptoms are Monitored and Maintained or Improved: Monitor and assess patient's chronic conditions and comorbid symptoms for stability, deterioration, or improvement     Problem: Discharge Planning  Goal: Discharge to home or other facility with appropriate resources  Recent Flowsheet Documentation  Taken 4/19/2025 2211 by Marti Khan RN  Discharge to home or other facility with appropriate resources: Identify barriers to discharge with patient and caregiver

## 2025-04-20 NOTE — DISCHARGE SUMMARY
Discharge Summary       PATIENT ID: Jareth Sutton  MRN: [id number]   YOB: 1935    DATE OF ADMISSION: 4/19/2025 10:41 AM    DATE OF DISCHARGE: 04/20/2025   PRIMARY CARE PROVIDER: Rafal Phan MD     ATTENDING PHYSICIAN: Sil Domínguez MD    DISCHARGING PROVIDER: Sil Domínguez MD    To contact this individual call [phone] and ask the  to page.  If unavailable ask to be transferred the Adult Hospitalist Department.    CONSULTATIONS: IP CONSULT TO NEUROSURGERY  IP CONSULT TO HOSPITALIST    PROCEDURES/SURGERIES: * No surgery found *     ADMITTING DIAGNOSES & HOSPITAL COURSE:   This is an 89-year-old elderly gentleman who was recently admitted from 4/15 - 4/17 for traumatic SDH return to the emergency room on 4/19/2025 with headache.  CT of the head showed unchanged subdural and intraventricular hemorrhage.           DISCHARGE DIAGNOSES / PLAN:    Intractable headache, recent hospitalization (4/15 - 4/17) for traumatic ICH.  ED was about to discharge him but family was uncomfortable so he was admitted for further observation, evaluation and management.  Head CT with unchanged subdural and intraventricular hemorrhage  Continue monitoring and pain management  BP control, SBP less than 160  Headache much better, added Fioricet  Seen by neurosurgery, recommended treating headache conservatively.  Patient ambulated the hallway using rolling walker with nursing prior to discharge     Uncontrolled chronic hypertension.  Home regimen includes amlodipine 5 mg daily, hydralazine 50 mg twice daily and Nebivolol 5 mg daily  Goal SBP<160     Leukocytosis present since 4/15, likely reactive  Afebrile, no evidence of infection  UA negative  CXR negative  Patient was already on Zithromax, finishing course.     Anxiety/depression  Continue home meds     Dyslipidemia  Continue statins  Mild hyponatremia in the setting of recent ICH, patient had a bolus of 500 cc saline.  NA improved from

## 2025-04-20 NOTE — PLAN OF CARE
Problem: Safety - Adult  Goal: Free from fall injury  Outcome: Progressing  Flowsheets (Taken 4/20/2025 0800)  Free From Fall Injury:   Instruct family/caregiver on patient safety   Based on caregiver fall risk screen, instruct family/caregiver to ask for assistance with transferring infant if caregiver noted to have fall risk factors     Problem: Chronic Conditions and Co-morbidities  Goal: Patient's chronic conditions and co-morbidity symptoms are monitored and maintained or improved  4/20/2025 1023 by Joanne Gonzalez RN  Outcome: Progressing  4/20/2025 0938 by Marti Khan RN  Outcome: Progressing  Flowsheets  Taken 4/20/2025 0801 by Joanne Gonzalez RN  Care Plan - Patient's Chronic Conditions and Co-Morbidity Symptoms are Monitored and Maintained or Improved:   Monitor and assess patient's chronic conditions and comorbid symptoms for stability, deterioration, or improvement   Update acute care plan with appropriate goals if chronic or comorbid symptoms are exacerbated and prevent overall improvement and discharge   Collaborate with multidisciplinary team to address chronic and comorbid conditions and prevent exacerbation or deterioration  Taken 4/19/2025 2211 by Marti Khan RN  Care Plan - Patient's Chronic Conditions and Co-Morbidity Symptoms are Monitored and Maintained or Improved: Monitor and assess patient's chronic conditions and comorbid symptoms for stability, deterioration, or improvement     Problem: Discharge Planning  Goal: Discharge to home or other facility with appropriate resources  Outcome: Progressing  Flowsheets  Taken 4/20/2025 0801 by Joanne Gonzalez RN  Discharge to home or other facility with appropriate resources:   Identify barriers to discharge with patient and caregiver   Arrange for needed discharge resources and transportation as appropriate   Identify discharge learning needs (meds, wound care, etc)  Taken 4/19/2025 2211 by Marti Khan

## 2025-04-20 NOTE — PROGRESS NOTES
Speech LAnguage Pathology EVALUATION/DISCHARGE    Patient: Jareth Sutton (89 y.o. male)  Date: 4/20/2025  Primary Diagnosis: Nonintractable headache, unspecified chronicity pattern, unspecified headache type [R51.9]  Acute intractable headache, unspecified headache type [R51.9]       Precautions:                     ASSESSMENT :  Patient seen for swallow evaluation. Patient presents with functional oropharyngeal swallow with no difficulties or s/s of aspiration appreciated at bedside with any consistencies. Patient denies any speech or swallowing concerns. Recommend regular diet/thin liquids with general aspiration precautions. Suspect pt is at baseline swallow function and therefore, skilled acute therapy provided by a speech-language pathologist is not indicated at this time. SLP will sign off. Please reconsult with any changes or if SLP can be of any further assistance.       Patient will be discharged from skilled speech-language pathology services at this time.     PLAN :  Recommendations and Planned Interventions:  Diet: Regular and thin liquids  General aspiration precautions  Good oral care         Acute SLP Services: No, patient will be discharged from acute skilled speech-language pathology at this time.  Discharge Recommendations: Continue to assess pending progress     SUBJECTIVE:   Patient stated, “Thank you, Fadia.”    OBJECTIVE:     Past Medical History:   Diagnosis Date    Allergy to ACE inhibitors 11/28/2017    Anxiety 11/28/2017    ASCVD (arteriosclerotic cardiovascular disease) 11/28/2017    CAD (coronary artery disease) 2000    MI     Cataract     Cataract 04/2019    Depression 11/28/2017    ED (erectile dysfunction) 11/28/2017    Elevated PSA 11/28/2017    Former smoker 11/28/2017    Glucose intolerance (impaired glucose tolerance) 11/28/2017    Hypercholesterolemia     Hypertension     Insomnia 11/28/2017    Macular degeneration     On statin therapy 11/28/2017    Other ill-defined

## 2025-04-20 NOTE — DISCHARGE INSTRUCTIONS
Discharge Instructions       PATIENT ID: Jareth Sutton  MRN: [id number]   YOB: 1935    DATE OF ADMISSION: 4/19/2025   DATE OF DISCHARGE: 4/20/2025    PRIMARY CARE PROVIDER: Rafal Phan     ATTENDING PHYSICIAN: Sil Domínguez MD   DISCHARGING PROVIDER: Sil Domínguez MD    To contact this individual call [phone] and ask the  to page.   If unavailable ask to be transferred the Adult Hospitalist Department.    DISCHARGE DIAGNOSES   1.  You are admitted for severe headache related to recent traumatic intracranial hemorrhage.  You underwent CAT scan of the head that showed the intracranial hemorrhage has remained stable.  Neurosurgery evaluated and recommended to treat headache conservatively.  Used Tylenol and you are prescribed Fioricet which is a combination of 3 medications including Tylenol 325 mg therefore do not exceed a total of 3 g of Tylenol in a 24-hour period combined from the plain Tylenol and the Fioricet.  If your headache persists medication such as amitriptyline/nortriptyline or gabapentin could be considered, please discuss with your PCP.  Definitely avoid NSAIDs such as ibuprofen, Motrin, Aleve etc.    Seek emergency care immediately if you experience  Worsening or severe headache that does not improve with medications  Confusion, drowsiness or difficulty staying awake  Repeated vomiting  Seizure or convulsions  Weakness, numbness or tingling in arms or legs  Difficulty speaking, slurred speech or trouble understanding  Vision changes  Loss of balance coordination  Clear fluid or blood coming from the nose or ears  Any new unusual behavior  Loss of questions or fainting  Fever with stiff neck or photophobia/light sensitivity      2.  Low sodium/hyponatremia which was mild.  Your sodium has improved from 132-134 after you received a saline balance on presentation in the ED.  Given your recent head injury, your sodium needs to be closely monitored,

## 2025-04-20 NOTE — PROGRESS NOTES
Hospitalist Progress Note  Sil Domínguez MD  Answering service: [phone]        Date of Service:  2025  NAME:  Jareth Sutton  :  1935  MRN:  [id number]      Admission Summary:   This is an 89-year-old elderly gentleman who was recently admitted from 4/15 -  for traumatic SDH return to the emergency room on 2025 with headache.  CT of the head showed unchanged subdural and intraventricular hemorrhage.       Interval history / Subjective:   Patient seen and examined this morning.  He is wife and daughter at the bedside  He is sitting in a chair by the bedside, he rates his headache 3/10 at present  No vision changes, nausea or vomiting  Neurology recommendations noted, treat headache conservatively     Assessment & Plan:     Intractable headache, recent hospitalization (4/15 - ) for traumatic ICH.  ED was about to discharge him but family was uncomfortable so he was admitted for further observation, evaluation and management.  Head CT with unchanged subdural and intraventricular hemorrhage  Continue monitoring and pain management  BP control, SBP less than 160  Headache much better, added Fioricet  Seen by neurosurgery, recommended treating headache conservatively.    Uncontrolled chronic hypertension.  Home regimen includes amlodipine 5 mg daily, hydralazine 50 mg twice daily and Nebivolol 5 mg daily  Goal SBP<160     Leukocytosis present since 4/15, likely reactive  Afebrile, no evidence of infection  UA negative  CXR negative  Patient was already on Zithromax, finishing course.    Anxiety/depression  Continue home meds     Dyslipidemia  Continue statins  Mild hyponatremia in the setting of recent ICH, patient had a bolus of 500 cc saline.  NA improved from 132-134.  Encouraged to hydrate with electrolyte fluids.  BMP in 3-5 days given risk for SIADH or CSW          Code status:      Recent Labs     04/19/25  1115 04/20/25  0426   ALT 33 26   GLOB 4.4* 4.0     Recent Labs     04/19/25  1115   INR 1.0      No results for input(s): \"TIBC\" in the last 72 hours.    Invalid input(s): \"FE\", \"PSAT\", \"FERR\"   No results found for: \"RBCF\"   No results for input(s): \"PH\", \"PCO2\", \"PO2\" in the last 72 hours.  No results for input(s): \"CPK\" in the last 72 hours.    Invalid input(s): \"CPKMB\", \"CKNDX\", \"TROIQ\"  Lab Results   Component Value Date/Time    CHOL 108 04/16/2025 03:26 AM    HDL 44 04/16/2025 03:26 AM    LDL 52.4 04/16/2025 03:26 AM    LDL 66.2 01/19/2024 09:00 AM     No results found for: \"GLUCPOC\"  [unfilled]      Medications Reviewed:     Current Facility-Administered Medications   Medication Dose Route Frequency    sodium chloride flush 0.9 % injection 5-40 mL  5-40 mL IntraVENous 2 times per day    sodium chloride flush 0.9 % injection 5-40 mL  5-40 mL IntraVENous PRN    0.9 % sodium chloride infusion   IntraVENous PRN    acetaminophen (TYLENOL) tablet 650 mg  650 mg Oral Q6H PRN    Or    acetaminophen (TYLENOL) suppository 650 mg  650 mg Rectal Q6H PRN    ondansetron (ZOFRAN) injection 4 mg  4 mg IntraVENous Q6H PRN    rosuvastatin (CRESTOR) tablet 20 mg  20 mg Oral Daily    Vitamin D (CHOLECALCIFEROL) tablet 2,000 Units  2,000 Units Oral Daily    therapeutic multivitamin-minerals 1 tablet  1 tablet Oral Daily    hydrALAZINE (APRESOLINE) tablet 50 mg  50 mg Oral BID    ezetimibe (ZETIA) tablet 10 mg  10 mg Oral Nightly    escitalopram (LEXAPRO) tablet 20 mg  20 mg Oral Daily    lidocaine 4 % external patch 1 patch  1 patch TransDERmal Daily    polyethylene glycol (GLYCOLAX) packet 17 g  17 g Oral Daily PRN    benzonatate (TESSALON) capsule 100 mg  100 mg Oral TID PRN    amLODIPine (NORVASC) tablet 10 mg  10 mg Oral Daily    acetaminophen (TYLENOL) tablet 1,000 mg  1,000 mg Oral TID    morphine (PF) injection 2 mg  2 mg IntraVENous Q4H PRN    oxyCODONE (ROXICODONE) immediate release tablet 5

## 2025-04-20 NOTE — H&P
History and Physical    Date of Service:  4/20/2025  Primary Care Provider: Rafal Phan MD  Source of information: The patient, patient's daughter at bedside and review of EMR    Chief Complaint: Headache      History of Presenting Illness:   Jareth Sutton is a 89 y.o. male with past medical history significant for hypertension, mood disorder, dyslipidemia, recent admission for subdural hematoma secondary to fall presented to the emergency room with headache.  This is located at the frontal part of the head, the onset was very sudden, constant, dull ache, no known aggravating or relieving factors.  Patient is unable to state the severity of the headache.  Patient is also not a good historian because of the acuity of his condition.  He was recently admitted to this hospital from 4/15/2025 to 4/17/2025, patient was admitted for evaluation and treatment of subdural hematoma which did not require surgical intervention.  Patient was doing relatively well including working with PT/OT prior to discharge home according to the patient's daughter.  There was no history of fall with this episode of headache.  Patient also reported worsening right hip pain.  He has history of osteoarthritis of the hip.  CT scan of the head without contrast done in the emergency room showed subdural and intraventricular hemorrhage no change since prior study.  Patient was referred to the hospitalist service for admission because of intractable headache.  Neurosurgery service on-call was also consulted.         REVIEW OF SYSTEMS:  Review of systems not obtained due to patient factors.     Past Medical History:   Diagnosis Date    Allergy to ACE inhibitors 11/28/2017    Anxiety 11/28/2017    ASCVD (arteriosclerotic cardiovascular disease) 11/28/2017    CAD (coronary artery disease) 2000    MI     Cataract     Cataract 04/2019    Depression 11/28/2017    ED (erectile dysfunction) 11/28/2017    Elevated PSA 11/28/2017     assistive devices (rolling walker/cane).   Ambulatory status/function: Ambulates with assistance:  Walker   EARLY MOBILITY ASSESSMENT: Recommend an assessment from physical therapy and/or occupational therapy  ANTICIPATED DISCHARGE: Greater than 48 hours.  ANTICIPATED DISPOSITION: Home with Home Healthcare  EMERGENCY CONTACT/SURROGATE DECISION MAKER: Sara Sutton, neha    CRITICAL CARE WAS PERFORMED FOR THIS ENCOUNTER: YES. I had a face to face encounter with the patient, reviewed and interpreted patient data including clinical events, labs, images, vital signs, I/O's, and examined patient.  I have discussed the case and the plan and management of the patient's care with the consulting services, the bedside nurses and necessary ancillary providers.  This patient has a high probability of imminent, clinically significant deterioration, which requires the highest level of preparedness to intervene urgently. I participated in the decision-making and personally managed or directed the management of the following life and organ supporting interventions that required my frequent assessment to treat or prevent imminent deterioration.  I personally spent 45 minutes of critical care time.  This is time spent at this critically ill patient's bedside actively involved in patient care as well as the coordination of care and discussions with the patient's family.  This does not include any procedural time which has been billed separately.      Signed By: Jim Lau MD     April 20, 2025         Please note that this dictation may have been completed with Dragon, the [employer] voice recognition software.  Quite often unanticipated grammatical, syntax, homophones, and other interpretive errors are inadvertently transcribed by the computer software.  Please disregard these errors.  Please excuse any errors that have escaped final proofreading.

## 2025-04-21 ENCOUNTER — TELEPHONE (OUTPATIENT)
Facility: CLINIC | Age: 89
End: 2025-04-21

## 2025-04-21 NOTE — TELEPHONE ENCOUNTER
Pt wife, Sara is calling to get an order for Outpatient Physical Therapy to come to their apartment.    Sara finds it difficult to get him out of the apartment by herself

## 2025-04-21 NOTE — TELEPHONE ENCOUNTER
Rafal Phan MD     The above patient was discharged on 4/20/25 from [facility] with a diagnosis of intractable headache following subdural hemorrhage. He has a REUBEN appt scheduled. Please call the patient within the required two business days, and document that call using the below smartphrase: .TCMOFFICE.    Thank you.

## 2025-04-22 ENCOUNTER — TELEPHONE (OUTPATIENT)
Facility: CLINIC | Age: 89
End: 2025-04-22

## 2025-04-22 NOTE — TELEPHONE ENCOUNTER
Care Transitions Initial Follow Up Call    Outreach made within 2 business days of discharge: Yes    Patient: Jareth Sutton Patient : 1935   MRN: [id number]  Reason for fall/ intraventricular hemorrhage. Admission: 04/15/2025  Discharge Date: 25       Spoke with: Jareth Sutton    Discharge department/facility: [facility] Interactive Patient Contact:  Was patient able to fill all prescriptions: Yes  Was patient instructed to bring all medications to the follow-up visit: Yes  Is patient taking all medications as directed in the discharge summary? Yes  Does patient understand their discharge instructions: Yes  Does patient have questions or concerns that need addressed prior to 7-14 day follow up office visit: no    Additional needs identified to be addressed with provider  No needs identified             Scheduled appointment with PCP within 7-14 days    Follow Up  Future Appointments   Date Time Provider Department Center   2025 11:00 AM Rafal Phan MD [facility] DEP   2025  8:00 AM Rafal Phan MD [facility] DEP       Ciara Berrios MA

## 2025-04-22 NOTE — TELEPHONE ENCOUNTER
Anabelle with [facility] is calling to give Dr Phan a heads up.    With the pts last 2 hospital stays they are trying to move him from independent living at [facility] to Inpatient.    Paperwork is being faxed over    No call back needed

## 2025-04-23 NOTE — PROGRESS NOTES
Physician Progress Note      PATIENT:               CHRIS MARTINEZ  [facility] #:                  [id number]  :                       1935  ADMIT DATE:       4/15/2025 7:00 PM  DISCH DATE:        2025 5:03 PM  RESPONDING  PROVIDER #:        Beatriz Jordan MD          QUERY TEXT:    Hypertensive emergency is documented in the medical record in DC summary.   Please provide additional clinical indicators supportive of your   documentation. Or please document if the diagnosis of hypertensive emergency   has been ruled out after study.    The clinical indicators include:  89 y.o. male with a pmhx CAD, HTN, dyslipidemia, macular degeneration, past   stroke, and depression/anxiety who was admitted to the ICU on 4/15 for left   SDH s/p fall.    Per DC summary: Hypertensive emergency; Continue Cardene as needed for   systolic blood pressure goal less than 140; Restart home BP meds; Holding home   Nebivolol in the setting of bradycardia  4/15 BP - 183/82, 187/83, 196/94  Options provided:  -- Hypertensive emergency present as evidenced by, Please document evidence.  -- Hypertensive emergency was ruled out, hypertensive crisis ruled in  -- Hypertensive emergency and hypertensive crisis ruled out  -- Other - I will add my own diagnosis  -- Disagree - Not applicable / Not valid  -- Disagree - Clinically unable to determine / Unknown  -- Refer to Clinical Documentation Reviewer    PROVIDER RESPONSE TEXT:    Hypertensive emergency and hypertensive crisis ruled out.    Query created by: Samra Gonzales on 2025 8:12 AM      Electronically signed by:  Beatriz Jordan MD 2025 2:20 PM

## 2025-04-23 NOTE — PROGRESS NOTES
transfer of care (Pt comes in today for a transfer of care after being in the hospital for a Subdural hemorrhage after falling outside after his last office visit. )       HPI:  Jareth Sutton is a 89 y.o. year old male who is here for a follow up visit for hospitalization transition of care.   He was last seen by me on 4/15/2025.     Discharged on: Discharged on Thursday 4/17/2025 after being admitted on 4/15/2025 with overnight hold for observation 4/19/2025 through 4/20/2025.  Transition of care follow-up call made on Monday 4/21/2025 thus within 2 business days of discharge.  Complete med reconciliation completed today.    Diagnosis in hospital:  1.  Subdural hematoma  2 hypertension with blood pressure elevated  3 anxiety depression  4.  Dyslipidemia  5.  Right hip pain      Complications in hospital: No complications during hospitalization    Medication changes: Start Tylenol 500 mg 2 tablets 3 times daily as needed, lidocaine patch to right hip as needed, discontinue aspirin daily, increase amlodipine to 10 mg daily, after being reevaluated to help for observation Fioricet 1 every 6 hours as needed started    Discharge Summary reviewed.  Today 4/24/2025      He reports the following: He is accompanied by his wife and daughter at the visit today.  His comment is he has such as severe headache to \"just please kill me \".  He really has no suicidal thoughts on the fact that headache is so severe he feels like he would be better.  Other than headache he denies any visual symptoms but generally feels weak.  Also because of the headache he does not have much of an appetite.  He has no nausea vomiting.  He is passing his bowels and there are no  complaints.  As noted because of his weakness he is nonambulatory.  He notes no chest pain, palpitations, PND, orthopnea or other cardiac or respiratory complaints.  He notes no other neurologic complaints other than the weakness in his severe headache.  He notes no

## 2025-04-24 ENCOUNTER — OFFICE VISIT (OUTPATIENT)
Facility: CLINIC | Age: 89
End: 2025-04-24

## 2025-04-24 VITALS
TEMPERATURE: 97.2 F | HEART RATE: 60 BPM | SYSTOLIC BLOOD PRESSURE: 176 MMHG | DIASTOLIC BLOOD PRESSURE: 71 MMHG | OXYGEN SATURATION: 98 %

## 2025-04-24 DIAGNOSIS — R51.9 ACUTE INTRACTABLE HEADACHE, UNSPECIFIED HEADACHE TYPE: ICD-10-CM

## 2025-04-24 DIAGNOSIS — E78.2 MIXED HYPERLIPIDEMIA: ICD-10-CM

## 2025-04-24 DIAGNOSIS — S06.5XAA SDH (SUBDURAL HEMATOMA) (HCC): Primary | ICD-10-CM

## 2025-04-24 DIAGNOSIS — I10 ESSENTIAL HYPERTENSION: ICD-10-CM

## 2025-04-24 DIAGNOSIS — Z09 HOSPITAL DISCHARGE FOLLOW-UP: ICD-10-CM

## 2025-04-24 DIAGNOSIS — M15.0 PRIMARY OSTEOARTHRITIS INVOLVING MULTIPLE JOINTS: ICD-10-CM

## 2025-04-24 DIAGNOSIS — E87.1 HYPONATREMIA: ICD-10-CM

## 2025-04-24 RX ORDER — BUTALBITAL, ACETAMINOPHEN AND CAFFEINE 50; 325; 40 MG/1; MG/1; MG/1
1 TABLET ORAL EVERY 6 HOURS PRN
Qty: 20 TABLET | Refills: 0 | Status: SHIPPED | OUTPATIENT
Start: 2025-04-24

## 2025-04-24 RX ORDER — DEXAMETHASONE 4 MG/1
4 TABLET ORAL 2 TIMES DAILY WITH MEALS
Qty: 14 TABLET | Refills: 0 | Status: SHIPPED | OUTPATIENT
Start: 2025-04-24 | End: 2025-05-01

## 2025-04-24 NOTE — PROGRESS NOTES
Have you been to the ER, urgent care clinic since your last visit?  Hospitalized since your last visit?   YES - When: approximately 04/15/2025 ago.  Where and Why: [facility] for subdural hemorrhage.    Have you seen or consulted any other health care providers outside our system since your last visit?   NO

## 2025-04-25 ENCOUNTER — TELEPHONE (OUTPATIENT)
Facility: CLINIC | Age: 89
End: 2025-04-25

## 2025-04-25 LAB
ANION GAP SERPL CALC-SCNC: 7 MMOL/L (ref 2–12)
BASOPHILS # BLD: 0.05 K/UL (ref 0–0.1)
BASOPHILS NFR BLD: 0.5 % (ref 0–1)
BUN SERPL-MCNC: 16 MG/DL (ref 6–20)
BUN/CREAT SERPL: 19 (ref 12–20)
CALCIUM SERPL-MCNC: 8.8 MG/DL (ref 8.5–10.1)
CHLORIDE SERPL-SCNC: 97 MMOL/L (ref 97–108)
CO2 SERPL-SCNC: 26 MMOL/L (ref 21–32)
CREAT SERPL-MCNC: 0.83 MG/DL (ref 0.7–1.3)
DIFFERENTIAL METHOD BLD: ABNORMAL
EOSINOPHIL # BLD: 0.09 K/UL (ref 0–0.4)
EOSINOPHIL NFR BLD: 0.8 % (ref 0–7)
ERYTHROCYTE [DISTWIDTH] IN BLOOD BY AUTOMATED COUNT: 13.4 % (ref 11.5–14.5)
GLUCOSE SERPL-MCNC: 95 MG/DL (ref 65–100)
HCT VFR BLD AUTO: 46.2 % (ref 36.6–50.3)
HGB BLD-MCNC: 15.8 G/DL (ref 12.1–17)
IMM GRANULOCYTES # BLD AUTO: 0.04 K/UL (ref 0–0.04)
IMM GRANULOCYTES NFR BLD AUTO: 0.4 % (ref 0–0.5)
LYMPHOCYTES # BLD: 1.48 K/UL (ref 0.8–3.5)
LYMPHOCYTES NFR BLD: 13.9 % (ref 12–49)
MCH RBC QN AUTO: 30.9 PG (ref 26–34)
MCHC RBC AUTO-ENTMCNC: 34.2 G/DL (ref 30–36.5)
MCV RBC AUTO: 90.4 FL (ref 80–99)
MONOCYTES # BLD: 0.72 K/UL (ref 0–1)
MONOCYTES NFR BLD: 6.8 % (ref 5–13)
NEUTS SEG # BLD: 8.25 K/UL (ref 1.8–8)
NEUTS SEG NFR BLD: 77.6 % (ref 32–75)
NRBC # BLD: 0 K/UL (ref 0–0.01)
NRBC BLD-RTO: 0 PER 100 WBC
PLATELET # BLD AUTO: 284 K/UL (ref 150–400)
PMV BLD AUTO: 9.8 FL (ref 8.9–12.9)
POTASSIUM SERPL-SCNC: 3.8 MMOL/L (ref 3.5–5.1)
RBC # BLD AUTO: 5.11 M/UL (ref 4.1–5.7)
SODIUM SERPL-SCNC: 130 MMOL/L (ref 136–145)
WBC # BLD AUTO: 10.6 K/UL (ref 4.1–11.1)

## 2025-04-25 NOTE — TELEPHONE ENCOUNTER
Pt needs a referral for neurology. Pt was diagnosed with a subdermal hematoma he has been in and out of the hospitals and he was placed on a steroid medication yesterday after a visit here. Since the injury he's in so much pain that he isn't eating or drinking.  Please call patient daughter [phone] to discuss further.

## 2025-04-28 ENCOUNTER — RESULTS FOLLOW-UP (OUTPATIENT)
Facility: CLINIC | Age: 89
End: 2025-04-28

## 2025-06-04 NOTE — PROGRESS NOTES
Chief Complaint   Patient presents with    Hypertension     3 month f/up    ASCVD    Blood Sugar Problem    Hyperlipidemia       SUBJECTIVE:    Jareth Sutton is a 89 y.o. male who returns in follow-up regarding his medical problems including hypertension, hyperlipidemia, ASCVD, recent problem with subdural hematoma post head trauma from fall, prior problems with blood sugar elevation and other medical problems.  He just feels like something is not right now.  He does note that his blood pressure medication was changed at the health care unit where he is at [facility].  For some completely unknown reason they stopped his Bystolic and Norvasc which she was on Bystolic 5 and Norvasc 10.  He notes no headaches or nosebleeds.  He notes no chest pain, shortness of breath, palpitations, PND, orthopnea or other cardiac or respiratory complaints.  He notes no current GI or  complaints.  He notes no headaches, dizziness or neurologic complaints.  He is getting around now and is still work with physical therapy, Occupational Therapy and speech therapy.  Today he is actually in a wheelchair.  He is accompanied by both his wife and his daughter today.  He notes no new arthritic complaints and there are no other complaints on complete review of systems.      Current Outpatient Medications   Medication Sig Dispense Refill    fluticasone (FLONASE) 50 MCG/ACT nasal spray       Melatonin 10 MG SUBL       polyethylene glycol (GLYCOLAX) 17 GM/SCOOP powder       amLODIPine (NORVASC) 5 MG tablet Take 2 tablets by mouth daily 60 tablet 0    nebivolol (BYSTOLIC) 5 MG tablet Take 1 tablet by mouth daily 30 tablet 5    butalbital-acetaminophen-caffeine (FIORICET, ESGIC) -40 MG per tablet Take 1 tablet by mouth every 6 hours as needed for Headaches 20 tablet 0    acetaminophen (TYLENOL) 500 MG tablet Take 2 tablets by mouth in the morning, at noon, and at bedtime for 14 days 84 tablet 0    escitalopram (LEXAPRO) 20 MG

## 2025-06-05 ENCOUNTER — OFFICE VISIT (OUTPATIENT)
Facility: CLINIC | Age: 89
End: 2025-06-05
Payer: MEDICARE

## 2025-06-05 ENCOUNTER — RESULTS FOLLOW-UP (OUTPATIENT)
Facility: CLINIC | Age: 89
End: 2025-06-05

## 2025-06-05 VITALS
BODY MASS INDEX: 27.28 KG/M2 | SYSTOLIC BLOOD PRESSURE: 186 MMHG | TEMPERATURE: 98.5 F | OXYGEN SATURATION: 95 % | DIASTOLIC BLOOD PRESSURE: 114 MMHG | HEART RATE: 62 BPM | HEIGHT: 68 IN | WEIGHT: 180 LBS

## 2025-06-05 DIAGNOSIS — M15.0 PRIMARY OSTEOARTHRITIS INVOLVING MULTIPLE JOINTS: ICD-10-CM

## 2025-06-05 DIAGNOSIS — E78.2 MIXED HYPERLIPIDEMIA: ICD-10-CM

## 2025-06-05 DIAGNOSIS — I25.10 ASCVD (ARTERIOSCLEROTIC CARDIOVASCULAR DISEASE): ICD-10-CM

## 2025-06-05 DIAGNOSIS — R73.02 GLUCOSE INTOLERANCE (IMPAIRED GLUCOSE TOLERANCE): ICD-10-CM

## 2025-06-05 DIAGNOSIS — I10 ESSENTIAL HYPERTENSION: Primary | ICD-10-CM

## 2025-06-05 DIAGNOSIS — S06.5XAA SDH (SUBDURAL HEMATOMA) (HCC): ICD-10-CM

## 2025-06-05 LAB
ALBUMIN SERPL-MCNC: 3.8 G/DL (ref 3.5–5)
ALBUMIN/GLOB SERPL: 1.1 (ref 1.1–2.2)
ALP SERPL-CCNC: 97 U/L (ref 45–117)
ALT SERPL-CCNC: 26 U/L (ref 12–78)
ANION GAP SERPL CALC-SCNC: 6 MMOL/L (ref 2–12)
AST SERPL-CCNC: 21 U/L (ref 15–37)
BILIRUB SERPL-MCNC: 0.5 MG/DL (ref 0.2–1)
BUN SERPL-MCNC: 17 MG/DL (ref 6–20)
BUN/CREAT SERPL: 18 (ref 12–20)
CALCIUM SERPL-MCNC: 9.4 MG/DL (ref 8.5–10.1)
CHLORIDE SERPL-SCNC: 107 MMOL/L (ref 97–108)
CHOLEST SERPL-MCNC: 173 MG/DL
CK SERPL-CCNC: 49 U/L (ref 39–308)
CO2 SERPL-SCNC: 29 MMOL/L (ref 21–32)
CREAT SERPL-MCNC: 0.94 MG/DL (ref 0.7–1.3)
EST. AVERAGE GLUCOSE BLD GHB EST-MCNC: 103 MG/DL
GLOBULIN SER CALC-MCNC: 3.5 G/DL (ref 2–4)
GLUCOSE SERPL-MCNC: 90 MG/DL (ref 65–100)
HBA1C MFR BLD: 5.2 % (ref 4–5.6)
HDLC SERPL-MCNC: 59 MG/DL
HDLC SERPL: 2.9 (ref 0–5)
LDLC SERPL CALC-MCNC: 91.6 MG/DL (ref 0–100)
POTASSIUM SERPL-SCNC: 4.4 MMOL/L (ref 3.5–5.1)
PROT SERPL-MCNC: 7.3 G/DL (ref 6.4–8.2)
SODIUM SERPL-SCNC: 142 MMOL/L (ref 136–145)
TRIGL SERPL-MCNC: 112 MG/DL
VLDLC SERPL CALC-MCNC: 22.4 MG/DL

## 2025-06-05 PROCEDURE — G8427 DOCREV CUR MEDS BY ELIG CLIN: HCPCS | Performed by: INTERNAL MEDICINE

## 2025-06-05 PROCEDURE — 99215 OFFICE O/P EST HI 40 MIN: CPT | Performed by: INTERNAL MEDICINE

## 2025-06-05 PROCEDURE — 1159F MED LIST DOCD IN RCRD: CPT | Performed by: INTERNAL MEDICINE

## 2025-06-05 PROCEDURE — 1160F RVW MEDS BY RX/DR IN RCRD: CPT | Performed by: INTERNAL MEDICINE

## 2025-06-05 PROCEDURE — G8419 CALC BMI OUT NRM PARAM NOF/U: HCPCS | Performed by: INTERNAL MEDICINE

## 2025-06-05 PROCEDURE — 1126F AMNT PAIN NOTED NONE PRSNT: CPT | Performed by: INTERNAL MEDICINE

## 2025-06-05 PROCEDURE — 1123F ACP DISCUSS/DSCN MKR DOCD: CPT | Performed by: INTERNAL MEDICINE

## 2025-06-05 PROCEDURE — 1036F TOBACCO NON-USER: CPT | Performed by: INTERNAL MEDICINE

## 2025-06-05 RX ORDER — AMLODIPINE BESYLATE 5 MG/1
10 TABLET ORAL DAILY
Qty: 60 TABLET | Refills: 0 | Status: SHIPPED | OUTPATIENT
Start: 2025-06-05 | End: 2025-07-05

## 2025-06-05 RX ORDER — POLYETHYLENE GLYCOL 3350 17 G/17G
POWDER, FOR SOLUTION ORAL
COMMUNITY
Start: 2025-04-24

## 2025-06-05 RX ORDER — NEBIVOLOL 5 MG/1
5 TABLET ORAL DAILY
Qty: 30 TABLET | Refills: 5 | Status: SHIPPED | OUTPATIENT
Start: 2025-06-05

## 2025-06-05 RX ORDER — FLUTICASONE PROPIONATE 50 MCG
SPRAY, SUSPENSION (ML) NASAL
COMMUNITY
Start: 2025-05-14

## 2025-06-05 NOTE — PROGRESS NOTES
Jareth Sutton is a 89 y.o. male     Chief Complaint   Patient presents with    Hypertension     3 month f/up    ASCVD    Blood Sugar Problem    Hyperlipidemia       \"Have you been to the ER, urgent care clinic since your last visit?  Hospitalized since your last visit?\"    NO    “Have you seen or consulted any other health care providers outside of [facility] since your last visit?”    NO

## 2025-06-24 ENCOUNTER — HOSPITAL ENCOUNTER (EMERGENCY)
Facility: HOSPITAL | Age: 89
Discharge: HOME | End: 2025-06-24
Payer: MEDICARE

## 2025-06-24 ENCOUNTER — APPOINTMENT (OUTPATIENT)
Facility: HOSPITAL | Age: 89
End: 2025-06-24
Payer: MEDICARE

## 2025-06-24 VITALS
BODY MASS INDEX: 27.28 KG/M2 | HEIGHT: 68 IN | WEIGHT: 180 LBS | TEMPERATURE: 97.8 F | OXYGEN SATURATION: 95 % | SYSTOLIC BLOOD PRESSURE: 160 MMHG | DIASTOLIC BLOOD PRESSURE: 84 MMHG | RESPIRATION RATE: 16 BRPM | HEART RATE: 55 BPM
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
## 2025-06-24 DIAGNOSIS — S09.90XA CLOSED HEAD INJURY, INITIAL ENCOUNTER: ICD-10-CM

## 2025-06-24 PROCEDURE — 99284 EMERGENCY DEPT VISIT MOD MDM: CPT

## 2025-06-24 PROCEDURE — 70450 CT HEAD/BRAIN W/O DYE: CPT

## 2025-06-24 RX ORDER — ACETAMINOPHEN 500 MG
1000 TABLET ORAL EVERY 8 HOURS PRN
Qty: 60 TABLET | Refills: 0 | Status: SHIPPED | OUTPATIENT
Start: 2025-06-24

## 2025-06-25 NOTE — ED NOTES
Patient discharged from the ED by PLACIDO Martinez. Diagnosis, medications, precautions and follow-ups were reviewed with the patient/family. Questions were asked and answered prior to departure. Patient departed the ED via wheelchair and was accompanied by wife and daughter.

## 2025-06-26 NOTE — ED PROVIDER NOTES
[facility] EMERGENCY DEPARTMENT  EMERGENCY DEPARTMENT ENCOUNTER       Pt Name: Jareth Sutton  MRN: [id number]  Birthdate 1935  Date of Evaluation: 6/24/2025  Provider: BELKYS Lane - PLACIDO   PCP: Rafal Phan MD  Note Started: 4:26 PM 6/26/25     CHIEF COMPLAINT       Chief Complaint   Patient presents with    Fall     Patient BIBEMS from home after mechanical GLF, +headstrike. Pt is not on thinners, no LOC. Pt has recent hx admitted to [facility] for intracranial hemorrhage 4/19. Pt A+O x4 during triage.         HISTORY OF PRESENT ILLNESS: 1 or more elements      History From: Patient, Patient's Wife, and Patient's Daughter  HPI Limitations None     Jareth Sutton is a 89 y.o. male with PMHx as noted below who presents to the ED today for concerns regarding mechanical fall and hitting his head.  He was in his closet and a light box on a shelf fell and hit him and made him fall with his walker.  Patient states he was recently admitted to [facility] for subdural hematoma on 4/15/2025 and was told that if he hit his head again, he  should be seen promptly.  Denies any pain, LOC, headaches, vision changes, nausea, vomiting, confusion.  Not on blood thinners.     See MDM Documentation for further HPI and PMHx     Nursing Notes were all reviewed and agreed with or any disagreements were addressed in the HPI.     REVIEW OF SYSTEMS      Review of Systems     Positives and Pertinent negatives as per HPI.    PAST HISTORY     Past Medical History:  Past Medical History:   Diagnosis Date    Anxiety 11/28/2017    ASCVD (arteriosclerotic cardiovascular disease) 11/28/2017    CAD (coronary artery disease) 2000    MI     Cataract 04/2019    Depression 11/28/2017    ED (erectile dysfunction) 11/28/2017    Hypercholesterolemia     Hypertension     Insomnia 11/28/2017    Macular degeneration     Osteoarthritis     Stroke (HCC)     TIA    Subdural hematoma (HCC) 04/2025       Past

## 2025-07-02 NOTE — PROGRESS NOTES
Chief Complaint   Patient presents with    1 Month Follow-Up       SUBJECTIVE:    Jareth Sutton is a 89 y.o. male who returns transitioning care follow-up from an emergency room visit where he was seen in the emergency room on 6/24 secondary to a fall ground-level at the facility where he is staying.  He did have a head CT that showed no acute process.  Of concern was the fact that he had been recently hospitalized with intracerebral hemorrhage/subdural hematoma after having had head trauma and is still in health care at [facility] because of that.  He also is in follow-up regarding his hypertension as his blood pressure on his last visit here was markedly elevated as his health care center had discontinued his Bystolic and Norvasc both of which I resumed.  He currently denies any headaches, or other neurologic complaints except he notes that short-term memory does not seem to be as good as he used to be.  He notes no GI or  complaints.  He notes no headaches, dizziness or neurologic complaints other than the memory issues.  He notes no chest pain, shortness of breath or cardiorespiratory complaints.  He has no change of his chronic arthritic complaints.      Current Outpatient Medications   Medication Sig Dispense Refill    traZODone (DESYREL) 50 MG tablet Take 1 tablet by mouth nightly 90 tablet 1    acetaminophen (TYLENOL) 500 MG tablet Take 2 tablets by mouth every 8 hours as needed for Pain or Fever 60 tablet 0    fluticasone (FLONASE) 50 MCG/ACT nasal spray       Melatonin 10 MG SUBL       polyethylene glycol (GLYCOLAX) 17 GM/SCOOP powder       amLODIPine (NORVASC) 5 MG tablet Take 2 tablets by mouth daily 60 tablet 0    nebivolol (BYSTOLIC) 5 MG tablet Take 1 tablet by mouth daily 30 tablet 5    butalbital-acetaminophen-caffeine (FIORICET, ESGIC) -40 MG per tablet Take 1 tablet by mouth every 6 hours as needed for Headaches 20 tablet 0    escitalopram (LEXAPRO) 20 MG tablet Take 1

## 2025-07-03 ENCOUNTER — OFFICE VISIT (OUTPATIENT)
Facility: CLINIC | Age: 89
End: 2025-07-03
Payer: MEDICARE

## 2025-07-03 VITALS
TEMPERATURE: 98.6 F | BODY MASS INDEX: 27.14 KG/M2 | OXYGEN SATURATION: 95 % | WEIGHT: 179.1 LBS | SYSTOLIC BLOOD PRESSURE: 136 MMHG | HEIGHT: 68 IN | RESPIRATION RATE: 18 BRPM | HEART RATE: 54 BPM | DIASTOLIC BLOOD PRESSURE: 74 MMHG

## 2025-07-03 DIAGNOSIS — R41.3 MEMORY DEFICIT: ICD-10-CM

## 2025-07-03 DIAGNOSIS — I10 ESSENTIAL HYPERTENSION: Primary | ICD-10-CM
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
## 2025-07-03 PROCEDURE — G8427 DOCREV CUR MEDS BY ELIG CLIN: HCPCS | Performed by: INTERNAL MEDICINE

## 2025-07-03 PROCEDURE — G8419 CALC BMI OUT NRM PARAM NOF/U: HCPCS | Performed by: INTERNAL MEDICINE

## 2025-07-03 PROCEDURE — 1123F ACP DISCUSS/DSCN MKR DOCD: CPT | Performed by: INTERNAL MEDICINE

## 2025-07-03 PROCEDURE — 1159F MED LIST DOCD IN RCRD: CPT | Performed by: INTERNAL MEDICINE

## 2025-07-03 PROCEDURE — 1036F TOBACCO NON-USER: CPT | Performed by: INTERNAL MEDICINE

## 2025-07-03 PROCEDURE — 99214 OFFICE O/P EST MOD 30 MIN: CPT | Performed by: INTERNAL MEDICINE

## 2025-07-03 PROCEDURE — 1126F AMNT PAIN NOTED NONE PRSNT: CPT | Performed by: INTERNAL MEDICINE

## 2025-07-03 RX ORDER — TRAZODONE HYDROCHLORIDE 50 MG/1
50 TABLET ORAL NIGHTLY
Qty: 90 TABLET | Refills: 1 | OUTPATIENT
Start: 2025-07-03

## 2025-07-03 NOTE — PROGRESS NOTES
Jareth Sutton is a 89 y.o. male     Chief Complaint   Patient presents with    1 Month Follow-Up       /62 (BP Site: Left Upper Arm, Patient Position: Sitting, BP Cuff Size: Medium Adult)   Pulse 54   Temp 98.6 °F (37 °C) (Temporal)   Resp 18   Ht 1.727 m (5' 8\")   Wt 81.2 kg (179 lb 1.6 oz)   SpO2 95%   BMI 27.23 kg/m²     Health Maintenance Due   Topic Date Due    Pneumococcal 50+ years Vaccine (2 of 2 - PPSV23) 12/22/2016    COVID-19 Vaccine (8 - 2024-25 season) 05/20/2025         \"Have you been to the ER, urgent care clinic since your last visit?  Hospitalized since your last visit?\"    6/24/2025; [facility]    “Have you seen or consulted any other health care providers outside of [facility] since your last visit?”    NO

## 2025-07-23 RX ORDER — ESCITALOPRAM OXALATE 20 MG/1
20 TABLET ORAL DAILY
Qty: 30 TABLET | Refills: 5 | Status: SHIPPED | OUTPATIENT
Start: 2025-07-23

## 2025-07-23 NOTE — TELEPHONE ENCOUNTER
Patient's wife is requesting a refill on escitalopram (LEXAPRO) 20 MG tablet.   She states he is currently on 10 mg and is requesting to increase dose to 20 mg.   Pharmacy- [facility] [address]

## 2025-07-24 RX ORDER — TRAZODONE HYDROCHLORIDE 50 MG/1
50 TABLET ORAL NIGHTLY
Qty: 90 TABLET | Refills: 1 | Status: SHIPPED | OUTPATIENT
Start: 2025-07-24

## 2025-07-24 NOTE — TELEPHONE ENCOUNTER
Spoke with patient wife Sara advised her  is out of the medication  traZODone (DESYREL) 50 MG tablet   and would like a refill by this evening if possible.    Pharmacy : [facility] [address]    Thank you

## 2025-07-24 NOTE — TELEPHONE ENCOUNTER
PCP: Rafal Phan MD    Last appt: 7/3/2025    Future Appointments   Date Time Provider Department Center   10/9/2025 10:40 AM Rafal Phan MD [facility] DEP       Requested Prescriptions     Pending Prescriptions Disp Refills    traZODone (DESYREL) 50 MG tablet 90 tablet 1     Sig: Take 1 tablet by mouth nightly

## 2025-08-19 RX ORDER — HYDRALAZINE HYDROCHLORIDE 50 MG/1
50 TABLET, FILM COATED ORAL 2 TIMES DAILY
Qty: 180 TABLET | Refills: 3 | Status: SHIPPED | OUTPATIENT
Start: 2025-08-19

## 2025-09-05 ENCOUNTER — OFFICE VISIT (OUTPATIENT)
Facility: CLINIC | Age: 89
End: 2025-09-05
Payer: MEDICARE

## 2025-09-05 VITALS
HEIGHT: 68 IN | TEMPERATURE: 98.2 F | BODY MASS INDEX: 28.23 KG/M2 | DIASTOLIC BLOOD PRESSURE: 62 MMHG | WEIGHT: 186.3 LBS | OXYGEN SATURATION: 96 % | SYSTOLIC BLOOD PRESSURE: 124 MMHG | HEART RATE: 53 BPM | RESPIRATION RATE: 20 BRPM

## 2025-09-05 DIAGNOSIS — I10 ESSENTIAL HYPERTENSION: Primary | ICD-10-CM

## 2025-09-05 DIAGNOSIS — R41.3 MEMORY DEFICIT: ICD-10-CM

## 2025-09-05 DIAGNOSIS — I25.10 ASCVD (ARTERIOSCLEROTIC CARDIOVASCULAR DISEASE): ICD-10-CM

## 2025-09-05 DIAGNOSIS — E78.2 MIXED HYPERLIPIDEMIA: ICD-10-CM

## 2025-09-05 DIAGNOSIS — R73.02 GLUCOSE INTOLERANCE (IMPAIRED GLUCOSE TOLERANCE): ICD-10-CM

## 2025-09-05 LAB
ALBUMIN SERPL-MCNC: 4.1 G/DL (ref 3.5–5.2)
ALBUMIN/GLOB SERPL: 1.3 (ref 1.1–2.2)
ALP SERPL-CCNC: 75 U/L (ref 40–129)
ALT SERPL-CCNC: 28 U/L (ref 10–50)
ANION GAP SERPL CALC-SCNC: 11 MMOL/L (ref 2–14)
AST SERPL-CCNC: 27 U/L (ref 10–50)
BILIRUB SERPL-MCNC: 0.6 MG/DL (ref 0–1.2)
BUN SERPL-MCNC: 22 MG/DL (ref 8–23)
BUN/CREAT SERPL: 23 (ref 12–20)
CALCIUM SERPL-MCNC: 10 MG/DL (ref 8.8–10.2)
CHLORIDE SERPL-SCNC: 106 MMOL/L (ref 98–107)
CHOLEST SERPL-MCNC: 164 MG/DL (ref 0–200)
CK SERPL-CCNC: 85 U/L (ref 39–308)
CO2 SERPL-SCNC: 27 MMOL/L (ref 20–29)
CREAT SERPL-MCNC: 0.97 MG/DL (ref 0.7–1.2)
EST. AVERAGE GLUCOSE BLD GHB EST-MCNC: 111 MG/DL
GLOBULIN SER CALC-MCNC: 3.1 G/DL (ref 2–4)
GLUCOSE SERPL-MCNC: 93 MG/DL (ref 65–100)
HBA1C MFR BLD: 5.5 % (ref 4–5.6)
HDLC SERPL-MCNC: 62 MG/DL (ref 40–60)
HDLC SERPL: 2.6 (ref 0–5)
LDLC SERPL CALC-MCNC: 82 MG/DL (ref 0–100)
POTASSIUM SERPL-SCNC: 5 MMOL/L (ref 3.5–5.1)
PROT SERPL-MCNC: 7.3 G/DL (ref 6.4–8.3)
SODIUM SERPL-SCNC: 144 MMOL/L (ref 136–145)
TRIGL SERPL-MCNC: 100 MG/DL (ref 0–150)
VLDLC SERPL CALC-MCNC: 20 MG/DL

## 2025-09-05 PROCEDURE — 1159F MED LIST DOCD IN RCRD: CPT | Performed by: INTERNAL MEDICINE

## 2025-09-05 PROCEDURE — 1036F TOBACCO NON-USER: CPT | Performed by: INTERNAL MEDICINE

## 2025-09-05 PROCEDURE — 1123F ACP DISCUSS/DSCN MKR DOCD: CPT | Performed by: INTERNAL MEDICINE

## 2025-09-05 PROCEDURE — 1160F RVW MEDS BY RX/DR IN RCRD: CPT | Performed by: INTERNAL MEDICINE

## 2025-09-05 PROCEDURE — G8427 DOCREV CUR MEDS BY ELIG CLIN: HCPCS | Performed by: INTERNAL MEDICINE

## 2025-09-05 PROCEDURE — 99214 OFFICE O/P EST MOD 30 MIN: CPT | Performed by: INTERNAL MEDICINE

## 2025-09-05 PROCEDURE — G8419 CALC BMI OUT NRM PARAM NOF/U: HCPCS | Performed by: INTERNAL MEDICINE

## (undated) DEVICE — SOLIDIFIER MEDC 1200ML -- CONVERT TO 356117

## (undated) DEVICE — KENDALL RADIOLUCENT FOAM MONITORING ELECTRODE RECTANGULAR SHAPE: Brand: KENDALL

## (undated) DEVICE — SUTURE PERMAHAND SZ 2-0 L30IN NONABSORBABLE BLK SILK W/O A305H

## (undated) DEVICE — SUT PROL 0 30IN CT1 BLU --

## (undated) DEVICE — Device

## (undated) DEVICE — SUTURE VCRL SZ 4-0 L27IN ABSRB UD L19MM PS-2 3/8 CIR PRIM J426H

## (undated) DEVICE — INTENDED FOR TISSUE SEPARATION, AND OTHER PROCEDURES THAT REQUIRE A SHARP SURGICAL BLADE TO PUNCTURE OR CUT.: Brand: BARD-PARKER ® CARBON RIB-BACK BLADES

## (undated) DEVICE — NEEDLE HYPO 18GA L1.5IN PNK S STL HUB POLYPR SHLD REG BVL

## (undated) DEVICE — SUTURE PROL SZ 0 L30IN NONABSORBABLE BLU L40MM CT 1/2 CIR 8434H

## (undated) DEVICE — NEONATAL-ADULT SPO2 SENSOR: Brand: NELLCOR

## (undated) DEVICE — GOWN,SIRUS,NONRNF,SETINSLV,2XL,18/CS: Brand: MEDLINE

## (undated) DEVICE — GLOVE SURG SZ 8 CRM LTX FREE POLYISOPRENE POLYMER BEAD ANTI

## (undated) DEVICE — SOLUTION IRRIG 1000ML 0.9% SOD CHL USP POUR PLAS BTL

## (undated) DEVICE — BANDAGE COBAN 4 IN COMPR W4INXL5YD FOAM COHESIVE QUIK STK SELF ADH SFT

## (undated) DEVICE — SPONGE GZ W4XL4IN COT RADPQ HIGHLY ABSRB

## (undated) DEVICE — CONTAINER SPEC 20 ML LID NEUT BUFF FORMALIN 10 % POLYPR STS

## (undated) DEVICE — BIPOLAR FORCEPS CORD: Brand: VALLEYLAB

## (undated) DEVICE — NEEDLE HYPO 25GA L1.5IN BVL ORIENTED ECLIPSE

## (undated) DEVICE — SUTURE VCRL SZ 3-0 L27IN ABSRB UD L26MM SH 1/2 CIR J416H

## (undated) DEVICE — BANDAGE,GAUZE,BULKEE II,4.5"X4.1YD,STRL: Brand: MEDLINE

## (undated) DEVICE — SYRINGE 50ML E/T

## (undated) DEVICE — SET ADMIN 16ML TBNG L100IN 2 Y INJ SITE IV PIGGY BK DISP

## (undated) DEVICE — SYR 3ML LL TIP 1/10ML GRAD --

## (undated) DEVICE — SUTURE VCRL SZ 3-0 L18IN ABSRB UD L26MM SH 1/2 CIR J864D

## (undated) DEVICE — TRAP SPEC COLL POLYP POLYSTYR --

## (undated) DEVICE — SUTURE VCRL SZ 3-0 L54IN ABSRB VLT LIGAPAK REEL NDL J205G

## (undated) DEVICE — CATH IV AUTOGRD BC PNK 20GA 25 -- INSYTE

## (undated) DEVICE — MAJOR LAPAROTOMY-MRMC: Brand: MEDLINE INDUSTRIES, INC.

## (undated) DEVICE — 1200 GUARD II KIT W/5MM TUBE W/O VAC TUBE: Brand: GUARDIAN

## (undated) DEVICE — STRIP SKIN CLSR W0.25XL4IN WHT SPUNBOUND FBR NYL HI ADH

## (undated) DEVICE — UNDERGLOVE SURG SZ 8 FNGR THK0.21MIL GRN LTX BEAD CUF

## (undated) DEVICE — SYR 10ML LUER LOK 1/5ML GRAD --

## (undated) DEVICE — BASIN EMSIS 16OZ GRAPHITE PLAS KID SHP MOLD GRAD FOR ORAL

## (undated) DEVICE — DERMABOND SKIN ADH 0.7ML -- DERMABOND ADVANCED 12/BX

## (undated) DEVICE — IMMOB SHLDR W/WAIST STRP L --

## (undated) DEVICE — SPONGE: SPECIALTY PEANUT XR 100/CS: Brand: MEDICAL ACTION INDUSTRIES

## (undated) DEVICE — EXTREMITY-MRMC: Brand: MEDLINE INDUSTRIES, INC.

## (undated) DEVICE — Z DISCONTINUED PER MEDLINE LINE GAS SAMPLING O2/CO2 LNG AD 13 FT NSL W/ TBNG FILTERLINE

## (undated) DEVICE — GLOVE ORANGE PI 7 1/2   MSG9075

## (undated) DEVICE — STOCKINETTE,IMPERVIOUS,12X48,STERILE: Brand: MEDLINE

## (undated) DEVICE — KIT,1200CC CANISTER,3/16"X6' TUBING: Brand: MEDLINE INDUSTRIES, INC.

## (undated) DEVICE — ADHESIVE LIQ 2OZ ADJUNCT FOR DSG MASTISOL

## (undated) DEVICE — SUTURE MCRYL SZ 4-0 L27IN ABSRB UD L19MM PS-2 1/2 CIR PRIM Y426H

## (undated) DEVICE — SOLUTION SURG PREP 26 CC PURPREP

## (undated) DEVICE — SNARE ENDOSCP M L240CM W27MM SHTH DIA2.4MM CHN 2.8MM OVL

## (undated) DEVICE — TOWEL 4 PLY TISS 19X30 SUE WHT

## (undated) DEVICE — GARMENT,MEDLINE,DVT,INT,CALF,MED, GEN2: Brand: MEDLINE